# Patient Record
Sex: MALE | Race: WHITE | NOT HISPANIC OR LATINO | Employment: FULL TIME | URBAN - METROPOLITAN AREA
[De-identification: names, ages, dates, MRNs, and addresses within clinical notes are randomized per-mention and may not be internally consistent; named-entity substitution may affect disease eponyms.]

---

## 2022-07-27 ENCOUNTER — OCCUPATIONAL MEDICINE (OUTPATIENT)
Dept: URGENT CARE | Facility: CLINIC | Age: 52
End: 2022-07-27
Payer: COMMERCIAL

## 2022-07-27 VITALS
BODY MASS INDEX: 26.44 KG/M2 | HEART RATE: 95 BPM | RESPIRATION RATE: 16 BRPM | SYSTOLIC BLOOD PRESSURE: 102 MMHG | DIASTOLIC BLOOD PRESSURE: 70 MMHG | HEIGHT: 74 IN | WEIGHT: 206 LBS | OXYGEN SATURATION: 96 % | TEMPERATURE: 98.4 F

## 2022-07-27 DIAGNOSIS — M25.611 DECREASED ROM OF RIGHT SHOULDER: ICD-10-CM

## 2022-07-27 DIAGNOSIS — M25.511 CHRONIC RIGHT SHOULDER PAIN: ICD-10-CM

## 2022-07-27 DIAGNOSIS — M54.2 NECK PAIN: ICD-10-CM

## 2022-07-27 DIAGNOSIS — G47.01 INSOMNIA DUE TO MEDICAL CONDITION: ICD-10-CM
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
## 2022-07-27 DIAGNOSIS — G89.29 CHRONIC RIGHT SHOULDER PAIN: ICD-10-CM

## 2022-07-27 PROCEDURE — 99203 OFFICE O/P NEW LOW 30 MIN: CPT | Performed by: NURSE PRACTITIONER

## 2022-07-27 RX ORDER — IBUPROFEN 800 MG/1
800 TABLET ORAL EVERY 8 HOURS PRN
Qty: 30 TABLET | Refills: 0 | Status: SHIPPED | OUTPATIENT
Start: 2022-07-27 | End: 2022-09-06

## 2022-07-27 RX ORDER — KETOROLAC TROMETHAMINE 30 MG/ML
30 INJECTION, SOLUTION INTRAMUSCULAR; INTRAVENOUS ONCE
Status: COMPLETED | OUTPATIENT
Start: 2022-07-27 | End: 2022-07-27

## 2022-07-27 RX ADMIN — KETOROLAC TROMETHAMINE 30 MG: 30 INJECTION, SOLUTION INTRAMUSCULAR; INTRAVENOUS at 18:03
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
## 2022-08-08 ENCOUNTER — OCCUPATIONAL MEDICINE (OUTPATIENT)
Dept: OCCUPATIONAL MEDICINE | Facility: CLINIC | Age: 52
End: 2022-08-08
Payer: COMMERCIAL

## 2022-08-08 VITALS
HEIGHT: 74 IN | RESPIRATION RATE: 14 BRPM | WEIGHT: 206 LBS | DIASTOLIC BLOOD PRESSURE: 82 MMHG | BODY MASS INDEX: 26.44 KG/M2 | HEART RATE: 85 BPM | SYSTOLIC BLOOD PRESSURE: 126 MMHG | TEMPERATURE: 98.4 F | OXYGEN SATURATION: 95 %

## 2022-08-08 DIAGNOSIS — S16.1XXD STRAIN OF NECK MUSCLE, SUBSEQUENT ENCOUNTER: ICD-10-CM

## 2022-08-08 DIAGNOSIS — S46.911D STRAIN OF RIGHT SHOULDER, SUBSEQUENT ENCOUNTER: ICD-10-CM

## 2022-08-08 PROCEDURE — 99204 OFFICE O/P NEW MOD 45 MIN: CPT | Performed by: PREVENTIVE MEDICINE

## 2022-08-08 RX ORDER — TIZANIDINE 4 MG/1
4 TABLET ORAL
Qty: 30 TABLET | Refills: 1 | Status: SHIPPED | OUTPATIENT
Start: 2022-08-08 | End: 2022-08-26

## 2022-08-08 RX ORDER — DICLOFENAC SODIUM 75 MG/1
75 TABLET, DELAYED RELEASE ORAL 2 TIMES DAILY
Qty: 60 TABLET | Refills: 0 | Status: SHIPPED | OUTPATIENT
Start: 2022-08-08 | End: 2022-08-26
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
## 2022-08-25 SDOH — ECONOMIC STABILITY: HOUSING INSECURITY
IN THE LAST 12 MONTHS, WAS THERE A TIME WHEN YOU DID NOT HAVE A STEADY PLACE TO SLEEP OR SLEPT IN A SHELTER (INCLUDING NOW)?: NO

## 2022-08-25 SDOH — ECONOMIC STABILITY: FOOD INSECURITY: WITHIN THE PAST 12 MONTHS, THE FOOD YOU BOUGHT JUST DIDN'T LAST AND YOU DIDN'T HAVE MONEY TO GET MORE.: NEVER TRUE

## 2022-08-25 SDOH — ECONOMIC STABILITY: FOOD INSECURITY: WITHIN THE PAST 12 MONTHS, YOU WORRIED THAT YOUR FOOD WOULD RUN OUT BEFORE YOU GOT MONEY TO BUY MORE.: NEVER TRUE

## 2022-08-25 SDOH — ECONOMIC STABILITY: TRANSPORTATION INSECURITY
IN THE PAST 12 MONTHS, HAS LACK OF RELIABLE TRANSPORTATION KEPT YOU FROM MEDICAL APPOINTMENTS, MEETINGS, WORK OR FROM GETTING THINGS NEEDED FOR DAILY LIVING?: NO

## 2022-08-25 SDOH — HEALTH STABILITY: PHYSICAL HEALTH: ON AVERAGE, HOW MANY DAYS PER WEEK DO YOU ENGAGE IN MODERATE TO STRENUOUS EXERCISE (LIKE A BRISK WALK)?: 0 DAYS

## 2022-08-25 SDOH — ECONOMIC STABILITY: TRANSPORTATION INSECURITY
IN THE PAST 12 MONTHS, HAS THE LACK OF TRANSPORTATION KEPT YOU FROM MEDICAL APPOINTMENTS OR FROM GETTING MEDICATIONS?: NO

## 2022-08-25 SDOH — ECONOMIC STABILITY: HOUSING INSECURITY: IN THE LAST 12 MONTHS, HOW MANY PLACES HAVE YOU LIVED?: 2

## 2022-08-25 SDOH — HEALTH STABILITY: PHYSICAL HEALTH: ON AVERAGE, HOW MANY MINUTES DO YOU ENGAGE IN EXERCISE AT THIS LEVEL?: 30 MIN

## 2022-08-25 SDOH — HEALTH STABILITY: MENTAL HEALTH
STRESS IS WHEN SOMEONE FEELS TENSE, NERVOUS, ANXIOUS, OR CAN'T SLEEP AT NIGHT BECAUSE THEIR MIND IS TROUBLED. HOW STRESSED ARE YOU?: NOT AT ALL

## 2022-08-25 SDOH — ECONOMIC STABILITY: INCOME INSECURITY: IN THE LAST 12 MONTHS, WAS THERE A TIME WHEN YOU WERE NOT ABLE TO PAY THE MORTGAGE OR RENT ON TIME?: NO

## 2022-08-25 SDOH — ECONOMIC STABILITY: INCOME INSECURITY: HOW HARD IS IT FOR YOU TO PAY FOR THE VERY BASICS LIKE FOOD, HOUSING, MEDICAL CARE, AND HEATING?: NOT HARD AT ALL

## 2022-08-25 SDOH — ECONOMIC STABILITY: TRANSPORTATION INSECURITY
IN THE PAST 12 MONTHS, HAS LACK OF TRANSPORTATION KEPT YOU FROM MEETINGS, WORK, OR FROM GETTING THINGS NEEDED FOR DAILY LIVING?: NO

## 2022-08-25 ASSESSMENT — SOCIAL DETERMINANTS OF HEALTH (SDOH)
HOW OFTEN DO YOU GET TOGETHER WITH FRIENDS OR RELATIVES?: NEVER
HOW MANY DRINKS CONTAINING ALCOHOL DO YOU HAVE ON A TYPICAL DAY WHEN YOU ARE DRINKING: PATIENT DOES NOT DRINK
HOW OFTEN DO YOU ATTENT MEETINGS OF THE CLUB OR ORGANIZATION YOU BELONG TO?: NEVER
HOW OFTEN DO YOU ATTENT MEETINGS OF THE CLUB OR ORGANIZATION YOU BELONG TO?: NEVER
HOW OFTEN DO YOU GET TOGETHER WITH FRIENDS OR RELATIVES?: NEVER
DO YOU BELONG TO ANY CLUBS OR ORGANIZATIONS SUCH AS CHURCH GROUPS UNIONS, FRATERNAL OR ATHLETIC GROUPS, OR SCHOOL GROUPS?: NO
WITHIN THE PAST 12 MONTHS, YOU WORRIED THAT YOUR FOOD WOULD RUN OUT BEFORE YOU GOT THE MONEY TO BUY MORE: NEVER TRUE
ARE YOU MARRIED, WIDOWED, DIVORCED, SEPARATED, NEVER MARRIED, OR LIVING WITH A PARTNER?: LIVING WITH PARTNER
HOW OFTEN DO YOU HAVE SIX OR MORE DRINKS ON ONE OCCASION: NEVER
HOW OFTEN DO YOU ATTEND CHURCH OR RELIGIOUS SERVICES?: NEVER
HOW OFTEN DO YOU HAVE A DRINK CONTAINING ALCOHOL: MONTHLY OR LESS
ARE YOU MARRIED, WIDOWED, DIVORCED, SEPARATED, NEVER MARRIED, OR LIVING WITH A PARTNER?: LIVING WITH PARTNER
IN A TYPICAL WEEK, HOW MANY TIMES DO YOU TALK ON THE PHONE WITH FAMILY, FRIENDS, OR NEIGHBORS?: ONCE A WEEK
IN A TYPICAL WEEK, HOW MANY TIMES DO YOU TALK ON THE PHONE WITH FAMILY, FRIENDS, OR NEIGHBORS?: ONCE A WEEK
HOW OFTEN DO YOU ATTEND CHURCH OR RELIGIOUS SERVICES?: NEVER
DO YOU BELONG TO ANY CLUBS OR ORGANIZATIONS SUCH AS CHURCH GROUPS UNIONS, FRATERNAL OR ATHLETIC GROUPS, OR SCHOOL GROUPS?: NO
HOW HARD IS IT FOR YOU TO PAY FOR THE VERY BASICS LIKE FOOD, HOUSING, MEDICAL CARE, AND HEATING?: NOT HARD AT ALL

## 2022-08-25 ASSESSMENT — LIFESTYLE VARIABLES
SKIP TO QUESTIONS 9-10: 1
HOW OFTEN DO YOU HAVE SIX OR MORE DRINKS ON ONE OCCASION: NEVER
AUDIT-C TOTAL SCORE: 1
HOW OFTEN DO YOU HAVE A DRINK CONTAINING ALCOHOL: MONTHLY OR LESS
HOW MANY STANDARD DRINKS CONTAINING ALCOHOL DO YOU HAVE ON A TYPICAL DAY: PATIENT DOES NOT DRINK

## 2022-08-26 ENCOUNTER — OFFICE VISIT (OUTPATIENT)
Dept: MEDICAL GROUP | Facility: PHYSICIAN GROUP | Age: 52
End: 2022-08-26
Payer: COMMERCIAL

## 2022-08-26 VITALS
OXYGEN SATURATION: 98 % | HEIGHT: 72 IN | DIASTOLIC BLOOD PRESSURE: 86 MMHG | WEIGHT: 199.6 LBS | SYSTOLIC BLOOD PRESSURE: 128 MMHG | HEART RATE: 98 BPM | TEMPERATURE: 97.6 F | BODY MASS INDEX: 27.04 KG/M2 | RESPIRATION RATE: 16 BRPM

## 2022-08-26 DIAGNOSIS — Z12.12 SCREENING FOR COLORECTAL CANCER: ICD-10-CM

## 2022-08-26 DIAGNOSIS — Z12.11 SCREENING FOR COLORECTAL CANCER: ICD-10-CM

## 2022-08-26 DIAGNOSIS — Z12.5 SCREENING FOR MALIGNANT NEOPLASM OF PROSTATE: ICD-10-CM

## 2022-08-26 DIAGNOSIS — Z80.3 FAMILY HISTORY OF BREAST CANCER IN SISTER: ICD-10-CM

## 2022-08-26 DIAGNOSIS — Z13.220 SCREENING, LIPID: ICD-10-CM

## 2022-08-26 DIAGNOSIS — Z23 NEED FOR VACCINATION: ICD-10-CM

## 2022-08-26 DIAGNOSIS — R53.83 FATIGUE, UNSPECIFIED TYPE: ICD-10-CM

## 2022-08-26 DIAGNOSIS — N52.03 COMBINED ARTERIAL INSUFFICIENCY AND CORPORO-VENOUS OCCLUSIVE ERECTILE DYSFUNCTION: ICD-10-CM

## 2022-08-26 PROBLEM — N52.9 ERECTILE DYSFUNCTION: Status: ACTIVE | Noted: 2022-08-26

## 2022-08-26 PROCEDURE — 99204 OFFICE O/P NEW MOD 45 MIN: CPT | Performed by: FAMILY MEDICINE

## 2022-08-26 RX ORDER — ZOSTER VACCINE RECOMBINANT, ADJUVANTED 50 MCG/0.5
0.5 KIT INTRAMUSCULAR ONCE
Qty: 0.5 ML | Refills: 1 | Status: SHIPPED | OUTPATIENT
Start: 2022-08-26 | End: 2022-08-26

## 2022-08-26 RX ORDER — CLOSTRIDIUM TETANI TOXOID ANTIGEN (FORMALDEHYDE INACTIVATED), CORYNEBACTERIUM DIPHTHERIAE TOXOID ANTIGEN (FORMALDEHYDE INACTIVATED), BORDETELLA PERTUSSIS TOXOID ANTIGEN (GLUTARALDEHYDE INACTIVATED), BORDETELLA PERTUSSIS FILAMENTOUS HEMAGGLUTININ ANTIGEN (FORMALDEHYDE INACTIVATED), BORDETELLA PERTUSSIS PERTACTIN ANTIGEN, AND BORDETELLA PERTUSSIS FIMBRIAE 2/3 ANTIGEN 5; 2; 2.5; 5; 3; 5 [LF]/.5ML; [LF]/.5ML; UG/.5ML; UG/.5ML; UG/.5ML; UG/.5ML
0.5 INJECTION, SUSPENSION INTRAMUSCULAR ONCE
Qty: 0.5 ML | Refills: 0 | Status: SHIPPED
Start: 2022-08-26 | End: 2022-08-26

## 2022-08-26 RX ORDER — SILDENAFIL 100 MG/1
100 TABLET, FILM COATED ORAL
Qty: 10 TABLET | Refills: 3 | Status: SHIPPED | OUTPATIENT
Start: 2022-08-26 | End: 2022-09-15

## 2022-08-26 ASSESSMENT — PATIENT HEALTH QUESTIONNAIRE - PHQ9: CLINICAL INTERPRETATION OF PHQ2 SCORE: 0

## 2022-08-26 NOTE — PROGRESS NOTES
Subjective:   Donadl Fuller is a 51 y.o. male here today for evaluation and management of:     Erectile dysfunction  rx for viagra 100mg as needed  Risks discussed.   No hx of trauma/surgery  No new partners.   No hx of HTN, heart disease.      Advised him to get colonoscopy, no fly hx of colon cancer.     Current medicines (including changes today)  Current Outpatient Medications   Medication Sig Dispense Refill    tetanus-dipth-acell pertussis (ADACEL) 5-2-15.5 LF-MCG/0.5 Suspension Inject 0.5 mL into the shoulder, thigh, or buttocks one time for 1 dose. 0.5 mL 0    Zoster Vac Recomb Adjuvanted (SHINGRIX) 50 MCG/0.5ML Recon Susp Inject 0.5 mL into the shoulder, thigh, or buttocks one time for 1 dose. 0.5 mL 1    sildenafil citrate (VIAGRA) 100 MG tablet Take 1 Tablet by mouth 1 time a day as needed for Erectile Dysfunction. 10 Tablet 3    ibuprofen (MOTRIN) 800 MG Tab Take 1 Tablet by mouth every 8 hours as needed for Moderate Pain or Inflammation. 30 Tablet 0     No current facility-administered medications for this visit.     He  has no past medical history on file.    ROS  No chest pain, no shortness of breath, no abdominal pain       Objective:     /86 (BP Location: Left arm, Patient Position: Sitting, BP Cuff Size: Adult)   Pulse 98   Temp 36.4 °C (97.6 °F) (Temporal)   Resp 16   Ht 1.829 m (6')   Wt 90.5 kg (199 lb 9.6 oz)   SpO2 98%  Body mass index is 27.07 kg/m².   Physical Exam:  Constitutional: Alert, no distress.  Skin: Warm, dry, good turgor, no rashes in visible areas.  Eye: Equal, round and reactive, conjunctiva clear, lids normal.  ENMT: Lips without lesions, good dentition, oropharynx clear.  Neck: Trachea midline, no masses, no thyromegaly. No cervical or supraclavicular lymphadenopathy  Respiratory: Unlabored respiratory effort, lungs clear to auscultation, no wheezes, no ronchi.  Cardiovascular: Normal S1, S2, no murmur, no edema.  Abdomen: Soft, non-tender, no masses, no  hepatosplenomegaly.  Psych: Alert and oriented x3, normal affect and mood.        Assessment and Plan:   The following treatment plan was discussed    1. Need for vaccination  - tetanus-dipth-acell pertussis (ADACEL) 5-2-15.5 LF-MCG/0.5 Suspension; Inject 0.5 mL into the shoulder, thigh, or buttocks one time for 1 dose.  Dispense: 0.5 mL; Refill: 0  - Zoster Vac Recomb Adjuvanted (SHINGRIX) 50 MCG/0.5ML Recon Susp; Inject 0.5 mL into the shoulder, thigh, or buttocks one time for 1 dose.  Dispense: 0.5 mL; Refill: 1    2. Screening for colorectal cancer  - Referral to GI for Colonoscopy    3. Combined arterial insufficiency and corporo-venous occlusive erectile dysfunction  - Comp Metabolic Panel; Future    4. Screening, lipid  - Lipid Profile; Future    5. Screening for malignant neoplasm of prostate  - PROSTATE SPECIFIC AG SCREENING; Future    6. Fatigue, unspecified type  - CBC WITH DIFFERENTIAL; Future    Other orders  - sildenafil citrate (VIAGRA) 100 MG tablet; Take 1 Tablet by mouth 1 time a day as needed for Erectile Dysfunction.  Dispense: 10 Tablet; Refill: 3      Followup: Return in about 6 months (around 2/26/2023) for Lab Review.

## 2022-08-26 NOTE — ASSESSMENT & PLAN NOTE
rx for viagra 100mg as needed  Risks discussed.   No hx of trauma/surgery  No new partners.   No hx of HTN, heart disease.

## 2022-08-26 NOTE — PATIENT INSTRUCTIONS
Please get fasting labs, fasting for 8-10 hours before next visit. You can make an appointment for the lab or walk in.   Lab hours [facility] location: Monday to Friday 6 am - 4 pm, Saturday 7 am -noon  Even if you lose your lab paperwork, you can still come in to get your lab done.     Please check [facility] for your referral information for colonoscopy or call the referral department at .   You can also send me a [employer] message regarding your referral information.   Please note you will probably not get a call regarding the referral.

## 2022-09-06 ENCOUNTER — OFFICE VISIT (OUTPATIENT)
Dept: MEDICAL GROUP | Facility: PHYSICIAN GROUP | Age: 52
End: 2022-09-06
Payer: COMMERCIAL

## 2022-09-06 VITALS
HEIGHT: 72 IN | TEMPERATURE: 99.3 F | WEIGHT: 204 LBS | RESPIRATION RATE: 14 BRPM | DIASTOLIC BLOOD PRESSURE: 80 MMHG | BODY MASS INDEX: 27.63 KG/M2 | OXYGEN SATURATION: 98 % | SYSTOLIC BLOOD PRESSURE: 126 MMHG | HEART RATE: 100 BPM

## 2022-09-06 DIAGNOSIS — E11.65 TYPE 2 DIABETES MELLITUS WITH HYPERGLYCEMIA, WITH LONG-TERM CURRENT USE OF INSULIN (HCC): ICD-10-CM

## 2022-09-06 DIAGNOSIS — Z79.4 TYPE 2 DIABETES MELLITUS WITH HYPERGLYCEMIA, WITH LONG-TERM CURRENT USE OF INSULIN (HCC): ICD-10-CM

## 2022-09-06 PROCEDURE — 99214 OFFICE O/P EST MOD 30 MIN: CPT | Performed by: FAMILY MEDICINE

## 2022-09-06 RX ORDER — SIMVASTATIN 20 MG
20 TABLET ORAL NIGHTLY
Qty: 90 TABLET | Refills: 3 | Status: SHIPPED | OUTPATIENT
Start: 2022-09-06 | End: 2023-01-24

## 2022-09-06 RX ORDER — DULAGLUTIDE 0.75 MG/.5ML
0.5 INJECTION, SOLUTION SUBCUTANEOUS
Qty: 2 ML | Refills: 3 | Status: SHIPPED | OUTPATIENT
Start: 2022-09-06 | End: 2023-01-24

## 2022-09-06 RX ORDER — LISINOPRIL 2.5 MG/1
2.5 TABLET ORAL DAILY
Qty: 90 TABLET | Refills: 3 | Status: SHIPPED | OUTPATIENT
Start: 2022-09-06 | End: 2023-01-24

## 2022-09-06 NOTE — PATIENT INSTRUCTIONS
Please get fasting labs, fasting for 8-10 hours before next visit. You can make an appointment for the lab or walk in.   Lab hours [facility] location: Monday to Friday 6 am - 4 pm, Saturday 7 am -noon  Even if you lose your lab paperwork, you can still come in to get your lab done.

## 2022-09-06 NOTE — ASSESSMENT & PLAN NOTE
Pt is on insulin 10 units qhs after A1c >14 when screening labs for neck surgery revealed BS in 600s.  Blood sugars now in 300s. I advsied he inc nph insulin to 15 units qhs  rx for met 1000 bid provided  rx for trulicity provided if inc in insulin and met are not improving blood sugars.   He feels well, no increased thirst, urination, or any symptoms of DM at all.   It is fortunate he was going to have surgery and had screening labs done.     ACEI added low dose lisinopril 2.5 mg to protect renal function, heart  Simvastatin 20 mg added for cardiovascular protection  Follow up labs ordered

## 2022-09-07 RX ORDER — BLOOD-GLUCOSE METER
KIT MISCELLANEOUS
COMMUNITY
Start: 2022-09-04

## 2022-09-07 RX ORDER — IBUPROFEN 200 MG
200 TABLET ORAL
COMMUNITY
End: 2023-01-24

## 2022-09-07 RX ORDER — BLOOD SUGAR DIAGNOSTIC
1 STRIP MISCELLANEOUS
COMMUNITY
Start: 2022-09-02

## 2022-09-07 RX ORDER — BLOOD SUGAR DIAGNOSTIC
STRIP MISCELLANEOUS
COMMUNITY
Start: 2022-09-04

## 2022-09-07 RX ORDER — LANCETS 30 GAUGE
EACH MISCELLANEOUS
COMMUNITY
Start: 2022-09-03

## 2022-09-07 NOTE — PROGRESS NOTES
Subjective:   Donald Fuller is a 51 y.o. male here today for evaluation and management of:     Type 2 diabetes mellitus with hyperglycemia, with long-term current use of insulin ([facility])  Pt is on insulin 10 units qhs after A1c >14 when screening labs for neck surgery revealed BS in 600s.  Blood sugars now in 300s. I advsied he inc nph insulin to 15 units qhs  rx for met 1000 bid provided  rx for trulicity provided if inc in insulin and met are not improving blood sugars.   He feels well, no increased thirst, urination, or any symptoms of DM at all.   It is fortunate he was going to have surgery and had screening labs done.     ACEI added low dose lisinopril 2.5 mg to protect renal function, heart  Simvastatin 20 mg added for cardiovascular protection  Follow up labs ordered       Current medicines (including changes today)  Current Outpatient Medications   Medication Sig Dispense Refill    insulin NPH (HUMULIN N) 100 UNIT/ML Suspension Inject 10 Units under the skin.      metformin (GLUCOPHAGE) 1000 MG tablet Take 1 Tablet by mouth 2 times a day with meals. 180 Tablet 3    Dulaglutide (TRULICITY) 0.75 MG/0.5ML Solution Pen-injector Inject 0.5 mL under the skin every 7 days. 2 mL 3    lisinopril (PRINIVIL) 2.5 MG Tab Take 1 Tablet by mouth every day. For kidney protection 90 Tablet 3    simvastatin (ZOCOR) 20 MG Tab Take 1 Tablet by mouth every evening. To protect your heart 90 Tablet 3     No current facility-administered medications for this visit.     He  has no past medical history on file.    ROS  No chest pain, no shortness of breath, no abdominal pain       Objective:     /80 (BP Location: Left arm)   Pulse 100   Temp 37.4 °C (99.3 °F) (Temporal)   Resp 14   Ht 1.829 m (6')   Wt 92.5 kg (204 lb)   SpO2 98%  Body mass index is 27.67 kg/m².   Physical Exam:  Constitutional: Alert, no distress.  Skin: Warm, dry, good turgor, no rashes in visible areas.  Eye: Equal, round and reactive,  conjunctiva clear, lids normal.  ENMT: Lips without lesions, good dentition, oropharynx clear.  Neck: Trachea midline, no masses, no thyromegaly. No cervical or supraclavicular lymphadenopathy  Respiratory: Unlabored respiratory effort, lungs clear to auscultation, no wheezes, no ronchi.  Cardiovascular: Normal S1, S2, no murmur, no edema.  Abdomen: Soft, non-tender, no masses, no hepatosplenomegaly.  Psych: Alert and oriented x3, normal affect and mood.        Assessment and Plan:   The following treatment plan was discussed    1. Type 2 diabetes mellitus with hyperglycemia, with long-term current use of insulin (HCC)  - Lipid Profile; Future  - Comp Metabolic Panel; Future  - HEMOGLOBIN A1C; Future  - MICROALBUMIN CREAT RATIO URINE; Future    Other orders  - insulin NPH (HUMULIN N) 100 UNIT/ML Suspension; Inject 10 Units under the skin.  - metformin (GLUCOPHAGE) 1000 MG tablet; Take 1 Tablet by mouth 2 times a day with meals.  Dispense: 180 Tablet; Refill: 3  - Dulaglutide (TRULICITY) 0.75 MG/0.5ML Solution Pen-injector; Inject 0.5 mL under the skin every 7 days.  Dispense: 2 mL; Refill: 3  - lisinopril (PRINIVIL) 2.5 MG Tab; Take 1 Tablet by mouth every day. For kidney protection  Dispense: 90 Tablet; Refill: 3  - simvastatin (ZOCOR) 20 MG Tab; Take 1 Tablet by mouth every evening. To protect your heart  Dispense: 90 Tablet; Refill: 3      Followup: Return in about 3 months (around 12/6/2022) for DM2, Lab Review.

## 2022-09-15 RX ORDER — CALCIUM CARB/VITAMIN D3/VIT K1 500-100-40
TABLET,CHEWABLE ORAL
COMMUNITY
End: 2022-09-15 | Stop reason: SDUPTHER

## 2022-09-15 RX ORDER — SILDENAFIL 100 MG/1
100 TABLET, FILM COATED ORAL
Qty: 10 TABLET | Refills: 3 | COMMUNITY
Start: 2022-09-15 | End: 2022-09-15 | Stop reason: SDUPTHER

## 2022-09-15 RX ORDER — SILDENAFIL 100 MG/1
100 TABLET, FILM COATED ORAL
Qty: 10 TABLET | Refills: 3 | Status: SHIPPED | OUTPATIENT
Start: 2022-09-15 | End: 2022-09-21

## 2022-09-15 RX ORDER — CALCIUM CARB/VITAMIN D3/VIT K1 500-100-40
TABLET,CHEWABLE ORAL
Qty: 400 EACH | Refills: 3 | Status: SHIPPED | OUTPATIENT
Start: 2022-09-15

## 2022-09-15 NOTE — TELEPHONE ENCOUNTER
Received request via: Patient    Was the patient seen in the last year in this department? Yes    Does the patient have an active prescription (recently filled or refills available) for medication(s) requested? No      Pt has tried the sildenafil citrate can we do this as Viagra

## 2022-11-10 ENCOUNTER — NON-PROVIDER VISIT (OUTPATIENT)
Dept: URGENT CARE | Facility: PHYSICIAN GROUP | Age: 52
End: 2022-11-10

## 2022-11-10 DIAGNOSIS — Z02.1 PRE-EMPLOYMENT DRUG SCREENING: ICD-10-CM

## 2022-11-10 PROCEDURE — 80305 DRUG TEST PRSMV DIR OPT OBS: CPT | Performed by: NURSE PRACTITIONER

## 2022-12-14 ENCOUNTER — OFFICE VISIT (OUTPATIENT)
Dept: MEDICAL GROUP | Facility: PHYSICIAN GROUP | Age: 52
End: 2022-12-14

## 2022-12-14 VITALS
SYSTOLIC BLOOD PRESSURE: 118 MMHG | OXYGEN SATURATION: 97 % | WEIGHT: 216 LBS | HEIGHT: 73 IN | DIASTOLIC BLOOD PRESSURE: 68 MMHG | RESPIRATION RATE: 18 BRPM | HEART RATE: 86 BPM | BODY MASS INDEX: 28.63 KG/M2 | TEMPERATURE: 97.2 F

## 2022-12-14 DIAGNOSIS — N52.03 COMBINED ARTERIAL INSUFFICIENCY AND CORPORO-VENOUS OCCLUSIVE ERECTILE DYSFUNCTION: ICD-10-CM

## 2022-12-14 DIAGNOSIS — D18.01 CHERRY ANGIOMA: ICD-10-CM

## 2022-12-14 DIAGNOSIS — R21 SKIN RASH: ICD-10-CM

## 2022-12-14 DIAGNOSIS — Z98.890 HISTORY OF NECK SURGERY: ICD-10-CM

## 2022-12-14 DIAGNOSIS — D22.9 BENIGN MOLE: ICD-10-CM

## 2022-12-14 PROBLEM — J96.00 ACUTE RESPIRATORY FAILURE (HCC): Status: ACTIVE | Noted: 2022-10-18

## 2022-12-14 PROBLEM — E87.1 HYPONATREMIA: Status: ACTIVE | Noted: 2022-10-18

## 2022-12-14 PROCEDURE — 99213 OFFICE O/P EST LOW 20 MIN: CPT | Performed by: NURSE PRACTITIONER

## 2022-12-14 RX ORDER — CELECOXIB 200 MG/1
200 CAPSULE ORAL
COMMUNITY
Start: 2022-10-11

## 2022-12-14 RX ORDER — TADALAFIL 20 MG/1
TABLET ORAL
Qty: 30 TABLET | Refills: 6 | Status: SHIPPED | OUTPATIENT
Start: 2022-12-14

## 2022-12-14 RX ORDER — GABAPENTIN 300 MG/1
300 CAPSULE ORAL
COMMUNITY
End: 2023-01-24

## 2022-12-14 ASSESSMENT — FIBROSIS 4 INDEX: FIB4 SCORE: .6163335513613656911

## 2022-12-14 NOTE — PROGRESS NOTES
"Subjective:     CC:   Chief Complaint   Patient presents with    Rash     Left upper chest x 2 yr        HPI:   Donald presents today with    History of neck surgery   Dr Sanderson was surgeon 10/18; neck pain has resolved    Skin rash  Has a spot on upper left side of chest proximal to collarbone x 2 yrs; will heal w/a/b ointment but after picking at it, returns    Erectile dysfunction  Generic Viagra was not effective for him  Tried name brand in the past was very effective but expensive   would like to try Cialis   advised Good Rx card given; will look into [facility] site    Benign mole  Mole on the left side of his cheek growing in size    Cherry angioma  Multiple on head bilat              ROS per HPI    Objective:     Exam:  /68   Pulse 86   Temp 36.2 °C (97.2 °F) (Temporal)   Resp 18   Ht 1.854 m (6' 1\")   Wt 98 kg (216 lb)   SpO2 97%   BMI 28.50 kg/m²  Body mass index is 28.5 kg/m².    Physical Exam:  Constitutional: Well-developed and well-nourished male  Not diaphoretic. No distress.   Skin: warm, dry, intact, scattered cherry angiomas on head, small mass approx size of a dime on left side of chest, no d/c or erythema noted; mole on left side of cheek approx size of a pea, small mass on left upper eyelid medial side; no open wounds  Head: Atraumatic without lesions.  Eyes: Conjunctivae are normal. Pupils are equal, round. No scleral icterus.   Ears:  External ears unremarkable.   Neck: Supple, trachea midline. No thyromegaly present. No cervical or supraclavicular lymphadenopathy.  Cardiovascular: Regular rate and rhythm without murmur.   Pulmonary: Clear to ausculation. Normal effort. No rales, ronchi, or wheezing.  Extremities: No cyanosis, clubbing, erythema, nor edema.   Neurological: Alert and oriented x 3.   Psychiatric:  Behavior, mood, and affect are appropriate.        Assessment & Plan:     51 y.o. male with the following -     1. Skin rash  - Referral to Dermatology    2. [place] " angioma  - Referral to Dermatology    3. 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    Please note that this dictation was created using voice recognition software. I have made every reasonable attempt to correct obvious errors, but I expect that there are errors of grammar and possibly content that I did not discover before finalizing the note.

## 2022-12-14 NOTE — ASSESSMENT & PLAN NOTE
Generic Viagra was not effective for him  Tried name brand in the past was very effective but expensive   would like to try Cialis   advised Good Rx card given; will look into [facility] site

## 2022-12-14 NOTE — ASSESSMENT & PLAN NOTE
Has a spot on upper left side of chest proximal to collarbone x 2 yrs; will heal w/a/b ointment but after picking at it, returns

## 2023-01-24 ENCOUNTER — OFFICE VISIT (OUTPATIENT)
Dept: MEDICAL GROUP | Facility: PHYSICIAN GROUP | Age: 53
End: 2023-01-24

## 2023-01-24 VITALS
TEMPERATURE: 97 F | WEIGHT: 206 LBS | OXYGEN SATURATION: 98 % | RESPIRATION RATE: 16 BRPM | DIASTOLIC BLOOD PRESSURE: 68 MMHG | HEIGHT: 73 IN | SYSTOLIC BLOOD PRESSURE: 118 MMHG | BODY MASS INDEX: 27.3 KG/M2 | HEART RATE: 107 BPM

## 2023-01-24 DIAGNOSIS — R94.31 ABNORMAL EKG: ICD-10-CM

## 2023-01-24 DIAGNOSIS — R55 SYNCOPE, UNSPECIFIED SYNCOPE TYPE: ICD-10-CM

## 2023-01-24 DIAGNOSIS — E11.65 TYPE 2 DIABETES MELLITUS WITH HYPERGLYCEMIA, WITH LONG-TERM CURRENT USE OF INSULIN (HCC): ICD-10-CM

## 2023-01-24 DIAGNOSIS — E16.2 HYPOGLYCEMIA: ICD-10-CM

## 2023-01-24 DIAGNOSIS — Z79.4 TYPE 2 DIABETES MELLITUS WITH HYPERGLYCEMIA, WITH LONG-TERM CURRENT USE OF INSULIN (HCC): ICD-10-CM

## 2023-01-24 DIAGNOSIS — F41.9 ANXIETY: ICD-10-CM

## 2023-01-24 LAB
GLUCOSE BLD-MCNC: 256 MG/DL (ref 70–100)
HBA1C MFR BLD: 9.7 % (ref 0–5.6)
INT CON NEG: ABNORMAL
INT CON POS: ABNORMAL

## 2023-01-24 PROCEDURE — 99214 OFFICE O/P EST MOD 30 MIN: CPT | Performed by: FAMILY MEDICINE

## 2023-01-24 PROCEDURE — 83036 HEMOGLOBIN GLYCOSYLATED A1C: CPT | Performed by: FAMILY MEDICINE

## 2023-01-24 PROCEDURE — 93000 ELECTROCARDIOGRAM COMPLETE: CPT | Performed by: FAMILY MEDICINE

## 2023-01-24 RX ORDER — VENLAFAXINE HYDROCHLORIDE 37.5 MG/1
37.5 CAPSULE, EXTENDED RELEASE ORAL DAILY
Qty: 90 CAPSULE | Refills: 3 | Status: SHIPPED | OUTPATIENT
Start: 2023-01-24 | End: 2023-03-08 | Stop reason: SDUPTHER

## 2023-01-24 RX ORDER — PROCHLORPERAZINE 25 MG/1
SUPPOSITORY RECTAL
Qty: 1 EACH | Refills: 3 | Status: SHIPPED | OUTPATIENT
Start: 2023-01-24

## 2023-01-24 RX ORDER — PROCHLORPERAZINE 25 MG/1
SUPPOSITORY RECTAL
Qty: 1 EACH | Refills: 0 | Status: SHIPPED | OUTPATIENT
Start: 2023-01-24

## 2023-01-24 RX ORDER — HYDROXYZINE HYDROCHLORIDE 25 MG/1
25 TABLET, FILM COATED ORAL 3 TIMES DAILY PRN
Qty: 90 TABLET | Refills: 0 | Status: SHIPPED | OUTPATIENT
Start: 2023-01-24 | End: 2023-03-08 | Stop reason: SDUPTHER

## 2023-01-24 RX ORDER — PROCHLORPERAZINE 25 MG/1
SUPPOSITORY RECTAL
Qty: 3 EACH | Refills: 11 | Status: SHIPPED | OUTPATIENT
Start: 2023-01-24

## 2023-01-24 RX ORDER — VENLAFAXINE HYDROCHLORIDE 37.5 MG/1
37.5 CAPSULE, EXTENDED RELEASE ORAL DAILY
Qty: 90 CAPSULE | Refills: 3 | Status: SHIPPED | OUTPATIENT
Start: 2023-01-24 | End: 2023-01-24 | Stop reason: SDUPTHER

## 2023-01-24 RX ORDER — GABAPENTIN 300 MG/1
600 CAPSULE ORAL 3 TIMES DAILY
Qty: 180 CAPSULE | Refills: 3 | Status: SHIPPED | OUTPATIENT
Start: 2023-01-24 | End: 2023-06-16 | Stop reason: SDUPTHER

## 2023-01-24 RX ORDER — SIMVASTATIN 20 MG
20 TABLET ORAL
COMMUNITY

## 2023-01-24 ASSESSMENT — FIBROSIS 4 INDEX: FIB4 SCORE: 0.63

## 2023-01-25 ENCOUNTER — TELEPHONE (OUTPATIENT)
Dept: HEALTH INFORMATION MANAGEMENT | Facility: OTHER | Age: 53
End: 2023-01-25

## 2023-01-25 ENCOUNTER — E-CONSULT (OUTPATIENT)
Dept: CARDIOLOGY | Facility: MEDICAL CENTER | Age: 53
End: 2023-01-25

## 2023-01-25 DIAGNOSIS — Z71.9 ENCOUNTER FOR CONSULTATION: ICD-10-CM

## 2023-01-25 PROCEDURE — 99447 NTRPROF PH1/NTRNET/EHR 11-20: CPT | Performed by: INTERNAL MEDICINE

## 2023-01-25 NOTE — ASSESSMENT & PLAN NOTE
In dec and then a week ago he had episodes of syncope  He has blood sugars in 300s as he ran out of his insulin  He was in the ER in [place] 2 days after he passed out, fell and broke ribs  2 days ago he felt faint, fell against the bed and passed out briefly.    ekg at [facility] showing tachycardia and diffuse st elevations  Similar ekg in clinic today.   I advised him to go to ER immediately with any recurrent syncope or any chest pain or call 911  I will request cardiology econsult to look at the ekg in case I am missing a serious arrhythmia.
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
## 2023-01-25 NOTE — PROGRESS NOTES
Called patient to see if patient was around building to come in and do EKG. Patient stated he was at home. I asked the patient to come back and do his EKG. Patient asked about his medications, I checked and assured the patient that they were sent to his pharmacy. I then asked the patient if he would be able to come back to do an EKG that his provider ordered and he stated he would be on his way. Called patient again at 5:31.Patient was upset stated he needs his gabapentin for his nerve damage. I told the patient I would lend his provider know to order the prescription. I then asked him again if he would be coming and he stated he will be here tomorrow after lunch.

## 2023-01-25 NOTE — PROGRESS NOTES
E-Consult Response     After careful review of the patient's information available in the medical record, the following are my findings and recommendations:    Reason for consult: Abnormal EKG    Summary of data reviewed: Clinic notes, EKG    Recommendations: EKG itself is not very concerning especially given the fact it has not changed since December.  However if he has recurrent syncope would recommend going to ER.  Agree with urgent cardiology consult looks like he needs full evaluation, echocardiogram.    E-Consult Time: 11 minutes were spent with >50% of the total time spent reviewing items outlined in the summary of data reviewed (Use code [id number])    Sravan Gonzalez M.D.

## 2023-01-25 NOTE — PROGRESS NOTES
Attempted to call patient for EKG, A1C, and Glucose from lobby. Pt held up his index finger for me to wait and left the building. Waited for 2 minutes then moved on to the next patient.

## 2023-01-25 NOTE — ASSESSMENT & PLAN NOTE
He has poorly controlled blood sugars  He is on humulin 22 units twice a day, met 1000 bid  He ran out of strips. Before that sugars were 126  He ran out of insulin as workman's comp did not pay him for a month and a half, he has to pay for his medication out of pocket.
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
## 2023-01-27 ENCOUNTER — TELEPHONE (OUTPATIENT)
Dept: CARDIOLOGY | Facility: MEDICAL CENTER | Age: 53
End: 2023-01-27

## 2023-01-27 NOTE — TELEPHONE ENCOUNTER
Spoke to patient in regards to obtaining records for NP appointment with Dr. Israel 2/1/23. Per patient has never been treated by a previous cardiologist. Confirmed all recent notes, labs, and cardiac imaging are in Epic. Confirmed with patient appointment time, location, and date.

## 2023-02-02 ENCOUNTER — TELEPHONE (OUTPATIENT)
Dept: CARDIOLOGY | Facility: MEDICAL CENTER | Age: 53
End: 2023-02-02

## 2023-02-06 ENCOUNTER — TELEPHONE (OUTPATIENT)
Dept: HEALTH INFORMATION MANAGEMENT | Facility: OTHER | Age: 53
End: 2023-02-06

## 2023-03-09 RX ORDER — HYDROXYZINE HYDROCHLORIDE 25 MG/1
25 TABLET, FILM COATED ORAL 3 TIMES DAILY PRN
Qty: 90 TABLET | Refills: 3 | Status: SHIPPED | OUTPATIENT
Start: 2023-03-09 | End: 2023-06-16 | Stop reason: SDUPTHER

## 2023-03-09 RX ORDER — VENLAFAXINE HYDROCHLORIDE 37.5 MG/1
37.5 CAPSULE, EXTENDED RELEASE ORAL DAILY
Qty: 90 CAPSULE | Refills: 3 | Status: SHIPPED | OUTPATIENT
Start: 2023-03-09 | End: 2023-06-16 | Stop reason: SDUPTHER

## 2023-03-09 NOTE — TELEPHONE ENCOUNTER
Received request via: Pharmacy    Was the patient seen in the last year in this department? Yes  LOV 01/24/2023    Does the patient have an active prescription (recently filled or refills available) for medication(s) requested? No    Does the patient have [facility] Plus and need 100 day supply (blood pressure, diabetes and cholesterol meds only)? Patient does not have SCP

## 2023-03-15 ENCOUNTER — OFFICE VISIT (OUTPATIENT)
Dept: MEDICAL GROUP | Facility: PHYSICIAN GROUP | Age: 53
End: 2023-03-15
Payer: COMMERCIAL

## 2023-03-15 VITALS
TEMPERATURE: 97.6 F | OXYGEN SATURATION: 97 % | WEIGHT: 217 LBS | HEART RATE: 106 BPM | HEIGHT: 73 IN | BODY MASS INDEX: 28.76 KG/M2 | RESPIRATION RATE: 16 BRPM | SYSTOLIC BLOOD PRESSURE: 110 MMHG | DIASTOLIC BLOOD PRESSURE: 78 MMHG

## 2023-03-15 DIAGNOSIS — E11.65 TYPE 2 DIABETES MELLITUS WITH HYPERGLYCEMIA, WITH LONG-TERM CURRENT USE OF INSULIN (HCC): ICD-10-CM

## 2023-03-15 DIAGNOSIS — Z12.12 SCREENING FOR COLORECTAL CANCER: ICD-10-CM

## 2023-03-15 DIAGNOSIS — F41.9 ANXIETY: ICD-10-CM

## 2023-03-15 DIAGNOSIS — Z79.4 TYPE 2 DIABETES MELLITUS WITH HYPERGLYCEMIA, WITH LONG-TERM CURRENT USE OF INSULIN (HCC): ICD-10-CM

## 2023-03-15 DIAGNOSIS — R55 SYNCOPE, UNSPECIFIED SYNCOPE TYPE: ICD-10-CM

## 2023-03-15 DIAGNOSIS — Z23 NEED FOR VACCINATION: ICD-10-CM

## 2023-03-15 DIAGNOSIS — Z12.11 SCREENING FOR COLORECTAL CANCER: ICD-10-CM

## 2023-03-15 PROBLEM — J96.00 ACUTE RESPIRATORY FAILURE (HCC): Status: RESOLVED | Noted: 2022-10-18 | Resolved: 2023-03-15

## 2023-03-15 PROCEDURE — 99214 OFFICE O/P EST MOD 30 MIN: CPT | Performed by: FAMILY MEDICINE

## 2023-03-15 ASSESSMENT — FIBROSIS 4 INDEX: FIB4 SCORE: 0.63

## 2023-03-15 ASSESSMENT — PATIENT HEALTH QUESTIONNAIRE - PHQ9: CLINICAL INTERPRETATION OF PHQ2 SCORE: 0

## 2023-03-15 NOTE — PATIENT INSTRUCTIONS
Referral information sent to the following:  Cardiology     [facility]  [facility]  [address], [address]  [place] [address]  Phone: [phone]

## 2023-03-15 NOTE — ASSESSMENT & PLAN NOTE
Advised him to follow up with cardiology referral and echo. Number to call cardiology provided.   He has had no further syncopal episodes.   ER precautions reviewed.

## 2023-03-15 NOTE — PROGRESS NOTES
Subjective:   Donald Fuller is a 52 y.o. male here today for evaluation and management of:     Anxiety  Good improvement with effexor and hydroxyzine  He is more relaxed.       Type 2 diabetes mellitus with hyperglycemia, with long-term current use of insulin (HCC)  Fasting sugars 269  He is on metformin 1000 bid  Had not paid from workers comp so not been on his insulin.  Insulin 70/30 25 units bid ordered: less expensive  DM foot exam done today  Retinal screen done in clinic today  PNA vaccination done today.        Syncope  Advised him to follow up with cardiology referral and echo. Number to call cardiology provided.   He has had no further syncopal episodes.   ER precautions reviewed.              Current medicines (including changes today)  Current Outpatient Medications   Medication Sig Dispense Refill    insulin 70/30 (HUMULIN 70/30/NOVOLIN 70/30) (70-30) 100 UNIT/ML Suspension Inject 25 Units under the skin 2 times a day. 10 mL 11    hydrOXYzine HCl (ATARAX) 25 MG Tab Take 1 Tablet by mouth 3 times a day as needed for Itching or Anxiety. 90 Tablet 3    venlafaxine XR (EFFEXOR XR) 37.5 MG CAPSULE SR 24 HR Take 1 Capsule by mouth every day. 90 Capsule 3    simvastatin (ZOCOR) 20 MG Tab Take 20 mg by mouth.      Continuous Blood Gluc  (DEXCOM G6 ) Device Use multiple times a day to check blood sugars: fasting, before and after meals and for symptoms of low and high blood sugars. 1 Each 0    Continuous Blood Gluc Sensor (DEXCOM G6 SENSOR) Misc Use multiple times a day to check blood sugars: fasting, before and after meals and for symptoms of low and high blood sugars. 3 Each 11    Continuous Blood Gluc Transmit (DEXCOM G6 TRANSMITTER) Misc Use multiple times a day to check blood sugars: fasting, before and after meals and for symptoms of low and high blood sugars. 1 Each 3    insulin NPH (HUMULIN/NOVOLIN) 100 UNIT/ML Suspension Inject 22 Units under the skin 2 times a day. 14 mL 5     "gabapentin (NEURONTIN) 300 MG Cap Take 2 Capsules by mouth 3 times a day. 180 Capsule 3    celecoxib (CELEBREX) 200 MG Cap Take 200 mg by mouth every day.      tadalafil (CIALIS) 20 MG tablet Take 1/2 tab PO 30-60 min before activity; may increase to full tab prn; max 20 mg/24 hr 30 Tablet 6    syringe 1 ML Misc       Insulin Syringe-Needle U-100 (INSULIN SYRINGE 1CC/31GX5/16\") 31G X 5/16\" 1 ML Misc Use as directed for SQ injection of insulin. 400 Each 3    Blood Glucose Calibration (ONETOUCH VERIO) Solution       Blood Glucose Monitoring Suppl (ONETOUCH VERIO REFLECT) w/Device Kit USE AS DIRECTED      glucose blood (ONETOUCH VERIO) strip 1 Strip by Other route.      ONETOUCH VERIO strip TEST 1 STRIP EVERY MORNING TO MEASURE BLOOD SUGAR AS DIRECTED      Lancets (ONETOUCH DELICA PLUS [id number]) Misc USE 1 LANCET EVERY MORNING TO MEASURE BLOOD SUGAR AS DIRECTED      metformin (GLUCOPHAGE) 1000 MG tablet Take 1 Tablet by mouth 2 times a day with meals. 180 Tablet 3     No current facility-administered medications for this visit.     He  has no past medical history on file.    ROS  No chest pain, no shortness of breath, no abdominal pain       Objective:     /78 (BP Location: Left arm, Patient Position: Sitting, BP Cuff Size: Adult)   Pulse (!) 106   Temp 36.4 °C (97.6 °F) (Temporal)   Resp 16   Ht 1.854 m (6' 1\")   Wt 98.4 kg (217 lb)   SpO2 97%  Body mass index is 28.63 kg/m².   Physical Exam:  Constitutional: Alert, no distress.  Skin: Warm, dry, good turgor, no rashes in visible areas.  Eye: Equal, round and reactive, conjunctiva clear, lids normal.  ENMT: Lips without lesions, good dentition, oropharynx clear.  Neck: Trachea midline, no masses, no thyromegaly. No cervical or supraclavicular lymphadenopathy  Respiratory: Unlabored respiratory effort, lungs clear to auscultation, no wheezes, no ronchi.  Cardiovascular: Normal S1, S2, no murmur, no edema.  Abdomen: Soft, non-tender, no masses, no " hepatosplenomegaly.  Psych: Alert and oriented x3, normal affect and mood.        Assessment and Plan:   The following treatment plan was discussed    1. Anxiety    2. Type 2 diabetes mellitus with hyperglycemia, with long-term current use of insulin (HCC)  - Diabetic Monofilament LE Exam  - insulin 70/30 (HUMULIN 70/30/NOVOLIN 70/30) (70-30) 100 UNIT/ML Suspension; Inject 25 Units under the skin 2 times a day.  Dispense: 10 mL; Refill: 11  - POCT Retinal Eye Exam    3. Syncope, unspecified syncope type    4. Need for vaccination      Followup: No follow-ups on file.

## 2023-03-15 NOTE — ASSESSMENT & PLAN NOTE
Fasting sugars 269  He is on metformin 1000 bid  Had not paid from workers comp so not been on his insulin.  Insulin 70/30 25 units bid ordered: less expensive  DM foot exam done today  Retinal screen done in clinic today  PNA vaccination done today.

## 2023-06-20 RX ORDER — VENLAFAXINE HYDROCHLORIDE 37.5 MG/1
37.5 CAPSULE, EXTENDED RELEASE ORAL DAILY
Qty: 90 CAPSULE | Refills: 3 | Status: SHIPPED | OUTPATIENT
Start: 2023-06-20

## 2023-06-20 RX ORDER — GABAPENTIN 300 MG/1
600 CAPSULE ORAL 3 TIMES DAILY
Qty: 180 CAPSULE | Refills: 3 | Status: SHIPPED | OUTPATIENT
Start: 2023-06-20 | End: 2023-11-28

## 2023-06-20 RX ORDER — HYDROXYZINE HYDROCHLORIDE 25 MG/1
25 TABLET, FILM COATED ORAL 3 TIMES DAILY PRN
Qty: 90 TABLET | Refills: 3 | Status: SHIPPED | OUTPATIENT
Start: 2023-06-20 | End: 2023-11-16

## 2023-07-13 ENCOUNTER — NON-PROVIDER VISIT (OUTPATIENT)
Dept: URGENT CARE | Facility: PHYSICIAN GROUP | Age: 53
End: 2023-07-13

## 2023-07-13 DIAGNOSIS — Z02.1 PRE-EMPLOYMENT DRUG SCREENING: ICD-10-CM

## 2023-07-13 LAB
AMP AMPHETAMINE: NORMAL
COC COCAINE: NORMAL
INT CON NEG: NORMAL
INT CON POS: NORMAL
MET METHAMPHETAMINES: NORMAL
OPI OPIATES: NORMAL
PCP PHENCYCLIDINE: NORMAL
POC DRUG COMMENT 753798-OCCUPATIONAL HEALTH: NORMAL
THC: NORMAL

## 2023-07-13 PROCEDURE — 80305 DRUG TEST PRSMV DIR OPT OBS: CPT

## 2023-11-16 RX ORDER — IBUPROFEN 800 MG/1
800 TABLET ORAL EVERY 8 HOURS PRN
COMMUNITY
Start: 2023-10-03

## 2023-11-16 RX ORDER — HYDROXYZINE HYDROCHLORIDE 25 MG/1
TABLET, FILM COATED ORAL
Qty: 90 TABLET | Refills: 3 | Status: SHIPPED | OUTPATIENT
Start: 2023-11-16

## 2023-11-16 RX ORDER — AMOXICILLIN 500 MG/1
500 CAPSULE ORAL 3 TIMES DAILY
COMMUNITY
Start: 2023-10-03

## 2023-11-16 NOTE — TELEPHONE ENCOUNTER
Received request via: Pharmacy    Was the patient seen in the last year in this department? Yes    Does the patient have an active prescription (recently filled or refills available) for medication(s) requested? No    Does the patient have [facility] Plus and need 100 day supply (blood pressure, diabetes and cholesterol meds only)? Patient does not have SCP    Last office visit 03/15/2023

## 2023-11-27 NOTE — TELEPHONE ENCOUNTER
Requested Prescriptions     Pending Prescriptions Disp Refills    gabapentin (NEURONTIN) 300 MG Cap [Pharmacy Med Name: Gabapentin 300 MG Oral Capsule] 180 Capsule 0     Sig: TAKE 2 CAPSULES BY MOUTH THREE TIMES DAILY      Last office visit: 3/15/23  Last lab: 9/2/22

## 2023-11-28 RX ORDER — GABAPENTIN 300 MG/1
600 CAPSULE ORAL 3 TIMES DAILY
Qty: 180 CAPSULE | Refills: 3 | Status: SHIPPED | OUTPATIENT
Start: 2023-11-28

## 2024-04-18 ENCOUNTER — OFFICE VISIT (OUTPATIENT)
Dept: URGENT CARE | Facility: PHYSICIAN GROUP | Age: 54
End: 2024-04-18

## 2024-04-18 VITALS
HEART RATE: 89 BPM | TEMPERATURE: 98.1 F | OXYGEN SATURATION: 96 % | BODY MASS INDEX: 28.79 KG/M2 | DIASTOLIC BLOOD PRESSURE: 70 MMHG | HEIGHT: 73 IN | SYSTOLIC BLOOD PRESSURE: 118 MMHG | WEIGHT: 217.2 LBS | RESPIRATION RATE: 20 BRPM

## 2024-04-18 DIAGNOSIS — E11.9 TYPE 2 DIABETES MELLITUS WITHOUT COMPLICATION, UNSPECIFIED WHETHER LONG TERM INSULIN USE (HCC): ICD-10-CM

## 2024-04-18 DIAGNOSIS — L08.9 WOUND INFECTION: ICD-10-CM

## 2024-04-18 DIAGNOSIS — T14.8XXA WOUND INFECTION: ICD-10-CM

## 2024-04-18 PROCEDURE — 99214 OFFICE O/P EST MOD 30 MIN: CPT | Performed by: PHYSICIAN ASSISTANT

## 2024-04-18 PROCEDURE — 3078F DIAST BP <80 MM HG: CPT | Performed by: PHYSICIAN ASSISTANT

## 2024-04-18 PROCEDURE — 3074F SYST BP LT 130 MM HG: CPT | Performed by: PHYSICIAN ASSISTANT

## 2024-04-18 RX ORDER — AMOXICILLIN AND CLAVULANATE POTASSIUM 875; 125 MG/1; MG/1
1 TABLET, FILM COATED ORAL 2 TIMES DAILY
Qty: 14 TABLET | Refills: 0 | Status: SHIPPED | OUTPATIENT
Start: 2024-04-18 | End: 2024-04-25

## 2024-04-18 RX ORDER — SULFAMETHOXAZOLE AND TRIMETHOPRIM 800; 160 MG/1; MG/1
1 TABLET ORAL 2 TIMES DAILY
Qty: 14 TABLET | Refills: 0 | Status: SHIPPED | OUTPATIENT
Start: 2024-04-18 | End: 2024-04-25

## 2024-04-18 ASSESSMENT — ENCOUNTER SYMPTOMS
FEVER: 0
CHILLS: 0

## 2024-04-18 ASSESSMENT — FIBROSIS 4 INDEX: FIB4 SCORE: 0.64

## 2024-04-18 NOTE — PROGRESS NOTES
"  Subjective:   Donald Fuller is a 53 y.o. male who presents today with   Chief Complaint   Patient presents with    Wound Check     Pt has had wound on left leg for four months its not healing and it has been getting worse and very painful, throbbing.     Other  This is a new problem. The current episode started more than 1 month ago. The problem occurs constantly. The problem has been gradually worsening. Pertinent negatives include no chills or fever.     Patient is unsure of specific injury but states he thought he may have scraped it about 4 months ago.  PMH:  has no past medical history on file.  MEDS:   Current Outpatient Medications:     amoxicillin-clavulanate (AUGMENTIN) 875-125 MG Tab, Take 1 Tablet by mouth 2 times a day for 7 days., Disp: 14 Tablet, Rfl: 0    sulfamethoxazole-trimethoprim (BACTRIM DS) 800-160 MG tablet, Take 1 Tablet by mouth 2 times a day for 7 days., Disp: 14 Tablet, Rfl: 0    ibuprofen (MOTRIN) 800 MG Tab, Take 800 mg by mouth every 8 hours as needed. for pain, Disp: , Rfl:     insulin 70/30 (HUMULIN 70/30/NOVOLIN 70/30) (70-30) 100 UNIT/ML Suspension, Inject 25 Units under the skin 2 times a day., Disp: 10 mL, Rfl: 11    syringe 1 ML Misc, , Disp: , Rfl:     Insulin Syringe-Needle U-100 (INSULIN SYRINGE 1CC/31GX5/16\") 31G X 5/16\" 1 ML Misc, Use as directed for SQ injection of insulin., Disp: 400 Each, Rfl: 3    Blood Glucose Calibration (ONETOUCH VERIO) Solution, , Disp: , Rfl:     Blood Glucose Monitoring Suppl (ONETOUCH VERIO REFLECT) w/Device Kit, USE AS DIRECTED, Disp: , Rfl:     glucose blood (ONETOUCH VERIO) strip, 1 Strip by Other route., Disp: , Rfl:     ONETOUCH VERIO strip, TEST 1 STRIP EVERY MORNING TO MEASURE BLOOD SUGAR AS DIRECTED, Disp: , Rfl:     Lancets (ONETOUCH DELICA PLUS [id number]) Misc, USE 1 LANCET EVERY MORNING TO MEASURE BLOOD SUGAR AS DIRECTED, Disp: , Rfl:     gabapentin (NEURONTIN) 300 MG Cap, TAKE 2 CAPSULES BY MOUTH THREE TIMES DAILY (Patient " not taking: Reported on 4/18/2024), Disp: 180 Capsule, Rfl: 3    hydrOXYzine HCl (ATARAX) 25 MG Tab, TAKE 1 TABLET BY MOUTH THREE TIMES DAILY AS NEEDED FOR ITCHING OR  ANXIETY (Patient not taking: Reported on 4/18/2024), Disp: 90 Tablet, Rfl: 3    venlafaxine XR (EFFEXOR XR) 37.5 MG CAPSULE SR 24 HR, Take 1 Capsule by mouth every day. (Patient not taking: Reported on 4/18/2024), Disp: 90 Capsule, Rfl: 3    metformin (GLUCOPHAGE) 1000 MG tablet, Take 1 Tablet by mouth 2 times a day with meals. (Patient not taking: Reported on 4/18/2024), Disp: 180 Tablet, Rfl: 3    simvastatin (ZOCOR) 20 MG Tab, Take 20 mg by mouth. (Patient not taking: Reported on 4/18/2024), Disp: , Rfl:     Continuous Blood Gluc  (DEXCOM G6 ) Device, Use multiple times a day to check blood sugars: fasting, before and after meals and for symptoms of low and high blood sugars. (Patient not taking: Reported on 4/18/2024), Disp: 1 Each, Rfl: 0    Continuous Blood Gluc Sensor (DEXCOM G6 SENSOR) Misc, Use multiple times a day to check blood sugars: fasting, before and after meals and for symptoms of low and high blood sugars. (Patient not taking: Reported on 4/18/2024), Disp: 3 Each, Rfl: 11    Continuous Blood Gluc Transmit (DEXCOM G6 TRANSMITTER) Misc, Use multiple times a day to check blood sugars: fasting, before and after meals and for symptoms of low and high blood sugars. (Patient not taking: Reported on 4/18/2024), Disp: 1 Each, Rfl: 3    insulin NPH (HUMULIN/NOVOLIN) 100 UNIT/ML Suspension, Inject 22 Units under the skin 2 times a day. (Patient not taking: Reported on 4/18/2024), Disp: 14 mL, Rfl: 5    celecoxib (CELEBREX) 200 MG Cap, Take 200 mg by mouth every day. (Patient not taking: Reported on 4/18/2024), Disp: , Rfl:     tadalafil (CIALIS) 20 MG tablet, Take 1/2 tab PO 30-60 min before activity; may increase to full tab prn; max 20 mg/24 hr (Patient not taking: Reported on 4/18/2024), Disp: 30 Tablet, Rfl: 6  ALLERGIES: No  "Known Allergies  SURGHX: No past surgical history on file.  SOCHX:  reports that he has never smoked. He has never used smokeless tobacco. He reports that he does not drink alcohol and does not use drugs.  FH: Reviewed with patient, not pertinent to this visit.     Review of Systems   Constitutional:  Negative for chills and fever.   Skin:         Left leg infection      Objective:   /70 (BP Location: Left arm, Patient Position: Sitting, BP Cuff Size: Adult)   Pulse 89   Temp 36.7 °C (98.1 °F) (Temporal)   Resp 20   Ht 1.854 m (6' 1\")   Wt 98.5 kg (217 lb 3.2 oz)   SpO2 96%   BMI 28.66 kg/m²   Physical Exam  Vitals and nursing note reviewed.   Constitutional:       General: He is not in acute distress.     Appearance: Normal appearance. He is well-developed. He is not ill-appearing or toxic-appearing.   HENT:      Head: Normocephalic and atraumatic.      Right Ear: Hearing normal.      Left Ear: Hearing normal.   Cardiovascular:      Rate and Rhythm: Normal rate.   Pulmonary:      Effort: Pulmonary effort is normal.   Musculoskeletal:      Comments: Patient has no bony tenderness to palpation of the left lower extremity.  Full range of motion of the left lower extremity and foot.  Normal gait.   Skin:     General: Skin is warm and dry.             Comments: Approximately 2 x 3 cm area of erythema with central scab to the left anterior shin.  No active purulent drainage.  Slight serous drainage.  Surrounding erythema but no proximal streaking or distal streaking.   Neurological:      Mental Status: He is alert.      Coordination: Coordination normal.   Psychiatric:         Mood and Affect: Mood normal.       Assessment/Plan:   Assessment    1. Wound infection  - amoxicillin-clavulanate (AUGMENTIN) 875-125 MG Tab; Take 1 Tablet by mouth 2 times a day for 7 days.  Dispense: 14 Tablet; Refill: 0  - sulfamethoxazole-trimethoprim (BACTRIM DS) 800-160 MG tablet; Take 1 Tablet by mouth 2 times a day for 7 days. "  Dispense: 14 Tablet; Refill: 0  - Referral to Dermatology  - Referral to Wound Clinic    2. Type 2 diabetes mellitus without complication, unspecified whether long term insulin use (HCC)  - Referral to Wound Clinic    Concern of lower extremity infection with history of diabetes.  I referred patient to dermatology as well as wound clinic at this time.  Dermatology referral was in case of possible precancerous lesion given changes and no noted recent injury or trauma.  Recommended updating tetanus shot today but patient declines.  Area was cleaned and antibiotic ointment and bandage placed today.  Recommend keeping area clean and covered with regular bandage changes at least once or twice a day.  Should allow the wound to air dry as well and try to elevate the area.  Will have dual coverage for broad antibiotic spectrum with Augmentin and Bactrim.    Differential diagnosis, natural history, supportive care, and indications for immediate follow-up discussed.   Patient given instructions and understanding of medications and treatment.    If not improving in 3-5 days, F/U with PCP or return to UC if symptoms worsen.  Strict ER precautions given with any new or worsening symptoms as we discussed.  Patient agreeable to plan.      Please note that this dictation was created using voice recognition software. I have made every reasonable attempt to correct obvious errors, but I expect that there are errors of grammar and possibly content that I did not discover before finalizing the note.    Jun Irving PA-C

## 2024-05-07 ENCOUNTER — TELEPHONE (OUTPATIENT)
Dept: HEALTH INFORMATION MANAGEMENT | Facility: OTHER | Age: 54
End: 2024-05-07
Payer: COMMERCIAL

## 2024-10-16 RX ORDER — GABAPENTIN 300 MG/1
600 CAPSULE ORAL 3 TIMES DAILY
Qty: 180 CAPSULE | Refills: 3 | Status: SHIPPED | OUTPATIENT
Start: 2024-10-16

## 2024-12-05 NOTE — TELEPHONE ENCOUNTER
Last OV  [id number]      Received request via: Patient    Was the patient seen in the last year in this department? No    Does the patient have an active prescription (recently filled or refills available) for medication(s) requested? No    Pharmacy Name: [facility]     Does the patient have nursing home Plus and need 100-day supply? (This applies to ALL medications) Patient does not have SCP

## 2024-12-06 RX ORDER — HYDROXYZINE HYDROCHLORIDE 25 MG/1
TABLET, FILM COATED ORAL
Qty: 90 TABLET | Refills: 3 | Status: SHIPPED | OUTPATIENT
Start: 2024-12-06

## 2025-01-08 ENCOUNTER — OFFICE VISIT (OUTPATIENT)
Dept: MEDICAL GROUP | Facility: PHYSICIAN GROUP | Age: 55
End: 2025-01-08
Payer: COMMERCIAL

## 2025-01-08 VITALS
BODY MASS INDEX: 28.23 KG/M2 | TEMPERATURE: 97.8 F | DIASTOLIC BLOOD PRESSURE: 80 MMHG | HEART RATE: 100 BPM | WEIGHT: 213 LBS | OXYGEN SATURATION: 98 % | RESPIRATION RATE: 18 BRPM | SYSTOLIC BLOOD PRESSURE: 120 MMHG | HEIGHT: 73 IN

## 2025-01-08 DIAGNOSIS — L97.425 DIABETIC ULCER OF LEFT MIDFOOT ASSOCIATED WITH TYPE 2 DIABETES MELLITUS, WITH MUSCLE INVOLVEMENT WITHOUT EVIDENCE OF NECROSIS (HCC): ICD-10-CM

## 2025-01-08 DIAGNOSIS — E11.65 TYPE 2 DIABETES MELLITUS WITH HYPERGLYCEMIA, WITH LONG-TERM CURRENT USE OF INSULIN (HCC): ICD-10-CM

## 2025-01-08 DIAGNOSIS — Z12.5 SCREENING FOR MALIGNANT NEOPLASM OF PROSTATE: ICD-10-CM

## 2025-01-08 DIAGNOSIS — Z79.4 TYPE 2 DIABETES MELLITUS WITH HYPERGLYCEMIA, WITH LONG-TERM CURRENT USE OF INSULIN (HCC): ICD-10-CM

## 2025-01-08 DIAGNOSIS — E11.42 DIABETIC POLYNEUROPATHY ASSOCIATED WITH TYPE 2 DIABETES MELLITUS (HCC): ICD-10-CM

## 2025-01-08 DIAGNOSIS — E11.621 DIABETIC ULCER OF LEFT MIDFOOT ASSOCIATED WITH TYPE 2 DIABETES MELLITUS, WITH MUSCLE INVOLVEMENT WITHOUT EVIDENCE OF NECROSIS (HCC): ICD-10-CM

## 2025-01-08 LAB
HBA1C MFR BLD: 15 % (ref ?–5.8)
POCT INT CON NEG: NEGATIVE
POCT INT CON POS: POSITIVE

## 2025-01-08 PROCEDURE — 3079F DIAST BP 80-89 MM HG: CPT | Performed by: FAMILY MEDICINE

## 2025-01-08 PROCEDURE — 83036 HEMOGLOBIN GLYCOSYLATED A1C: CPT | Performed by: FAMILY MEDICINE

## 2025-01-08 PROCEDURE — 3074F SYST BP LT 130 MM HG: CPT | Performed by: FAMILY MEDICINE

## 2025-01-08 PROCEDURE — 99214 OFFICE O/P EST MOD 30 MIN: CPT | Performed by: FAMILY MEDICINE

## 2025-01-08 RX ORDER — INSULIN LISPRO 100 [IU]/ML
2 INJECTION, SOLUTION INTRAVENOUS; SUBCUTANEOUS
Qty: 3 ML | Refills: 3 | Status: SHIPPED | OUTPATIENT
Start: 2025-01-08

## 2025-01-08 RX ORDER — SIMVASTATIN 20 MG
20 TABLET ORAL NIGHTLY
Qty: 90 TABLET | Refills: 3 | Status: SHIPPED | OUTPATIENT
Start: 2025-01-08

## 2025-01-08 RX ORDER — CEPHALEXIN 500 MG/1
500 CAPSULE ORAL
COMMUNITY
Start: 2025-01-06 | End: 2025-01-16

## 2025-01-08 RX ORDER — PREGABALIN 100 MG/1
100 CAPSULE ORAL 2 TIMES DAILY
Qty: 60 CAPSULE | Refills: 5 | Status: SHIPPED | OUTPATIENT
Start: 2025-01-08 | End: 2025-07-07

## 2025-01-08 RX ORDER — VENLAFAXINE HYDROCHLORIDE 37.5 MG/1
37.5 CAPSULE, EXTENDED RELEASE ORAL DAILY
Qty: 90 CAPSULE | Refills: 3 | Status: SHIPPED | OUTPATIENT
Start: 2025-01-08

## 2025-01-08 RX ORDER — SEMAGLUTIDE 0.68 MG/ML
0.25 INJECTION, SOLUTION SUBCUTANEOUS
Qty: 3 ML | Refills: 11 | Status: SHIPPED | OUTPATIENT
Start: 2025-01-08

## 2025-01-08 ASSESSMENT — FIBROSIS 4 INDEX: FIB4 SCORE: 0.76

## 2025-01-08 ASSESSMENT — PATIENT HEALTH QUESTIONNAIRE - PHQ9: CLINICAL INTERPRETATION OF PHQ2 SCORE: 0
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
## 2025-01-13 ENCOUNTER — TELEPHONE (OUTPATIENT)
Dept: HEALTH INFORMATION MANAGEMENT | Facility: OTHER | Age: 55
End: 2025-01-13
Payer: COMMERCIAL

## 2025-01-17 ENCOUNTER — TELEPHONE (OUTPATIENT)
Dept: HEALTH INFORMATION MANAGEMENT | Facility: OTHER | Age: 55
End: 2025-01-17
Payer: COMMERCIAL

## 2025-01-23 ENCOUNTER — TELEPHONE (OUTPATIENT)
Dept: MEDICAL GROUP | Facility: PHYSICIAN GROUP | Age: 55
End: 2025-01-23
Payer: COMMERCIAL

## 2025-01-23 NOTE — TELEPHONE ENCOUNTER
Patient, called stating they are picking up medication but is currently moving and not able to find his syringes so he would like to have some sent to the pharmacy. Please advise.    Thank you!

## 2025-01-24 RX ORDER — CALCIUM CARB/VITAMIN D3/VIT K1 500-100-40
TABLET,CHEWABLE ORAL
Qty: 400 EACH | Refills: 3 | Status: SHIPPED | OUTPATIENT
Start: 2025-01-24

## 2025-02-07 ENCOUNTER — DOCUMENTATION (OUTPATIENT)
Dept: MEDICAL GROUP | Facility: MEDICAL CENTER | Age: 55
End: 2025-02-07

## 2025-02-07 NOTE — Clinical Note
Called with questions about foot wound that sounds like was worse over the last couple days, advised to present to ER.

## 2025-02-08 ENCOUNTER — PHARMACY VISIT (OUTPATIENT)
Dept: PHARMACY | Facility: MEDICAL CENTER | Age: 55
End: 2025-02-08
Payer: COMMERCIAL

## 2025-02-08 ENCOUNTER — APPOINTMENT (OUTPATIENT)
Dept: RADIOLOGY | Facility: MEDICAL CENTER | Age: 55
End: 2025-02-08
Attending: EMERGENCY MEDICINE

## 2025-02-08 ENCOUNTER — HOSPITAL ENCOUNTER (EMERGENCY)
Facility: MEDICAL CENTER | Age: 55
End: 2025-02-08
Attending: EMERGENCY MEDICINE

## 2025-02-08 VITALS
SYSTOLIC BLOOD PRESSURE: 129 MMHG | OXYGEN SATURATION: 95 % | WEIGHT: 212.96 LBS | HEART RATE: 105 BPM | HEIGHT: 73 IN | RESPIRATION RATE: 18 BRPM | DIASTOLIC BLOOD PRESSURE: 78 MMHG | TEMPERATURE: 98.6 F | BODY MASS INDEX: 28.22 KG/M2

## 2025-02-08 DIAGNOSIS — L03.90 CELLULITIS, UNSPECIFIED CELLULITIS SITE: ICD-10-CM

## 2025-02-08 LAB
ALBUMIN SERPL BCP-MCNC: 3.8 G/DL (ref 3.2–4.9)
ALBUMIN/GLOB SERPL: 1.3 G/DL
ALP SERPL-CCNC: 88 U/L (ref 30–99)
ALT SERPL-CCNC: 14 U/L (ref 2–50)
ANION GAP SERPL CALC-SCNC: 12 MMOL/L (ref 7–16)
AST SERPL-CCNC: 17 U/L (ref 12–45)
BASOPHILS # BLD AUTO: 0.4 % (ref 0–1.8)
BASOPHILS # BLD: 0.04 K/UL (ref 0–0.12)
BILIRUB SERPL-MCNC: 0.3 MG/DL (ref 0.1–1.5)
BUN SERPL-MCNC: 24 MG/DL (ref 8–22)
CALCIUM ALBUM COR SERPL-MCNC: 9.6 MG/DL (ref 8.5–10.5)
CALCIUM SERPL-MCNC: 9.4 MG/DL (ref 8.5–10.5)
CHLORIDE SERPL-SCNC: 100 MMOL/L (ref 96–112)
CO2 SERPL-SCNC: 23 MMOL/L (ref 20–33)
CREAT SERPL-MCNC: 1.19 MG/DL (ref 0.5–1.4)
EOSINOPHIL # BLD AUTO: 0.1 K/UL (ref 0–0.51)
EOSINOPHIL NFR BLD: 1 % (ref 0–6.9)
ERYTHROCYTE [DISTWIDTH] IN BLOOD BY AUTOMATED COUNT: 40.4 FL (ref 35.9–50)
GFR SERPLBLD CREATININE-BSD FMLA CKD-EPI: 72 ML/MIN/1.73 M 2
GLOBULIN SER CALC-MCNC: 3 G/DL (ref 1.9–3.5)
GLUCOSE BLD STRIP.AUTO-MCNC: 249 MG/DL (ref 65–99)
GLUCOSE SERPL-MCNC: 301 MG/DL (ref 65–99)
HCT VFR BLD AUTO: 48.6 % (ref 42–52)
HGB BLD-MCNC: 15.9 G/DL (ref 14–18)
IMM GRANULOCYTES # BLD AUTO: 0.04 K/UL (ref 0–0.11)
IMM GRANULOCYTES NFR BLD AUTO: 0.4 % (ref 0–0.9)
LYMPHOCYTES # BLD AUTO: 1.42 K/UL (ref 1–4.8)
LYMPHOCYTES NFR BLD: 14.6 % (ref 22–41)
MCH RBC QN AUTO: 28.9 PG (ref 27–33)
MCHC RBC AUTO-ENTMCNC: 32.7 G/DL (ref 32.3–36.5)
MCV RBC AUTO: 88.4 FL (ref 81.4–97.8)
MONOCYTES # BLD AUTO: 0.73 K/UL (ref 0–0.85)
MONOCYTES NFR BLD AUTO: 7.5 % (ref 0–13.4)
NEUTROPHILS # BLD AUTO: 7.42 K/UL (ref 1.82–7.42)
NEUTROPHILS NFR BLD: 76.1 % (ref 44–72)
NRBC # BLD AUTO: 0 K/UL
NRBC BLD-RTO: 0 /100 WBC (ref 0–0.2)
PLATELET # BLD AUTO: 269 K/UL (ref 164–446)
PMV BLD AUTO: 10.1 FL (ref 9–12.9)
POTASSIUM SERPL-SCNC: 5.3 MMOL/L (ref 3.6–5.5)
PROT SERPL-MCNC: 6.8 G/DL (ref 6–8.2)
RBC # BLD AUTO: 5.5 M/UL (ref 4.7–6.1)
SODIUM SERPL-SCNC: 135 MMOL/L (ref 135–145)
WBC # BLD AUTO: 9.8 K/UL (ref 4.8–10.8)

## 2025-02-08 PROCEDURE — RXMED WILLOW AMBULATORY MEDICATION CHARGE: Performed by: EMERGENCY MEDICINE

## 2025-02-08 PROCEDURE — 36415 COLL VENOUS BLD VENIPUNCTURE: CPT

## 2025-02-08 PROCEDURE — 82962 GLUCOSE BLOOD TEST: CPT

## 2025-02-08 PROCEDURE — 85025 COMPLETE CBC W/AUTO DIFF WBC: CPT

## 2025-02-08 PROCEDURE — 700102 HCHG RX REV CODE 250 W/ 637 OVERRIDE(OP): Performed by: EMERGENCY MEDICINE

## 2025-02-08 PROCEDURE — 73630 X-RAY EXAM OF FOOT: CPT | Mod: LT

## 2025-02-08 PROCEDURE — 99283 EMERGENCY DEPT VISIT LOW MDM: CPT

## 2025-02-08 PROCEDURE — A9270 NON-COVERED ITEM OR SERVICE: HCPCS | Performed by: EMERGENCY MEDICINE

## 2025-02-08 PROCEDURE — 80053 COMPREHEN METABOLIC PANEL: CPT

## 2025-02-08 RX ADMIN — AMOXICILLIN AND CLAVULANATE POTASSIUM 1 TABLET: 875; 125 TABLET, FILM COATED ORAL at 13:47

## 2025-02-08 ASSESSMENT — FIBROSIS 4 INDEX: FIB4 SCORE: 0.76

## 2025-02-08 NOTE — PROGRESS NOTES
"After hours phone call  Call on 2/7/2025 at 11:22 PM from Donald Fuller [phone] (home) [phone] (work) who reports PCP: Kojo Covington M.D..  Pt reports: months old chronic left foot wound acutely became red and swollen \"like a balloon\" with severe pain in the mid foot that comes and goes despite neuropathy. Wound is purplish and redness extends from the wound on the plantar foot over the top of the foot. No drainage or foul odors. He does report chills the last 2 nights.   Hx of uncontrolled diabetes, last at 15.0 1 month ago. He notes he is now on metformin, 70/30 30 units, insulin glargine 10 units, lispro 2-4 units with meals. He only checks once per day before on eof his meals in the mid afternoon, which is around 128.   He has not followed up with wound care.    Plan: considering severity of uncontrolled diabetes, severe pain in the foot even in the setting of neuropathy, description of redness and swelling, wound looking purple, reports of chills, I have advised patient to present to the emergency room for further evaluation. He states he will present in the morning.     Maria D Hale M.D.      "

## 2025-02-08 NOTE — ED NOTES
Patient given discharge instructions and education. Verbalizes understanding. Given chance to ask questions. Patient educated on signs and symptoms to returned to ER, Educated patient they can return for any concerning symptoms. One prescription sent to pharmacy on file. Education given to patient about medication. Patient states they have their belongings. Denies additional needs at this time. Reports pain is well controlled. Patient monitored every hour and PRN for safety and comfort. Patient ambulatory out of department.

## 2025-02-08 NOTE — ED TRIAGE NOTES
Chief Complaint   Patient presents with    Foot Swelling    Wound Infection     Ambulatory to triage w/ c/o L foot pain/redness/swelling x 2 days.  Patient is a diabetic, compliant with medications.  FSBS 249.  Patient has a ulcer to plantar aspect of foot x 2 months. Completed antibiotics 1 week ago.

## 2025-02-08 NOTE — ED PROVIDER NOTES
ER Provider Note    Scribed for Dr. Johnathan Washburn M.D. by Kristie Ibarra. 2/8/2025  1:10 PM    Primary Care Provider: Kojo Covington M.D.    CHIEF COMPLAINT  Chief Complaint   Patient presents with    Foot Swelling    Wound Infection       EXTERNAL RECORDS REVIEWED  Outpatient Notes Follow ups for diabetes mellitus in January, no changes to medications    HPI/ROS      Donald Fuller is a 54 y.o. male who presents to the ED for evaluation of foot swelling onset two days ago. His left foot is more swollen than his right. Patient has peripheral neuropathy secondary to diabetes mellitus type I. He reports pain with walking and a wound on the bottom of his left foot. No alleviating or exacerbating factors noted.     PAST MEDICAL HISTORY  Past Medical History:   Diagnosis Date    Diabetes (HCC)        SURGICAL HISTORY  History reviewed. No pertinent surgical history.    FAMILY HISTORY  History reviewed. No pertinent family history.    SOCIAL HISTORY   reports that he has never smoked. He has never used smokeless tobacco. He reports that he does not drink alcohol and does not use drugs.    CURRENT MEDICATIONS  Previous Medications    BLOOD GLUCOSE CALIBRATION (ONETOUCH VERIO) SOLUTION        BLOOD GLUCOSE MONITORING SUPPL (ONETOUCH VERIO REFLECT) W/DEVICE KIT    USE AS DIRECTED    GLUCOSE BLOOD (ONETOUCH VERIO) STRIP    1 Strip by Other route.    HYDROXYZINE HCL (ATARAX) 25 MG TAB    TAKE 1 TABLET BY MOUTH THREE TIMES DAILY AS NEEDED FOR ITCHING OR ANXIETY.    IBUPROFEN (MOTRIN) 800 MG TAB    Take 800 mg by mouth every 8 hours as needed. for pain    INSULIN 70/30 (HUMULIN 70/30/NOVOLIN 70/30) (70-30) 100 UNIT/ML SUSPENSION    Inject 25 Units under the skin 2 times a day.    INSULIN GLARGINE 100 UNIT/ML SC SOPN INJECTION    Inject 10 Units under the skin every evening. Till fasting glucose is     INSULIN LISPRO 100 UNIT/ML SC SOPN INJECTION PEN    Inject 2 Units under the skin 3 times a day before meals.  "Use 2 units if blood sugar 150-200. 4 units if 200-250, 6 units if 250-300, 8 units if 300-350, 10 units if 350-400    INSULIN SYRINGE-NEEDLE U-100 (INSULIN SYRINGE 1CC/31GX5/16\") 31G X 5/16\" 1 ML MISC    Use as directed for SQ injection of insulin.    LANCETS (ONETOUCH DELICA PLUS [id number]) MISC    USE 1 LANCET EVERY MORNING TO MEASURE BLOOD SUGAR AS DIRECTED    METFORMIN (GLUCOPHAGE) 1000 MG TABLET    Take 1 Tablet by mouth 2 times a day with meals.    ONETOUCH VERIO STRIP    TEST 1 STRIP EVERY MORNING TO MEASURE BLOOD SUGAR AS DIRECTED    PREGABALIN (LYRICA) 100 MG CAP    Take 1 Capsule by mouth 2 times a day for 180 days.    SEMAGLUTIDE,0.25 OR 0.5MG/DOS, (OZEMPIC, 0.25 OR 0.5 MG/DOSE,) 2 MG/3ML SOLUTION PEN-INJECTOR    Inject 0.25 mg under the skin every 7 days.    SIMVASTATIN (ZOCOR) 20 MG TAB    Take 1 Tablet by mouth every evening.    SYRINGE 1 ML MISC        VENLAFAXINE XR (EFFEXOR XR) 37.5 MG CAPSULE SR 24 HR    Take 1 Capsule by mouth every day.       ALLERGIES  Patient has no known allergies.    PHYSICAL EXAM  BP (!) 148/92   Pulse (!) 110   Temp 37 °C (98.6 °F) (Temporal)   Resp 18   Ht 1.854 m (6' 1\")   Wt 96.6 kg (212 lb 15.4 oz)   SpO2 97%   BMI 28.10 kg/m²   Constitutional: Alert in no apparent distress.  HENT: No signs of trauma, Bilateral external ears normal, Nose normal.   Eyes: Pupils are equal and reactive, Conjunctiva normal, Non-icteric.   Neck: Normal range of motion, No tenderness, Supple,   Cardiovascular: Tachycardic rate and rhythm, no murmurs.   Thorax & Lungs: Normal breath sounds, No respiratory distress, No wheezing, No chest tenderness.   Abdomen: Bowel sounds normal, Soft, No tenderness,   Skin: Warm, Dry, No erythema, No rash. Wound to the base of the left forefoot with little soft purulent drainage, no obvious deformity.  Back: No bony tenderness, No CVA tenderness.   Extremities: No edema, No tenderness, No cyanosis, no tenderness  Neurologic: Alert, Grossly non-focal. "   Psychiatric: Affect normal     DIAGNOSTIC STUDIES & PROCEDURES    Labs:   Labs Reviewed   CBC WITH DIFFERENTIAL - Abnormal; Notable for the following components:       Result Value    Neutrophils-Polys 76.10 (*)     Lymphocytes 14.60 (*)     All other components within normal limits   COMP METABOLIC PANEL - Abnormal; Notable for the following components:    Glucose 301 (*)     Bun 24 (*)     All other components within normal limits   POCT GLUCOSE DEVICE RESULTS - Abnormal; Notable for the following components:    POC Glucose, Blood 249 (*)     All other components within normal limits   ESTIMATED GFR   POCT GLUCOSE     All labs reviewed by me.    Radiology:   The attending Emergency Physician has independently interpreted the diagnostic imaging associated with this visit and is awaiting the final reading from the radiologist, which will be displayed below.    Preliminary interpretation is a follows: No obvious bony erosions  Radiologist interpretation: See below.    DX-FOOT-COMPLETE 3+ LEFT   Final Result      Soft tissue swelling without acute osseous abnormality.           COURSE & MEDICAL DECISION MAKING    ED Observation Status? No; Patient does not meet criteria for ED Observation.     INITIAL ASSESSMENT AND PLAN  Care Narrative:       1:18 PM - Patient seen and evaluated at bedside. Discussed plan of care, including obtaining imaging and labs. Patient agrees to plan of care. Ordered Urinalysis, POCT glucose docked device, CMP, CBC w/ diff, and DX-Foot left to evaluate. Will give patient a dose of Augmentin 875-125 mg PO.    1:57 PM - I reevaluated the patient at bedside. The patient informs me they feel improved. I discussed the patient's diagnostic study results.  I discussed plan for discharge and follow up as outlined below. He is to take the antibiotics as prescribed. The patient is stable for discharge at this time and will return for any new or worsening symptoms. Patient verbalizes understanding and  support with my plan for discharge.       ADDITIONAL PROBLEM LIST AND DISPOSITION  Patient with wound to the bottom of his foot.  It does appear to be improved and somewhat is from old pictures.  However the patient does have cellulitis on the dorsum of the foot.  X-ray does not show any any erosions or osteomyelitis.  Will give the patient antibiotics and discharged home with strict return precautions and follow-up with               DISPOSITION AND DISCUSSIONS  I have discussed management of the patient with the following physicians and TALI's: None    Discussion of management with other [facility] or appropriate source(s): None     Escalation of care considered, and ultimately not performed: diagnostic imaging.  CT was not felt necessary  Barriers to care at this time, including but not limited to:  None .     Decision tools and prescription drugs considered including, but not limited to: Antibiotics Augmentin 875-125 mg .    The patient will return for new or worsening symptoms and is stable at the time of discharge.    The patient is referred to a primary physician for blood pressure management, diabetic screening, and for all other preventative health concerns.    DISPOSITION:  Patient will be discharged home in stable condition.    FOLLOW UP:  Kojo Covington M.D.  [address] [place] [id number] [phone]    In 2 days        OUTPATIENT MEDICATIONS:  New Prescriptions    AMOXICILLIN-CLAVULANATE (AUGMENTIN) 875-125 MG TAB    Take 1 Tablet by mouth 2 times a day for 7 days.       FINAL IMPRESSION   1. Cellulitis, unspecified cellulitis site         Kristie ROBERSON), am scribing for, and in the presence of, Johnathan Washburn M.D..    Electronically signed by: Kristie Pham), 2/8/2025    Johnathan ROBERSON M.D. personally performed the services described in this documentation, as scribed by Kristie Ibarra in my presence, and it is both accurate and complete.    The note accurately  reflects work and decisions made by me.  Johnathan Washburn M.D.  2/8/2025  3:45 PM

## 2025-02-26 ENCOUNTER — HOSPITAL ENCOUNTER (INPATIENT)
Facility: MEDICAL CENTER | Age: 55
End: 2025-02-26
Attending: EMERGENCY MEDICINE | Admitting: HOSPITALIST

## 2025-02-26 ENCOUNTER — APPOINTMENT (OUTPATIENT)
Dept: RADIOLOGY | Facility: MEDICAL CENTER | Age: 55
DRG: 617 | End: 2025-02-26
Attending: EMERGENCY MEDICINE

## 2025-02-26 DIAGNOSIS — I10 PRIMARY HYPERTENSION: ICD-10-CM

## 2025-02-26 DIAGNOSIS — E11.65 TYPE 2 DIABETES MELLITUS WITH HYPERGLYCEMIA, WITH LONG-TERM CURRENT USE OF INSULIN (HCC): ICD-10-CM

## 2025-02-26 DIAGNOSIS — L97.426 DIABETIC ULCER OF LEFT MIDFOOT ASSOCIATED WITH TYPE 2 DIABETES MELLITUS, WITH BONE INVOLVEMENT WITHOUT EVIDENCE OF NECROSIS (HCC): ICD-10-CM

## 2025-02-26 DIAGNOSIS — E11.621 DIABETIC ULCER OF TOE OF LEFT FOOT ASSOCIATED WITH TYPE 2 DIABETES MELLITUS, UNSPECIFIED ULCER STAGE (HCC): ICD-10-CM

## 2025-02-26 DIAGNOSIS — E11.621 DIABETIC ULCER OF LEFT MIDFOOT ASSOCIATED WITH TYPE 2 DIABETES MELLITUS, WITH BONE INVOLVEMENT WITHOUT EVIDENCE OF NECROSIS (HCC): ICD-10-CM

## 2025-02-26 DIAGNOSIS — N17.9 AKI (ACUTE KIDNEY INJURY) (HCC): ICD-10-CM

## 2025-02-26 DIAGNOSIS — L97.529 DIABETIC ULCER OF TOE OF LEFT FOOT ASSOCIATED WITH TYPE 2 DIABETES MELLITUS, UNSPECIFIED ULCER STAGE (HCC): ICD-10-CM

## 2025-02-26 DIAGNOSIS — Z79.4 TYPE 2 DIABETES MELLITUS WITH HYPERGLYCEMIA, WITH LONG-TERM CURRENT USE OF INSULIN (HCC): ICD-10-CM

## 2025-02-26 DIAGNOSIS — M86.072 ACUTE HEMATOGENOUS OSTEOMYELITIS OF LEFT FOOT (HCC): ICD-10-CM

## 2025-02-26 DIAGNOSIS — Z89.429 HISTORY OF COMPLETE RAY AMPUTATION OF FIFTH TOE (HCC): ICD-10-CM

## 2025-02-26 DIAGNOSIS — R73.9 HYPERGLYCEMIA: ICD-10-CM

## 2025-02-26 DIAGNOSIS — L03.116 CELLULITIS OF LEFT LOWER EXTREMITY: ICD-10-CM

## 2025-02-26 LAB
ANION GAP SERPL CALC-SCNC: 12 MMOL/L (ref 7–16)
BASOPHILS # BLD AUTO: 0.5 % (ref 0–1.8)
BASOPHILS # BLD: 0.04 K/UL (ref 0–0.12)
BUN SERPL-MCNC: 27 MG/DL (ref 8–22)
CALCIUM SERPL-MCNC: 9.7 MG/DL (ref 8.5–10.5)
CHLORIDE SERPL-SCNC: 97 MMOL/L (ref 96–112)
CO2 SERPL-SCNC: 26 MMOL/L (ref 20–33)
CREAT SERPL-MCNC: 1.62 MG/DL (ref 0.5–1.4)
CRP SERPL HS-MCNC: 1.34 MG/DL (ref 0–0.75)
EOSINOPHIL # BLD AUTO: 0.09 K/UL (ref 0–0.51)
EOSINOPHIL NFR BLD: 1.2 % (ref 0–6.9)
ERYTHROCYTE [DISTWIDTH] IN BLOOD BY AUTOMATED COUNT: 39 FL (ref 35.9–50)
ERYTHROCYTE [SEDIMENTATION RATE] IN BLOOD BY WESTERGREN METHOD: 17 MM/HOUR (ref 0–20)
GFR SERPLBLD CREATININE-BSD FMLA CKD-EPI: 50 ML/MIN/1.73 M 2
GLUCOSE BLD STRIP.AUTO-MCNC: 327 MG/DL (ref 65–99)
GLUCOSE SERPL-MCNC: 513 MG/DL (ref 65–99)
HCT VFR BLD AUTO: 44.4 % (ref 42–52)
HGB BLD-MCNC: 14.6 G/DL (ref 14–18)
IMM GRANULOCYTES # BLD AUTO: 0.03 K/UL (ref 0–0.11)
IMM GRANULOCYTES NFR BLD AUTO: 0.4 % (ref 0–0.9)
LACTATE SERPL-SCNC: 3.1 MMOL/L (ref 0.5–2)
LYMPHOCYTES # BLD AUTO: 1.46 K/UL (ref 1–4.8)
LYMPHOCYTES NFR BLD: 19.5 % (ref 22–41)
MCH RBC QN AUTO: 28.5 PG (ref 27–33)
MCHC RBC AUTO-ENTMCNC: 32.9 G/DL (ref 32.3–36.5)
MCV RBC AUTO: 86.5 FL (ref 81.4–97.8)
MONOCYTES # BLD AUTO: 0.66 K/UL (ref 0–0.85)
MONOCYTES NFR BLD AUTO: 8.8 % (ref 0–13.4)
NEUTROPHILS # BLD AUTO: 5.21 K/UL (ref 1.82–7.42)
NEUTROPHILS NFR BLD: 69.6 % (ref 44–72)
NRBC # BLD AUTO: 0 K/UL
NRBC BLD-RTO: 0 /100 WBC (ref 0–0.2)
PLATELET # BLD AUTO: 269 K/UL (ref 164–446)
PMV BLD AUTO: 10.4 FL (ref 9–12.9)
POTASSIUM SERPL-SCNC: 5.4 MMOL/L (ref 3.6–5.5)
RBC # BLD AUTO: 5.13 M/UL (ref 4.7–6.1)
SODIUM SERPL-SCNC: 135 MMOL/L (ref 135–145)
WBC # BLD AUTO: 7.5 K/UL (ref 4.8–10.8)

## 2025-02-26 PROCEDURE — 85652 RBC SED RATE AUTOMATED: CPT

## 2025-02-26 PROCEDURE — 700105 HCHG RX REV CODE 258: Performed by: EMERGENCY MEDICINE

## 2025-02-26 PROCEDURE — 86140 C-REACTIVE PROTEIN: CPT

## 2025-02-26 PROCEDURE — 83605 ASSAY OF LACTIC ACID: CPT | Mod: 91

## 2025-02-26 PROCEDURE — 82962 GLUCOSE BLOOD TEST: CPT

## 2025-02-26 PROCEDURE — 80048 BASIC METABOLIC PNL TOTAL CA: CPT

## 2025-02-26 PROCEDURE — 87040 BLOOD CULTURE FOR BACTERIA: CPT | Mod: 91

## 2025-02-26 PROCEDURE — 99285 EMERGENCY DEPT VISIT HI MDM: CPT

## 2025-02-26 PROCEDURE — 96365 THER/PROPH/DIAG IV INF INIT: CPT

## 2025-02-26 PROCEDURE — 87641 MR-STAPH DNA AMP PROBE: CPT

## 2025-02-26 PROCEDURE — 96375 TX/PRO/DX INJ NEW DRUG ADDON: CPT

## 2025-02-26 PROCEDURE — 73630 X-RAY EXAM OF FOOT: CPT | Mod: LT

## 2025-02-26 PROCEDURE — 96366 THER/PROPH/DIAG IV INF ADDON: CPT

## 2025-02-26 PROCEDURE — 85025 COMPLETE CBC W/AUTO DIFF WBC: CPT

## 2025-02-26 PROCEDURE — 96367 TX/PROPH/DG ADDL SEQ IV INF: CPT

## 2025-02-26 PROCEDURE — 700111 HCHG RX REV CODE 636 W/ 250 OVERRIDE (IP): Performed by: EMERGENCY MEDICINE

## 2025-02-26 PROCEDURE — 36415 COLL VENOUS BLD VENIPUNCTURE: CPT

## 2025-02-26 RX ORDER — SODIUM CHLORIDE 9 MG/ML
1000 INJECTION, SOLUTION INTRAVENOUS ONCE
Status: COMPLETED | OUTPATIENT
Start: 2025-02-26 | End: 2025-02-26

## 2025-02-26 RX ORDER — MORPHINE SULFATE 4 MG/ML
4 INJECTION INTRAVENOUS ONCE
Status: COMPLETED | OUTPATIENT
Start: 2025-02-26 | End: 2025-02-26

## 2025-02-26 RX ADMIN — VANCOMYCIN HYDROCHLORIDE 2500 MG: 5 INJECTION, POWDER, LYOPHILIZED, FOR SOLUTION INTRAVENOUS at 22:30

## 2025-02-26 RX ADMIN — MORPHINE SULFATE 4 MG: 4 INJECTION, SOLUTION INTRAMUSCULAR; INTRAVENOUS at 21:55

## 2025-02-26 RX ADMIN — AMPICILLIN AND SULBACTAM 3 G: 1; 2 INJECTION, POWDER, FOR SOLUTION INTRAMUSCULAR; INTRAVENOUS at 21:56

## 2025-02-26 RX ADMIN — SODIUM CHLORIDE 1000 ML: 9 INJECTION, SOLUTION INTRAVENOUS at 21:38

## 2025-02-26 ASSESSMENT — PAIN DESCRIPTION - PAIN TYPE: TYPE: ACUTE PAIN

## 2025-02-26 ASSESSMENT — FIBROSIS 4 INDEX: FIB4 SCORE: 0.93

## 2025-02-27 ENCOUNTER — APPOINTMENT (OUTPATIENT)
Dept: RADIOLOGY | Facility: MEDICAL CENTER | Age: 55
DRG: 617 | End: 2025-02-27
Attending: STUDENT IN AN ORGANIZED HEALTH CARE EDUCATION/TRAINING PROGRAM

## 2025-02-27 PROBLEM — E87.20 LACTIC ACIDOSIS: Status: ACTIVE | Noted: 2025-02-27

## 2025-02-27 PROBLEM — E11.8 DIABETIC FOOT (HCC): Status: ACTIVE | Noted: 2025-02-27

## 2025-02-27 PROBLEM — N17.9 ACUTE RENAL FAILURE (HCC): Status: ACTIVE | Noted: 2025-02-27

## 2025-02-27 LAB
BACTERIA BLD CULT: NORMAL
BACTERIA BLD CULT: NORMAL
EST. AVERAGE GLUCOSE BLD GHB EST-MCNC: 352 MG/DL
GLUCOSE BLD STRIP.AUTO-MCNC: 246 MG/DL (ref 65–99)
GLUCOSE BLD STRIP.AUTO-MCNC: 290 MG/DL (ref 65–99)
GLUCOSE BLD STRIP.AUTO-MCNC: 318 MG/DL (ref 65–99)
GRAM STN SPEC: NORMAL
HBA1C MFR BLD: 13.9 % (ref 4–5.6)
LACTATE SERPL-SCNC: 1.1 MMOL/L (ref 0.5–2)
LACTATE SERPL-SCNC: 1.3 MMOL/L (ref 0.5–2)
SCCMEC + MECA PNL NOSE NAA+PROBE: NEGATIVE
SIGNIFICANT IND 70042: NORMAL
SITE SITE: NORMAL
SOURCE SOURCE: NORMAL

## 2025-02-27 PROCEDURE — 96366 THER/PROPH/DIAG IV INF ADDON: CPT

## 2025-02-27 PROCEDURE — 700102 HCHG RX REV CODE 250 W/ 637 OVERRIDE(OP): Performed by: HOSPITALIST

## 2025-02-27 PROCEDURE — 87070 CULTURE OTHR SPECIMN AEROBIC: CPT

## 2025-02-27 PROCEDURE — 97597 DBRDMT OPN WND 1ST 20 CM/<: CPT

## 2025-02-27 PROCEDURE — 700105 HCHG RX REV CODE 258: Performed by: HOSPITALIST

## 2025-02-27 PROCEDURE — 700117 HCHG RX CONTRAST REV CODE 255: Mod: JZ | Performed by: STUDENT IN AN ORGANIZED HEALTH CARE EDUCATION/TRAINING PROGRAM

## 2025-02-27 PROCEDURE — A9270 NON-COVERED ITEM OR SERVICE: HCPCS | Performed by: STUDENT IN AN ORGANIZED HEALTH CARE EDUCATION/TRAINING PROGRAM

## 2025-02-27 PROCEDURE — 770001 HCHG ROOM/CARE - MED/SURG/GYN PRIV*

## 2025-02-27 PROCEDURE — A9270 NON-COVERED ITEM OR SERVICE: HCPCS | Performed by: HOSPITALIST

## 2025-02-27 PROCEDURE — 700101 HCHG RX REV CODE 250: Performed by: STUDENT IN AN ORGANIZED HEALTH CARE EDUCATION/TRAINING PROGRAM

## 2025-02-27 PROCEDURE — A9579 GAD-BASE MR CONTRAST NOS,1ML: HCPCS | Mod: JZ | Performed by: STUDENT IN AN ORGANIZED HEALTH CARE EDUCATION/TRAINING PROGRAM

## 2025-02-27 PROCEDURE — 82962 GLUCOSE BLOOD TEST: CPT | Mod: 91

## 2025-02-27 PROCEDURE — 83036 HEMOGLOBIN GLYCOSYLATED A1C: CPT

## 2025-02-27 PROCEDURE — 97162 PT EVAL MOD COMPLEX 30 MIN: CPT

## 2025-02-27 PROCEDURE — 700111 HCHG RX REV CODE 636 W/ 250 OVERRIDE (IP): Mod: JZ | Performed by: HOSPITALIST

## 2025-02-27 PROCEDURE — 87147 CULTURE TYPE IMMUNOLOGIC: CPT

## 2025-02-27 PROCEDURE — 87077 CULTURE AEROBIC IDENTIFY: CPT | Mod: 91

## 2025-02-27 PROCEDURE — 99223 1ST HOSP IP/OBS HIGH 75: CPT | Performed by: HOSPITALIST

## 2025-02-27 PROCEDURE — 87205 SMEAR GRAM STAIN: CPT

## 2025-02-27 PROCEDURE — 700102 HCHG RX REV CODE 250 W/ 637 OVERRIDE(OP): Performed by: STUDENT IN AN ORGANIZED HEALTH CARE EDUCATION/TRAINING PROGRAM

## 2025-02-27 PROCEDURE — 83605 ASSAY OF LACTIC ACID: CPT

## 2025-02-27 PROCEDURE — 36415 COLL VENOUS BLD VENIPUNCTURE: CPT

## 2025-02-27 PROCEDURE — 73719 MRI LOWER EXTREMITY W/DYE: CPT | Mod: LT

## 2025-02-27 PROCEDURE — 87186 SC STD MICRODIL/AGAR DIL: CPT

## 2025-02-27 RX ORDER — PROMETHAZINE HYDROCHLORIDE 25 MG/1
12.5-25 SUPPOSITORY RECTAL EVERY 4 HOURS PRN
Status: DISCONTINUED | OUTPATIENT
Start: 2025-02-27 | End: 2025-03-01 | Stop reason: HOSPADM

## 2025-02-27 RX ORDER — ATORVASTATIN CALCIUM 20 MG/1
20 TABLET, FILM COATED ORAL EVERY EVENING
Status: DISCONTINUED | OUTPATIENT
Start: 2025-02-27 | End: 2025-03-01 | Stop reason: HOSPADM

## 2025-02-27 RX ORDER — INSULIN LISPRO 100 [IU]/ML
2-9 INJECTION, SOLUTION INTRAVENOUS; SUBCUTANEOUS
Status: DISCONTINUED | OUTPATIENT
Start: 2025-02-27 | End: 2025-03-01 | Stop reason: HOSPADM

## 2025-02-27 RX ORDER — ONDANSETRON 4 MG/1
4 TABLET, ORALLY DISINTEGRATING ORAL EVERY 4 HOURS PRN
Status: DISCONTINUED | OUTPATIENT
Start: 2025-02-27 | End: 2025-03-01 | Stop reason: HOSPADM

## 2025-02-27 RX ORDER — OXYCODONE HYDROCHLORIDE 5 MG/1
5 TABLET ORAL
Refills: 0 | Status: DISCONTINUED | OUTPATIENT
Start: 2025-02-27 | End: 2025-03-01 | Stop reason: HOSPADM

## 2025-02-27 RX ORDER — LABETALOL HYDROCHLORIDE 5 MG/ML
10 INJECTION, SOLUTION INTRAVENOUS EVERY 4 HOURS PRN
Status: DISCONTINUED | OUTPATIENT
Start: 2025-02-27 | End: 2025-03-01 | Stop reason: HOSPADM

## 2025-02-27 RX ORDER — ACETAMINOPHEN 325 MG/1
650 TABLET ORAL EVERY 6 HOURS PRN
Status: DISCONTINUED | OUTPATIENT
Start: 2025-02-27 | End: 2025-03-01 | Stop reason: HOSPADM

## 2025-02-27 RX ORDER — OXYCODONE HYDROCHLORIDE 5 MG/1
2.5 TABLET ORAL
Refills: 0 | Status: DISCONTINUED | OUTPATIENT
Start: 2025-02-27 | End: 2025-03-01 | Stop reason: HOSPADM

## 2025-02-27 RX ORDER — HYDROXYZINE HYDROCHLORIDE 50 MG/1
25 TABLET, FILM COATED ORAL 3 TIMES DAILY PRN
Status: DISCONTINUED | OUTPATIENT
Start: 2025-02-27 | End: 2025-03-01 | Stop reason: HOSPADM

## 2025-02-27 RX ORDER — PROMETHAZINE HYDROCHLORIDE 25 MG/1
12.5-25 TABLET ORAL EVERY 4 HOURS PRN
Status: DISCONTINUED | OUTPATIENT
Start: 2025-02-27 | End: 2025-03-01 | Stop reason: HOSPADM

## 2025-02-27 RX ORDER — MORPHINE SULFATE 4 MG/ML
2 INJECTION INTRAVENOUS
Status: DISCONTINUED | OUTPATIENT
Start: 2025-02-27 | End: 2025-03-01 | Stop reason: HOSPADM

## 2025-02-27 RX ORDER — FLUOXETINE 10 MG/1
10 CAPSULE ORAL DAILY
Status: DISCONTINUED | OUTPATIENT
Start: 2025-02-27 | End: 2025-03-01 | Stop reason: HOSPADM

## 2025-02-27 RX ORDER — SODIUM CHLORIDE 9 MG/ML
INJECTION, SOLUTION INTRAVENOUS CONTINUOUS
Status: DISCONTINUED | OUTPATIENT
Start: 2025-02-27 | End: 2025-02-28

## 2025-02-27 RX ORDER — PROCHLORPERAZINE EDISYLATE 5 MG/ML
5-10 INJECTION INTRAMUSCULAR; INTRAVENOUS EVERY 4 HOURS PRN
Status: DISCONTINUED | OUTPATIENT
Start: 2025-02-27 | End: 2025-03-01 | Stop reason: HOSPADM

## 2025-02-27 RX ORDER — ONDANSETRON 2 MG/ML
4 INJECTION INTRAMUSCULAR; INTRAVENOUS EVERY 4 HOURS PRN
Status: DISCONTINUED | OUTPATIENT
Start: 2025-02-27 | End: 2025-03-01 | Stop reason: HOSPADM

## 2025-02-27 RX ORDER — DEXTROSE MONOHYDRATE 25 G/50ML
25 INJECTION, SOLUTION INTRAVENOUS
Status: DISCONTINUED | OUTPATIENT
Start: 2025-02-27 | End: 2025-03-01 | Stop reason: HOSPADM

## 2025-02-27 RX ORDER — SODIUM HYPOCHLORITE 1.25 MG/ML
SOLUTION TOPICAL DAILY
Status: DISCONTINUED | OUTPATIENT
Start: 2025-02-27 | End: 2025-03-01

## 2025-02-27 RX ADMIN — INSULIN LISPRO 6 UNITS: 100 INJECTION, SOLUTION INTRAVENOUS; SUBCUTANEOUS at 09:42

## 2025-02-27 RX ADMIN — INSULIN LISPRO 3 UNITS: 100 INJECTION, SOLUTION INTRAVENOUS; SUBCUTANEOUS at 13:24

## 2025-02-27 RX ADMIN — INSULIN LISPRO 5 UNITS: 100 INJECTION, SOLUTION INTRAVENOUS; SUBCUTANEOUS at 21:00

## 2025-02-27 RX ADMIN — ACETAMINOPHEN 650 MG: 325 TABLET ORAL at 19:46

## 2025-02-27 RX ADMIN — OXYCODONE HYDROCHLORIDE 5 MG: 5 TABLET ORAL at 13:21

## 2025-02-27 RX ADMIN — OXYCODONE HYDROCHLORIDE 5 MG: 5 TABLET ORAL at 02:11

## 2025-02-27 RX ADMIN — SODIUM CHLORIDE 1000 ML: 9 INJECTION, SOLUTION INTRAVENOUS at 19:52

## 2025-02-27 RX ADMIN — FLUOXETINE HYDROCHLORIDE 10 MG: 10 CAPSULE ORAL at 05:07

## 2025-02-27 RX ADMIN — AMPICILLIN AND SULBACTAM 3 G: 1; 2 INJECTION, POWDER, FOR SOLUTION INTRAMUSCULAR; INTRAVENOUS at 19:54

## 2025-02-27 RX ADMIN — GADOTERIDOL 20 ML: 279.3 INJECTION, SOLUTION INTRAVENOUS at 19:28

## 2025-02-27 RX ADMIN — SODIUM CHLORIDE: 9 INJECTION, SOLUTION INTRAVENOUS at 01:54

## 2025-02-27 RX ADMIN — AMPICILLIN AND SULBACTAM 3 G: 1; 2 INJECTION, POWDER, FOR SOLUTION INTRAMUSCULAR; INTRAVENOUS at 13:17

## 2025-02-27 RX ADMIN — SODIUM HYPOCHLORITE 10 ML: 1.25 SOLUTION TOPICAL at 13:30

## 2025-02-27 RX ADMIN — AMPICILLIN AND SULBACTAM 3 G: 1; 2 INJECTION, POWDER, FOR SOLUTION INTRAMUSCULAR; INTRAVENOUS at 05:07

## 2025-02-27 RX ADMIN — ATORVASTATIN CALCIUM 20 MG: 20 TABLET, FILM COATED ORAL at 19:47

## 2025-02-27 ASSESSMENT — PAIN DESCRIPTION - PAIN TYPE
TYPE: OTHER (COMMENT)
TYPE: ACUTE PAIN

## 2025-02-27 ASSESSMENT — LIFESTYLE VARIABLES
TOTAL SCORE: 0
CONSUMPTION TOTAL: NEGATIVE
EVER FELT BAD OR GUILTY ABOUT YOUR DRINKING: NO
SUBSTANCE_ABUSE: 0
EVER HAD A DRINK FIRST THING IN THE MORNING TO STEADY YOUR NERVES TO GET RID OF A HANGOVER: NO
HAVE PEOPLE ANNOYED YOU BY CRITICIZING YOUR DRINKING: NO
DOES PATIENT WANT TO STOP DRINKING: NO
HAVE YOU EVER FELT YOU SHOULD CUT DOWN ON YOUR DRINKING: NO
AVERAGE NUMBER OF DAYS PER WEEK YOU HAVE A DRINK CONTAINING ALCOHOL: 0
HOW MANY TIMES IN THE PAST YEAR HAVE YOU HAD 5 OR MORE DRINKS IN A DAY: 0
TOTAL SCORE: 0
ALCOHOL_USE: NO
TOTAL SCORE: 0
ON A TYPICAL DAY WHEN YOU DRINK ALCOHOL HOW MANY DRINKS DO YOU HAVE: 0

## 2025-02-27 ASSESSMENT — COGNITIVE AND FUNCTIONAL STATUS - GENERAL
WALKING IN HOSPITAL ROOM: A LITTLE
MOVING TO AND FROM BED TO CHAIR: A LITTLE
SUGGESTED CMS G CODE MODIFIER DAILY ACTIVITY: CH
STANDING UP FROM CHAIR USING ARMS: A LITTLE
CLIMB 3 TO 5 STEPS WITH RAILING: A LITTLE
DAILY ACTIVITIY SCORE: 24
MOBILITY SCORE: 20
SUGGESTED CMS G CODE MODIFIER MOBILITY: CJ

## 2025-02-27 ASSESSMENT — ENCOUNTER SYMPTOMS
PALPITATIONS: 0
BACK PAIN: 0
DIAPHORESIS: 0
HALLUCINATIONS: 0
HEADACHES: 0
SINUS PAIN: 0
FEVER: 0
BRUISES/BLEEDS EASILY: 0
WHEEZING: 0
TREMORS: 0
FLANK PAIN: 0
SORE THROAT: 0
DEPRESSION: 0
TINGLING: 0
CONSTIPATION: 0
SPUTUM PRODUCTION: 0
FALLS: 0
MYALGIAS: 0
WEAKNESS: 0
DIZZINESS: 0
DIARRHEA: 0
CHILLS: 1
DOUBLE VISION: 0
VOMITING: 0
ORTHOPNEA: 0
ABDOMINAL PAIN: 0
HEMOPTYSIS: 0
HEARTBURN: 0
CLAUDICATION: 0
STRIDOR: 0
NAUSEA: 0
COUGH: 0
POLYDIPSIA: 0
PND: 0
SHORTNESS OF BREATH: 0
BLURRED VISION: 0
BLOOD IN STOOL: 0
EYE PAIN: 0
PHOTOPHOBIA: 0
NECK PAIN: 0

## 2025-02-27 ASSESSMENT — SOCIAL DETERMINANTS OF HEALTH (SDOH)
IN THE PAST 12 MONTHS, HAS THE ELECTRIC, GAS, OIL, OR WATER COMPANY THREATENED TO SHUT OFF SERVICE IN YOUR HOME?: NO
WITHIN THE LAST YEAR, HAVE YOU BEEN AFRAID OF YOUR PARTNER OR EX-PARTNER?: NO
WITHIN THE LAST YEAR, HAVE TO BEEN RAPED OR FORCED TO HAVE ANY KIND OF SEXUAL ACTIVITY BY YOUR PARTNER OR EX-PARTNER?: NO
WITHIN THE PAST 12 MONTHS, THE FOOD YOU BOUGHT JUST DIDN'T LAST AND YOU DIDN'T HAVE MONEY TO GET MORE: NEVER TRUE
WITHIN THE PAST 12 MONTHS, YOU WORRIED THAT YOUR FOOD WOULD RUN OUT BEFORE YOU GOT THE MONEY TO BUY MORE: NEVER TRUE
WITHIN THE LAST YEAR, HAVE YOU BEEN KICKED, HIT, SLAPPED, OR OTHERWISE PHYSICALLY HURT BY YOUR PARTNER OR EX-PARTNER?: NO
WITHIN THE LAST YEAR, HAVE YOU BEEN HUMILIATED OR EMOTIONALLY ABUSED IN OTHER WAYS BY YOUR PARTNER OR EX-PARTNER?: NO

## 2025-02-27 ASSESSMENT — PATIENT HEALTH QUESTIONNAIRE - PHQ9
SUM OF ALL RESPONSES TO PHQ9 QUESTIONS 1 AND 2: 0
1. LITTLE INTEREST OR PLEASURE IN DOING THINGS: NOT AT ALL
2. FEELING DOWN, DEPRESSED, IRRITABLE, OR HOPELESS: NOT AT ALL

## 2025-02-27 NOTE — ED NOTES
POC BS finger stick 327, repeat lactic collected and sent. Pt provided urinal. Denies any pain at this time, Vanco still running, denies any needs  Call light in reach

## 2025-02-27 NOTE — PROGRESS NOTES
" Daily Progress Note    Date of Service  2/27/2025    Chief Complaint  Donald Fuller is a 54 y.o. male admitted 2/26/2025 with left foot pain    Hospital Course  Donald Fuller is a 54 y.o. male  with a significant history of poorly controlled diabetes that came in on 2/26/25 due to a worsening left foot ulcer. Patient was last seen 10 days ago for left foot cellulitis. He was given antibiotics and sent home. Since then his redness and swelling around the foot has gotten worse with a streak coming up midfoot.   In the ED, patient was found to be hyperglycemic with BS as high as 500 as well as an DOMINIC. He states that his sugars are generally 130-160's and he does have diabetic neuropathy. Most recent A1C this year is 15. Radiographic images of his left foot showed no signs of osteomyelitis.     Interval Problem Update  2.26 Admitted for further management of diabetic foot ulcer.  2.27. Wound care consulted. Left foot wound having atypical presentation. Will order MRI of left foot due to atypical presentation and recurrent infection. Wound cultures pending. Continue IVF for DOMINIC and trend daily BMPs. Will order off loading boot and diabetes education. Continue supportive care.       I have discussed this patient's plan of care and discharge plan at IDT rounds today with Case Management, Nursing, Nursing leadership, and other members of the IDT team.    Consultants/Specialty      Code Status  Full Code    Disposition:   I have discussed this patient's plan of care and discharge plan at IDT rounds today with Case Management, Nursing, Nursing leadership, and other members of the IDT team.        Subjectively: Patient reports, \" I just want to get this wound better\"     Review of Systems  Review of Systems   Constitutional:  Positive for chills. Negative for fever.   HENT:  Negative for hearing loss.    Eyes:  Negative for blurred vision.   Respiratory:  Negative for cough and hemoptysis.  "   Cardiovascular:  Negative for chest pain and palpitations.   Gastrointestinal:  Negative for heartburn.   Genitourinary:  Negative for dysuria.   Musculoskeletal:  Negative for myalgias.   Skin:  Negative for rash.   Neurological:  Negative for dizziness and weakness.        Physical Exam  Temp:  [36.1 °C (97 °F)-36.4 °C (97.6 °F)] 36.1 °C (97 °F)  Pulse:  [] 96  Resp:  [17-19] 17  BP: (128-156)/() 144/95  SpO2:  [90 %-96 %] 93 %    Physical Exam  Vitals and nursing note reviewed.   Constitutional:       Appearance: Normal appearance.   Cardiovascular:      Rate and Rhythm: Normal rate and regular rhythm.      Pulses: Normal pulses.      Heart sounds: Normal heart sounds, S1 normal and S2 normal. No murmur heard.     No friction rub. No gallop.   Pulmonary:      Effort: Pulmonary effort is normal.      Breath sounds: Normal breath sounds.   Abdominal:      General: Abdomen is flat. Bowel sounds are normal.      Palpations: Abdomen is soft.   Feet:      Left foot:      Skin integrity: Ulcer, skin breakdown and erythema present.      Comments: Left foot ulcer calloused, appearing with some necrotic tissue.   Skin:     Capillary Refill: Capillary refill takes 2 to 3 seconds.      Findings: Erythema present.   Neurological:      Mental Status: He is alert.      Comments: BLE numbness and tingling.          Fluids    Intake/Output Summary (Last 24 hours) at 2/27/2025 1236  Last data filed at 2/27/2025 0509  Gross per 24 hour   Intake --   Output 800 ml   Net -800 ml        Laboratory  Recent Labs     02/26/25  2136   WBC 7.5   RBC 5.13   HEMOGLOBIN 14.6   HEMATOCRIT 44.4   MCV 86.5   MCH 28.5   MCHC 32.9   RDW 39.0   PLATELETCT 269   MPV 10.4     Recent Labs     02/26/25  2136   SODIUM 135   POTASSIUM 5.4   CHLORIDE 97   CO2 26   GLUCOSE 513*   BUN 27*   CREATININE 1.62*   CALCIUM 9.7                   Imaging  DX-FOOT-COMPLETE 3+ LEFT   Final Result      Wound or ulcer involving the lateral forefoot  without definite underlying osseous abnormality.           Assessment/Plan  * Diabetic foot ulcer (HCC)- (present on admission)  Assessment & Plan  Patient had a wound since January 5 with drainage  Started IV vancomycin, follow trough levels and adjust levels accordingly  IV Unasyn  Blood cultures and wound cultures pending .   Inflammatory markers are negative and foot x-rays negative for osteomyelitis, however, wound showing atypical presentations. Ordered MRI for left foot.  Wound care following. Will treat with antiseptic solution.   Supportive care: Heel off loading. Pain control  Ordered off loading boot.     Lactic acidosis  Assessment & Plan   3.1 on admit  Trended and resolved.   Continue IV hydration     Acute renal failure (HCC)  Assessment & Plan  Likely prerenal  IV fluid hydration  Monitor BMP daily and trend  Avoid IV contrast/nephrotoxins/NSAIDs  Dose adjust meds for decreased GFR     Type 2 diabetes mellitus with hyperglycemia, with long-term current use of insulin (HCC)- (present on admission)  Assessment & Plan    BG 500s on admit. Last HgbA1C obtained this last January was 15.0.   Started on insulin sliding scale with serial Accu-Check and Lantus 10 units  Holding home meds   Continue statin   BG Goal 140-180 while in hospital  Check hemoglobin A1c  Hypoglycemic protocol in place  Ordered Diabetes Education  Off loading boot ordered      Diabetic Neuropathy   BLE decreased sensation however no tingling or discomfort aside from foot ulcer.  Pain control.       VTE prophylaxis:   SCDs/TEDs   enoxaparin ppx

## 2025-02-27 NOTE — ASSESSMENT & PLAN NOTE
Likely prerenal  IV fluid hydration completed, renal function back to normal   Avoid IV contrast/nephrotoxins/NSAIDs  Dose adjust meds for decreased GFR

## 2025-02-27 NOTE — PROGRESS NOTES
4 Eyes Skin Assessment Completed by Vicki VAIL RN and Jed NATH RN.    Head Scab  Ears WDL  Nose WDL  Mouth WDL  Neck WDL  Breast/Chest WDL  Shoulder Blades WDL  Spine WDL  (R) Arm/Elbow/Hand Redness and Blanching  (L) Arm/Elbow/Hand Redness, Blanching, and Scab  Abdomen WDL  Groin WDL  Scrotum/Coccyx/Buttocks WDL  (R) Leg Redness, Blanching, and Scab  (L) Leg Redness, Blanching, and Scab  (R) Heel/Foot/Toe Redness and Blanching  (L) Heel/Foot/Toe Redness, Blanching, and Swelling, Wound (see pictures)    Devices In Places Blood Pressure Cuff and Pulse Ox    Interventions In Place Heel Mepilex, TAP System, and Pillows    Possible Skin Injury Yes    Pictures Uploaded Into Epic Yes  Wound Consult Placed Yes  RN Wound Prevention Protocol Ordered Yes

## 2025-02-27 NOTE — ASSESSMENT & PLAN NOTE
Patient had a wound for several months, seen in early January, completed 7 day course of keflex, and see again 2/8 and treated with 10 day course of augment. Presents with cellulitis   Cx thus far with GPC, continue Unasyn, stop Vanc   Xray neg for osteomyelitis  MRI completed showing Marrow edema and abnormal enhancement of the fifth proximal phalanx in the fifth metatarsal head consistent with osteomyelitis.  Wound consulted   I have consulted with ortho to see if he would benefit from debridement

## 2025-02-27 NOTE — ED NOTES
Pt medicated per MAR for foot pain 8/10 and Unasyn abx started after both sets of blood cultures collected and taken to lab. Pt resting at this time in NAD, VSS on RA. NS bolus still running. Updated on POC, call light in reach

## 2025-02-27 NOTE — ED PROVIDER NOTES
ED Provider Note    CHIEF COMPLAINT  Chief Complaint   Patient presents with    Foot Pain     Pt reports he was seen for left foot cellulitis x10 days ago and was given antibiotics. Now, pt reports a blister/wound on left foot. Hx of diabetes        EXTERNAL RECORDS REVIEWED  Other patient was seen in our department on 2/8 with cellulitis of left lower extremity and sent home on Augmentin    HPI/ROS  LIMITATION TO HISTORY   Select: : None  OUTSIDE HISTORIAN(S):  katerina Fuller is a 54 y.o. male who presents to the emergency department chief complaint of worsening ulcer worsening redness and pain to the left foot.  He does have a diabetic neuropathy and he states that sugars been fine in the 130s to 160s.  He completed the Augmentin and the last 5 days the redness swelling and pain is progressed.  He now has a streak coming up the midfoot.  He also states the wound has gotten significantly enlarged.  No fevers no chills no nausea vomiting but just feels uncomfortable.    PAST MEDICAL HISTORY   has a past medical history of Diabetes (HCC).    SURGICAL HISTORY  patient denies any surgical history    FAMILY HISTORY  History reviewed. No pertinent family history.    SOCIAL HISTORY  Social History     Tobacco Use    Smoking status: Never    Smokeless tobacco: Never   Vaping Use    Vaping status: Some Days    Substances: Nicotine   Substance and Sexual Activity    Alcohol use: Never    Drug use: Never    Sexual activity: Never       CURRENT MEDICATIONS  Home Medications       Reviewed by Sandi Clifford R.N. (Registered Nurse) on 02/26/25 at 2031  Med List Status: Not Addressed     Medication Last Dose Status   Blood Glucose Calibration (ONETOUCH VERIO) Solution  Active   Blood Glucose Monitoring Suppl (ONETOUCH VERIO REFLECT) w/Device Kit  Active   glucose blood (ONETOUCH VERIO) strip  Active   hydrOXYzine HCl (ATARAX) 25 MG Tab  Active   ibuprofen (MOTRIN) 800 MG Tab  Active   insulin 70/30 (HUMULIN  "70/30/NOVOLIN 70/30) (70-30) 100 UNIT/ML Suspension  Active   insulin glargine 100 UNIT/ML SC SOPN injection  Active   insulin lispro 100 UNIT/ML SC SOPN injection PEN  Active   Insulin Syringe-Needle U-100 (INSULIN SYRINGE 1CC/31GX5/16\") 31G X 5/16\" 1 ML Misc  Active   Lancets (ONETOUCH DELICA PLUS [id number]) Misc  Active   metformin (GLUCOPHAGE) 1000 MG tablet  Active   ONETOUCH VERIO strip  Active   pregabalin (LYRICA) 100 MG Cap  Active   Semaglutide,0.25 or 0.5MG/DOS, (OZEMPIC, 0.25 OR 0.5 MG/DOSE,) 2 MG/3ML Solution Pen-injector  Active   simvastatin (ZOCOR) 20 MG Tab  Active   syringe 1 ML Misc  Active   venlafaxine XR (EFFEXOR XR) 37.5 MG CAPSULE SR 24 HR  Active                    ALLERGIES  No Known Allergies    PHYSICAL EXAM  VITAL SIGNS: BP (!) 156/101   Pulse (!) 114   Temp 36.4 °C (97.6 °F) (Temporal)   Resp 19   Ht 1.88 m (6' 2\")   Wt 95.9 kg (211 lb 6.7 oz)   SpO2 93%   BMI 27.14 kg/m²    Pulse OX: Pulse Oxygen level is within normal limits on room air  Constitutional: Alert in no apparent distress.  HENT: Normocephalic, Atraumatic, MMM  Eyes: PERound. Conjunctiva normal, non-icteric.   Heart: Regular rhythm tachycardic intact distal pulses   Lungs: Symmetrical movement, no resp distress   Abdomen: Non-tender, non-distended, normal bowel sounds  EXT/Back  left lower extremity there is a wound proximal to the fifth digit on the plantar aspect approximately 3 cm in diameter there is erythema on the dorsal aspect of the foot with a mild streak going up the foot but not to the tibial area DP pulses 2+  Skin: Warm, Dry  Neurologic: Alert and oriented, Grossly non-focal.       EKG/LABS  Labs Reviewed   CBC WITH DIFFERENTIAL - Abnormal; Notable for the following components:       Result Value    Lymphocytes 19.50 (*)     All other components within normal limits   BASIC METABOLIC PANEL - Abnormal; Notable for the following components:    Glucose 513 (*)     Bun 27 (*)     Creatinine 1.62 (*)     All " other components within normal limits   CRP QUANTITIVE (NON-CARDIAC) - Abnormal; Notable for the following components:    Stat C-Reactive Protein 1.34 (*)     All other components within normal limits   LACTIC ACID - Abnormal; Notable for the following components:    Lactic Acid 3.1 (*)     All other components within normal limits   ESTIMATED GFR - Abnormal; Notable for the following components:    GFR (CKD-EPI) 50 (*)     All other components within normal limits   SED RATE   LACTIC ACID   LACTIC ACID   BLOOD CULTURE   BLOOD CULTURE   MRSA BY PCR (AMP)       I have independently interpreted this EKG    RADIOLOGY/PROCEDURES   I have independently interpreted the diagnostic imaging associated with this visit and am waiting the final reading from the radiologist.   My preliminary interpretation is as follows:     X-ray of the left foot with ulcer evident without bony abnormality    Radiologist interpretation:  DX-FOOT-COMPLETE 3+ LEFT   Final Result      Wound or ulcer involving the lateral forefoot without definite underlying osseous abnormality.          COURSE & MEDICAL DECISION MAKING    ASSESSMENT, COURSE AND PLAN  Care Narrative:     Patient is a 54-year-old male who presents to the emergency department with physical examination consistent with a diabetic ulcer and cellulitis of the left foot with mild streaking.  Good pulses and cap refill intact will evaluate for underlying infectious process with CRP ESR and will be treated with IV fluids pain management IV antibiotics.    DISPOSITION AND DISCUSSIONS  11:28 PM  Patient's blood glucose came back at over 500 as well as an DOMINIC based on his renal function.  The patient was already treated with IV antibiotics for diabetic cellulitis and wound.  Accu-Chek will be done now after his receive the IV fluids as well as repeat lactic acid which was elevated consistent with SIRS without endorgan injury.  He will be admitted for IV antibiotics in the setting of a diabetic  foot ulcer and cellulitis.    Hydration: Based on the patient's presentation of Tachycardia the patient was given IV fluids. IV Hydration was used because oral hydration was not adequate alone. Upon recheck following hydration, the patient was improved.    Patient blood glucose in the 300s after the IV fluids I spoke with Dr. Shore with the medicine service for hospitalization the setting of a diabetic foot ulcer uncontrolled diabetes and cellulitis.      I have discussed management of the patient with the following physicians and TALI's:  Silviano    Discussion of management with other Q or appropriate source(s): Pharmacy for vanc dosing        FINAL DIAGNOSIS  1. Diabetic ulcer of toe of left foot associated with type 2 diabetes mellitus, unspecified ulcer stage (HCC)    2. Cellulitis of left lower extremity    3. DOMINIC (acute kidney injury) (HCC)    4. Hyperglycemia         Electronically signed by: Edith Henriquez M.D., 2/26/2025 9:24 PM

## 2025-02-27 NOTE — ED NOTES
Unasyn complete, MRSA swab collected and sent. Vanco infusion started. Pt resting at this time in NAD, VSS on RA. Call light in reach, pain has improved since morphine admin

## 2025-02-27 NOTE — H&P
Hospital Medicine History & Physical Note    Date of Service  2/27/2025    Primary Care Physician  Kojo Covington M.D.    Consultants      Specialist Names:     Code Status  Full Code    Chief Complaint  Chief Complaint   Patient presents with    Foot Pain     Pt reports he was seen for left foot cellulitis x10 days ago and was given antibiotics. Now, pt reports a blister/wound on left foot. Hx of diabetes        History of Presenting Illness  Donald Fuller is a 54 y.o. male who presented 2/26/2025 with past medical history of insulin-dependent diabetes, who presents to the hospital for left foot wound that started on January 5.  He states that it started out as a blister which then popped.  For the past 2 to 3 days it has been bleeding every day.  Yesterday he started noticing streaking going up his foot.  The patient is having chills but denies any fevers.  The patient was scheduled to see wound care this Friday.  He denies any nausea, vomiting, diarrhea.  Patient is compliant with his home diabetic medication.  Patient is a non-smoker.    Foot x-ray did not show any evidence of osteomyelitis      I discussed the plan of care with patient.    Review of Systems  Review of Systems   Constitutional:  Positive for chills. Negative for diaphoresis, fever and malaise/fatigue.   HENT:  Negative for congestion, ear discharge, ear pain, hearing loss, nosebleeds, sinus pain, sore throat and tinnitus.    Eyes:  Negative for blurred vision, double vision, photophobia and pain.   Respiratory:  Negative for cough, hemoptysis, sputum production, shortness of breath, wheezing and stridor.    Cardiovascular:  Negative for chest pain, palpitations, orthopnea, claudication, leg swelling and PND.   Gastrointestinal:  Negative for abdominal pain, blood in stool, constipation, diarrhea, heartburn, melena, nausea and vomiting.   Genitourinary:  Negative for dysuria, flank pain, frequency, hematuria and urgency.  "  Musculoskeletal:  Negative for back pain, falls, joint pain, myalgias and neck pain.   Skin:  Negative for itching and rash.   Neurological:  Negative for dizziness, tingling, tremors, weakness and headaches.   Endo/Heme/Allergies:  Negative for environmental allergies and polydipsia. Does not bruise/bleed easily.   Psychiatric/Behavioral:  Negative for depression, hallucinations, substance abuse and suicidal ideas.        Past Medical History   has a past medical history of Diabetes ([facility]).    Surgical History    Surgical history reviewed is not pertinent    Family History  Family history reviewed with patient. There is no family history that is pertinent to the chief complaint.     Social History   reports that he has never smoked. He has never used smokeless tobacco. He reports that he does not drink alcohol and does not use drugs.    Allergies  No Known Allergies    Medications  Prior to Admission Medications   Prescriptions Last Dose Informant Patient Reported? Taking?   FLUOXETINE HCL PO 2/26/2025 Morning Patient Yes Yes   Sig: Take 1 Caplet by mouth every day. Unknown strength   Insulin Syringe-Needle U-100 (INSULIN SYRINGE 1CC/31GX5/16\") 31G X 5/16\" 1 ML Misc  Patient No No   Sig: Use as directed for SQ injection of insulin.   Semaglutide,0.25 or 0.5MG/DOS, (OZEMPIC, 0.25 OR 0.5 MG/DOSE,) 2 MG/3ML Solution Pen-injector Not Taking Patient No No   Sig: Inject 0.25 mg under the skin every 7 days.   Patient not taking: Reported on 2/26/2025   amoxicillin-clavulanate (AUGMENTIN) 875-125 MG Tab 2/15/2025 Evening  Yes Yes   Sig: Take 1 Tablet by mouth 2 times a day. 7 day course completed   hydrOXYzine HCl (ATARAX) 25 MG Tab 2/26/2025 Morning Patient No Yes   Sig: TAKE 1 TABLET BY MOUTH THREE TIMES DAILY AS NEEDED FOR ITCHING OR ANXIETY.   Patient taking differently: Take 25 mg by mouth 3 times a day as needed.   ibuprofen (MOTRIN) 200 MG Tab 2/25/2025 Evening Patient Yes Yes   Sig: Take 600 mg by mouth every 8 " hours as needed for Mild Pain. 600 mg = 3 tablets   insulin 70/30 (HUMULIN 70/30/NOVOLIN 70/30) (70-30) 100 UNIT/ML Suspension 2/25/2025 Evening Patient No Yes   Sig: Inject 25 Units under the skin 2 times a day.   Patient taking differently: Inject 30 Units under the skin 2 times a day.   insulin glargine 100 UNIT/ML SC SOPN injection 2/25/2025 Evening Patient No Yes   Sig: Inject 10 Units under the skin every evening. Till fasting glucose is    insulin lispro 100 UNIT/ML SC SOPN injection PEN 2/23/2025 Evening Patient No Yes   Sig: Inject 2 Units under the skin 3 times a day before meals. Use 2 units if blood sugar 150-200. 4 units if 200-250, 6 units if 250-300, 8 units if 300-350, 10 units if 350-400   metformin (GLUCOPHAGE) 1000 MG tablet 2/26/2025 Morning Patient No Yes   Sig: Take 1 Tablet by mouth 2 times a day with meals.   pregabalin (LYRICA) 100 MG Cap Not Taking Patient No No   Sig: Take 1 Capsule by mouth 2 times a day for 180 days.   Patient not taking: Reported on 2/26/2025   simvastatin (ZOCOR) 20 MG Tab Not Taking Patient No No   Sig: Take 1 Tablet by mouth every evening.   Patient not taking: Reported on 2/26/2025   venlafaxine XR (EFFEXOR XR) 37.5 MG CAPSULE SR 24 HR Not Taking Patient No No   Sig: Take 1 Capsule by mouth every day.   Patient not taking: Reported on 2/26/2025      Facility-Administered Medications: None       Physical Exam  Temp:  [36.4 °C (97.6 °F)] 36.4 °C (97.6 °F)  Pulse:  [] 98  Resp:  [18-19] 19  BP: (128-156)/() 128/70  SpO2:  [90 %-96 %] 92 %  Blood Pressure: 128/70   Temperature: 36.4 °C (97.6 °F)   Pulse: 100   Respiration: 19   Pulse Oximetry: 92 %       Physical Exam  Vitals and nursing note reviewed.   Constitutional:       General: He is not in acute distress.     Appearance: Normal appearance. He is not ill-appearing, toxic-appearing or diaphoretic.   HENT:      Head: Normocephalic and atraumatic.      Nose: No congestion or rhinorrhea.       "Mouth/Throat:      Pharynx: No posterior oropharyngeal erythema.   Eyes:      General: No scleral icterus.        Right eye: No discharge.   Cardiovascular:      Rate and Rhythm: Normal rate and regular rhythm.      Pulses: Normal pulses.      Heart sounds: Normal heart sounds. No murmur heard.     No friction rub. No gallop.   Pulmonary:      Effort: Pulmonary effort is normal. No respiratory distress.      Breath sounds: Normal breath sounds. No stridor. No wheezing, rhonchi or rales.   Abdominal:      General: There is no distension.      Tenderness: There is no abdominal tenderness.   Musculoskeletal:         General: No swelling, tenderness, deformity or signs of injury.      Cervical back: Normal range of motion.      Right lower leg: No edema.      Left lower leg: No edema.   Skin:     Coloration: Skin is not jaundiced or pale.      Findings: No bruising, erythema, lesion or rash.      Comments: Left foot wound with drainage   Neurological:      General: No focal deficit present.      Mental Status: He is alert and oriented to person, place, and time.         Laboratory:  Recent Labs     02/26/25  2136   WBC 7.5   RBC 5.13   HEMOGLOBIN 14.6   HEMATOCRIT 44.4   MCV 86.5   MCH 28.5   MCHC 32.9   RDW 39.0   PLATELETCT 269   MPV 10.4     Recent Labs     02/26/25  2136   SODIUM 135   POTASSIUM 5.4   CHLORIDE 97   CO2 26   GLUCOSE 513*   BUN 27*   CREATININE 1.62*   CALCIUM 9.7     Recent Labs     02/26/25  2136   GLUCOSE 513*         No results for input(s): \"NTPROBNP\" in the last 72 hours.      No results for input(s): \"TROPONINT\" in the last 72 hours.    Imaging:  DX-FOOT-COMPLETE 3+ LEFT   Final Result      Wound or ulcer involving the lateral forefoot without definite underlying osseous abnormality.          X-Ray:  I have personally reviewed the images and compared with prior images.    Assessment/Plan:  Justification for Admission Status  I anticipate this patient will require at least two midnights for " appropriate medical management, necessitating inpatient admission because diabetic foot    Patient will need a Med/Surg bed on MEDICAL service .  The need is secondary to diabetic foot.    * Diabetic foot (HCC)- (present on admission)  Assessment & Plan  Patient had a wound since January 5 with drainage  Start IV vancomycin, follow trough levels and adjust levels accordingly  IV Unasyn  Wound cultures and blood cultures ordered  Inflammatory markers are negative and foot x-rays negative for osteomyelitis  Wound consult      Lactic acidosis  Assessment & Plan  Continue IV hydration    Acute renal failure (HCC)  Assessment & Plan  Likely prerenal  IV fluid hydration  Monitor BMP and assess response  Avoid IV contrast/nephrotoxins/NSAIDs  Dose adjust meds for decreased GFR      Type 2 diabetes mellitus with hyperglycemia, with long-term current use of insulin (HCC)- (present on admission)  Assessment & Plan  Start on insulin sliding scale with serial Accu-Checks  Check hemoglobin A1c  Hypoglycemic protocol in place  Lantus 10 units        VTE prophylaxis: SCDs/TEDs

## 2025-02-27 NOTE — ED NOTES
ED tech at [facility] transporting pt upstairs. Pt transported upstairs in stable condition w/ chart and all belongings, VSS

## 2025-02-27 NOTE — WOUND TEAM
[facility]  Inpatient Services  Initial Wound and Skin Care Evaluation    Admission Date: 2/26/2025     Last order of IP CONSULT TO WOUND CARE was found on 2/27/2025 from Hospital Encounter on 2/26/2025     HPI, PMH, SH: Reviewed    History reviewed. No pertinent surgical history.  Social History     Tobacco Use    Smoking status: Never    Smokeless tobacco: Never   Substance Use Topics    Alcohol use: Never     Chief Complaint   Patient presents with    Foot Pain     Pt reports he was seen for left foot cellulitis x10 days ago and was given antibiotics. Now, pt reports a blister/wound on left foot. Hx of diabetes      Diagnosis: Diabetic foot (HCC) [E11.8]    Unit where seen by Wound Team: T312/02     WOUND CONSULT RELATED TO:  L DFU    WOUND TEAM PLAN OF CARE - Frequency of Follow-up:   Nursing to follow dressing orders written for wound care. Contact wound team if area fails to progress, deteriorates or with any questions/concerns if something comes up before next scheduled follow up (See below as to whether wound is following and frequency of wound follow up)   Weekly - nursing to do routine dressing changes and wound team will follow up about every 7 days    WOUND HISTORY:   Pt is a 53 YO male who presented to the ED for worsening wound on the L foot.  Pt was treated with a cours of Augmentin.  Hx includes DM.  Pt admitted for DFU, cellulitis, DOMINIC and hyperglycemia.  Pt states he has had the L foot wound for months. He takes care of wound himself cleaning and using bag balm.  Pt wears street shoes.  Pt states he takes his BG daily and gets a reading around 128.  He takes metformin and insulin.  Pt states he does not have full sensation to his feet    Shoe size 11       WOUND ASSESSMENT/LDA          Wound 02/26/25 Diabetic Ulcer Left plantar 5th MTH (Active)   Wound Image      02/27/25 1200   Periwound Assessment Callused;Maceration;Blanchable erythema 02/27/25 1200   Drainage Amount Small  02/27/25 1200   Drainage Description Serosanguineous;Purulent 02/27/25 1200   Periwound Protectant Barrier Paste 02/27/25 1200   Dressing Status Other (Comment) 02/27/25 1200   Dressing Changed Changed 02/27/25 1200   Dressing Cleansing/Solutions 1/4 Strength Dakin's Solution 02/27/25 1200   Dressing Options Moist Roll Gauze;Silicone Adhesive Foam 02/27/25 1200   Dressing Change/Treatment Frequency Every Shift, and As Needed 02/27/25 1200   NEXT Dressing Change/Treatment Date 02/28/25 02/27/25 1200   NEXT Weekly Photo (Inpatient Only) 03/06/25 02/27/25 1200   Wound Team Following Other (comment) 02/27/25 1200   Wound Length (cm) 2 cm 02/27/25 1200   Wound Width (cm) 4 cm 02/27/25 1200   Wound Surface Area (cm^2) 8 cm^2 02/27/25 1200   Wound Bed Slough (%) 100 % 02/27/25 1200   Wound Odor Mild 02/27/25 1200   Pulses Left;DP 02/27/25 1200   WOUND NURSE ONLY - Time Spent with Patient (mins) 60 02/27/25 1200         MRI ordered 2/27/25     Vascular:    TERRENCE:   No results found.    Lab Values:    Lab Results   Component Value Date/Time    WBC 7.5 02/26/2025 09:36 PM    RBC 5.13 02/26/2025 09:36 PM    HEMOGLOBIN 14.6 02/26/2025 09:36 PM    HEMATOCRIT 44.4 02/26/2025 09:36 PM    CREACTPROT 1.34 (H) 02/26/2025 09:36 PM    SEDRATEWES 17 02/26/2025 09:36 PM    HBA1C 15.0 (A) 01/08/2025 11:43 AM         Culture Results show:  Swab culture taken 2/27/25    Pain Level/Medicated:  None, Tolerated without pain medication       INTERVENTIONS BY WOUND TEAM:    Wound:  L foot plantar 5th MTH  Preparation for Dressing removal: Removed without difficulty  Cleansed/Non-selectively Debrided with:  Wound cleanser and Gauze  Non-Excisional Conservative Sharp debridement: Slough and Callus debrided away using curette, scissors, and forceps < 20cm2 debrided down to non-viable/devitalized tissue.  Scant bleeding noted, controlled with manual pressure.  Ashwini wound: Cleansed with Wound cleanser and Gauze, Prepped with No Sting  Primary Dressing:   aquacel ag, silicone adherent foam - orders for dakins q shift  educated pt re the importance of off loading for foot wound resolution    Advanced Wound Care Discharge Planning  Number of Clinicians necessary to complete wound care: 1  Is patient requiring IV pain medications for dressing changes:  No   Length of time for dressing change 60 min. (This does not include chart review, pre-medication time, set up, clean up or time spent charting.)    Interdisciplinary consultation: Patient, Bedside RN (), .  Dr Ram bedside    EVALUATION / RATIONALE FOR TREATMENT:     Date:  02/27/25  Wound Status:      Pt has a chronic DFU L plantar forefoot.  Pt has not had medical wound care for this wound.  Pt does not off load area.  BG not well controlled.  DP pulse palpable so blood flow should be adequate to resolve wound.  X-ray did not show signs of ostea - given history and presentation an MRI was ordered by physician.  Wound bed appears to be necrosing adipose.  Sharp debridement resulted in removal of most of arturo callus - curreting wound bed resulted in bleeding even though viable tissue was not observed.  Will treat with an antiseptic solution and reassess wound bed.           Goals: 100% granulation tissue in wound bed    NURSING PLAN OF CARE ORDERS:  Dressing changes: See Dressing Care orders    NUTRITION RECOMMENDATIONS   Wound Team Recommendations:  Dietary consult  Diabetes Education consult    DIET ORDERS (From admission to next 24h)       Start     Ordered    02/27/25 0103  Diet Order Diet: Consistent CHO (Diabetic)  ALL MEALS        Question:  Diet:  Answer:  Consistent CHO (Diabetic)    02/27/25 0102                    PREVENTATIVE INTERVENTIONS:    Q shift Jp - performed per nursing policy  Q shift pressure point assessments - performed per nursing policy    Surface/Positioning  Reposition q 2 hours - Currently in Place    Offloading/Redistribution  Sacral offloading dressing (Silicone dressing) -  Ordered  Heel offloading dressing (Silicone dressing) - Applied this Visit    Anticipated discharge plans:  Outpatient Wound Center - pt will need ongoing wound care for L foot wound       Vac Discharge Needs:  Vac Discharge plan is purely a recommendation from wound team and not a requirement for discharge unless otherwise stated by physician.  Not Applicable Pt not on a wound vac

## 2025-02-27 NOTE — CARE PLAN
The patient is Stable - Low risk of patient condition declining or worsening    Shift Goals  Clinical Goals: Pain, comfort  Patient Goals: Pain  Family Goals: not present    Progress made toward(s) clinical / shift goals:    Problem: Fall Risk  Goal: Patient will remain free from falls  Outcome: Progressing  Note: Patient is low fall risk     Problem: Mobility  Goal: Patient's capacity to carry out activities will improve  Outcome: Progressing  Flowsheets (Taken 2/27/2025 1346)  Mobility:   Encouraged mobilization per interdisciplinary team recommendations   Provided assistive devices   Administered pain management to allow progressive mobilization   Monitored for signs of activity intolerance   Provided rest periods between activities     Problem: Wound/ / Incision Healing  Goal: Patient's wound/surgical incision will decrease in size and heals properly  Outcome: Progressing  Note: Wound consulted and dressing changed, dressing orders placed for qshift changes

## 2025-02-27 NOTE — ED NOTES
PIV est by assist EMT, labs collected and sent including first set of blood cultures. Second set being collected now by phleb. NS bolus started.

## 2025-02-27 NOTE — PROGRESS NOTES
Pharmacy Vancomycin Kinetics Note for 2/27/2025     54 y.o. male on Vancomycin day # 1     Vancomycin Indication (AUC Dosing): Skin/skin structure infection    Provider specified end date: 03/06/25    Active Antibiotics (From admission, onward)      Ordered     Ordering Provider       Thu Feb 27, 2025  2:44 AM    02/27/25 0244  vancomycin (Vancocin) 1,750 mg in  mL IVPB  (vancomycin (VANCOCIN) IV (LD + Maintenance))  EVERY 24 HOURS        Note to Pharmacy: Per P&T vanco per pharmacy Protocol    Dena Shore M.D.       Thu Feb 27, 2025  1:03 AM    02/27/25 0103  ampicillin/sulbactam (Unasyn) 3 g in  mL IVPB  (Abscess/Cellulitis )  EVERY 6 HOURS         Dena Shore M.D.    02/27/25 0103  MD Alert...Vancomycin per Pharmacy  (Abscess/Cellulitis )  PHARMACY TO DOSE        Question:  Indication(s) for vancomycin?  Answer:  Skin and soft tissue infection    Dena Shore M.D.            Dosing Weight: 95.9 kg (211 lb 6.7 oz)      Admission History: Admitted on 2/26/2025 for Diabetic foot (HCC) [E11.8]  Pertinent history: Patient presenting with worsening wound on his foot including drainage. X-Rays negative for osteo. Empiric unasyn and vanco therapy initiated.    Allergies:     Patient has no known allergies.     Pertinent cultures to date:     Results       Procedure Component Value Units Date/Time    Blood Culture - Draw one from central line and one from peripheral site [[id number]] Collected: 02/26/25 2141    Order Status: Completed Specimen: Blood from Peripheral Updated: 02/27/25 0209     Significant Indicator NEG     Source BLD     Site PERIPHERAL     Culture Result No Growth  Note: Blood cultures are incubated for 5 days and  are monitored continuously.Positive blood cultures  are called to the RN and reported as soon as  they are identified.      Blood Culture - Draw one from central line and one from peripheral site [[id number]] Collected: 02/26/25 2136    Order Status: Completed Specimen: Blood  "from Line Updated: 25 0209     Significant Indicator NEG     Source BLD     Site Peripheral     Culture Result No Growth  Note: Blood cultures are incubated for 5 days and  are monitored continuously.Positive blood cultures  are called to the RN and reported as soon as  they are identified.      Culture Wound W/ Gram Stain [[id number]]     Order Status: Sent Specimen: Wound from Left Foot     Culture Wound W/ Gram Stain [[id number]]     Order Status: Canceled Specimen: Wound from Left Foot     MRSA By PCR (Amp) [[id number]] Collected: 25    Order Status: Sent Specimen: Respirate from Nares Updated: 25            Labs:     Estimated Creatinine Clearance: 59.9 mL/min (A) (by C-G formula based on SCr of 1.62 mg/dL (H)).  Recent Labs     25   WBC 7.5   NEUTSPOLYS 69.60     Recent Labs     25   BUN 27*   CREATININE 1.62*     No intake or output data in the 24 hours ending 25 0244   BP (!) 137/91   Pulse (!) 101   Temp 36.3 °C (97.3 °F) (Temporal)   Resp 18   Ht 1.88 m (6' 2\")   Wt 95.9 kg (211 lb 6.7 oz)   SpO2 92%  Temp (24hrs), Av.4 °C (97.5 °F), Min:36.3 °C (97.3 °F), Max:36.4 °C (97.6 °F)      List concerns for Vancomycin clearance:     None    Pharmacokinetics:     AUC kinetics:   Ke (hr ^-1): 0.0534 hr^-1  Half life: 12.98 hr  Clearance: 3.329  Estimated TDD: 1664.5  Estimated Dose: 1040  Estimated interval: 15    A/P:     -  Vancomycin dose: 2500mg loading dose followed by 1750mg Q24h    -  Next vancomycin level(s):    - None ordered at this time. Likely obtain levels after the 4th maintenance dose unless clinical status or renal function changes.     -  Predicted vancomycin AUC from initial AUC test calculator: 526 mg·hr/L    -  Comments: Concerns for accumulation listed above. MRSA nares swab ordered. Pharmacy will continue to follow and adjust therapy as clinically appropriate.    Vinayak Steel, PharmD    "

## 2025-02-27 NOTE — ED TRIAGE NOTES
Chief Complaint   Patient presents with    Foot Pain     Pt reports he was seen for left foot cellulitis x10 days ago and was given antibiotics. Now, pt reports a blister/wound on left foot. Hx of diabetes        Pt is alert and oriented, speaking in full sentences, follows commands and responds appropriately to questions. Resperations are even and unlabored.      Pt placed in lobby. Pt educated on triage process. Pt encouraged to alert staff for any changes.

## 2025-02-27 NOTE — PROGRESS NOTES
Left XL offloading shoe fit to pt. Pegs removed near area of concern and left at pt's bedside.     Contact traction for any questions or concerns.

## 2025-02-27 NOTE — DISCHARGE INSTR - DIET
Carbohydrate Counting for People With Diabetes  Foods with carbohydrates make your blood glucose level go up. Learning how to count carbohydrates can help you control your blood glucose levels. First, identify the foods you eat that contain carbohydrates. Then, using the Foods with Carbohydrates chart, determine about how much carbohydrates are in your meals and snacks. Make sure you are eating foods with fiber, protein, and healthy fat along with your carbohydrate foods.    Foods with Carbohydrates  The following table shows carbohydrate foods that have about 15 grams of carbohydrate each. Using measuring cups, spoons, or a food scale when you first begin learning about carbohydrate counting can help you learn about the portion sizes you typically eat.      The following foods have 15 grams carbohydrate each:    Grains  1 slice bread (1 ounce)  1 small tortilla (6-inch size)  ¼ large bagel (1 ounce)  1/3 cup pasta or rice (cooked)  ½ hamburger or hot dog bun (¾ ounce)  ½ cup cooked cereal  ½ to ¾ cup ready-to-eat cereal  2 taco shells (5-inch size)    Fruit  1 small fresh fruit (¾ to 1 cup)  ½ medium banana  17 small grapes (3 ounces)  1 cup melon or berries  ½ cup canned or frozen fruit  2 tablespoons dried fruit (blueberries, cherries, cranberries, raisins)  ½ cup unsweetened fruit juice    Starchy Vegetables  ½ cup cooked beans, peas, corn, potatoes/sweet potatoes  ¼ large baked potato (3 ounces)  1 cup acorn or butternut squash    Snack Foods  3 to 6 crackers  8 potato chips or 13 tortilla chips (¾ ounce to 1 ounce)  3 cups popped popcorn    Dairy  3/4 cup (6 ounces) nonfat plain yogurt, or yogurt with sugar-free sweetener  1 cup milk  1 cup plain rice, soy, coconut or flavored almond milk    Sweets and Desserts  ½ cup ice cream or frozen yogurt  1 tablespoon jam, jelly, pancake syrup, table sugar, or honey  2 tablespoons light pancake syrup  1 inch square of frosted cake or 2 inch square of unfrosted cake  2  small cookies (2/3 ounce each) or ¼ large cookie    Sometimes you’ll have to estimate carbohydrate amounts if you don’t know the exact recipe. One cup of mixed foods like soups can have 1 to 2 carbohydrate servings, while some casseroles might have 2 or more servings of carbohydrate.    Foods that have less than 20 calories in each serving can be counted as “free” foods. Count 1 cup raw vegetables, or ½ cup cooked non-starchy vegetables as “free” foods. If you eat 3 or more servings at one meal, then count them as 1 carbohydrate serving.    Foods without Carbohydrates  Not all foods contain carbohydrates. Meat, some dairy, fats, non-starchy vegetables, and many beverages don’t contain carbohydrate. So when you count carbohydrates, you can generally exclude chicken, pork, beef, fish, seafood, eggs, tofu, cheese, butter, sour cream, avocado, nuts, seeds, olives, mayonnaise, water, black coffee, unsweetened tea, and zero-calorie drinks. Vegetables with no or low carbohydrate include green beans, cauliflower, tomatoes, and onions.    How much carbohydrate should I eat at each meal?  Carbohydrate counting can help you plan your meals and manage your weight. Following are some starting points for carbohydrate intake at each meal. Work with your registered dietitian nutritionist to find the best range that works for your blood glucose and weight.       Women  2 - 3 carb servings (To Lose Weight)  3 - 4 carb servings (To Maintain Weight)    Men  3 - 4 carb servings (To Lose Weight)  4 - 5 carb servings (To Maintain Weight)      Checking your blood glucose after meals will help you know if you need to adjust the timing, type, or number of carbohydrate servings in your meal plan. Achieve and keep a healthy body weight by balancing your food intake and physical activity.         How should I plan my meals?  Plan for half the food on your plate to include non-starchy vegetables, like salad greens, broccoli, or carrots. Try to  eat 3 to 5 servings of non-starchy vegetables every day. Have a protein food at each meal. Protein foods include chicken, fish, meat, eggs, or beans (note that beans contain carbohydrate). These two food groups (non-starchy vegetables and proteins) are low in carbohydrate. If you fill up your plate with these foods, you will eat less carbohydrate but still fill up your stomach. Try to limit your carbohydrate portion to ¼ of the plate.    What fats are healthiest to eat?  Diabetes increases risk for heart disease. To help protect your heart, eat more healthy fats, such as olive oil, nuts, and avocado. Eat less saturated fats like butter, cream, and high-fat meats, like burden and sausage. Avoid trans fats, which are in all foods that list “partially hydrogenated oil” as an ingredient.      What should I drink?  Choose drinks that are not sweetened with sugar. The healthiest choices are water, carbonated or seltzer cabrera, and tea and coffee without added sugars.    Sweet drinks will make your blood glucose go up very quickly. One serving of soda or energy drink is ½ cup. It is best to drink these beverages only if your blood glucose is low.    Artificially sweetened, or diet drinks, typically do not increase your blood glucose if they have zero calories in them. Read labels of beverages, as some diet drinks do have carbohydrate and will raise your blood glucose.    Label Reading Tips  Read Nutrition Facts labels to find out how many grams of carbohydrate are in a food you want to eat. Don’t forget: sometimes serving sizes on the label aren’t the same as how much food you are going to eat, so you may need to calculate how much carbohydrate is in the food you are serving yourself.        The Nutrition Facts information is based on a standard serving size. However, that serving size may not be the same as the serving size used in carbohydrate counting.  Always start by checking the serving size on the label. Is this the  serving size you will be eating? How many servings are in the package?  Next, look at the total carbohydrate. It is measured in grams (g). To find the number of carbohydrate servings in 1 standard serving of a food, divide the total grams of carbohydrate by 15. One (1) carbohydrate serving is the amount of food with 15 g carbohydrate.  You don’t need to count grams of sugars. They are included in the total carbohydrate.  The label shows how many calories are in the standard serving. It also lists the amount of fat, cholesterol, sodium, protein, and some vitamins and minerals. Talk to your registered dietitian nutritionist or diabetes educator to learn about your goals for these nutrients.  Look below the line listing total fat to find out how much of that fat is saturated fat or trans fat. Choose foods that are low in these kinds of fats because they are not healthy for your heart. In the foods that are healthiest for your heart, grams of saturated fat and trans fat are less than one-third of the total fat grams.  If foods are very low in calories (less than 20 calories per serving) or carbohydrates (5 g carbohydrate or less per serving), you may not need to count them when you count carbohydrates. Ask your registered dietitian nutritionist or diabetes educator about these “free” foods.        Nutrition Counseling  Our expert team offers:   Medical Nutrition Therapy for Chronic Conditions   Weight Management   Diabetes Education and Management   Wellness Services   Body Composition Measurements   Gastrointestinal Health    [facility] Services are located at:  [address] [place] [id number]  For more information and to schedule a consultation, please call [phone].  A physician referral may be required by your insurance for coverage.

## 2025-02-27 NOTE — ASSESSMENT & PLAN NOTE
Poorly controlled diabetes, Ha1c in Jan 15.1%, repeat is 13.9%   Increaed insulin to 15 units, continue ISS, I have added meal time 5 unts and BG >250  Hypoglycemic protocol in place  Resume metformin 1000 mg BID   I have started actos 15 mg   Diabetes education ordered

## 2025-02-27 NOTE — CARE PLAN
The patient is Stable - Low risk of patient condition declining or worsening    Shift Goals  Clinical Goals: pain control, safety, ABX therapy  Patient Goals: pain control, education  Family Goals: not present    Problem: Pain - Standard  Goal: Alleviation of pain or a reduction in pain to the patient’s comfort goal  Description: Target End Date:  Prior to discharge or change in level of care  1.  Document pain using the appropriate pain scale per order or unit policy  2.  Educate and implement non-pharmacologic comfort measures (i.e. relaxation, distraction, massage, cold/heat therapy, etc.)  3.  Pain management medications as ordered  4.  Reassess pain after pain med administration per policy  5.  If opiods administered assess patient's response to pain medication is appropriate per POSS sedation scale  6.  Follow pain management plan developed in collaboration with patient and interdisciplinary team (including palliative care or pain specialists if applicable)  Outcome: Progressing     Problem: Knowledge Deficit - Standard  Goal: Patient and family/care givers will demonstrate understanding of plan of care, disease process/condition, diagnostic tests and medications  Description: Target End Date:  1-3 days or as soon as patient condition allows  1.  Patient and family/caregiver oriented to unit, equipment, visitation policy and means for communicating concern  2.  Complete/review Learning Assessment  3.  Assess knowledge level of disease process/condition, treatment plan, diagnostic tests and medications  4.  Explain disease process/condition, treatment plan, diagnostic tests and medications  Outcome: Progressing

## 2025-02-27 NOTE — ED NOTES
Med Rec completed per patient     Allergies reviewed: yes     Antibiotics in the past 30 days: yes  Augmentin 875-125 mg 7 day course completed 02/15/2025    Anticoagulant in past 14 days: no    Pharmacy patient utilizes: [facility]  [phone]    Per patient he does not remember if he is taking Simvastatin 20 mg (90 days supply ordered with 3 refills 01/08/2025)  Venflaxine XR 37.5 mg ( 90 day supply ordered with 3 refills 01/08/2025)  Pregabalin 100 mg (30 day supply ordered with 5 refills 01/08/2025)    He is not taking Ozempic because it is too expensive

## 2025-02-27 NOTE — DIETARY
Nutrition Services: Diet Education Consult   Day 0 of admit.  Donald Fuller is a 54 y.o. male with admitting DX of Diabetic foot (HCC) [E11.8]    RD received consult for diabetic diet education. RD spoke with pt to provide verbal education on eating for diabetes management. Pt reports he is all about convenience and it is easy to grab a bag of chips or candy. States he is thinking about using a meal preparation service for increased convenience because he can just microwave it and it will be ready. RD included nutrition recommendations and tips in dietary discharge instructions that align with pt's current dx. Outpatient resources included to encourage follow-up with [facility] for continued support after discharge.     No other education needs identified at this time. Please consult RD as needed for supplemental education or at request of pt.

## 2025-02-28 ENCOUNTER — TELEPHONE (OUTPATIENT)
Dept: ENDOCRINOLOGY | Facility: MEDICAL CENTER | Age: 55
End: 2025-02-28
Payer: COMMERCIAL

## 2025-02-28 ENCOUNTER — SURGERY (OUTPATIENT)
Age: 55
End: 2025-02-28
Payer: COMMERCIAL

## 2025-02-28 ENCOUNTER — ANESTHESIA (OUTPATIENT)
Dept: SURGERY | Facility: MEDICAL CENTER | Age: 55
End: 2025-02-28

## 2025-02-28 ENCOUNTER — ANESTHESIA EVENT (OUTPATIENT)
Dept: SURGERY | Facility: MEDICAL CENTER | Age: 55
End: 2025-02-28

## 2025-02-28 ENCOUNTER — APPOINTMENT (OUTPATIENT)
Dept: ENDOCRINOLOGY | Facility: MEDICAL CENTER | Age: 55
End: 2025-02-28

## 2025-02-28 VITALS
SYSTOLIC BLOOD PRESSURE: 110 MMHG | DIASTOLIC BLOOD PRESSURE: 82 MMHG | RESPIRATION RATE: 15 BRPM | WEIGHT: 211.42 LBS | HEART RATE: 101 BPM | TEMPERATURE: 97.2 F | BODY MASS INDEX: 27.13 KG/M2 | HEIGHT: 74 IN | OXYGEN SATURATION: 98 %

## 2025-02-28 PROBLEM — I10 HYPERTENSION: Status: ACTIVE | Noted: 2025-02-28

## 2025-02-28 PROBLEM — L97.426 DIABETIC ULCER OF LEFT MIDFOOT ASSOCIATED WITH TYPE 2 DIABETES MELLITUS, WITH BONE INVOLVEMENT WITHOUT EVIDENCE OF NECROSIS (HCC): Status: ACTIVE | Noted: 2025-02-27

## 2025-02-28 PROBLEM — E11.621 DIABETIC ULCER OF LEFT MIDFOOT ASSOCIATED WITH TYPE 2 DIABETES MELLITUS, WITH BONE INVOLVEMENT WITHOUT EVIDENCE OF NECROSIS (HCC): Status: ACTIVE | Noted: 2025-02-27

## 2025-02-28 LAB
ALBUMIN SERPL BCP-MCNC: 3.4 G/DL (ref 3.2–4.9)
ALBUMIN/GLOB SERPL: 1.4 G/DL
ALP SERPL-CCNC: 86 U/L (ref 30–99)
ALT SERPL-CCNC: 16 U/L (ref 2–50)
ANION GAP SERPL CALC-SCNC: 12 MMOL/L (ref 7–16)
ANION GAP SERPL CALC-SCNC: 12 MMOL/L (ref 7–16)
AST SERPL-CCNC: 15 U/L (ref 12–45)
BACTERIA SPEC ANAEROBE CULT: NORMAL
BACTERIA TISS AEROBE CULT: NORMAL
BACTERIA WND AEROBE CULT: ABNORMAL
BACTERIA WND AEROBE CULT: ABNORMAL
BILIRUB SERPL-MCNC: 0.3 MG/DL (ref 0.1–1.5)
BUN SERPL-MCNC: 16 MG/DL (ref 8–22)
BUN SERPL-MCNC: 18 MG/DL (ref 8–22)
CALCIUM ALBUM COR SERPL-MCNC: 9.4 MG/DL (ref 8.5–10.5)
CALCIUM SERPL-MCNC: 8.9 MG/DL (ref 8.5–10.5)
CALCIUM SERPL-MCNC: 9.3 MG/DL (ref 8.5–10.5)
CHLORIDE SERPL-SCNC: 101 MMOL/L (ref 96–112)
CHLORIDE SERPL-SCNC: 103 MMOL/L (ref 96–112)
CO2 SERPL-SCNC: 22 MMOL/L (ref 20–33)
CO2 SERPL-SCNC: 23 MMOL/L (ref 20–33)
CREAT SERPL-MCNC: 0.99 MG/DL (ref 0.5–1.4)
CREAT SERPL-MCNC: 1.31 MG/DL (ref 0.5–1.4)
ERYTHROCYTE [DISTWIDTH] IN BLOOD BY AUTOMATED COUNT: 37.7 FL (ref 35.9–50)
GFR SERPLBLD CREATININE-BSD FMLA CKD-EPI: 64 ML/MIN/1.73 M 2
GFR SERPLBLD CREATININE-BSD FMLA CKD-EPI: 90 ML/MIN/1.73 M 2
GLOBULIN SER CALC-MCNC: 2.5 G/DL (ref 1.9–3.5)
GLUCOSE BLD STRIP.AUTO-MCNC: 168 MG/DL (ref 65–99)
GLUCOSE BLD STRIP.AUTO-MCNC: 186 MG/DL (ref 65–99)
GLUCOSE BLD STRIP.AUTO-MCNC: 251 MG/DL (ref 65–99)
GLUCOSE BLD STRIP.AUTO-MCNC: 254 MG/DL (ref 65–99)
GLUCOSE BLD STRIP.AUTO-MCNC: 92 MG/DL (ref 65–99)
GLUCOSE SERPL-MCNC: 167 MG/DL (ref 65–99)
GLUCOSE SERPL-MCNC: 269 MG/DL (ref 65–99)
GRAM STN SPEC: ABNORMAL
GRAM STN SPEC: NORMAL
GRAM STN SPEC: NORMAL
HCT VFR BLD AUTO: 41.1 % (ref 42–52)
HGB BLD-MCNC: 14.1 G/DL (ref 14–18)
MCH RBC QN AUTO: 29 PG (ref 27–33)
MCHC RBC AUTO-ENTMCNC: 34.3 G/DL (ref 32.3–36.5)
MCV RBC AUTO: 84.4 FL (ref 81.4–97.8)
PLATELET # BLD AUTO: 230 K/UL (ref 164–446)
PMV BLD AUTO: 10.2 FL (ref 9–12.9)
POTASSIUM SERPL-SCNC: 4.4 MMOL/L (ref 3.6–5.5)
POTASSIUM SERPL-SCNC: 4.4 MMOL/L (ref 3.6–5.5)
PROT SERPL-MCNC: 5.9 G/DL (ref 6–8.2)
RBC # BLD AUTO: 4.87 M/UL (ref 4.7–6.1)
SIGNIFICANT IND 70042: ABNORMAL
SIGNIFICANT IND 70042: NORMAL
SITE SITE: ABNORMAL
SITE SITE: NORMAL
SODIUM SERPL-SCNC: 136 MMOL/L (ref 135–145)
SODIUM SERPL-SCNC: 137 MMOL/L (ref 135–145)
SOURCE SOURCE: ABNORMAL
SOURCE SOURCE: NORMAL
WBC # BLD AUTO: 6.8 K/UL (ref 4.8–10.8)

## 2025-02-28 PROCEDURE — 160009 HCHG ANES TIME/MIN: Performed by: STUDENT IN AN ORGANIZED HEALTH CARE EDUCATION/TRAINING PROGRAM

## 2025-02-28 PROCEDURE — 700102 HCHG RX REV CODE 250 W/ 637 OVERRIDE(OP): Performed by: HOSPITALIST

## 2025-02-28 PROCEDURE — 99222 1ST HOSP IP/OBS MODERATE 55: CPT | Mod: 57 | Performed by: STUDENT IN AN ORGANIZED HEALTH CARE EDUCATION/TRAINING PROGRAM

## 2025-02-28 PROCEDURE — 80053 COMPREHEN METABOLIC PANEL: CPT

## 2025-02-28 PROCEDURE — 87075 CULTR BACTERIA EXCEPT BLOOD: CPT

## 2025-02-28 PROCEDURE — 87015 SPECIMEN INFECT AGNT CONCNTJ: CPT

## 2025-02-28 PROCEDURE — 160002 HCHG RECOVERY MINUTES (STAT): Performed by: STUDENT IN AN ORGANIZED HEALTH CARE EDUCATION/TRAINING PROGRAM

## 2025-02-28 PROCEDURE — 88311 DECALCIFY TISSUE: CPT | Mod: 26 | Performed by: PATHOLOGY

## 2025-02-28 PROCEDURE — 160035 HCHG PACU - 1ST 60 MINS PHASE I: Performed by: STUDENT IN AN ORGANIZED HEALTH CARE EDUCATION/TRAINING PROGRAM

## 2025-02-28 PROCEDURE — 160039 HCHG SURGERY MINUTES - EA ADDL 1 MIN LEVEL 3: Performed by: STUDENT IN AN ORGANIZED HEALTH CARE EDUCATION/TRAINING PROGRAM

## 2025-02-28 PROCEDURE — 700102 HCHG RX REV CODE 250 W/ 637 OVERRIDE(OP): Performed by: STUDENT IN AN ORGANIZED HEALTH CARE EDUCATION/TRAINING PROGRAM

## 2025-02-28 PROCEDURE — 36415 COLL VENOUS BLD VENIPUNCTURE: CPT

## 2025-02-28 PROCEDURE — 85027 COMPLETE CBC AUTOMATED: CPT

## 2025-02-28 PROCEDURE — 0Y6N0ZF DETACHMENT AT LEFT FOOT, PARTIAL 5TH RAY, OPEN APPROACH: ICD-10-PCS | Performed by: STUDENT IN AN ORGANIZED HEALTH CARE EDUCATION/TRAINING PROGRAM

## 2025-02-28 PROCEDURE — A9270 NON-COVERED ITEM OR SERVICE: HCPCS | Performed by: HOSPITALIST

## 2025-02-28 PROCEDURE — 700111 HCHG RX REV CODE 636 W/ 250 OVERRIDE (IP): Mod: JZ | Performed by: STUDENT IN AN ORGANIZED HEALTH CARE EDUCATION/TRAINING PROGRAM

## 2025-02-28 PROCEDURE — 700105 HCHG RX REV CODE 258: Performed by: STUDENT IN AN ORGANIZED HEALTH CARE EDUCATION/TRAINING PROGRAM

## 2025-02-28 PROCEDURE — 82962 GLUCOSE BLOOD TEST: CPT

## 2025-02-28 PROCEDURE — 700101 HCHG RX REV CODE 250: Performed by: STUDENT IN AN ORGANIZED HEALTH CARE EDUCATION/TRAINING PROGRAM

## 2025-02-28 PROCEDURE — 28810 AMPUTATION TOE & METATARSAL: CPT | Mod: T4 | Performed by: STUDENT IN AN ORGANIZED HEALTH CARE EDUCATION/TRAINING PROGRAM

## 2025-02-28 PROCEDURE — 80048 BASIC METABOLIC PNL TOTAL CA: CPT

## 2025-02-28 PROCEDURE — 87205 SMEAR GRAM STAIN: CPT

## 2025-02-28 PROCEDURE — 160015 HCHG STAT PREOP MINUTES: Performed by: STUDENT IN AN ORGANIZED HEALTH CARE EDUCATION/TRAINING PROGRAM

## 2025-02-28 PROCEDURE — 770001 HCHG ROOM/CARE - MED/SURG/GYN PRIV*

## 2025-02-28 PROCEDURE — 87070 CULTURE OTHR SPECIMN AEROBIC: CPT

## 2025-02-28 PROCEDURE — 28810 AMPUTATION TOE & METATARSAL: CPT | Mod: 80ROC,T4 | Performed by: STUDENT IN AN ORGANIZED HEALTH CARE EDUCATION/TRAINING PROGRAM

## 2025-02-28 PROCEDURE — 160048 HCHG OR STATISTICAL LEVEL 1-5: Performed by: STUDENT IN AN ORGANIZED HEALTH CARE EDUCATION/TRAINING PROGRAM

## 2025-02-28 PROCEDURE — 99233 SBSQ HOSP IP/OBS HIGH 50: CPT | Performed by: STUDENT IN AN ORGANIZED HEALTH CARE EDUCATION/TRAINING PROGRAM

## 2025-02-28 PROCEDURE — 88305 TISSUE EXAM BY PATHOLOGIST: CPT | Mod: 26 | Performed by: PATHOLOGY

## 2025-02-28 PROCEDURE — 87077 CULTURE AEROBIC IDENTIFY: CPT | Mod: 91

## 2025-02-28 PROCEDURE — 700111 HCHG RX REV CODE 636 W/ 250 OVERRIDE (IP): Performed by: STUDENT IN AN ORGANIZED HEALTH CARE EDUCATION/TRAINING PROGRAM

## 2025-02-28 PROCEDURE — 88305 TISSUE EXAM BY PATHOLOGIST: CPT | Performed by: PATHOLOGY

## 2025-02-28 PROCEDURE — 700105 HCHG RX REV CODE 258: Performed by: HOSPITALIST

## 2025-02-28 PROCEDURE — A9270 NON-COVERED ITEM OR SERVICE: HCPCS | Performed by: STUDENT IN AN ORGANIZED HEALTH CARE EDUCATION/TRAINING PROGRAM

## 2025-02-28 PROCEDURE — 160028 HCHG SURGERY MINUTES - 1ST 30 MINS LEVEL 3: Performed by: STUDENT IN AN ORGANIZED HEALTH CARE EDUCATION/TRAINING PROGRAM

## 2025-02-28 PROCEDURE — 700111 HCHG RX REV CODE 636 W/ 250 OVERRIDE (IP): Performed by: HOSPITALIST

## 2025-02-28 PROCEDURE — 88311 DECALCIFY TISSUE: CPT | Performed by: PATHOLOGY

## 2025-02-28 RX ORDER — LABETALOL HYDROCHLORIDE 5 MG/ML
5 INJECTION, SOLUTION INTRAVENOUS
Status: DISCONTINUED | OUTPATIENT
Start: 2025-02-28 | End: 2025-02-28 | Stop reason: HOSPADM

## 2025-02-28 RX ORDER — VANCOMYCIN HYDROCHLORIDE 500 MG/10ML
INJECTION, POWDER, LYOPHILIZED, FOR SOLUTION INTRAVENOUS
Status: COMPLETED | OUTPATIENT
Start: 2025-02-28 | End: 2025-02-28

## 2025-02-28 RX ORDER — DEXTROSE MONOHYDRATE 25 G/50ML
25 INJECTION, SOLUTION INTRAVENOUS
Status: DISCONTINUED | OUTPATIENT
Start: 2025-02-28 | End: 2025-02-28 | Stop reason: HOSPADM

## 2025-02-28 RX ORDER — ENOXAPARIN SODIUM 100 MG/ML
40 INJECTION SUBCUTANEOUS DAILY
Status: DISCONTINUED | OUTPATIENT
Start: 2025-02-28 | End: 2025-03-01 | Stop reason: HOSPADM

## 2025-02-28 RX ORDER — OXYCODONE HCL 5 MG/5 ML
10 SOLUTION, ORAL ORAL
Status: DISCONTINUED | OUTPATIENT
Start: 2025-02-28 | End: 2025-02-28 | Stop reason: HOSPADM

## 2025-02-28 RX ORDER — DEXMEDETOMIDINE HYDROCHLORIDE 100 UG/ML
INJECTION, SOLUTION INTRAVENOUS PRN
Status: DISCONTINUED | OUTPATIENT
Start: 2025-02-28 | End: 2025-02-28 | Stop reason: SURG

## 2025-02-28 RX ORDER — ONDANSETRON 2 MG/ML
INJECTION INTRAMUSCULAR; INTRAVENOUS PRN
Status: DISCONTINUED | OUTPATIENT
Start: 2025-02-28 | End: 2025-02-28 | Stop reason: SURG

## 2025-02-28 RX ORDER — SODIUM CHLORIDE, SODIUM LACTATE, POTASSIUM CHLORIDE, CALCIUM CHLORIDE 600; 310; 30; 20 MG/100ML; MG/100ML; MG/100ML; MG/100ML
INJECTION, SOLUTION INTRAVENOUS
Status: DISCONTINUED | OUTPATIENT
Start: 2025-02-28 | End: 2025-02-28 | Stop reason: SURG

## 2025-02-28 RX ORDER — ALBUTEROL SULFATE 5 MG/ML
2.5 SOLUTION RESPIRATORY (INHALATION)
Status: DISCONTINUED | OUTPATIENT
Start: 2025-02-28 | End: 2025-02-28 | Stop reason: HOSPADM

## 2025-02-28 RX ORDER — OXYCODONE HCL 5 MG/5 ML
5 SOLUTION, ORAL ORAL
Status: DISCONTINUED | OUTPATIENT
Start: 2025-02-28 | End: 2025-02-28 | Stop reason: HOSPADM

## 2025-02-28 RX ORDER — PIOGLITAZONE 15 MG/1
15 TABLET ORAL DAILY
Status: DISCONTINUED | OUTPATIENT
Start: 2025-02-28 | End: 2025-03-01 | Stop reason: HOSPADM

## 2025-02-28 RX ORDER — DIPHENHYDRAMINE HYDROCHLORIDE 50 MG/ML
12.5 INJECTION, SOLUTION INTRAMUSCULAR; INTRAVENOUS
Status: DISCONTINUED | OUTPATIENT
Start: 2025-02-28 | End: 2025-02-28 | Stop reason: HOSPADM

## 2025-02-28 RX ORDER — HALOPERIDOL 5 MG/ML
1 INJECTION INTRAMUSCULAR
Status: DISCONTINUED | OUTPATIENT
Start: 2025-02-28 | End: 2025-02-28 | Stop reason: HOSPADM

## 2025-02-28 RX ORDER — MIDAZOLAM HYDROCHLORIDE 1 MG/ML
INJECTION INTRAMUSCULAR; INTRAVENOUS PRN
Status: DISCONTINUED | OUTPATIENT
Start: 2025-02-28 | End: 2025-02-28 | Stop reason: SURG

## 2025-02-28 RX ORDER — ONDANSETRON 2 MG/ML
4 INJECTION INTRAMUSCULAR; INTRAVENOUS
Status: DISCONTINUED | OUTPATIENT
Start: 2025-02-28 | End: 2025-02-28 | Stop reason: HOSPADM

## 2025-02-28 RX ORDER — DEXAMETHASONE SODIUM PHOSPHATE 4 MG/ML
INJECTION, SOLUTION INTRA-ARTICULAR; INTRALESIONAL; INTRAMUSCULAR; INTRAVENOUS; SOFT TISSUE PRN
Status: DISCONTINUED | OUTPATIENT
Start: 2025-02-28 | End: 2025-02-28 | Stop reason: SURG

## 2025-02-28 RX ORDER — INSULIN LISPRO 100 [IU]/ML
5 INJECTION, SOLUTION INTRAVENOUS; SUBCUTANEOUS
Status: DISCONTINUED | OUTPATIENT
Start: 2025-02-28 | End: 2025-03-01 | Stop reason: HOSPADM

## 2025-02-28 RX ORDER — LOSARTAN POTASSIUM 25 MG/1
25 TABLET ORAL
Status: DISCONTINUED | OUTPATIENT
Start: 2025-02-28 | End: 2025-03-01

## 2025-02-28 RX ORDER — HYDRALAZINE HYDROCHLORIDE 20 MG/ML
5 INJECTION INTRAMUSCULAR; INTRAVENOUS
Status: DISCONTINUED | OUTPATIENT
Start: 2025-02-28 | End: 2025-02-28 | Stop reason: HOSPADM

## 2025-02-28 RX ORDER — INSULIN LISPRO 100 [IU]/ML
2-9 INJECTION, SOLUTION INTRAVENOUS; SUBCUTANEOUS EVERY 6 HOURS
Status: DISCONTINUED | OUTPATIENT
Start: 2025-02-28 | End: 2025-02-28 | Stop reason: HOSPADM

## 2025-02-28 RX ORDER — LIDOCAINE HYDROCHLORIDE 20 MG/ML
INJECTION, SOLUTION EPIDURAL; INFILTRATION; INTRACAUDAL; PERINEURAL PRN
Status: DISCONTINUED | OUTPATIENT
Start: 2025-02-28 | End: 2025-02-28 | Stop reason: SURG

## 2025-02-28 RX ORDER — EPHEDRINE SULFATE 50 MG/ML
5 INJECTION, SOLUTION INTRAVENOUS
Status: DISCONTINUED | OUTPATIENT
Start: 2025-02-28 | End: 2025-02-28 | Stop reason: HOSPADM

## 2025-02-28 RX ORDER — TOBRAMYCIN 1.2 G/30ML
INJECTION, POWDER, LYOPHILIZED, FOR SOLUTION INTRAVENOUS
Status: DISCONTINUED | OUTPATIENT
Start: 2025-02-28 | End: 2025-02-28 | Stop reason: HOSPADM

## 2025-02-28 RX ADMIN — INSULIN LISPRO 5 UNITS: 100 INJECTION, SOLUTION INTRAVENOUS; SUBCUTANEOUS at 12:08

## 2025-02-28 RX ADMIN — AMPICILLIN AND SULBACTAM 3 G: 1; 2 INJECTION, POWDER, FOR SOLUTION INTRAMUSCULAR; INTRAVENOUS at 04:52

## 2025-02-28 RX ADMIN — METFORMIN HYDROCHLORIDE 1000 MG: 500 TABLET ORAL at 17:10

## 2025-02-28 RX ADMIN — INSULIN LISPRO 5 UNITS: 100 INJECTION, SOLUTION INTRAVENOUS; SUBCUTANEOUS at 17:20

## 2025-02-28 RX ADMIN — LIDOCAINE HYDROCHLORIDE 100 MG: 20 INJECTION, SOLUTION EPIDURAL; INFILTRATION; INTRACAUDAL; PERINEURAL at 14:32

## 2025-02-28 RX ADMIN — AMPICILLIN AND SULBACTAM 3 G: 1; 2 INJECTION, POWDER, FOR SOLUTION INTRAMUSCULAR; INTRAVENOUS at 00:01

## 2025-02-28 RX ADMIN — FENTANYL CITRATE 100 MCG: 50 INJECTION, SOLUTION INTRAMUSCULAR; INTRAVENOUS at 14:31

## 2025-02-28 RX ADMIN — TOBRAMYCIN 1.2 G: 1200 INJECTION, POWDER, FOR SOLUTION INTRAVENOUS at 14:56

## 2025-02-28 RX ADMIN — INSULIN LISPRO 5 UNITS: 100 INJECTION, SOLUTION INTRAVENOUS; SUBCUTANEOUS at 08:34

## 2025-02-28 RX ADMIN — INSULIN LISPRO 2 UNITS: 100 INJECTION, SOLUTION INTRAVENOUS; SUBCUTANEOUS at 12:06

## 2025-02-28 RX ADMIN — SODIUM HYPOCHLORITE 473 ML: 1.25 SOLUTION TOPICAL at 04:49

## 2025-02-28 RX ADMIN — ACETAMINOPHEN 650 MG: 325 TABLET ORAL at 05:42

## 2025-02-28 RX ADMIN — PROPOFOL 200 MG: 10 INJECTION, EMULSION INTRAVENOUS at 14:32

## 2025-02-28 RX ADMIN — ATORVASTATIN CALCIUM 20 MG: 20 TABLET, FILM COATED ORAL at 17:10

## 2025-02-28 RX ADMIN — AMPICILLIN AND SULBACTAM 3 G: 1; 2 INJECTION, POWDER, FOR SOLUTION INTRAMUSCULAR; INTRAVENOUS at 23:48

## 2025-02-28 RX ADMIN — METFORMIN HYDROCHLORIDE 1000 MG: 500 TABLET ORAL at 08:42

## 2025-02-28 RX ADMIN — AMPICILLIN AND SULBACTAM 3 G: 1; 2 INJECTION, POWDER, FOR SOLUTION INTRAMUSCULAR; INTRAVENOUS at 12:12

## 2025-02-28 RX ADMIN — VANCOMYCIN HYDROCHLORIDE 500 MG: 500 INJECTION, POWDER, LYOPHILIZED, FOR SOLUTION INTRAVENOUS at 14:56

## 2025-02-28 RX ADMIN — FLUOXETINE HYDROCHLORIDE 10 MG: 10 CAPSULE ORAL at 04:49

## 2025-02-28 RX ADMIN — PIOGLITAZONE 15 MG: 15 TABLET ORAL at 09:25

## 2025-02-28 RX ADMIN — VANCOMYCIN HYDROCHLORIDE 1750 MG: 5 INJECTION, POWDER, LYOPHILIZED, FOR SOLUTION INTRAVENOUS at 01:19

## 2025-02-28 RX ADMIN — ENOXAPARIN SODIUM 40 MG: 100 INJECTION SUBCUTANEOUS at 17:10

## 2025-02-28 RX ADMIN — MIDAZOLAM HYDROCHLORIDE 2 MG: 1 INJECTION, SOLUTION INTRAMUSCULAR; INTRAVENOUS at 14:31

## 2025-02-28 RX ADMIN — AMPICILLIN AND SULBACTAM 3 G: 1; 2 INJECTION, POWDER, FOR SOLUTION INTRAMUSCULAR; INTRAVENOUS at 17:16

## 2025-02-28 RX ADMIN — DEXMEDETOMIDINE HYDROCHLORIDE 20 MCG: 100 INJECTION, SOLUTION INTRAVENOUS at 14:40

## 2025-02-28 RX ADMIN — SODIUM CHLORIDE, POTASSIUM CHLORIDE, SODIUM LACTATE AND CALCIUM CHLORIDE: 600; 310; 30; 20 INJECTION, SOLUTION INTRAVENOUS at 14:31

## 2025-02-28 RX ADMIN — OXYCODONE HYDROCHLORIDE 5 MG: 5 TABLET ORAL at 13:45

## 2025-02-28 RX ADMIN — INSULIN LISPRO 5 UNITS: 100 INJECTION, SOLUTION INTRAVENOUS; SUBCUTANEOUS at 22:57

## 2025-02-28 RX ADMIN — INSULIN LISPRO 2 UNITS: 100 INJECTION, SOLUTION INTRAVENOUS; SUBCUTANEOUS at 17:19

## 2025-02-28 RX ADMIN — LOSARTAN POTASSIUM 25 MG: 25 TABLET, FILM COATED ORAL at 13:45

## 2025-02-28 RX ADMIN — ONDANSETRON 4 MG: 2 INJECTION INTRAMUSCULAR; INTRAVENOUS at 14:35

## 2025-02-28 RX ADMIN — DEXAMETHASONE SODIUM PHOSPHATE 4 MG: 4 INJECTION INTRA-ARTICULAR; INTRALESIONAL; INTRAMUSCULAR; INTRAVENOUS; SOFT TISSUE at 14:35

## 2025-02-28 ASSESSMENT — COGNITIVE AND FUNCTIONAL STATUS - GENERAL
MOVING TO AND FROM BED TO CHAIR: A LITTLE
SUGGESTED CMS G CODE MODIFIER MOBILITY: CJ
TOILETING: A LITTLE
DRESSING REGULAR LOWER BODY CLOTHING: A LITTLE
HELP NEEDED FOR BATHING: A LITTLE
MOBILITY SCORE: 20
WALKING IN HOSPITAL ROOM: A LITTLE
SUGGESTED CMS G CODE MODIFIER DAILY ACTIVITY: CJ
STANDING UP FROM CHAIR USING ARMS: A LITTLE
CLIMB 3 TO 5 STEPS WITH RAILING: A LITTLE
DAILY ACTIVITIY SCORE: 21

## 2025-02-28 ASSESSMENT — ENCOUNTER SYMPTOMS
FEVER: 0
SORE THROAT: 0
NAUSEA: 0
SHORTNESS OF BREATH: 0
VOMITING: 0
DIZZINESS: 0
CHILLS: 0

## 2025-02-28 ASSESSMENT — LIFESTYLE VARIABLES: SUBSTANCE_ABUSE: 0

## 2025-02-28 ASSESSMENT — PAIN DESCRIPTION - PAIN TYPE
TYPE: ACUTE PAIN
TYPE: ACUTE PAIN

## 2025-02-28 NOTE — ANESTHESIA TIME REPORT
Anesthesia Start and Stop Event Times       Date Time Event    2/28/2025 1426 Ready for Procedure     1431 Anesthesia Start     1507 Anesthesia Stop          Responsible Staff  02/28/25      Name Role Begin End    David Santiago M.D. Anesth 1431 1507          Overtime Reason:  no overtime (within assigned shift)    Comments:                                                           Patient with Hypoxic Respiratory failure which is Acute.  he is not on home oxygen. Supplemental oxygen was provided and noted- Oxygen Concentration (%):  [0.5-100] 100.   Signs/symptoms of respiratory failure include- tachypnea, increased work of breathing and respiratory distress. Contributing diagnoses includes - CHF, Pleural effusion and Pneumonia Labs and images were reviewed. Patient Has recent ABG, which has been reviewed. Will treat underlying causes and adjust management of respiratory failure as follows- broad spectrum abx, NIV

## 2025-02-28 NOTE — OR NURSING
1506- Pt arrives to PACU from OR on 6L of oxygen and OPA in place. Report received. Dressing to L foot CDI. FSBS 92.    1521- Pt contact Martha called and updated on pt status and POC.    1545- Pt awake, OPA removed, denies pain at this time.     1605- Pt taken to room. Pt comfortable and dressing CDI. Report called to Kelli MAGAÑA.

## 2025-02-28 NOTE — ANESTHESIA PROCEDURE NOTES
Airway    Date/Time: 2/28/2025 2:35 PM    Performed by: David Santiago M.D.  Authorized by: David Santiago M.D.    Location:  OR  Urgency:  Elective  Indications for Airway Management:  Anesthesia      Spontaneous Ventilation: absent    Sedation Level:  Deep  Preoxygenated: Yes    Mask Difficulty Assessment:  0 - not attempted  Final Airway Type:  Supraglottic airway  Final Supraglottic Airway:  Standard LMA    SGA Size:  5  Number of Attempts at Approach:  1

## 2025-02-28 NOTE — OP REPORT
Operative Report    Date of Surgery: 2/28/2025    Operating Surgeon:   Primary: Jason Davis M.D.  Fellow: Randall Thompson M.D.      Assistant:  Randall Thompson MD - ortho trauma fellow    Preoperative diagnosis:  Left small toe metatarsal head osteomyelitis with associated plantar diabetic ulcer    Postoperative diagnosis:   Left small toe metatarsal head osteomyelitis with associated plantar diabetic ulcer    Procedure:  Left small toe ray resection    Implants:     Indications:  Donald Fuller 54 y.o. male uncontrolled DMII (HbA1c 13.9) with plantar lateral foot ulcer overlying 5th metatarsal head.  MRI suggestive of osteomyelitis to the small toe and associated metatarsal head.  I held an extensive discussion with the patient regarding treatment options.  Nonoperative management consisting of antibiotics and wound care is unlikely to be curative for his issue.  Surgical options include ray resection versus transmetatarsal amputation.  I did inform him that I am concerned that there may be difficulty closing his wound with ray resection alone, which may mandate resection of the fourth digit as well to mobilize skin or amputation at the transmetatarsal level.     Discussed clinical, physical, and imaging findings with patient.  He elected to proceed with a resection of the fifth digit.  He understands that the fourth digit may also require resection to allow for appropriate soft tissue tissue mobilization or closure.  He also understands that he is at risk for wound complications at the surgical site, which may mandate transmetatarsal amputation in the future.  Other risks, benefits, and alternatives discussed with patient. Risks for surgery may include bleeding, infection, pain, persistent dysfunction, damage to surrounding neurovascular structures, anesthesia complications, operative failure, stroke, DVT, or death. Patient agrees to proceed with surgery. Informed consent obtained.      Description:    Patient was taken to the operating room and placed supine. Prepped and drapped in usual sterile fashion. A surgical timeout was performed to verify the patient, laterality of surgery, planned procedure.    A fishmouth incision was made overlying the small toe.  Incision was extended proximally and used to excise the plantar ulcer overlying the metatarsal head.  Incision then proceeded longitudinally along the lateral aspect of the fifth metatarsal.  Dissection was carried to bone throughout the wound.  The metatarsal shaft was then cut with an oscillating saw at the midshaft region.  Soft tissues were dissected from bone and the amputated digit was excised.  There appeared to be sufficient soft tissue to allow for closure of the wound.    The wound was irrigated copiously. One gram of vancomycin powder and 1.2 grams tobramycin powder were dispersed throughout the wound. A layered closure was performed. A soft compressive dressing was applied, followed by a postoperative shoe.    The patient was awoken from anesthesia and transferred off the operating table without complication.     Specimens: None    Anesthesia type: laryngeal mask anesthesia    Blood loss: 10 cc    Drains: none    Wound Classification: IV, Dirty or Infected.    Blood Products Administered: none    Postoperative condition: Stable    Postoperative Plan:  - Weightbearing Status: Heel weightbearing in postoperative shoe until wound heals  - ROM: As Tolerated  - Antibiotics: Per infectious disease  - Pain Control: Per protocol  - DVT Prophylaxis: Per protocol until discharge, none indicated at discharge  - Disposition: To floor, return to clinic Ortho trauma PA in 2 to 3 weeks for wound check no x-ray

## 2025-02-28 NOTE — ASSESSMENT & PLAN NOTE
BP persistently elevated   I have started losartan monitor and adjust as needed   Monitor renal function and K levels

## 2025-02-28 NOTE — CONSULTS
Orthopaedic Surgery Consult    Date: 2/28/2025       History Present Illness    Donald Fuller 54 y.o. male uncontrolled DMII (HbA1c 13.9) with plantar lateral foot ulcer overlying 5th metatarsal head. This has been present for several months. Has previously failed PO antibiotic treatment.  The wound is recently worsened in appearance and has begun draining more.  He also has new streaking erythema the foot.    Past Medical History:  Active Ambulatory Problems     Diagnosis Date Noted    Erectile dysfunction 08/26/2022    Family history of breast cancer in sister 08/26/2022    Type 2 diabetes mellitus with hyperglycemia, with long-term current use of insulin ([facility]) 09/06/2022    Hyponatremia 10/18/2022    History of neck surgery 12/14/2022    Skin rash 12/14/2022    Cherry angioma 12/14/2022    Benign mole 12/14/2022    Anxiety 01/24/2023    Syncope 01/24/2023    Diabetic ulcer of left midfoot associated with type 2 diabetes mellitus, with muscle involvement without evidence of necrosis ([facility]) 01/08/2025     Resolved Ambulatory Problems     Diagnosis Date Noted    Acute respiratory failure ([facility]) 10/18/2022     Past Medical History:   Diagnosis Date    Diabetes ([facility])        Past Surgical History:  History reviewed. No pertinent surgical history.    Medications:  Medications Prior to Admission   Medication Sig Dispense Refill Last Dose/Taking    FLUOXETINE HCL PO Take 1 Caplet by mouth every day. Unknown strength   2/26/2025 Morning    amoxicillin-clavulanate (AUGMENTIN) 875-125 MG Tab Take 1 Tablet by mouth 2 times a day. 7 day course completed   2/15/2025 Evening    insulin glargine 100 UNIT/ML SC SOPN injection Inject 10 Units under the skin every evening. Till fasting glucose is  3 mL 3 2/25/2025 Evening    insulin lispro 100 UNIT/ML SC SOPN injection PEN Inject 2 Units under the skin 3 times a day before meals. Use 2 units if blood sugar 150-200. 4 units if 200-250, 6 units if 250-300, 8 units if  "300-350, 10 units if 350-400 3 mL 3 2/23/2025 Evening    metformin (GLUCOPHAGE) 1000 MG tablet Take 1 Tablet by mouth 2 times a day with meals. 180 Tablet 3 2/26/2025 Morning    hydrOXYzine HCl (ATARAX) 25 MG Tab TAKE 1 TABLET BY MOUTH THREE TIMES DAILY AS NEEDED FOR ITCHING OR ANXIETY. (Patient taking differently: Take 25 mg by mouth 3 times a day as needed.) 90 Tablet 3 2/26/2025 Morning    ibuprofen (MOTRIN) 200 MG Tab Take 600 mg by mouth every 8 hours as needed for Mild Pain. 600 mg = 3 tablets   2/25/2025 Evening    insulin 70/30 (HUMULIN 70/30/NOVOLIN 70/30) (70-30) 100 UNIT/ML Suspension Inject 25 Units under the skin 2 times a day. (Patient taking differently: Inject 30 Units under the skin 2 times a day.) 10 mL 11 2/25/2025 Evening    Insulin Syringe-Needle U-100 (INSULIN SYRINGE 1CC/31GX5/16\") 31G X 5/16\" 1 ML Misc Use as directed for SQ injection of insulin. 400 Each 3     Semaglutide,0.25 or 0.5MG/DOS, (OZEMPIC, 0.25 OR 0.5 MG/DOSE,) 2 MG/3ML Solution Pen-injector Inject 0.25 mg under the skin every 7 days. (Patient not taking: Reported on 2/26/2025) 3 mL 11 Not Taking    simvastatin (ZOCOR) 20 MG Tab Take 1 Tablet by mouth every evening. (Patient not taking: Reported on 2/26/2025) 90 Tablet 3 Not Taking    venlafaxine XR (EFFEXOR XR) 37.5 MG CAPSULE SR 24 HR Take 1 Capsule by mouth every day. (Patient not taking: Reported on 2/26/2025) 90 Capsule 3 Not Taking    pregabalin (LYRICA) 100 MG Cap Take 1 Capsule by mouth 2 times a day for 180 days. (Patient not taking: Reported on 2/26/2025) 60 Capsule 5 Not Taking     Current Facility-Administered Medications   Medication Dose Route Frequency Provider Last Rate Last Admin    insulin lispro (HumaLOG,AdmeLOG) subcutaneous injection  5 Units Subcutaneous TID LASHON Ram M.D.   5 Units at 02/28/25 1208    metFORMIN (Glucophage) tablet 1,000 mg  1,000 mg Oral BID WITH MEALS Mary Ram M.D.   1,000 mg at 02/28/25 0842    pioglitazone (Actos) " tablet 15 mg  15 mg Oral DAILY Mary Ram M.D.   15 mg at 02/28/25 0925    losartan (Cozaar) tablet 25 mg  25 mg Oral Q DAY Mary Ram M.D.   25 mg at 02/28/25 1345    enoxaparin (Lovenox) inj 40 mg  40 mg Subcutaneous DAILY AT 1800 Mary Ram M.D.        acetaminophen (Tylenol) tablet 650 mg  650 mg Oral Q6HRS PRSHELLIE Shore M.D.   650 mg at 02/28/25 0542    oxyCODONE immediate-release (Roxicodone) tablet 2.5 mg  2.5 mg Oral Q3HRS PRSHELLIE Shore M.D.        Or    oxyCODONE immediate-release (Roxicodone) tablet 5 mg  5 mg Oral Q3HRS FRANCO Shore M.D.   5 mg at 02/28/25 1345    Or    morphine 4 MG/ML injection 2 mg  2 mg Intravenous Q3HRS PRSHELLIE Shore M.D.        ondansetron (Zofran) syringe/vial injection 4 mg  4 mg Intravenous Q4HRS PRSHELLIE Shore M.D.        ondansetron (Zofran ODT) dispertab 4 mg  4 mg Oral Q4HRS PRSHELLIE Shore M.D.        promethazine (Phenergan) tablet 12.5-25 mg  12.5-25 mg Oral Q4HRS FRANCO Shore M.D.        promethazine (Phenergan) suppository 12.5-25 mg  12.5-25 mg Rectal Q4HRS FRANCO Shore M.D.        prochlorperazine (Compazine) injection 5-10 mg  5-10 mg Intravenous Q4HRS FRANCO Shore M.D.        labetalol (Normodyne/Trandate) injection 10 mg  10 mg Intravenous Q4HRS FRANCO Shore M.D.        insulin lispro (HumaLOG,AdmeLOG) subcutaneous injection  2-9 Units Subcutaneous 4X/DAY TOD Shore M.D.   2 Units at 02/28/25 1206    And    dextrose 50% (D50W) injection 25 g  25 g Intravenous Q15 MIN PRN Dena Shore M.D.        ampicillin/sulbactam (Unasyn) 3 g in  mL IVPB  3 g Intravenous Q6HRS Dena Shore M.D.   Stopped at 02/28/25 1242    FLUoxetine (PROzac) capsule 10 mg  10 mg Oral DAILY Dena Shore M.D.   10 mg at 02/28/25 0449    dakins 0.125% (1/4 strength) topical soln   Topical DAILY Mary Ram M.D.   473 mL at 02/28/25 0449    hydrOXYzine HCl (Atarax) tablet 25 mg  25 mg Oral TID PRN Mary CHOUDHARY  SHON Ram        atorvastatin (Lipitor) tablet 20 mg  20 mg Oral Q EVENING Mary Ram M.D.   20 mg at 02/27/25 1947    insulin GLARGINE (Lantus,Semglee) injection  15 Units Subcutaneous Q EVENING Mary Ram M.D.   15 Units at 02/27/25 2000       Allergies:  Patient has no known allergies.    Family History:  History reviewed. No pertinent family history.    Social History:  Social History     Socioeconomic History    Marital status: Single    Highest education level: Bachelor's degree (e.g., BA, AB, BS)   Tobacco Use    Smoking status: Never    Smokeless tobacco: Never   Vaping Use    Vaping status: Some Days    Substances: Nicotine   Substance and Sexual Activity    Alcohol use: Never    Drug use: Never    Sexual activity: Never     Social Drivers of Health     Financial Resource Strain: Low Risk  (8/25/2022)    Overall Financial Resource Strain (CARDIA)     Difficulty of Paying Living Expenses: Not hard at all   Food Insecurity: No Food Insecurity (2/27/2025)    Hunger Vital Sign     Worried About Running Out of Food in the Last Year: Never true     Ran Out of Food in the Last Year: Never true   Transportation Needs: No Transportation Needs (2/27/2025)    PRAPARE - Transportation     Lack of Transportation (Medical): No     Lack of Transportation (Non-Medical): No   Physical Activity: Inactive (8/25/2022)    Exercise Vital Sign     Days of Exercise per Week: 0 days     Minutes of Exercise per Session: 30 min   Stress: No Stress Concern Present (8/25/2022)    [employer] [place] [employer] - Occupational Stress Questionnaire     Feeling of Stress : Not at all   Social Connections: Socially Isolated (8/25/2022)    Social Connection and Isolation Panel [NHANES]     Frequency of Communication with Friends and Family: Once a week     Frequency of Social Gatherings with Friends and Family: Never     Attends Hoahaoism Services: Never     Active Member of Clubs or Organizations: No      Attends Club or Organization Meetings: Never     Marital Status: Living with partner   Intimate Partner Violence: Not At Risk (2/27/2025)    Humiliation, Afraid, Rape, and Kick questionnaire     Fear of Current or Ex-Partner: No     Emotionally Abused: No     Physically Abused: No     Sexually Abused: No   Housing Stability: High Risk (2/27/2025)    Housing Stability Vital Sign     Unable to Pay for Housing in the Last Year: No     Number of Times Moved in the Last Year: 2     Homeless in the Last Year: No       ROS:  Complete ROS performed. ROS otherwise negative except for pertinent positives, negatives noted above in HPI.    Physical Exam     Vitals:    02/28/25 0820   BP: (!) 132/97   Pulse: 98   Resp: 16   Temp: 36.2 °C (97.2 °F)   SpO2: 95%       Physical Exam  General: NAD    Lungs: No increased work of breathing  Heart: Regular rate by palpation of peripheral pulse    LLE  2 x 3 cm wound along lateral plantar foot overlying fifth metatarsal head, probes to bone  Regional erythema and drainage from the wound  Grossly neurovascularly intact throughout foot  foot WWP    Labs, Imaging   Imaging:   MR-FOOT-WITH LEFT   Final Result      1.  Marrow edema and abnormal enhancement of the fifth proximal phalanx in the fifth metatarsal head consistent with osteomyelitis.      2.  Cellulitis involving the forefoot and most prominently seen in the area of the fifth MTP joint and there is also seen soft tissue ulceration.      DX-FOOT-COMPLETE 3+ LEFT   Final Result      Wound or ulcer involving the lateral forefoot without definite underlying osseous abnormality.          Laboratory Values  Recent Labs     02/26/25 2136 02/28/25  0103   WBC 7.5 6.8   RBC 5.13 4.87   HEMOGLOBIN 14.6 14.1   HEMATOCRIT 44.4 41.1*   MCV 86.5 84.4   MCH 28.5 29.0   MCHC 32.9 34.3   RDW 39.0 37.7   PLATELETCT 269 230   MPV 10.4 10.2     Recent Labs     02/26/25 2136 02/28/25  0103   SODIUM 135 136   POTASSIUM 5.4 4.4   CHLORIDE 97 101   CO2  26 23   GLUCOSE 513* 269*   BUN 27* 18           Assessment   Donald Fuller 54 y.o. male uncontrolled DMII (HbA1c 13.9) with plantar lateral foot ulcer overlying 5th metatarsal head.  MRI suggestive of osteomyelitis to the small toe and associated metatarsal head.  I held an extensive discussion with the patient regarding treatment options.  Nonoperative management consisting of antibiotics and wound care is unlikely to be curative for his issue.  Surgical options include ray resection versus transmetatarsal amputation.  I did inform him that I am concerned that there may be difficulty closing his wound with ray resection alone, which may mandate resection of the fourth digit as well to mobilize skin or amputation at the transmetatarsal level.     Plan     Discussed clinical, physical, and imaging findings with patient.  He elected to proceed with a resection of the fifth digit.  He understands that the fourth digit may also require resection to allow for appropriate soft tissue tissue mobilization or closure.  He also understands that he is at risk for wound complications at the surgical site, which may mandate transmetatarsal amputation in the future.  Other risks, benefits, and alternatives discussed with patient. Risks for surgery may include bleeding, infection, pain, persistent dysfunction, damage to surrounding neurovascular structures, anesthesia complications, operative failure, stroke, DVT, or death. Patient agrees to proceed with surgery. Informed consent obtained.      Signed:  Jason Davis M.D., MD  2/28/2025 2:00 PM

## 2025-02-28 NOTE — TELEPHONE ENCOUNTER
Called patient to confirm that they were able to make it into their apt for today due to them being inpatient at ED

## 2025-02-28 NOTE — PROGRESS NOTES
Hospital Medicine Daily Progress Note    Date of Service  2/28/2025    Chief Complaint  Donald Fuller is a 54 y.o. male admitted 2/26/2025 with diabetic foot ulcer with associated cellulitis     Hospital Course  Donald Fuller is a 54 y.o. male with a history of poorly controlled diabetes, diabetic neuropathy (Ha1c 15.1%) that presented with increasing redness and swelling of the right foot associated with a non-healing diabetic foot wound. Patient was treated with 10 day course of Keflex in Jan 2025 and a 7 day course of augmentin on 2/8/2025 which he reports completing. Wound has been present for several months. He has been managing it himself up to this point.     In the ED, patient was found to be hyperglycemic with BS as high as 500 as well as an DOMINIC. Xray imaging showed no  signs of osteomyelitis. He was started on IV antibiotics and admitted for further evaluation and care     Interval Problem Update  Seen and examined, no acute issues. No significant pain, has significant diabetic neuropathy in both feet  - MRI completed and show bone marrow edema involving the 5th phalange and distal tarsal head consistent with OM. These findings were discussed with the patient.   - I did consult with ortho to see if he would benefit from debridement/amputation   - continue Unasyn, stop vanc. Cx with GPC MRSA nares neg, no sign. Purulence  - DOMINIC resolved. BP is elevated, start losartan, monitor renal function and K   - BG remain poorly controlled. Resume home metformin, continue lantus 15 U, added meal time 5 units TID, start actos 15 mg daily   - diabetes education ordered and pending     I have discussed this patient's plan of care and discharge plan at IDT rounds today with Case Management, Nursing, Nursing leadership, and other members of the IDT team.    Consultants/Specialty  orthopedics    Code Status  Full Code    Disposition  The patient is not medically cleared for discharge to home or a  post-acute facility.      I have placed the appropriate orders for post-discharge needs.    Review of Systems  Review of Systems   Constitutional:  Negative for chills, fever and malaise/fatigue.   HENT:  Negative for sore throat.    Respiratory:  Negative for shortness of breath.    Cardiovascular:  Negative for chest pain.   Gastrointestinal:  Negative for nausea and vomiting.   Musculoskeletal:  Negative for joint pain.   Neurological:  Negative for dizziness.   Psychiatric/Behavioral:  Negative for substance abuse.         Physical Exam  Temp:  [36 °C (96.8 °F)-36.2 °C (97.2 °F)] 36.2 °C (97.2 °F)  Pulse:  [] 98  Resp:  [15-16] 16  BP: (132-144)/() 132/97  SpO2:  [94 %-95 %] 95 %    Physical Exam  Vitals and nursing note reviewed.   Constitutional:       Appearance: He is normal weight.   HENT:      Head: Normocephalic and atraumatic.      Mouth/Throat:      Mouth: Mucous membranes are moist.      Pharynx: Oropharynx is clear.   Eyes:      Conjunctiva/sclera: Conjunctivae normal.   Cardiovascular:      Rate and Rhythm: Normal rate and regular rhythm.      Heart sounds: Normal heart sounds.   Pulmonary:      Breath sounds: Normal breath sounds.   Abdominal:      General: Bowel sounds are normal.      Palpations: Abdomen is soft.   Musculoskeletal:         General: Swelling and signs of injury present. Normal range of motion.      Cervical back: Neck supple.      Comments: Diabetic ulcer to the lateral/ventral aspect of the foot, fatty tissue exposed, no tracking to bone   Skin:     Findings: Lesion present.   Neurological:      General: No focal deficit present.      Mental Status: He is alert and oriented to person, place, and time. Mental status is at baseline.   Psychiatric:         Mood and Affect: Mood normal.         Behavior: Behavior normal.         Fluids    Intake/Output Summary (Last 24 hours) at 2/28/2025 1316  Last data filed at 2/28/2025 0820  Gross per 24 hour   Intake --   Output 900  ml   Net -900 ml        Laboratory  Recent Labs     02/26/25 2136 02/28/25  0103   WBC 7.5 6.8   RBC 5.13 4.87   HEMOGLOBIN 14.6 14.1   HEMATOCRIT 44.4 41.1*   MCV 86.5 84.4   MCH 28.5 29.0   MCHC 32.9 34.3   RDW 39.0 37.7   PLATELETCT 269 230   MPV 10.4 10.2     Recent Labs     02/26/25 2136 02/28/25  0103   SODIUM 135 136   POTASSIUM 5.4 4.4   CHLORIDE 97 101   CO2 26 23   GLUCOSE 513* 269*   BUN 27* 18   CREATININE 1.62* 1.31   CALCIUM 9.7 8.9                   Imaging  MR-FOOT-WITH LEFT   Final Result      1.  Marrow edema and abnormal enhancement of the fifth proximal phalanx in the fifth metatarsal head consistent with osteomyelitis.      2.  Cellulitis involving the forefoot and most prominently seen in the area of the fifth MTP joint and there is also seen soft tissue ulceration.      DX-FOOT-COMPLETE 3+ LEFT   Final Result      Wound or ulcer involving the lateral forefoot without definite underlying osseous abnormality.           Assessment/Plan  * Diabetic ulcer of left midfoot associated with type 2 diabetes mellitus, with bone involvement without evidence of necrosis (HCC)- (present on admission)  Assessment & Plan  Patient had a wound for several months, seen in early January, completed 7 day course of keflex, and see again 2/8 and treated with 10 day course of augment. Presents with cellulitis   Cx thus far with GPC, continue Unasyn, stop Vanc   Xray neg for osteomyelitis  MRI completed showing Marrow edema and abnormal enhancement of the fifth proximal phalanx in the fifth metatarsal head consistent with osteomyelitis.  Wound consulted   I have consulted with ortho to see if he would benefit from debridement       Hypertension  Assessment & Plan  BP persistently elevated   I have started losartan monitor and adjust as needed   Monitor renal function and K levels     Lactic acidosis  Assessment & Plan  Resolved, stop IV hydration    Acute renal failure (HCC)  Assessment & Plan  Likely prerenal  IV  fluid hydration completed, renal function back to normal   Avoid IV contrast/nephrotoxins/NSAIDs  Dose adjust meds for decreased GFR      Type 2 diabetes mellitus with hyperglycemia, with long-term current use of insulin (HCC)- (present on admission)  Assessment & Plan  Poorly controlled diabetes, Ha1c in Jan 15.1%, repeat is 13.9%   Increaed insulin to 15 units, continue ISS, I have added meal time 5 unts and BG >250  Hypoglycemic protocol in place  Resume metformin 1000 mg BID   I have started actos 15 mg   Diabetes education ordered          VTE prophylaxis:    enoxaparin ppx      I have performed a physical exam and reviewed and updated ROS and Plan today (2/28/2025). In review of yesterday's note (2/27/2025), there are no changes except as documented above.    Greater than 51 minutes spent prepping to see patient (e.g. review of tests) obtaining and/or reviewing separately obtained history. Performing a medically appropriate examination and/ evaluation.  Counseling and educating the patient/family/caregiver.  Ordering medications, tests, or procedures.  Referring and communicating with other health care professionals.  Documenting clinical information in EPIC.  Independently interpreting results and communicating results to patient/family/caregiver.  Care coordination.

## 2025-02-28 NOTE — PROGRESS NOTES
"21:35 - CNA went in the patient's room because the bed alarm went off. Patient is agitated because CNA would not let him go out of the bed on his own. Patient also became agitated when CNA offered to assist with standing edge of bad. CNA educated the patient about the risk of falling. patient verbalized that \"I am walking independently on his 2 feet for 55 years and you are taking my independence away. F**k You guys and I can walk away with my 2 feet\". This RN intervened and the patient became more agitated. This RN tried to educate patient on the risk but patient refused to listen. Patient still refused bed alarm despite education. Charge RN also notified.    "

## 2025-02-28 NOTE — CARE PLAN
The patient is Stable - Low risk of patient condition declining or worsening    Shift Goals  Clinical Goals: NPO, pain control, safety  Patient Goals: pain control, rest  Family Goals: not present    Progress made toward(s) clinical / shift goals:    Problem: Mobility  Goal: Patient's capacity to carry out activities will improve  Outcome: Progressing  Flowsheets (Taken 2/28/2025 0958)  Mobility:   Encouraged mobilization per interdisciplinary team recommendations   Monitored for signs of activity intolerance   Provided assistive devices   Provided rest periods between activities   Administered pain management to allow progressive mobilization   Collaborated with PT/OT     Problem: Self Care  Goal: Patient will have the ability to perform ADLs independently or with assistance (bathe, groom, dress, toilet and feed)  Outcome: Progressing     Problem: Infection - Standard  Goal: Patient will remain free from infection  Outcome: Progressing  Flowsheets (Taken 2/28/2025 0958)  Standard Infection Interventions:   Assessed for signs and symptoms of infection   Implemented standard precautions   Instructed patient/family on signs and symptoms of infection   Provided education on proper hand hygiene and infection prevention measures   Assessed for removal IV, central lines, intra-arterial or urinary catheters     Problem: Wound/ / Incision Healing  Goal: Patient's wound/surgical incision will decrease in size and heals properly  Outcome: Progressing       Patient is not progressing towards the following goals: n/a

## 2025-02-28 NOTE — CARE PLAN
The patient is Stable - Low risk of patient condition declining or worsening    Shift Goals  Clinical Goals: pain mgmt, safety  Patient Goals: pain cotrol, rest, comfort  Family Goals: not present    Progress made toward(s) clinical / shift goals:        Problem: Pain - Standard  Goal: Alleviation of pain or a reduction in pain to the patient’s comfort goal  Outcome: Progressing     Problem: Knowledge Deficit - Standard  Goal: Patient and family/care givers will demonstrate understanding of plan of care, disease process/condition, diagnostic tests and medications  Outcome: Progressing     Problem: Fall Risk  Goal: Patient will remain free from falls  Outcome: Progressing     Problem: Mobility  Goal: Patient's capacity to carry out activities will improve  Outcome: Progressing     Problem: Self Care  Goal: Patient will have the ability to perform ADLs independently or with assistance (bathe, groom, dress, toilet and feed)  Outcome: Progressing     Problem: Infection - Standard  Goal: Patient will remain free from infection  Outcome: Progressing     Problem: Wound/ / Incision Healing  Goal: Patient's wound/surgical incision will decrease in size and heals properly  Outcome: Progressing       Patient is not progressing towards the following goals:

## 2025-02-28 NOTE — HOSPITAL COURSE
Donald Fuller is a 54 y.o. male with a history of poorly controlled diabetes, diabetic neuropathy (Ha1c 15.1%) that presented with increasing redness and swelling of the right foot associated with a non-healing diabetic foot wound. Patient was treated with 10 day course of Keflex in Jan 2025 and a 7 day course of augmentin on 2/8/2025 which he reports completing. Wound has been present for several months. He has been managing it himself up to this point.     In the ED, patient was found to be hyperglycemic with BS as high as 500 as well as an DOMINIC. Xray imaging showed no  signs of osteomyelitis. He was started on IV antibiotics and admitted for further evaluation and care

## 2025-03-01 ENCOUNTER — PHARMACY VISIT (OUTPATIENT)
Dept: PHARMACY | Facility: MEDICAL CENTER | Age: 55
End: 2025-03-01
Payer: COMMERCIAL

## 2025-03-01 VITALS
BODY MASS INDEX: 27.13 KG/M2 | TEMPERATURE: 97.9 F | WEIGHT: 211.42 LBS | OXYGEN SATURATION: 96 % | SYSTOLIC BLOOD PRESSURE: 130 MMHG | RESPIRATION RATE: 16 BRPM | HEART RATE: 100 BPM | DIASTOLIC BLOOD PRESSURE: 94 MMHG | HEIGHT: 74 IN

## 2025-03-01 LAB
ERYTHROCYTE [DISTWIDTH] IN BLOOD BY AUTOMATED COUNT: 38.7 FL (ref 35.9–50)
GLUCOSE BLD STRIP.AUTO-MCNC: 162 MG/DL (ref 65–99)
GLUCOSE BLD STRIP.AUTO-MCNC: 163 MG/DL (ref 65–99)
HCT VFR BLD AUTO: 43.6 % (ref 42–52)
HGB BLD-MCNC: 14.6 G/DL (ref 14–18)
MCH RBC QN AUTO: 28.6 PG (ref 27–33)
MCHC RBC AUTO-ENTMCNC: 33.5 G/DL (ref 32.3–36.5)
MCV RBC AUTO: 85.3 FL (ref 81.4–97.8)
PLATELET # BLD AUTO: 258 K/UL (ref 164–446)
PMV BLD AUTO: 10.3 FL (ref 9–12.9)
RBC # BLD AUTO: 5.11 M/UL (ref 4.7–6.1)
WBC # BLD AUTO: 9 K/UL (ref 4.8–10.8)

## 2025-03-01 PROCEDURE — 85027 COMPLETE CBC AUTOMATED: CPT

## 2025-03-01 PROCEDURE — 700102 HCHG RX REV CODE 250 W/ 637 OVERRIDE(OP): Performed by: STUDENT IN AN ORGANIZED HEALTH CARE EDUCATION/TRAINING PROGRAM

## 2025-03-01 PROCEDURE — 700102 HCHG RX REV CODE 250 W/ 637 OVERRIDE(OP): Performed by: HOSPITALIST

## 2025-03-01 PROCEDURE — 700111 HCHG RX REV CODE 636 W/ 250 OVERRIDE (IP): Mod: JZ | Performed by: HOSPITALIST

## 2025-03-01 PROCEDURE — A9270 NON-COVERED ITEM OR SERVICE: HCPCS | Performed by: HOSPITALIST

## 2025-03-01 PROCEDURE — A9270 NON-COVERED ITEM OR SERVICE: HCPCS | Performed by: STUDENT IN AN ORGANIZED HEALTH CARE EDUCATION/TRAINING PROGRAM

## 2025-03-01 PROCEDURE — 700105 HCHG RX REV CODE 258: Performed by: HOSPITALIST

## 2025-03-01 PROCEDURE — RXMED WILLOW AMBULATORY MEDICATION CHARGE: Performed by: STUDENT IN AN ORGANIZED HEALTH CARE EDUCATION/TRAINING PROGRAM

## 2025-03-01 PROCEDURE — 82962 GLUCOSE BLOOD TEST: CPT | Mod: 91

## 2025-03-01 PROCEDURE — 99239 HOSP IP/OBS DSCHRG MGMT >30: CPT | Performed by: STUDENT IN AN ORGANIZED HEALTH CARE EDUCATION/TRAINING PROGRAM

## 2025-03-01 RX ORDER — PIOGLITAZONE 15 MG/1
15 TABLET ORAL DAILY
Qty: 100 TABLET | Refills: 3 | Status: SHIPPED | OUTPATIENT
Start: 2025-03-02 | End: 2026-04-06

## 2025-03-01 RX ORDER — LOSARTAN POTASSIUM 50 MG/1
50 TABLET ORAL
Status: DISCONTINUED | OUTPATIENT
Start: 2025-03-02 | End: 2025-03-01 | Stop reason: HOSPADM

## 2025-03-01 RX ORDER — ATORVASTATIN CALCIUM 20 MG/1
20 TABLET, FILM COATED ORAL EVERY EVENING
Qty: 100 TABLET | Refills: 3 | Status: SHIPPED | OUTPATIENT
Start: 2025-03-01 | End: 2026-04-05

## 2025-03-01 RX ORDER — INSULIN LISPRO 100 [IU]/ML
2-10 INJECTION, SOLUTION INTRAVENOUS; SUBCUTANEOUS
Qty: 3 ML | Refills: 3 | Status: ACTIVE | OUTPATIENT
Start: 2025-03-01

## 2025-03-01 RX ORDER — LOSARTAN POTASSIUM 50 MG/1
50 TABLET ORAL DAILY
Qty: 100 TABLET | Refills: 3 | Status: SHIPPED | OUTPATIENT
Start: 2025-03-02 | End: 2026-04-06

## 2025-03-01 RX ADMIN — PIOGLITAZONE 15 MG: 15 TABLET ORAL at 05:36

## 2025-03-01 RX ADMIN — INSULIN LISPRO 5 UNITS: 100 INJECTION, SOLUTION INTRAVENOUS; SUBCUTANEOUS at 09:34

## 2025-03-01 RX ADMIN — OXYCODONE HYDROCHLORIDE 5 MG: 5 TABLET ORAL at 15:23

## 2025-03-01 RX ADMIN — AMPICILLIN AND SULBACTAM 3 G: 1; 2 INJECTION, POWDER, FOR SOLUTION INTRAMUSCULAR; INTRAVENOUS at 13:11

## 2025-03-01 RX ADMIN — INSULIN LISPRO 2 UNITS: 100 INJECTION, SOLUTION INTRAVENOUS; SUBCUTANEOUS at 13:22

## 2025-03-01 RX ADMIN — AMPICILLIN AND SULBACTAM 3 G: 1; 2 INJECTION, POWDER, FOR SOLUTION INTRAMUSCULAR; INTRAVENOUS at 05:38

## 2025-03-01 RX ADMIN — FLUOXETINE HYDROCHLORIDE 10 MG: 10 CAPSULE ORAL at 05:36

## 2025-03-01 RX ADMIN — INSULIN LISPRO 5 UNITS: 100 INJECTION, SOLUTION INTRAVENOUS; SUBCUTANEOUS at 13:21

## 2025-03-01 RX ADMIN — METFORMIN HYDROCHLORIDE 1000 MG: 500 TABLET ORAL at 09:32

## 2025-03-01 RX ADMIN — LOSARTAN POTASSIUM 25 MG: 25 TABLET, FILM COATED ORAL at 05:36

## 2025-03-01 RX ADMIN — INSULIN LISPRO 2 UNITS: 100 INJECTION, SOLUTION INTRAVENOUS; SUBCUTANEOUS at 09:34

## 2025-03-01 ASSESSMENT — PAIN DESCRIPTION - PAIN TYPE
TYPE: SURGICAL PAIN
TYPE: SURGICAL PAIN

## 2025-03-01 NOTE — PROGRESS NOTES
"      Orthopaedic Progress Note    Interval changes:  Patient doing well post op  L foot dressings changed by ortho PA due to min sanguinous  Cleared for DC to home by ortho pending medicine and ID team clearance    ROS - Patient denies any new issues.  Pain well controlled.    /86   Pulse 93   Temp 36.7 °C (98 °F) (Temporal)   Resp 16   Ht 1.88 m (6' 2\")   Wt 95.9 kg (211 lb 6.7 oz)   SpO2 96%     Patient seen and examined  No acute distress  Breathing non labored  RRR  L foot dressings changed by ortho PA due to min sanguinous, incision without issues, DNVI, moves remaining toes, cap refill <2 sec distally.     Recent Labs     02/26/25  2136 02/28/25  0103 03/01/25  0056   WBC 7.5 6.8 9.0   RBC 5.13 4.87 5.11   HEMOGLOBIN 14.6 14.1 14.6   HEMATOCRIT 44.4 41.1* 43.6   MCV 86.5 84.4 85.3   MCH 28.5 29.0 28.6   MCHC 32.9 34.3 33.5   RDW 39.0 37.7 38.7   PLATELETCT 269 230 258   MPV 10.4 10.2 10.3       Active Hospital Problems    Diagnosis     Hypertension [I10]     Diabetic ulcer of left midfoot associated with type 2 diabetes mellitus, with bone involvement without evidence of necrosis (HCC) [E11.621, L97.426]     Acute renal failure (HCC) [N17.9]     Lactic acidosis [E87.20]     Type 2 diabetes mellitus with hyperglycemia, with long-term current use of insulin (HCC) [E11.65, Z79.4]        Assessment/Plan:  Patient doing well post op  L foot dressings changed by ortho PA due to min sanguinous  Cleared for DC to home by ortho pending medicine and ID team clearance  POD#1 S/P Left small toe ray resection   Wt bearing status - WBAT in post op un- weighting shoe  Wound care/Drains - Dressings to be changed every other day by nursing. Or PRN for saturation starting POD#2  Future Procedures - none planed  Lovenox: Start 2/28, Duration-until ambulatory > 150'  Sutures/Staples out- 14-21 days post operatively. Removal will completed by ortho mid levels only.  PT/OT-initiated  Antibiotics: unasyn 3g IV Q6  DVT " Prophylaxis- TEDS/SCDs/Foot pumps  Sainz-not needed per ortho  Case Coordination for Discharge Planning - Disposition per therapy recs.

## 2025-03-01 NOTE — CARE PLAN
The patient is Stable - Low risk of patient condition declining or worsening    Shift Goals  Clinical Goals: pain ccontrol, comfort, mobility, PT/OT eval, fsbg checks  Patient Goals: pain controll, mobility, d/c  Family Goals: n/a    Progress made toward(s) clinical / shift goals:       Problem: Pain - Standard  Goal: Alleviation of pain or a reduction in pain to the patient’s comfort goal  Description: Target End Date:  Prior to discharge or change in level of care    Document on Vitals flowsheet    1.  Document pain using the appropriate pain scale per order or unit policy  2.  Educate and implement non-pharmacologic comfort measures (i.e. relaxation, distraction, massage, cold/heat therapy, etc.)  3.  Pain management medications as ordered  4.  Reassess pain after pain med administration per policy  5.  If opiods administered assess patient's response to pain medication is appropriate per POSS sedation scale  6.  Follow pain management plan developed in collaboration with patient and interdisciplinary team (including palliative care or pain specialists if applicable)  Outcome: Progressing     Problem: Knowledge Deficit - Standard  Goal: Patient and family/care givers will demonstrate understanding of plan of care, disease process/condition, diagnostic tests and medications  Description: Target End Date:  1-3 days or as soon as patient condition allows    Document in Patient Education    1.  Patient and family/caregiver oriented to unit, equipment, visitation policy and means for communicating concern  2.  Complete/review Learning Assessment  3.  Assess knowledge level of disease process/condition, treatment plan, diagnostic tests and medications  4.  Explain disease process/condition, treatment plan, diagnostic tests and medications  Outcome: Progressing     Problem: Fall Risk  Goal: Patient will remain free from falls  Description: Target End Date:  Prior to discharge or change in level of care    Document  interventions on the [facility] Fall Risk Assessment    1.  Assess for fall risk factors  2.  Implement fall precautions  Outcome: Progressing     Problem: Mobility  Goal: Patient's capacity to carry out activities will improve  Description: Target End Date:  Prior to discharge or change in level of care    1.  Assess for barriers to mobility/activity  2.  Implement activity per interdisciplinary team recommendations  3.  Target activity level identified and patient/family/caregiver aware of goal  4.  Provide assistive devices  5.  Instruct patient/caregiver on proper use of assistive/adaptive devices  6.  Schedule activities and rest periods to decrease effects of fatigue  7.  Encourage mobilization to extent of ability  8.  Maintain proper body alignment  9.  Provide adequate pain management to allow progressive mobilization  10. Implement pace maker precautions as needed  Outcome: Progressing     Problem: Self Care  Goal: Patient will have the ability to perform ADLs independently or with assistance (bathe, groom, dress, toilet and feed)  Description: Target End Date:  Prior to discharge or change in level of care    Document on ADL flowsheet    1.  Assess the capability and level of deficiency to perform ADLs  2.  Encourage family/care giver involvement  3.  Provide assistive devices  4.  Consider PT/OT evaluations  5.  Maintain support, give positive feedback, encourage self-care allowing extra time and verbal cuing as needed  6.  Avoid doing something for patients they can do themselves, but provide assistance as needed  7.  Assist in anticipating/planning individual needs  8.  Collaborate with Case Management and  to meet discharge needs  Outcome: Progressing     Problem: Infection - Standard  Goal: Patient will remain free from infection  Description: Target End Date:  Prior to discharge or change in level of care    1.  Utilize Standard Precautions at all times to reduce the risk of  transmission of microorganisms from both recognized and  unrecognized sources of infection  2.  Infection prevention handouts provided (general/device/diagnosis specific) and documented in Patient Education  3.  Educate patient and family/caregiver on isolation precautions if applicable  Outcome: Progressing     Problem: Wound/ / Incision Healing  Goal: Patient's wound/surgical incision will decrease in size and heals properly  Description: Target End Date:  Prior to discharge or change in level of care    Document on LDA    1.  Assess and document surgical incision/wound  2.  Provide incision/wound care per policy and/or provider orders  3.  Manage surgical drains per policy if applicable  4.  Encourage adequate nutrition to promote wound healing  5.  Collaborate with Clinical Dietician  Outcome: Progressing     Patient is not progressing towards the following goals:

## 2025-03-01 NOTE — DISCHARGE SUMMARY
Discharge Summary    CHIEF COMPLAINT ON ADMISSION  Chief Complaint   Patient presents with    Foot Pain     Pt reports he was seen for left foot cellulitis x10 days ago and was given antibiotics. Now, pt reports a blister/wound on left foot. Hx of diabetes        Reason for Admission  Foot wound     Admission Date  2/26/2025    CODE STATUS  Full Code    HPI & HOSPITAL COURSE    Donald Fuller is a 54 y.o. male with a history of poorly controlled diabetes, diabetic neuropathy (Ha1c 15.1%) that presented with increasing redness and swelling of the right foot associated with a non-healing diabetic foot wound. Patient was treated with 10 day course of Keflex in Jan 2025 and a 7 day course of augmentin on 2/8/2025 which he reports completing. Wound has been present for several months. He has been managing it himself up to this point.     In the ED, patient was found to be hyperglycemic with BS as high as 500 as well as an DOMINIC. Xray imaging showed no  signs of osteomyelitis. He was started on IV antibiotics and admitted for further evaluation and care. He underwent MRI which did reveal osteomyelitis involving the 5th toe, surgery was consulted and he underwent left small toe ray resection. He did well post op. He was treated with 2 week course of antibiotics and educated on importance of glucose control and orthopedic follow up to ensure wound healing.     Therefore, he is discharged in fair and stable condition to home with close outpatient follow-up.    The patient met 2-midnight criteria for an inpatient stay at the time of discharge.    Discharge Date  3/1/25    FOLLOW UP ITEMS POST DISCHARGE  Post op follow up   Diabetes control     DISCHARGE DIAGNOSES  Principal Problem:    Diabetic ulcer of left midfoot associated with type 2 diabetes mellitus, with bone involvement without evidence of necrosis (HCC) (POA: Yes)  Active Problems:    Type 2 diabetes mellitus with hyperglycemia, with long-term current use of  insulin (HCC) (POA: Yes)    Acute renal failure (HCC) (POA: Unknown)    Lactic acidosis (POA: Unknown)    Hypertension (POA: Unknown)  Resolved Problems:    * No resolved hospital problems. *      FOLLOW UP  Future Appointments   Date Time Provider Department Center   3/3/2025  7:30 AM [facility] PHARMACIST [facility]     No follow-up provider specified.    MEDICATIONS ON DISCHARGE     Medication List        START taking these medications        Instructions   atorvastatin 20 MG Tabs  Commonly known as: Lipitor   Take 1 Tablet by mouth every evening.  Dose: 20 mg     Insulin Pen Needle 32 G x 4 mm   Doctor's comments: Per patient/formulary preference. ICD-10 code: E11.65 Uncontrolled type 2 Diabetes Mellitus  Use one pen needle in pen device to inject insulin four times daily.     losartan 50 MG Tabs  Start taking on: March 2, 2025  Commonly known as: Cozaar   Take 1 Tablet by mouth every day.  Dose: 50 mg     pioglitazone 15 MG Tabs  Start taking on: March 2, 2025  Commonly known as: Actos   Take 1 Tablet by mouth every day.  Dose: 15 mg            CHANGE how you take these medications        Instructions   amoxicillin-clavulanate 875-125 MG Tabs  What changed: additional instructions  Commonly known as: Augmentin   Take 1 Tablet by mouth 2 times a day for 23 doses.  Dose: 1 Tablet     hydrOXYzine HCl 25 MG Tabs  What changed: See the new instructions.  Commonly known as: Atarax   TAKE 1 TABLET BY MOUTH THREE TIMES DAILY AS NEEDED FOR ITCHING OR ANXIETY.     insulin glargine 100 UNIT/ML Sopn injection  What changed:   how much to take  additional instructions  Commonly known as: Lantus   Inject 15 Units under the skin every evening.  Dose: 15 Units     insulin lispro 100 UNIT/ML Sopn injection PEN  What changed: how much to take  Commonly known as: HumaLOG/AdmeLOG   Inject 2-10 Units under the skin 3 times a day before meals. Use 2 units if blood sugar 150-200. 4 units if 200-250, 6 units if 250-300, 8 units if  "300-350, 10 units if 350-400  Dose: 2-10 Units            CONTINUE taking these medications        Instructions   FLUOXETINE HCL PO   Take 1 Caplet by mouth every day. Unknown strength  Dose: 1 Caplet     INSULIN SYRINGE 1CC/31GX5/16\" 31G X 5/16\" 1 ML Misc   Use as directed for SQ injection of insulin.     metformin 1000 MG tablet  Commonly known as: Glucophage   Take 1 Tablet by mouth 2 times a day with meals.  Dose: 1,000 mg            STOP taking these medications      ibuprofen 200 MG Tabs  Commonly known as: Motrin     insulin 70/30 (70-30) 100 UNIT/ML Susp  Commonly known as: HumuLIN 70/30/NovoLIN 70/30     Ozempic (0.25 or 0.5 MG/DOSE) 2 MG/3ML Sopn  Generic drug: Semaglutide(0.25 or 0.5MG/DOS)     pregabalin 100 MG Caps  Commonly known as: Lyrica     simvastatin 20 MG Tabs  Commonly known as: Zocor     venlafaxine XR 37.5 MG Cp24  Commonly known as: Effexor XR              Allergies  No Known Allergies    DIET  Orders Placed This Encounter   Procedures    Diet Order Diet: Consistent CHO (Diabetic)     Standing Status:   Standing     Number of Occurrences:   1     Diet::   Consistent CHO (Diabetic) [4]       ACTIVITY  As tolerated.  Heal touch weight bearing only     CONSULTATIONS  Orthopedic surgery    PROCEDURES  Left small toe ray resection     LABORATORY  Lab Results   Component Value Date    SODIUM 137 02/28/2025    POTASSIUM 4.4 02/28/2025    CHLORIDE 103 02/28/2025    CO2 22 02/28/2025    GLUCOSE 167 (H) 02/28/2025    BUN 16 02/28/2025    CREATININE 0.99 02/28/2025        Lab Results   Component Value Date    WBC 9.0 03/01/2025    HEMOGLOBIN 14.6 03/01/2025    HEMATOCRIT 43.6 03/01/2025    PLATELETCT 258 03/01/2025        Total time of the discharge process exceeds 32 minutes.  "

## 2025-03-01 NOTE — CARE PLAN
The patient is Stable - Low risk of patient condition declining or worsening    Shift Goals  Clinical Goals: pain mgnt, safety  Patient Goals: pain control, rest  Family Goals: not present    Progress made toward(s) clinical / shift goals:        Problem: Pain - Standard  Goal: Alleviation of pain or a reduction in pain to the patient’s comfort goal  Outcome: Progressing     Problem: Knowledge Deficit - Standard  Goal: Patient and family/care givers will demonstrate understanding of plan of care, disease process/condition, diagnostic tests and medications  Outcome: Progressing     Problem: Fall Risk  Goal: Patient will remain free from falls  Outcome: Progressing     Problem: Mobility  Goal: Patient's capacity to carry out activities will improve  Outcome: Progressing       Patient is not progressing towards the following goals:

## 2025-03-01 NOTE — ANESTHESIA POSTPROCEDURE EVALUATION
Patient: Donald Fuller    Procedure Summary       Date: 02/28/25 Room / Location: [place] [id number] / SURGERY [facility]    Anesthesia Start: 1431 Anesthesia Stop: 1507    Procedure: AMPUTATION, FOOT-4TH AND 5TH RAY RESECTION (Left: Foot) Diagnosis: (LEFT FOOT OSTEOMYELITIS)    Surgeons: Jason Davis M.D. Responsible Provider: David Santiago M.D.    Anesthesia Type: general ASA Status: 3            Final Anesthesia Type: general  Last vitals  BP   Blood Pressure: 116/68    Temp   36.1 °C (96.9 °F)    Pulse   84   Resp   13    SpO2   97 %      Anesthesia Post Evaluation    Patient location during evaluation: PACU  Patient participation: complete - patient participated  Level of consciousness: awake and alert    Airway patency: patent  Anesthetic complications: no  Cardiovascular status: hemodynamically stable  Respiratory status: acceptable  Hydration status: euvolemic    PONV: none          No notable events documented.     Nurse Pain Score: 0 (NPRS)

## 2025-03-01 NOTE — DISCHARGE INSTRUCTIONS
Do not bear weight on your forefoot, heal weight bearing only until your wound heals, you have been provided a walking shoe to help with this. Do not wear regular foot wear on your surgical foot   Do not soak your foot in water, keep the wound clean and do change dressing as recommended   You will need to follow up with orthopedic surgery in 2 weeks for wound check and suture removal, if you have questions or concerns, call office   Take the antibiotic given as prescribed until complete, if you develop any redness, increased drainage, fever, chills or other concerning symptoms, seek medical attention   It is extremely important that you get your diabetes under better control, you goal blood sugar should be less than 140. You should be checking your sugars at least 3 days daily and using insulin as prescribed. I have also added actos which is an oral medication to help with blood sugar control   Since you have severe neuropathy it is important that you examine your feet daily, always were protective footwear even in your home and see your doctor regularly to make sure you do not have any foot wounds that can lead to further amputation.

## 2025-03-01 NOTE — CONSULTS
Diabetes education: Pt has diabetes medications listed in med rec to include Lantus, metformin, 70/30 insulin and Ozempic. Per PhT note he was not taking Ozempic as too expensive but note did not list the other dm medications. Per MD outpatient note from 2/7/25, pt said he was on Metformin,  70/30  ( 30 units), Glargine ( 10 units), Lispro ( 2-4 units with meals) and only checks once a day. Pt has no insurance listed.  Pt was admitted with blood sugar of  513, and Hga1c of 13.9% ( down from 15.0% from 1/8/25). Pt is currently on Glarine 15 units pm with Lispro 5 units tid meals and Lispro per sliding scale coverage ac and hs. Blood sugars were 290 ( 5 units), 251 ( 5 + 5 units), 186 ( 2 + 5 units), 92, and 168 ( 2 + 5 units).  Plan: Attempted to see pt this afternoon but he was in surgery. CDE will follow up on Monday if he remains in the hospital.  Unclear what he actually takes for insulin ( why both 70/30, Lispro and Lantus?). If discharged before Monday, please confirm what he is taking and if he uses pens vs vials and has his medications and supplies at home. At this time he has no insurance listed so may need approved services at discharge.

## 2025-03-01 NOTE — WOUND TEAM
Patient went to surgery on 2/28 with Ortho for L small toe ray resection for osteomyelitis with surgical closure.  Please defer to Ortho for management of this surgical site.    Wound team signing off at this time.

## 2025-03-01 NOTE — PROGRESS NOTES
Diabetes education: Attempted to see pt this afternoon, but he was in surgery. Please see consult note.  Plan:   CDE will follow up on Monday if he remains in the hospital.  Unclear what he actually takes for insulin ( why both 70/30, Lispro and Lantus?). If discharged before Monday, please confirm what he is taking and if he uses pens vs vials and has his medications and supplies at home. At this time he has no insurance listed so may need approved services at discharge.

## 2025-03-01 NOTE — PROGRESS NOTES
Contact Custom angled heel offloading shoe ordered through [employer]. For any questions or concerns, please contact [employer] at [phone]

## 2025-03-02 LAB
BACTERIA TISS AEROBE CULT: ABNORMAL
BACTERIA WND AEROBE CULT: ABNORMAL
GRAM STN SPEC: ABNORMAL
GRAM STN SPEC: ABNORMAL
SIGNIFICANT IND 70042: ABNORMAL
SIGNIFICANT IND 70042: ABNORMAL
SITE SITE: ABNORMAL
SITE SITE: ABNORMAL
SOURCE SOURCE: ABNORMAL
SOURCE SOURCE: ABNORMAL

## 2025-03-02 NOTE — PROGRESS NOTES
Notified that custom boot from [employer] can only be delivered on Monday, called [employer] with no success. Sherman LYNN notified, pt is still okay to go home with ortho shoe. [employer] number given to pt for further planning on custom boot delivery.

## 2025-03-02 NOTE — PROGRESS NOTES
Discharge orders received.  Patient arrived to the discharge lounge.  PIV removed by discharge RN. Meds to beds medications verified by discharge RN, bag of medication given to patient. Patient received diabetic foot shoe in discharge lounge. Instructions given, medications reviewed and general discharge education provided to patient.  Follow up appointments discussed.  Patient verbalized understanding of dc instructions and prescriptions.  Patient signed discharge instructions.  Patient verbalized he had all belongings with him, Denied having any home medications locked in our inpatient pharmacy that  he needs back. Patient wheeled from discharge lounge to private vehicle. Patient left via car with spouse to home in stable condition.

## 2025-03-03 LAB — PATHOLOGY CONSULT NOTE: NORMAL

## 2025-03-04 LAB
BACTERIA BLD CULT: NORMAL
BACTERIA BLD CULT: NORMAL
BACTERIA SPEC ANAEROBE CULT: NORMAL
SIGNIFICANT IND 70042: NORMAL
SITE SITE: NORMAL
SOURCE SOURCE: NORMAL

## 2025-03-10 ENCOUNTER — TELEPHONE (OUTPATIENT)
Dept: HEALTH INFORMATION MANAGEMENT | Facility: OTHER | Age: 55
End: 2025-03-10
Payer: COMMERCIAL

## 2025-04-20 ENCOUNTER — HOSPITAL ENCOUNTER (INPATIENT)
Facility: MEDICAL CENTER | Age: 55
LOS: 8 days | End: 2025-04-28
Attending: STUDENT IN AN ORGANIZED HEALTH CARE EDUCATION/TRAINING PROGRAM
Payer: SELF-PAY

## 2025-04-20 ENCOUNTER — APPOINTMENT (OUTPATIENT)
Dept: RADIOLOGY | Facility: MEDICAL CENTER | Age: 55
End: 2025-04-20
Attending: STUDENT IN AN ORGANIZED HEALTH CARE EDUCATION/TRAINING PROGRAM

## 2025-04-20 DIAGNOSIS — N17.9 AKI (ACUTE KIDNEY INJURY) (HCC): ICD-10-CM

## 2025-04-20 DIAGNOSIS — E11.65 TYPE 2 DIABETES MELLITUS WITH HYPERGLYCEMIA, WITH LONG-TERM CURRENT USE OF INSULIN (HCC): ICD-10-CM

## 2025-04-20 DIAGNOSIS — M86.172 OSTEOMYELITIS OF ANKLE OR FOOT, ACUTE, LEFT (HCC): ICD-10-CM

## 2025-04-20 DIAGNOSIS — L03.116 CELLULITIS OF LEFT FOOT: ICD-10-CM

## 2025-04-20 DIAGNOSIS — R73.9 HYPERGLYCEMIA: ICD-10-CM

## 2025-04-20 DIAGNOSIS — R78.81 BACTEREMIA: ICD-10-CM

## 2025-04-20 DIAGNOSIS — M86.172 OTHER ACUTE OSTEOMYELITIS OF LEFT FOOT (HCC): Primary | ICD-10-CM

## 2025-04-20 DIAGNOSIS — Z79.4 TYPE 2 DIABETES MELLITUS WITH HYPERGLYCEMIA, WITH LONG-TERM CURRENT USE OF INSULIN (HCC): ICD-10-CM

## 2025-04-20 LAB
ALBUMIN SERPL BCP-MCNC: 3.8 G/DL (ref 3.2–4.9)
ALBUMIN/GLOB SERPL: 1.1 G/DL
ALP SERPL-CCNC: 91 U/L (ref 30–99)
ALT SERPL-CCNC: 31 U/L (ref 2–50)
ANION GAP SERPL CALC-SCNC: 15 MMOL/L (ref 7–16)
AST SERPL-CCNC: 29 U/L (ref 12–45)
BASOPHILS # BLD AUTO: 0.2 % (ref 0–1.8)
BASOPHILS # BLD: 0.02 K/UL (ref 0–0.12)
BILIRUB SERPL-MCNC: 0.3 MG/DL (ref 0.1–1.5)
BUN SERPL-MCNC: 27 MG/DL (ref 8–22)
CALCIUM ALBUM COR SERPL-MCNC: 9.6 MG/DL (ref 8.5–10.5)
CALCIUM SERPL-MCNC: 9.4 MG/DL (ref 8.5–10.5)
CHLORIDE SERPL-SCNC: 97 MMOL/L (ref 96–112)
CO2 SERPL-SCNC: 23 MMOL/L (ref 20–33)
CREAT SERPL-MCNC: 1.52 MG/DL (ref 0.5–1.4)
CRP SERPL HS-MCNC: 25.1 MG/DL (ref 0–0.75)
EOSINOPHIL # BLD AUTO: 0 K/UL (ref 0–0.51)
EOSINOPHIL NFR BLD: 0 % (ref 0–6.9)
ERYTHROCYTE [DISTWIDTH] IN BLOOD BY AUTOMATED COUNT: 40.4 FL (ref 35.9–50)
ERYTHROCYTE [SEDIMENTATION RATE] IN BLOOD BY WESTERGREN METHOD: 2 MM/HOUR (ref 0–20)
GFR SERPLBLD CREATININE-BSD FMLA CKD-EPI: 54 ML/MIN/1.73 M 2
GLOBULIN SER CALC-MCNC: 3.6 G/DL (ref 1.9–3.5)
GLUCOSE SERPL-MCNC: 325 MG/DL (ref 65–99)
HCT VFR BLD AUTO: 49.3 % (ref 42–52)
HGB BLD-MCNC: 16.6 G/DL (ref 14–18)
IMM GRANULOCYTES # BLD AUTO: 0.02 K/UL (ref 0–0.11)
IMM GRANULOCYTES NFR BLD AUTO: 0.2 % (ref 0–0.9)
INR PPP: 1.07 (ref 0.87–1.13)
LACTATE SERPL-SCNC: 1.8 MMOL/L (ref 0.5–2)
LYMPHOCYTES # BLD AUTO: 0.61 K/UL (ref 1–4.8)
LYMPHOCYTES NFR BLD: 5.9 % (ref 22–41)
MCH RBC QN AUTO: 29 PG (ref 27–33)
MCHC RBC AUTO-ENTMCNC: 33.7 G/DL (ref 32.3–36.5)
MCV RBC AUTO: 86.2 FL (ref 81.4–97.8)
MONOCYTES # BLD AUTO: 0.69 K/UL (ref 0–0.85)
MONOCYTES NFR BLD AUTO: 6.7 % (ref 0–13.4)
NEUTROPHILS # BLD AUTO: 8.99 K/UL (ref 1.82–7.42)
NEUTROPHILS NFR BLD: 87 % (ref 44–72)
NRBC # BLD AUTO: 0 K/UL
NRBC BLD-RTO: 0 /100 WBC (ref 0–0.2)
PLATELET # BLD AUTO: 216 K/UL (ref 164–446)
PMV BLD AUTO: 10 FL (ref 9–12.9)
POTASSIUM SERPL-SCNC: 4.8 MMOL/L (ref 3.6–5.5)
PROT SERPL-MCNC: 7.4 G/DL (ref 6–8.2)
PROTHROMBIN TIME: 13.9 SEC (ref 12–14.6)
RBC # BLD AUTO: 5.72 M/UL (ref 4.7–6.1)
SCCMEC + MECA PNL NOSE NAA+PROBE: NEGATIVE
SODIUM SERPL-SCNC: 135 MMOL/L (ref 135–145)
WBC # BLD AUTO: 10.3 K/UL (ref 4.8–10.8)

## 2025-04-20 PROCEDURE — 87641 MR-STAPH DNA AMP PROBE: CPT

## 2025-04-20 PROCEDURE — 87186 SC STD MICRODIL/AGAR DIL: CPT | Mod: 91

## 2025-04-20 PROCEDURE — 86140 C-REACTIVE PROTEIN: CPT

## 2025-04-20 PROCEDURE — 83605 ASSAY OF LACTIC ACID: CPT

## 2025-04-20 PROCEDURE — 700111 HCHG RX REV CODE 636 W/ 250 OVERRIDE (IP): Performed by: STUDENT IN AN ORGANIZED HEALTH CARE EDUCATION/TRAINING PROGRAM

## 2025-04-20 PROCEDURE — 73630 X-RAY EXAM OF FOOT: CPT | Mod: LT

## 2025-04-20 PROCEDURE — 96367 TX/PROPH/DG ADDL SEQ IV INF: CPT

## 2025-04-20 PROCEDURE — 85652 RBC SED RATE AUTOMATED: CPT

## 2025-04-20 PROCEDURE — 87150 DNA/RNA AMPLIFIED PROBE: CPT

## 2025-04-20 PROCEDURE — 87040 BLOOD CULTURE FOR BACTERIA: CPT | Mod: 91

## 2025-04-20 PROCEDURE — 71045 X-RAY EXAM CHEST 1 VIEW: CPT

## 2025-04-20 PROCEDURE — 96365 THER/PROPH/DIAG IV INF INIT: CPT

## 2025-04-20 PROCEDURE — 770006 HCHG ROOM/CARE - MED/SURG/GYN SEMI*

## 2025-04-20 PROCEDURE — 36415 COLL VENOUS BLD VENIPUNCTURE: CPT

## 2025-04-20 PROCEDURE — 99285 EMERGENCY DEPT VISIT HI MDM: CPT

## 2025-04-20 PROCEDURE — 96366 THER/PROPH/DIAG IV INF ADDON: CPT

## 2025-04-20 PROCEDURE — 87205 SMEAR GRAM STAIN: CPT

## 2025-04-20 PROCEDURE — 87077 CULTURE AEROBIC IDENTIFY: CPT | Mod: 91

## 2025-04-20 PROCEDURE — 85025 COMPLETE CBC W/AUTO DIFF WBC: CPT

## 2025-04-20 PROCEDURE — 87147 CULTURE TYPE IMMUNOLOGIC: CPT | Mod: 91

## 2025-04-20 PROCEDURE — 85610 PROTHROMBIN TIME: CPT

## 2025-04-20 PROCEDURE — 99223 1ST HOSP IP/OBS HIGH 75: CPT | Mod: GC

## 2025-04-20 PROCEDURE — 80053 COMPREHEN METABOLIC PANEL: CPT

## 2025-04-20 PROCEDURE — 700105 HCHG RX REV CODE 258: Performed by: STUDENT IN AN ORGANIZED HEALTH CARE EDUCATION/TRAINING PROGRAM

## 2025-04-20 PROCEDURE — 87070 CULTURE OTHR SPECIMN AEROBIC: CPT

## 2025-04-20 RX ORDER — OXYCODONE HYDROCHLORIDE 10 MG/1
10 TABLET ORAL
Refills: 0 | Status: DISCONTINUED | OUTPATIENT
Start: 2025-04-20 | End: 2025-04-23

## 2025-04-20 RX ORDER — ACETAMINOPHEN 500 MG
1000 TABLET ORAL EVERY 6 HOURS PRN
Status: DISCONTINUED | OUTPATIENT
Start: 2025-04-26 | End: 2025-04-21

## 2025-04-20 RX ORDER — HYDROMORPHONE HYDROCHLORIDE 1 MG/ML
0.5 INJECTION, SOLUTION INTRAMUSCULAR; INTRAVENOUS; SUBCUTANEOUS
Status: DISCONTINUED | OUTPATIENT
Start: 2025-04-20 | End: 2025-04-23

## 2025-04-20 RX ORDER — HYDROXYZINE HYDROCHLORIDE 50 MG/1
25 TABLET, FILM COATED ORAL 3 TIMES DAILY PRN
Status: DISCONTINUED | OUTPATIENT
Start: 2025-04-20 | End: 2025-04-28 | Stop reason: HOSPADM

## 2025-04-20 RX ORDER — ATORVASTATIN CALCIUM 20 MG/1
20 TABLET, FILM COATED ORAL EVERY EVENING
Status: DISCONTINUED | OUTPATIENT
Start: 2025-04-21 | End: 2025-04-28 | Stop reason: HOSPADM

## 2025-04-20 RX ORDER — ACETAMINOPHEN 500 MG
1000 TABLET ORAL EVERY 6 HOURS
Status: DISCONTINUED | OUTPATIENT
Start: 2025-04-21 | End: 2025-04-21

## 2025-04-20 RX ORDER — INSULIN LISPRO 100 [IU]/ML
1-6 INJECTION, SOLUTION INTRAVENOUS; SUBCUTANEOUS EVERY 6 HOURS
Status: DISCONTINUED | OUTPATIENT
Start: 2025-04-21 | End: 2025-04-21

## 2025-04-20 RX ORDER — DEXTROSE MONOHYDRATE 25 G/50ML
25 INJECTION, SOLUTION INTRAVENOUS
Status: DISCONTINUED | OUTPATIENT
Start: 2025-04-20 | End: 2025-04-21

## 2025-04-20 RX ORDER — DEXTROSE MONOHYDRATE 25 G/50ML
25 INJECTION, SOLUTION INTRAVENOUS
Status: DISCONTINUED | OUTPATIENT
Start: 2025-04-20 | End: 2025-04-20

## 2025-04-20 RX ORDER — SODIUM CHLORIDE, SODIUM LACTATE, POTASSIUM CHLORIDE, AND CALCIUM CHLORIDE .6; .31; .03; .02 G/100ML; G/100ML; G/100ML; G/100ML
30 INJECTION, SOLUTION INTRAVENOUS ONCE
Status: COMPLETED | OUTPATIENT
Start: 2025-04-20 | End: 2025-04-21

## 2025-04-20 RX ORDER — OXYCODONE HYDROCHLORIDE 5 MG/1
5 TABLET ORAL
Refills: 0 | Status: DISCONTINUED | OUTPATIENT
Start: 2025-04-20 | End: 2025-04-23

## 2025-04-20 RX ADMIN — SODIUM CHLORIDE, POTASSIUM CHLORIDE, SODIUM LACTATE AND CALCIUM CHLORIDE 2952 ML: 600; 310; 30; 20 INJECTION, SOLUTION INTRAVENOUS at 21:47

## 2025-04-20 RX ADMIN — VANCOMYCIN HYDROCHLORIDE 2500 MG: 5 INJECTION, POWDER, LYOPHILIZED, FOR SOLUTION INTRAVENOUS at 22:27

## 2025-04-20 RX ADMIN — AMPICILLIN SODIUM, SULBACTAM SODIUM 3 G: 2; 1 INJECTION, POWDER, FOR SOLUTION INTRAMUSCULAR; INTRAVENOUS at 21:46

## 2025-04-20 ASSESSMENT — FIBROSIS 4 INDEX: FIB4 SCORE: 0.8

## 2025-04-21 ENCOUNTER — ANESTHESIA EVENT (OUTPATIENT)
Dept: SURGERY | Facility: MEDICAL CENTER | Age: 55
End: 2025-04-21

## 2025-04-21 ENCOUNTER — APPOINTMENT (OUTPATIENT)
Dept: CARDIOLOGY | Facility: MEDICAL CENTER | Age: 55
End: 2025-04-21
Attending: STUDENT IN AN ORGANIZED HEALTH CARE EDUCATION/TRAINING PROGRAM

## 2025-04-21 ENCOUNTER — SURGERY (OUTPATIENT)
Age: 55
End: 2025-04-21
Payer: COMMERCIAL

## 2025-04-21 ENCOUNTER — ANESTHESIA (OUTPATIENT)
Dept: SURGERY | Facility: MEDICAL CENTER | Age: 55
End: 2025-04-21

## 2025-04-21 PROBLEM — A41.9 SEPSIS (HCC): Status: ACTIVE | Noted: 2025-04-21

## 2025-04-21 PROBLEM — R78.81 BACTEREMIA: Status: ACTIVE | Noted: 2025-04-21

## 2025-04-21 LAB
ALBUMIN SERPL BCP-MCNC: 3.1 G/DL (ref 3.2–4.9)
AMORPHOUS CRYSTALS 1764: PRESENT /HPF
APPEARANCE UR: CLEAR
APTT PPP: 26.8 SEC (ref 24.7–36)
BACTERIA #/AREA URNS HPF: ABNORMAL /HPF
BILIRUB UR QL STRIP.AUTO: NEGATIVE
BUN SERPL-MCNC: 23 MG/DL (ref 8–22)
CALCIUM ALBUM COR SERPL-MCNC: 9 MG/DL (ref 8.5–10.5)
CALCIUM SERPL-MCNC: 8.3 MG/DL (ref 8.5–10.5)
CASTS URNS QL MICRO: ABNORMAL /LPF (ref 0–2)
CHLORIDE SERPL-SCNC: 100 MMOL/L (ref 96–112)
CO2 SERPL-SCNC: 20 MMOL/L (ref 20–33)
COLOR UR: YELLOW
CREAT SERPL-MCNC: 1.17 MG/DL (ref 0.5–1.4)
EKG IMPRESSION: NORMAL
EPITHELIAL CELLS 1715: ABNORMAL /HPF (ref 0–5)
ERYTHROCYTE [DISTWIDTH] IN BLOOD BY AUTOMATED COUNT: 40.5 FL (ref 35.9–50)
EST. AVERAGE GLUCOSE BLD GHB EST-MCNC: 324 MG/DL
GFR SERPLBLD CREATININE-BSD FMLA CKD-EPI: 73 ML/MIN/1.73 M 2
GLUCOSE BLD STRIP.AUTO-MCNC: 215 MG/DL (ref 65–99)
GLUCOSE BLD STRIP.AUTO-MCNC: 223 MG/DL (ref 65–99)
GLUCOSE BLD STRIP.AUTO-MCNC: 229 MG/DL (ref 65–99)
GLUCOSE BLD STRIP.AUTO-MCNC: 269 MG/DL (ref 65–99)
GLUCOSE SERPL-MCNC: 267 MG/DL (ref 65–99)
GLUCOSE UR STRIP.AUTO-MCNC: >=1000 MG/DL
GRAM STN SPEC: NORMAL
GRAM STN SPEC: NORMAL
HBA1C MFR BLD: 12.9 % (ref 4–5.6)
HCT VFR BLD AUTO: 41 % (ref 42–52)
HGB BLD-MCNC: 13.6 G/DL (ref 14–18)
HYALINE CAST   1831: PRESENT /LPF
KETONES UR STRIP.AUTO-MCNC: 40 MG/DL
LEUKOCYTE ESTERASE UR QL STRIP.AUTO: NEGATIVE
MCH RBC QN AUTO: 28.7 PG (ref 27–33)
MCHC RBC AUTO-ENTMCNC: 33.2 G/DL (ref 32.3–36.5)
MCV RBC AUTO: 86.5 FL (ref 81.4–97.8)
MICRO URNS: ABNORMAL
NITRITE UR QL STRIP.AUTO: NEGATIVE
PH UR STRIP.AUTO: 6 [PH] (ref 5–8)
PHOSPHATE SERPL-MCNC: 2.7 MG/DL (ref 2.5–4.5)
PLATELET # BLD AUTO: 170 K/UL (ref 164–446)
PMV BLD AUTO: 10.7 FL (ref 9–12.9)
POTASSIUM SERPL-SCNC: 4.3 MMOL/L (ref 3.6–5.5)
PROT UR QL STRIP: 100 MG/DL
RBC # BLD AUTO: 4.74 M/UL (ref 4.7–6.1)
RBC # URNS HPF: ABNORMAL /HPF (ref 0–2)
RBC UR QL AUTO: ABNORMAL
SIGNIFICANT IND 70042: NORMAL
SIGNIFICANT IND 70042: NORMAL
SITE SITE: NORMAL
SITE SITE: NORMAL
SODIUM SERPL-SCNC: 134 MMOL/L (ref 135–145)
SOURCE SOURCE: NORMAL
SOURCE SOURCE: NORMAL
SP GR UR STRIP.AUTO: 1.03
UROBILINOGEN UR STRIP.AUTO-MCNC: 1 EU/DL
WBC # BLD AUTO: 8.7 K/UL (ref 4.8–10.8)
WBC #/AREA URNS HPF: ABNORMAL /HPF

## 2025-04-21 PROCEDURE — 88311 DECALCIFY TISSUE: CPT | Mod: 26 | Performed by: PATHOLOGY

## 2025-04-21 PROCEDURE — 88311 DECALCIFY TISSUE: CPT | Performed by: PATHOLOGY

## 2025-04-21 PROCEDURE — 93010 ELECTROCARDIOGRAM REPORT: CPT | Performed by: INTERNAL MEDICINE

## 2025-04-21 PROCEDURE — 160002 HCHG RECOVERY MINUTES (STAT): Performed by: ORTHOPAEDIC SURGERY

## 2025-04-21 PROCEDURE — 99233 SBSQ HOSP IP/OBS HIGH 50: CPT | Performed by: STUDENT IN AN ORGANIZED HEALTH CARE EDUCATION/TRAINING PROGRAM

## 2025-04-21 PROCEDURE — 700105 HCHG RX REV CODE 258: Performed by: ANESTHESIOLOGY

## 2025-04-21 PROCEDURE — 83036 HEMOGLOBIN GLYCOSYLATED A1C: CPT

## 2025-04-21 PROCEDURE — 700105 HCHG RX REV CODE 258: Performed by: STUDENT IN AN ORGANIZED HEALTH CARE EDUCATION/TRAINING PROGRAM

## 2025-04-21 PROCEDURE — A9270 NON-COVERED ITEM OR SERVICE: HCPCS | Performed by: ANESTHESIOLOGY

## 2025-04-21 PROCEDURE — 160035 HCHG PACU - 1ST 60 MINS PHASE I: Performed by: ORTHOPAEDIC SURGERY

## 2025-04-21 PROCEDURE — 80069 RENAL FUNCTION PANEL: CPT

## 2025-04-21 PROCEDURE — 700111 HCHG RX REV CODE 636 W/ 250 OVERRIDE (IP): Performed by: STUDENT IN AN ORGANIZED HEALTH CARE EDUCATION/TRAINING PROGRAM

## 2025-04-21 PROCEDURE — 160009 HCHG ANES TIME/MIN: Performed by: ORTHOPAEDIC SURGERY

## 2025-04-21 PROCEDURE — 700105 HCHG RX REV CODE 258

## 2025-04-21 PROCEDURE — 160028 HCHG SURGERY MINUTES - 1ST 30 MINS LEVEL 3: Performed by: ORTHOPAEDIC SURGERY

## 2025-04-21 PROCEDURE — 700102 HCHG RX REV CODE 250 W/ 637 OVERRIDE(OP)

## 2025-04-21 PROCEDURE — A9270 NON-COVERED ITEM OR SERVICE: HCPCS | Performed by: STUDENT IN AN ORGANIZED HEALTH CARE EDUCATION/TRAINING PROGRAM

## 2025-04-21 PROCEDURE — 700102 HCHG RX REV CODE 250 W/ 637 OVERRIDE(OP): Performed by: STUDENT IN AN ORGANIZED HEALTH CARE EDUCATION/TRAINING PROGRAM

## 2025-04-21 PROCEDURE — 13160 SEC CLSR SURG WND/DEHSN XTN: CPT | Mod: 80ROC,58 | Performed by: STUDENT IN AN ORGANIZED HEALTH CARE EDUCATION/TRAINING PROGRAM

## 2025-04-21 PROCEDURE — 160048 HCHG OR STATISTICAL LEVEL 1-5: Performed by: ORTHOPAEDIC SURGERY

## 2025-04-21 PROCEDURE — 160039 HCHG SURGERY MINUTES - EA ADDL 1 MIN LEVEL 3: Performed by: ORTHOPAEDIC SURGERY

## 2025-04-21 PROCEDURE — 87075 CULTR BACTERIA EXCEPT BLOOD: CPT

## 2025-04-21 PROCEDURE — 87015 SPECIMEN INFECT AGNT CONCNTJ: CPT

## 2025-04-21 PROCEDURE — 82962 GLUCOSE BLOOD TEST: CPT

## 2025-04-21 PROCEDURE — 700111 HCHG RX REV CODE 636 W/ 250 OVERRIDE (IP): Mod: JZ

## 2025-04-21 PROCEDURE — 36415 COLL VENOUS BLD VENIPUNCTURE: CPT

## 2025-04-21 PROCEDURE — 28810 AMPUTATION TOE & METATARSAL: CPT | Mod: 78,T4 | Performed by: ORTHOPAEDIC SURGERY

## 2025-04-21 PROCEDURE — 770006 HCHG ROOM/CARE - MED/SURG/GYN SEMI*

## 2025-04-21 PROCEDURE — 93005 ELECTROCARDIOGRAM TRACING: CPT | Mod: TC | Performed by: ORTHOPAEDIC SURGERY

## 2025-04-21 PROCEDURE — 88307 TISSUE EXAM BY PATHOLOGIST: CPT | Performed by: PATHOLOGY

## 2025-04-21 PROCEDURE — A9270 NON-COVERED ITEM OR SERVICE: HCPCS

## 2025-04-21 PROCEDURE — 99222 1ST HOSP IP/OBS MODERATE 55: CPT | Mod: 57 | Performed by: ORTHOPAEDIC SURGERY

## 2025-04-21 PROCEDURE — 0Y6N0ZF DETACHMENT AT LEFT FOOT, PARTIAL 5TH RAY, OPEN APPROACH: ICD-10-PCS | Performed by: ORTHOPAEDIC SURGERY

## 2025-04-21 PROCEDURE — 81001 URINALYSIS AUTO W/SCOPE: CPT

## 2025-04-21 PROCEDURE — 87205 SMEAR GRAM STAIN: CPT

## 2025-04-21 PROCEDURE — 87076 CULTURE ANAEROBE IDENT EACH: CPT

## 2025-04-21 PROCEDURE — 85027 COMPLETE CBC AUTOMATED: CPT

## 2025-04-21 PROCEDURE — 87086 URINE CULTURE/COLONY COUNT: CPT

## 2025-04-21 PROCEDURE — 700102 HCHG RX REV CODE 250 W/ 637 OVERRIDE(OP): Performed by: ANESTHESIOLOGY

## 2025-04-21 PROCEDURE — 88307 TISSUE EXAM BY PATHOLOGIST: CPT | Mod: 26 | Performed by: PATHOLOGY

## 2025-04-21 PROCEDURE — 700111 HCHG RX REV CODE 636 W/ 250 OVERRIDE (IP): Mod: JZ | Performed by: ANESTHESIOLOGY

## 2025-04-21 PROCEDURE — 87070 CULTURE OTHR SPECIMN AEROBIC: CPT

## 2025-04-21 PROCEDURE — 160015 HCHG STAT PREOP MINUTES: Performed by: ORTHOPAEDIC SURGERY

## 2025-04-21 PROCEDURE — 110371 HCHG SHELL REV 272: Performed by: ORTHOPAEDIC SURGERY

## 2025-04-21 PROCEDURE — 700101 HCHG RX REV CODE 250: Performed by: ANESTHESIOLOGY

## 2025-04-21 PROCEDURE — 87147 CULTURE TYPE IMMUNOLOGIC: CPT

## 2025-04-21 PROCEDURE — 85730 THROMBOPLASTIN TIME PARTIAL: CPT

## 2025-04-21 RX ORDER — VENLAFAXINE 37.5 MG/1
37.5 TABLET ORAL DAILY
COMMUNITY

## 2025-04-21 RX ORDER — LINEZOLID 2 MG/ML
600 INJECTION, SOLUTION INTRAVENOUS EVERY 12 HOURS
Status: DISCONTINUED | OUTPATIENT
Start: 2025-04-21 | End: 2025-04-21

## 2025-04-21 RX ORDER — HYDROMORPHONE HYDROCHLORIDE 1 MG/ML
0.4 INJECTION, SOLUTION INTRAMUSCULAR; INTRAVENOUS; SUBCUTANEOUS
Status: DISCONTINUED | OUTPATIENT
Start: 2025-04-21 | End: 2025-04-21 | Stop reason: HOSPADM

## 2025-04-21 RX ORDER — HYDROMORPHONE HYDROCHLORIDE 1 MG/ML
0.2 INJECTION, SOLUTION INTRAMUSCULAR; INTRAVENOUS; SUBCUTANEOUS
Status: DISCONTINUED | OUTPATIENT
Start: 2025-04-21 | End: 2025-04-21 | Stop reason: HOSPADM

## 2025-04-21 RX ORDER — DIPHENHYDRAMINE HYDROCHLORIDE 50 MG/ML
12.5 INJECTION, SOLUTION INTRAMUSCULAR; INTRAVENOUS
Status: DISCONTINUED | OUTPATIENT
Start: 2025-04-21 | End: 2025-04-21 | Stop reason: HOSPADM

## 2025-04-21 RX ORDER — LIDOCAINE HYDROCHLORIDE 20 MG/ML
INJECTION, SOLUTION EPIDURAL; INFILTRATION; INTRACAUDAL; PERINEURAL PRN
Status: DISCONTINUED | OUTPATIENT
Start: 2025-04-21 | End: 2025-04-21 | Stop reason: SURG

## 2025-04-21 RX ORDER — PHENYLEPHRINE HCL IN 0.9% NACL 1 MG/10 ML
SYRINGE (ML) INTRAVENOUS PRN
Status: DISCONTINUED | OUTPATIENT
Start: 2025-04-21 | End: 2025-04-21 | Stop reason: SURG

## 2025-04-21 RX ORDER — ACETAMINOPHEN 500 MG
1000 TABLET ORAL 3 TIMES DAILY
Status: DISCONTINUED | OUTPATIENT
Start: 2025-04-21 | End: 2025-04-28 | Stop reason: HOSPADM

## 2025-04-21 RX ORDER — VENLAFAXINE 75 MG/1
37.5 TABLET ORAL DAILY
Status: DISCONTINUED | OUTPATIENT
Start: 2025-04-21 | End: 2025-04-21

## 2025-04-21 RX ORDER — ALBUTEROL SULFATE 5 MG/ML
2.5 SOLUTION RESPIRATORY (INHALATION)
Status: DISCONTINUED | OUTPATIENT
Start: 2025-04-21 | End: 2025-04-21 | Stop reason: HOSPADM

## 2025-04-21 RX ORDER — SODIUM CHLORIDE, SODIUM LACTATE, POTASSIUM CHLORIDE, CALCIUM CHLORIDE 600; 310; 30; 20 MG/100ML; MG/100ML; MG/100ML; MG/100ML
INJECTION, SOLUTION INTRAVENOUS
Status: DISCONTINUED | OUTPATIENT
Start: 2025-04-21 | End: 2025-04-21 | Stop reason: SURG

## 2025-04-21 RX ORDER — INSULIN LISPRO 100 [IU]/ML
1-6 INJECTION, SOLUTION INTRAVENOUS; SUBCUTANEOUS EVERY 6 HOURS
Status: DISCONTINUED | OUTPATIENT
Start: 2025-04-21 | End: 2025-04-24

## 2025-04-21 RX ORDER — METOCLOPRAMIDE HYDROCHLORIDE 5 MG/ML
INJECTION INTRAMUSCULAR; INTRAVENOUS PRN
Status: DISCONTINUED | OUTPATIENT
Start: 2025-04-21 | End: 2025-04-21 | Stop reason: SURG

## 2025-04-21 RX ORDER — EPHEDRINE SULFATE 50 MG/ML
5 INJECTION, SOLUTION INTRAVENOUS
Status: DISCONTINUED | OUTPATIENT
Start: 2025-04-21 | End: 2025-04-21 | Stop reason: HOSPADM

## 2025-04-21 RX ORDER — SODIUM CHLORIDE, SODIUM LACTATE, POTASSIUM CHLORIDE, CALCIUM CHLORIDE 600; 310; 30; 20 MG/100ML; MG/100ML; MG/100ML; MG/100ML
INJECTION, SOLUTION INTRAVENOUS CONTINUOUS
Status: DISCONTINUED | OUTPATIENT
Start: 2025-04-21 | End: 2025-04-21 | Stop reason: HOSPADM

## 2025-04-21 RX ORDER — SODIUM CHLORIDE 9 MG/ML
INJECTION, SOLUTION INTRAVENOUS CONTINUOUS
Status: DISCONTINUED | OUTPATIENT
Start: 2025-04-21 | End: 2025-04-28 | Stop reason: HOSPADM

## 2025-04-21 RX ORDER — ONDANSETRON 2 MG/ML
INJECTION INTRAMUSCULAR; INTRAVENOUS PRN
Status: DISCONTINUED | OUTPATIENT
Start: 2025-04-21 | End: 2025-04-21 | Stop reason: SURG

## 2025-04-21 RX ORDER — DEXTROSE MONOHYDRATE 25 G/50ML
25 INJECTION, SOLUTION INTRAVENOUS
Status: DISCONTINUED | OUTPATIENT
Start: 2025-04-21 | End: 2025-04-24

## 2025-04-21 RX ORDER — OXYCODONE HCL 5 MG/5 ML
10 SOLUTION, ORAL ORAL
Status: COMPLETED | OUTPATIENT
Start: 2025-04-21 | End: 2025-04-21

## 2025-04-21 RX ORDER — HALOPERIDOL 5 MG/ML
1 INJECTION INTRAMUSCULAR
Status: DISCONTINUED | OUTPATIENT
Start: 2025-04-21 | End: 2025-04-21 | Stop reason: HOSPADM

## 2025-04-21 RX ORDER — LABETALOL HYDROCHLORIDE 5 MG/ML
5 INJECTION, SOLUTION INTRAVENOUS
Status: DISCONTINUED | OUTPATIENT
Start: 2025-04-21 | End: 2025-04-21 | Stop reason: HOSPADM

## 2025-04-21 RX ORDER — HYDRALAZINE HYDROCHLORIDE 20 MG/ML
5 INJECTION INTRAMUSCULAR; INTRAVENOUS
Status: DISCONTINUED | OUTPATIENT
Start: 2025-04-21 | End: 2025-04-21 | Stop reason: HOSPADM

## 2025-04-21 RX ORDER — CEFAZOLIN SODIUM 1 G/3ML
INJECTION, POWDER, FOR SOLUTION INTRAMUSCULAR; INTRAVENOUS PRN
Status: DISCONTINUED | OUTPATIENT
Start: 2025-04-21 | End: 2025-04-21 | Stop reason: SURG

## 2025-04-21 RX ORDER — OXYCODONE HCL 5 MG/5 ML
5 SOLUTION, ORAL ORAL
Status: COMPLETED | OUTPATIENT
Start: 2025-04-21 | End: 2025-04-21

## 2025-04-21 RX ORDER — ONDANSETRON 2 MG/ML
4 INJECTION INTRAMUSCULAR; INTRAVENOUS
Status: DISCONTINUED | OUTPATIENT
Start: 2025-04-21 | End: 2025-04-21 | Stop reason: HOSPADM

## 2025-04-21 RX ORDER — MEPERIDINE HYDROCHLORIDE 25 MG/ML
6.25 INJECTION INTRAMUSCULAR; INTRAVENOUS; SUBCUTANEOUS
Status: DISCONTINUED | OUTPATIENT
Start: 2025-04-21 | End: 2025-04-21 | Stop reason: HOSPADM

## 2025-04-21 RX ORDER — HYDROMORPHONE HYDROCHLORIDE 1 MG/ML
0.1 INJECTION, SOLUTION INTRAMUSCULAR; INTRAVENOUS; SUBCUTANEOUS
Status: DISCONTINUED | OUTPATIENT
Start: 2025-04-21 | End: 2025-04-21 | Stop reason: HOSPADM

## 2025-04-21 RX ADMIN — METOCLOPRAMIDE HYDROCHLORIDE 10 MG: 5 INJECTION INTRAMUSCULAR; INTRAVENOUS at 16:08

## 2025-04-21 RX ADMIN — LIDOCAINE HYDROCHLORIDE 100 MG: 20 INJECTION, SOLUTION EPIDURAL; INFILTRATION; INTRACAUDAL; PERINEURAL at 16:08

## 2025-04-21 RX ADMIN — ACETAMINOPHEN 1000 MG: 500 TABLET, FILM COATED ORAL at 12:01

## 2025-04-21 RX ADMIN — AMPICILLIN AND SULBACTAM 3 G: 1; 2 INJECTION, POWDER, FOR SOLUTION INTRAMUSCULAR; INTRAVENOUS at 12:01

## 2025-04-21 RX ADMIN — OXYCODONE HYDROCHLORIDE 10 MG: 10 TABLET ORAL at 00:26

## 2025-04-21 RX ADMIN — FENTANYL CITRATE 25 MCG: 50 INJECTION, SOLUTION INTRAMUSCULAR; INTRAVENOUS at 16:29

## 2025-04-21 RX ADMIN — CEFAZOLIN 2 G: 1 INJECTION, POWDER, FOR SOLUTION INTRAMUSCULAR; INTRAVENOUS at 16:10

## 2025-04-21 RX ADMIN — SODIUM CHLORIDE: 9 INJECTION, SOLUTION INTRAVENOUS at 07:53

## 2025-04-21 RX ADMIN — LINEZOLID 600 MG: 600 INJECTION, SOLUTION INTRAVENOUS at 05:34

## 2025-04-21 RX ADMIN — OXYCODONE 5 MG: 5 TABLET ORAL at 22:12

## 2025-04-21 RX ADMIN — CEFAZOLIN 2 G: 2 INJECTION, POWDER, FOR SOLUTION INTRAMUSCULAR; INTRAVENOUS at 20:54

## 2025-04-21 RX ADMIN — Medication 100 MCG: at 16:22

## 2025-04-21 RX ADMIN — OXYCODONE HYDROCHLORIDE 10 MG: 5 SOLUTION ORAL at 17:00

## 2025-04-21 RX ADMIN — INSULIN LISPRO 3 UNITS: 100 INJECTION, SOLUTION INTRAVENOUS; SUBCUTANEOUS at 05:40

## 2025-04-21 RX ADMIN — Medication 100 MCG: at 16:12

## 2025-04-21 RX ADMIN — Medication 100 MCG: at 16:29

## 2025-04-21 RX ADMIN — SODIUM CHLORIDE, POTASSIUM CHLORIDE, SODIUM LACTATE AND CALCIUM CHLORIDE: 600; 310; 30; 20 INJECTION, SOLUTION INTRAVENOUS at 16:03

## 2025-04-21 RX ADMIN — FENTANYL CITRATE 50 MCG: 50 INJECTION, SOLUTION INTRAMUSCULAR; INTRAVENOUS at 16:07

## 2025-04-21 RX ADMIN — FENTANYL CITRATE 50 MCG: 50 INJECTION, SOLUTION INTRAMUSCULAR; INTRAVENOUS at 16:13

## 2025-04-21 RX ADMIN — Medication 100 MCG: at 16:17

## 2025-04-21 RX ADMIN — PROPOFOL 200 MG: 10 INJECTION, EMULSION INTRAVENOUS at 16:08

## 2025-04-21 RX ADMIN — INSULIN GLARGINE-YFGN 10 UNITS: 100 INJECTION, SOLUTION SUBCUTANEOUS at 02:31

## 2025-04-21 RX ADMIN — ONDANSETRON 4 MG: 2 INJECTION INTRAMUSCULAR; INTRAVENOUS at 16:08

## 2025-04-21 RX ADMIN — AMPICILLIN AND SULBACTAM 3 G: 1; 2 INJECTION, POWDER, FOR SOLUTION INTRAMUSCULAR; INTRAVENOUS at 05:35

## 2025-04-21 ASSESSMENT — COGNITIVE AND FUNCTIONAL STATUS - GENERAL
MOBILITY SCORE: 20
TOILETING: A LITTLE
DAILY ACTIVITIY SCORE: 21
DRESSING REGULAR LOWER BODY CLOTHING: A LITTLE
CLIMB 3 TO 5 STEPS WITH RAILING: A LITTLE
MOVING TO AND FROM BED TO CHAIR: A LITTLE
STANDING UP FROM CHAIR USING ARMS: A LITTLE
HELP NEEDED FOR BATHING: A LITTLE
SUGGESTED CMS G CODE MODIFIER DAILY ACTIVITY: CJ
WALKING IN HOSPITAL ROOM: A LITTLE
SUGGESTED CMS G CODE MODIFIER MOBILITY: CJ

## 2025-04-21 ASSESSMENT — SOCIAL DETERMINANTS OF HEALTH (SDOH)
WITHIN THE LAST YEAR, HAVE YOU BEEN AFRAID OF YOUR PARTNER OR EX-PARTNER?: NO
WITHIN THE LAST YEAR, HAVE YOU BEEN HUMILIATED OR EMOTIONALLY ABUSED IN OTHER WAYS BY YOUR PARTNER OR EX-PARTNER?: NO
WITHIN THE PAST 12 MONTHS, THE FOOD YOU BOUGHT JUST DIDN'T LAST AND YOU DIDN'T HAVE MONEY TO GET MORE: NEVER TRUE
WITHIN THE LAST YEAR, HAVE TO BEEN RAPED OR FORCED TO HAVE ANY KIND OF SEXUAL ACTIVITY BY YOUR PARTNER OR EX-PARTNER?: NO
WITHIN THE PAST 12 MONTHS, YOU WORRIED THAT YOUR FOOD WOULD RUN OUT BEFORE YOU GOT THE MONEY TO BUY MORE: NEVER TRUE
IN THE PAST 12 MONTHS, HAS THE ELECTRIC, GAS, OIL, OR WATER COMPANY THREATENED TO SHUT OFF SERVICE IN YOUR HOME?: NO
WITHIN THE LAST YEAR, HAVE YOU BEEN KICKED, HIT, SLAPPED, OR OTHERWISE PHYSICALLY HURT BY YOUR PARTNER OR EX-PARTNER?: NO

## 2025-04-21 ASSESSMENT — ENCOUNTER SYMPTOMS
NAUSEA: 1
CHILLS: 1
FEVER: 0
CLAUDICATION: 0
VOMITING: 0
ABDOMINAL PAIN: 0
SENSORY CHANGE: 1
CARDIOVASCULAR NEGATIVE: 1
ORTHOPNEA: 0
RESPIRATORY NEGATIVE: 1
PSYCHIATRIC NEGATIVE: 1
DIAPHORESIS: 1
MUSCULOSKELETAL NEGATIVE: 1
NEUROLOGICAL NEGATIVE: 1
CONSTIPATION: 0
GASTROINTESTINAL NEGATIVE: 1
DIARRHEA: 0
EYES NEGATIVE: 1
CONSTITUTIONAL NEGATIVE: 1

## 2025-04-21 ASSESSMENT — PAIN DESCRIPTION - PAIN TYPE
TYPE: SURGICAL PAIN
TYPE: ACUTE PAIN
TYPE: ACUTE PAIN
TYPE: SURGICAL PAIN
TYPE: ACUTE PAIN
TYPE: ACUTE PAIN

## 2025-04-21 ASSESSMENT — LIFESTYLE VARIABLES
TOTAL SCORE: 0
TOTAL SCORE: 0
HAVE PEOPLE ANNOYED YOU BY CRITICIZING YOUR DRINKING: NO
EVER FELT BAD OR GUILTY ABOUT YOUR DRINKING: NO
HOW MANY TIMES IN THE PAST YEAR HAVE YOU HAD 5 OR MORE DRINKS IN A DAY: 0
HAVE YOU EVER FELT YOU SHOULD CUT DOWN ON YOUR DRINKING: NO
ALCOHOL_USE: NO
TOTAL SCORE: 0
EVER HAD A DRINK FIRST THING IN THE MORNING TO STEADY YOUR NERVES TO GET RID OF A HANGOVER: NO
DOES PATIENT WANT TO STOP DRINKING: NO
AVERAGE NUMBER OF DAYS PER WEEK YOU HAVE A DRINK CONTAINING ALCOHOL: 0
CONSUMPTION TOTAL: NEGATIVE
ON A TYPICAL DAY WHEN YOU DRINK ALCOHOL HOW MANY DRINKS DO YOU HAVE: 0

## 2025-04-21 ASSESSMENT — PATIENT HEALTH QUESTIONNAIRE - PHQ9
1. LITTLE INTEREST OR PLEASURE IN DOING THINGS: NOT AT ALL
SUM OF ALL RESPONSES TO PHQ9 QUESTIONS 1 AND 2: 0
2. FEELING DOWN, DEPRESSED, IRRITABLE, OR HOPELESS: NOT AT ALL

## 2025-04-21 ASSESSMENT — FIBROSIS 4 INDEX: FIB4 SCORE: 1.33

## 2025-04-21 NOTE — ED TRIAGE NOTES
Chief Complaint   Patient presents with    Post-Op Complications     Patient had 5th toe of left foot amputated approximately 5 weeks ago. Patient endorsing bleeding and swelling to incision site for approximately 2 days. Patient also complains of difficulty with keeping balance, complaints of chills and hot flashes, starting since yesterday.    Nausea     Patient has been feeling nauseous starting yesterday but has not vomited.      Patient using wheelchair, A&O4 on RA. Patient has boot on left foot. Patient denies seeing pus come from incision site, only blood.

## 2025-04-21 NOTE — CARE PLAN
The patient is Stable - Low risk of patient condition declining or worsening         Progress made toward(s) clinical / shift goals:  Aox4. No c/o pain noted. Skin check done. [facility] All due meds given.   Problem: Knowledge Deficit - Standard  Goal: Patient and family/care givers will demonstrate understanding of plan of care, disease process/condition, diagnostic tests and medications  Outcome: Progressing     Problem: Fall Risk  Goal: Patient will remain free from falls  Outcome: Progressing         Patient is not progressing towards the following goals:

## 2025-04-21 NOTE — CONSULTS
"4/21/2025    HPI: Donald Fuller is a 54 y.o. male with diabetes who presents with wound dehiscense and purulent drainage from his left foot ray amputation.  Worsened over the past few days. Currently complains of pain    Past Medical History:   Diagnosis Date    Diabetes (HCC)        Past Surgical History:   Procedure Laterality Date    FOOT AMPUTATION Left 2/28/2025    Procedure: AMPUTATION, FOOT-4TH AND 5TH RAY RESECTION;  Surgeon: Jason Davis M.D.;  Location: SURGERY [facility];  Service: Orthopedics       Medications  No current facility-administered medications on file prior to encounter.     Current Outpatient Medications on File Prior to Encounter   Medication Sig Dispense Refill    venlafaxine (EFFEXOR) 37.5 MG Tab Take 37.5 mg by mouth every day.      NON SPECIFIED Take 1 Dose by mouth every day. Beet Powder supplement      NON SPECIFIED Take 1 Dose by mouth every day. Muscle recovery powder      insulin lispro 100 UNIT/ML SC SOPN injection PEN Inject 2-10 Units under the skin 3 times a day before meals. Use 2 units if blood sugar 150-200. 4 units if 200-250, 6 units if 250-300, 8 units if 300-350, 10 units if 350-400 3 mL 3    insulin glargine 100 UNIT/ML SC SOPN injection Inject 15 Units under the skin every evening. 3 mL 3    atorvastatin (LIPITOR) 20 MG Tab Take 1 Tablet by mouth every evening. 100 Tablet 3    losartan (COZAAR) 50 MG Tab Take 1 Tablet by mouth every day. 100 Tablet 3    pioglitazone (ACTOS) 15 MG Tab Take 1 Tablet by mouth every day. 100 Tablet 3    Insulin Pen Needle 32 G x 4 mm Use one pen needle in pen device to inject insulin four times daily. 100 Each 0    Insulin Syringe-Needle U-100 (INSULIN SYRINGE 1CC/31GX5/16\") 31G X 5/16\" 1 ML Misc Use as directed for SQ injection of insulin. 400 Each 3    metformin (GLUCOPHAGE) 1000 MG tablet Take 1 Tablet by mouth 2 times a day with meals. 180 Tablet 3    hydrOXYzine HCl (ATARAX) 25 MG Tab TAKE 1 TABLET BY MOUTH THREE " TIMES DAILY AS NEEDED FOR ITCHING OR ANXIETY. (Patient taking differently: Take 25 mg by mouth every day.) 90 Tablet 3       Allergies  Patient has no known allergies.    ROS  Negative except as indicated in the HPI    History reviewed. No pertinent family history.    Social History     Socioeconomic History    Marital status: Single    Highest education level: Bachelor's degree (e.g., BA, AB, BS)   Tobacco Use    Smoking status: Never    Smokeless tobacco: Never   Vaping Use    Vaping status: Some Days    Substances: Nicotine   Substance and Sexual Activity    Alcohol use: Never    Drug use: Never    Sexual activity: Never     Social Drivers of Health     Financial Resource Strain: Low Risk  (8/25/2022)    Overall Financial Resource Strain (CARDIA)     Difficulty of Paying Living Expenses: Not hard at all   Food Insecurity: No Food Insecurity (4/21/2025)    Hunger Vital Sign     Worried About Running Out of Food in the Last Year: Never true     Ran Out of Food in the Last Year: Never true   Transportation Needs: No Transportation Needs (4/21/2025)    PRAPARE - Transportation     Lack of Transportation (Medical): No     Lack of Transportation (Non-Medical): No   Physical Activity: Inactive (8/25/2022)    Exercise Vital Sign     Days of Exercise per Week: 0 days     Minutes of Exercise per Session: 30 min   Stress: No Stress Concern Present (8/25/2022)    [employer] [place] [employer] - Occupational Stress Questionnaire     Feeling of Stress : Not at all   Social Connections: Socially Isolated (8/25/2022)    Social Connection and Isolation Panel [NHANES]     Frequency of Communication with Friends and Family: Once a week     Frequency of Social Gatherings with Friends and Family: Never     Attends Confucianism Services: Never     Active Member of Clubs or Organizations: No     Attends Club or Organization Meetings: Never     Marital Status: Living with partner   Intimate Partner Violence: Not At Risk  "(4/21/2025)    Humiliation, Afraid, Rape, and Kick questionnaire     Fear of Current or Ex-Partner: No     Emotionally Abused: No     Physically Abused: No     Sexually Abused: No   Housing Stability: High Risk (4/21/2025)    Housing Stability Vital Sign     Unable to Pay for Housing in the Last Year: No     Number of Times Moved in the Last Year: 2     Homeless in the Last Year: No       Physical Exam  Vitals  /69   Pulse 85   Temp 36.3 °C (97.4 °F) (Temporal)   Resp 18   Ht 1.854 m (6' 1\")   Wt 98.4 kg (216 lb 14.9 oz)   SpO2 94%   General: NAD  HEENT: Normocephalic, atraumatic  Psych: Normal mood and affect  Neck: no collar in place, normal appearing motion  Lungs: No increased work of breathing  Heart: Regular rate by palpation of peripheral pulse  Abdomen: Nondistended  MSK:   On inspection left lower extremity there is a dressing in place.  On review of clinical photos there is slight wound dehiscence and active purulent drainage from his prior incision.  Remainder of foot is warm and perfused.      Radiographs:  X-ray left foot demonstrates postop changes from partial ray amputation of the fifth ray.  No evidence of osteomyelitis    DX-FOOT-COMPLETE 3+ LEFT   Final Result         1.  Slight permeative appearance of the lateral cortex of the fifth toe metatarsal waist, appearance raising concern for component of osteomyelitis.   2.  Soft tissue gas at the amputation site, consider underlying infectious etiology.      DX-CHEST-PORTABLE (1 VIEW)   Final Result         1.  No acute cardiopulmonary disease.      EC-ECHOCARDIOGRAM COMPLETE W/O CONT    (Results Pending)       Laboratory Values  Recent Labs     04/20/25 2113 04/21/25  0205   WBC 10.3 8.7   RBC 5.72 4.74   HEMOGLOBIN 16.6 13.6*   HEMATOCRIT 49.3 41.0*   MCV 86.2 86.5   MCH 29.0 28.7   MCHC 33.7 33.2   RDW 40.4 40.5   PLATELETCT 216 170   MPV 10.0 10.7     Recent Labs     04/20/25 2113 04/21/25  0205   SODIUM 135 134*   POTASSIUM 4.8 4.3 "   CHLORIDE 97 100   CO2 23 20   GLUCOSE 325* 267*   BUN 27* 23*     Recent Labs     04/20/25  2113 04/21/25  0205   APTT  --  26.8   INR 1.07  --          Assessment: 54-year-old male with wound dehiscence and drainage from his left foot fifth ray amputation    Plan: We discussed the diagnosis and findings with the patient at length.  We reviewed possible non operative and operative interventions and the risks and benefits of each of these.  he had a chance to ask questions and all of these were answered to his satisfaction. The patient chose to proceed with operative intervention to include irrigation and debridement with possible revision left fifth ray amputation. Risks and benefits of surgery were discussed which include but are not limited to bleeding, infection, neurovascular damage, malunion, nonunion, instability, limb length discrepancy, DVT, PE, MI, Stroke and death. They understand these risks and wish to proceed.      N.p.o. for or  I specifically discussed the possibility of wound VAC placement and delayed closure pending the appearance of his soft tissues.  He verbalizes understanding of this.      Nik Andersen MD  Orthopedic Trauma

## 2025-04-21 NOTE — ANESTHESIA PREPROCEDURE EVALUATION
Case: [id number] Date/Time: 04/21/25 1514    Procedure: AMPUTATION REVISION, RAY AMPUTATION (Left: Foot)    Location: [place] [address] / SURGERY [facility]    Surgeons: Nik Andersen M.D.            Relevant Problems   ANESTHESIA (within normal limits)      PULMONARY (within normal limits)      NEURO   (negative) TIA (transient ischemic attack)      CARDIAC   (positive) Cherry angioma   (positive) Hypertension         (positive) Acute renal failure (HCC)      ENDO   (positive) Type 2 diabetes mellitus with hyperglycemia, with long-term current use of insulin (HCC)      Other   (positive) Osteomyelitis of ankle or foot, acute, left (HCC)       Physical Exam    Airway   Mallampati: III  TM distance: >3 FB  Neck ROM: full       Cardiovascular - normal exam  Rhythm: regular  Rate: normal  (-) murmur     Dental - normal exam           Pulmonary - normal exam  Breath sounds clear to auscultation     Abdominal    Neurological - normal exam                   Anesthesia Plan    ASA 3   ASA physical status 3 criteria: diabetes - poorly controlled    Plan - general       Airway plan will be LMA          Induction: intravenous    Postoperative Plan: Postoperative administration of opioids is intended.    Pertinent diagnostic labs and testing reviewed    Informed Consent:    Anesthetic plan and risks discussed with patient.    Use of blood products discussed with: patient whom consented to blood products.           
pt was encountered in bed sitting up using arms to support her in No apparent distress +IV +high flow O2 +tele. Pt was agreeable to participate in PT IE. pt demonstrates generalized weakness and decrease endurance. Pt was left in the chair with +tele +high flow O2 +IV with call bell and phone nearby. RN Archana) made aware.

## 2025-04-21 NOTE — ASSESSMENT & PLAN NOTE
A1c was 13.9 in FEB 2024. On insulin. Reports compliance with medication and significant diet adjustment since February. Glucose 325 on admission.  -Repeat A1C  -Glargine 10. Goal blood glucose 140-180 given active infection.  -SSI  -Hypoglycemia protocol

## 2025-04-21 NOTE — PROGRESS NOTES
[facility] Medicine Daily Progress Note    Date of Service  4/21/2025    Chief Complaint  Donald Fuller is a 54 y.o. male admitted 4/20/2025 with left 5th ray resection surgical site infection    Hospital Course  54 y.o. male with pmh IDDM2, Anxiety, HTN, S/P left 5th ray resection who presented 4/20/2025 with bleeding swelling and draining near amputation site that has been present past two days. Resection was done 2/28 for osteomyelitis, Cultures at that time grew E. Cloacae. Was discharged on 14 day course of augmentin.      Had sutures removed 5 days ago. Noted bleeding and swelling near amputation site 2 days ago, intermittent pain. Says he takes great care in not re-injuring foot, proper wound care. Has not noticed pus. Has had chills past 1 day. Nausea past Today.      In ED: Tachy cardic, tachypneic. Sepsis protocol initiated, received ~3L of IVF. Given Unasyn and vancomycin. Labs significant for Bglc 325, Cr. 1.52, BUN 27, CRP 25.10. Blood and wound cultures drawn. XR foot consistent with osteomyelitis. Ortho consulted, will see in am.    Interval Problem Update  Evaluated at bedside  In NAD this AM, looking forward to OR today  Tachycardia resolved, heart rate in the 90s  Hemodynamically stable  On room air  CBC without leukocytosis  Normocytic anemia 13 today  Acute renal failure resolved  4/20 BCX w/NGTD  4/20 WCX w/NGTD  Continue IV Unasyn/Linezolid  Wound care  Orthopedic surgery following  Aim for OR today  Labs in a.m.    I have discussed this patient's plan of care and discharge plan at IDT rounds today with Case Management, Nursing, Nursing leadership, and other members of the IDT team.    Consultants/Specialty  Orthopedic surgery    Code Status  DNAR/DNI    Disposition  The patient is not medically cleared for discharge to home or a post-acute facility.      I have placed the appropriate orders for post-discharge needs.    Review of Systems  Review of Systems   Constitutional: Negative.     HENT: Negative.     Eyes: Negative.    Respiratory: Negative.     Cardiovascular: Negative.    Gastrointestinal: Negative.    Genitourinary: Negative.    Musculoskeletal:  Positive for joint pain.   Skin: Negative.    Neurological: Negative.    Endo/Heme/Allergies: Negative.    Psychiatric/Behavioral: Negative.          Physical Exam  Temp:  [35.9 °C (96.6 °F)-37.7 °C (99.8 °F)] 35.9 °C (96.6 °F)  Pulse:  [] 96  Resp:  [16-22] 18  BP: (107-150)/(56-90) 119/83  SpO2:  [90 %-96 %] 92 %    Physical Exam  Constitutional:       General: He is not in acute distress.     Appearance: Normal appearance.   HENT:      Head: Normocephalic and atraumatic.      Nose: Nose normal. No congestion.      Mouth/Throat:      Mouth: Mucous membranes are moist.   Eyes:      Extraocular Movements: Extraocular movements intact.      Pupils: Pupils are equal, round, and reactive to light.   Cardiovascular:      Rate and Rhythm: Normal rate and regular rhythm.      Pulses: Normal pulses.      Heart sounds: Normal heart sounds.   Pulmonary:      Effort: Pulmonary effort is normal.      Breath sounds: Normal breath sounds.   Abdominal:      General: Bowel sounds are normal.      Palpations: Abdomen is soft.      Tenderness: There is no abdominal tenderness.   Musculoskeletal:         General: Swelling present. Normal range of motion.      Cervical back: Normal range of motion and neck supple.      Comments: Left 5th ray resection, Wound 1cm anterolateral left foot, erythema   Skin:     General: Skin is warm.      Coloration: Skin is not jaundiced.   Neurological:      General: No focal deficit present.      Mental Status: He is alert and oriented to person, place, and time. Mental status is at baseline.      Cranial Nerves: No cranial nerve deficit.   Psychiatric:         Mood and Affect: Mood normal.         Behavior: Behavior normal.         Thought Content: Thought content normal.         Judgment: Judgment normal.          Fluids    Intake/Output Summary (Last 24 hours) at 4/21/2025 1047  Last data filed at 4/20/2025 2224  Gross per 24 hour   Intake 126.67 ml   Output --   Net 126.67 ml        Laboratory  Recent Labs     04/20/25 2113 04/21/25  0205   WBC 10.3 8.7   RBC 5.72 4.74   HEMOGLOBIN 16.6 13.6*   HEMATOCRIT 49.3 41.0*   MCV 86.2 86.5   MCH 29.0 28.7   MCHC 33.7 33.2   RDW 40.4 40.5   PLATELETCT 216 170   MPV 10.0 10.7     Recent Labs     04/20/25 2113 04/21/25  0205   SODIUM 135 134*   POTASSIUM 4.8 4.3   CHLORIDE 97 100   CO2 23 20   GLUCOSE 325* 267*   BUN 27* 23*   CREATININE 1.52* 1.17   CALCIUM 9.4 8.3*     Recent Labs     04/20/25 2113 04/21/25  0205   APTT  --  26.8   INR 1.07  --                Imaging  DX-FOOT-COMPLETE 3+ LEFT   Final Result         1.  Slight permeative appearance of the lateral cortex of the fifth toe metatarsal waist, appearance raising concern for component of osteomyelitis.   2.  Soft tissue gas at the amputation site, consider underlying infectious etiology.      DX-CHEST-PORTABLE (1 VIEW)   Final Result         1.  No acute cardiopulmonary disease.           Assessment/Plan  * Osteomyelitis of ankle or foot, acute, left (HCC)- (present on admission)  Assessment & Plan  2/28 Left small toe ray resection  for osteomyelitis. Wcx grew MSSA. L foot xray concerning for osteo of left lateral foot near resection site. Received Unasyn and Vancomycin in ED. Ortho consult in ED, to see in AM. S/P Sepsis protocol for SIRS 2/4 and this as likely source of infection. Not febrile, no leukocytosis at presentation.    4/20 BCX w/NGTD  4/20 WCX w/NGTD  Wound care  Continue IV Unasyn/Linezolid  Wound care  Orthopedic surgery following  Aim for OR today    Type 2 diabetes mellitus with hyperglycemia, with long-term current use of insulin (HCC)- (present on admission)  Assessment & Plan  A1c was 13.9 in FEB 2024. On insulin. Reports compliance with medication and significant diet adjustment since  February. Glucose 325 on admission.  -Repeat A1C  -Glargine 10. Goal blood glucose 140-180 given active infection.  -SSI  -Hypoglycemia protocol    Sepsis (HCC)  Assessment & Plan  Resolved    Hypertension- (present on admission)  Assessment & Plan  -Hold home losartan given DOMINIC at presentation    Acute renal failure (HCC)- (present on admission)  Assessment & Plan  Resolved    Anxiety- (present on admission)  Assessment & Plan  Mood ok.  -Continue home venlafaxine       VTE prophylaxis: SCDs, OR today    I have performed a physical exam and reviewed and updated ROS and Plan today (4/21/2025). In review of yesterday's note (4/20/2025), there are no changes except as documented above.

## 2025-04-21 NOTE — CONSULTS
Brief ID note:    Chart reviewed.  54-year-old male with history of type 2 diabetes mellitus, recent hospitalization in February secondary to left fifth toe osteomyelitis status post left fifth toe ray resection on 2/28/2025 with pathology showing clear margins admitted on 4/20/2025 secondary to swelling and drainage in her amputation site.  No report of any fevers or chills.  X-ray of the left foot showed slight permeative appearance of the lateral cortex of the fifth toe metatarsal waist concerning for osteomyelitis.  Blood cultures are now positive for MSSA.  Plan for ray amputation today    -Continue IV cefazolin 2 g every 8 hours  -Repeat blood cultures ordered  -Follow echo    Full consult to follow tomorrow    Preliminary recommendations discussed with hospitalist, Dr. Champion

## 2025-04-21 NOTE — ED NOTES
Med Rec completed per patient      Allergies reviewed: yes     Oral antibiotics in the past 30 days: no    Anticoagulant in past 14 days: no    Dispense history available in EPIC: yes     Pharmacy patient utilizes: [facility]   [phone]

## 2025-04-21 NOTE — ED NOTES
Patient medicated per MAR for pain, tolerated well nad resting comfortably on right side at this time, NAD noted.

## 2025-04-21 NOTE — H&P
[facility] Internal Medicine History & Physical Note    Date of Service  4/20/2025      Attending: Vicente Padron D.o.  Senior Resident: Dr. Oconnor  Intern:  Dr. Irving  Contact Number: [phone]    Primary Care Physician  Kojo Covington M.D.    Consultants  orthopedics        Code Status  DNAR/DNI    Chief Complaint  Chief Complaint   Patient presents with    Post-Op Complications     Patient had 5th toe of left foot amputated approximately 5 weeks ago. Patient endorsing bleeding and swelling to incision site for approximately 2 days. Patient also complains of difficulty with keeping balance, complaints of chills and hot flashes, starting since yesterday.    Nausea     Patient has been feeling nauseous starting yesterday but has not vomited.        History of Presenting Illness (HPI): Donald Fuller is a 54 y.o. male with pmh IDDM2, Anxiety, HTN, S/P left 5th ray resection who presented 4/20/2025 with bleeding swelling and draining near amputation site that has been present past two days. Resection was done 2/28 for osteomyelitis, Cultures at that time grew E. Cloacae. Was discharged on 14 day course of augmentin.     Had sutures removed 5 days ago. Noted bleeding and swelling near amputation site 2 days ago, intermittent pain. Says he takes great care in not re-injuring foot, proper wound care. Has not noticed pus. Has had chills past 1 day. Nausea past Today.     In ED: Tachy cardic, tachypneic. Sepsis protocol initiated, received ~3L of IVF. Given Unasyn and vancomycin. Labs significant for Bglc 325, Cr. 1.52, BUN 27, CRP 25.10. Blood and wound cultures drawn. XR foot consistent with osteomyelitis. Ortho consulted, will see in am.    I discussed the plan of care with patient.    Review of Systems  Review of Systems   Constitutional:  Positive for chills, diaphoresis and malaise/fatigue. Negative for fever.   Eyes: Negative.    Respiratory: Negative.     Cardiovascular:  Negative for chest pain, orthopnea,  "claudication and leg swelling.   Gastrointestinal:  Positive for nausea. Negative for abdominal pain, constipation, diarrhea and vomiting.   Genitourinary: Negative.    Musculoskeletal: Negative.    Skin:         Wound lateral left foot with bleeding and intermittent pain.   Neurological:  Positive for sensory change (Numbness in feet to midshins bilaterally.).       Past Medical History   has a past medical history of Diabetes (HCC).    Surgical History   has a past surgical history that includes foot amputation (Left, 2/28/2025).     Family History  family history is not on file.   Family history reviewed with patient.     Social History  Tobacco: Denies  Alcohol: Denies  Recreational drugs (illegal or prescription): Hx of cocaine use.  Employment: Construction company  Living Situation: [place]  Recent Travel: Denies  Primary Care Provider: Not Reviewed  Other (stressors, spirituality, exposures): Denies    Allergies  No Known Allergies    Medications  Prior to Admission Medications   Prescriptions Last Dose Informant Patient Reported? Taking?   Insulin Pen Needle 32 G x 4 mm  Patient No No   Sig: Use one pen needle in pen device to inject insulin four times daily.   Insulin Syringe-Needle U-100 (INSULIN SYRINGE 1CC/31GX5/16\") 31G X 5/16\" 1 ML Misc  Patient No No   Sig: Use as directed for SQ injection of insulin.   NON SPECIFIED 4/18/2025 Morning Patient Yes Yes   Sig: Take 1 Dose by mouth every day. Beet Powder supplement   NON SPECIFIED 4/18/2025 Morning Patient Yes Yes   Sig: Take 1 Dose by mouth every day. Muscle recovery powder   atorvastatin (LIPITOR) 20 MG Tab 4/18/2025 Evening Patient No No   Sig: Take 1 Tablet by mouth every evening.   hydrOXYzine HCl (ATARAX) 25 MG Tab 4/18/2025 Morning Patient No No   Sig: TAKE 1 TABLET BY MOUTH THREE TIMES DAILY AS NEEDED FOR ITCHING OR ANXIETY.   Patient taking differently: Take 25 mg by mouth every day.   insulin glargine 100 UNIT/ML SC SOPN injection 4/18/2025 " Evening Patient No No   Sig: Inject 15 Units under the skin every evening.   insulin lispro 100 UNIT/ML SC SOPN injection PEN 4/18/2025 Evening Patient No No   Sig: Inject 2-10 Units under the skin 3 times a day before meals. Use 2 units if blood sugar 150-200. 4 units if 200-250, 6 units if 250-300, 8 units if 300-350, 10 units if 350-400   losartan (COZAAR) 50 MG Tab 4/18/2025 Morning Patient No No   Sig: Take 1 Tablet by mouth every day.   metformin (GLUCOPHAGE) 1000 MG tablet 4/5/2025 Morning Patient No No   Sig: Take 1 Tablet by mouth 2 times a day with meals.   pioglitazone (ACTOS) 15 MG Tab 4/18/2025 Morning Patient No No   Sig: Take 1 Tablet by mouth every day.   venlafaxine (EFFEXOR) 37.5 MG Tab 4/18/2025 Morning Patient Yes Yes   Sig: Take 37.5 mg by mouth every day.      Facility-Administered Medications: None       Physical Exam  Temp:  [36.8 °C (98.3 °F)-37.7 °C (99.8 °F)] 37.7 °C (99.8 °F)  Pulse:  [107-123] 118  Resp:  [16-22] 18  BP: (107-149)/(56-82) 115/76  SpO2:  [90 %-96 %] 95 %  Blood Pressure: 115/76   Temperature: 37.7 °C (99.8 °F)   Pulse: (!) 118   Respiration: 18   Pulse Oximetry: 95 %       Physical Exam  Constitutional:       Appearance: Normal appearance.   HENT:      Head: Normocephalic.   Cardiovascular:      Rate and Rhythm: Normal rate and regular rhythm.      Pulses: Normal pulses.      Heart sounds: Normal heart sounds.   Pulmonary:      Effort: Pulmonary effort is normal.      Breath sounds: Normal breath sounds.   Abdominal:      General: Abdomen is flat.      Palpations: Abdomen is soft.   Musculoskeletal:         General: Deformity (Left 5th ray resection. Wound 1cm anterolateral left foot, no visible bone. Purulence with palpation, blood present. Surrounding erythema. Hot to touch.) present. No swelling.   Skin:     General: Skin is warm.   Neurological:      Mental Status: He is alert.         Laboratory:  Recent Labs     04/20/25  2113   WBC 10.3   RBC 5.72   HEMOGLOBIN 16.6  "  HEMATOCRIT 49.3   MCV 86.2   MCH 29.0   MCHC 33.7   RDW 40.4   PLATELETCT 216   MPV 10.0     Recent Labs     04/20/25 2113   SODIUM 135   POTASSIUM 4.8   CHLORIDE 97   CO2 23   GLUCOSE 325*   BUN 27*   CREATININE 1.52*   CALCIUM 9.4     Recent Labs     04/20/25 2113   ALTSGPT 31   ASTSGOT 29   ALKPHOSPHAT 91   TBILIRUBIN 0.3   GLUCOSE 325*     Recent Labs     04/20/25 2113 04/21/25  0205   APTT  --  26.8   INR 1.07  --      No results for input(s): \"NTPROBNP\" in the last 72 hours.      No results for input(s): \"TROPONINT\" in the last 72 hours.    Imaging:  DX-FOOT-COMPLETE 3+ LEFT   Final Result         1.  Slight permeative appearance of the lateral cortex of the fifth toe metatarsal waist, appearance raising concern for component of osteomyelitis.   2.  Soft tissue gas at the amputation site, consider underlying infectious etiology.      DX-CHEST-PORTABLE (1 VIEW)   Final Result         1.  No acute cardiopulmonary disease.          X-Ray:  I have personally reviewed the images and compared with prior images.    Assessment/Plan:  Problem Representation: 54 year old male with pmh of IDDMT2, Hx of osteomyelitis left foot status post left 5th ray resection admitted for osteomyelitis of left foot.    I anticipate this patient will require at least two midnights for appropriate medical management, necessitating inpatient admission because possible surgical procedure and IV abx.    Patient will need a Med/Surg bed on MEDICAL service .  The need is secondary to iv abx.    * Osteomyelitis of ankle or foot, acute, left (HCC)- (present on admission)  Assessment & Plan  2/28 Left small toe ray resection  for osteomyelitis. Wcx grew MSSA. L foot xray concerning for osteo of left lateral foot near resection site. Received Unasyn and Vancomycin in ED. Ortho consult in ED, to see in AM. S/P Sepsis protocol for SIRS 2/4 and this as likely source of infection. Not febrile, no leukocytosis at presentation.  -Continue Unasyn, " Linezolid due to concern for MRSA given recent hospitalization and extended abx coverage.  -Wcx, Blood Cx pending.  -NPO at midnight  -Hold DVT ppx due to potential procedure in am.  -Wound Consult.  -Consider ID consult pending culture results.  -Consider MRI foot in am to confirm osteo      Sepsis (HCC)  Assessment & Plan  This is Sepsis Present on admission  SIRS criteria identified on my evaluation include: Tachycardia, with heart rate greater than 90 BPM and Tachypnea, with respirations greater than 20 per minute  Clinical indicators of end organ dysfunction include Acute On Chronic Renal Failure, with creatinine >0.5 above baseline level  Source is likely Left foot osteomyelitis  Sepsis protocol initiated  Crystalloid Fluid Administration: Fluid resuscitation ordered per standard protocol - 30 mL/kg per current or ideal body weight  IV antibiotics as appropriate for source of sepsis  Reassessment: I have reassessed the patient's hemodynamic status    -ABX as per Osteomyelitis AP    Hypertension- (present on admission)  Assessment & Plan  -Hold home losartan given DOMINIC at presentation    Acute renal failure (HCC)- (present on admission)  Assessment & Plan  Cr 1.52 from baseline ~1.0 in context of previously uncontrolled diabetes and SIRS 2/4 on admission with concern for sepsis.S/P sepsis fluid protocol in ED. Most likely pre-renal  -Bladder scan  -UA pending  -I/O's    Anxiety- (present on admission)  Assessment & Plan  Mood ok.  -Continue home venlafaxine    Type 2 diabetes mellitus with hyperglycemia, with long-term current use of insulin (HCC)- (present on admission)  Assessment & Plan  A1c was 13.9 in FEB 2024. On insulin. Reports compliance with medication and significant diet adjustment since February. Glucose 325 on admission.  -Repeat A1C  -Glargine 10. Goal blood glucose 140-180 given active infection.  -SSI  -Hypoglycemia protocol        VTE prophylaxis: SCDs/TEDs

## 2025-04-21 NOTE — ED NOTES
2nd PIV initiated, 2nd BC collected and sent to the lab. Swabs collected and sent to the lab. Pt medicated per MAR

## 2025-04-21 NOTE — WOUND TEAM
Patient scheduled to go to the OR today. Will complete consult. Please reconsult for any advanced wound care needs.

## 2025-04-21 NOTE — ASSESSMENT & PLAN NOTE
4/20 BCX w/MSSA  4/20 WCX w/NGTD  4/21 ORCX w/Gram stain+ few positive cocci   4/22 BCX w/NGTD  ID following  Continue IV cefazolin   Pending TTE

## 2025-04-21 NOTE — ASSESSMENT & PLAN NOTE
2/28 Left small toe ray resection  for osteomyelitis. Wcx grew MSSA. L foot xray concerning for osteo of left lateral foot near resection site. Received Unasyn and Vancomycin in ED. Ortho consult in ED, to see in AM. S/P Sepsis protocol for SIRS 2/4 and this as likely source of infection. Not febrile, no leukocytosis at presentation.    IV ABX per ID  Wound care  Orthopedic surgery following  Aim for OR on AM

## 2025-04-21 NOTE — ANESTHESIA TIME REPORT
Anesthesia Start and Stop Event Times       Date Time Event    4/21/2025 1526 Ready for Procedure     1603 Anesthesia Start     1648 Anesthesia Stop          Responsible Staff  04/21/25      Name Role Begin End    Oneida Cárdenas M.D. Anesth 1603 1648          Overtime Reason:  no overtime (within assigned shift)    Comments:

## 2025-04-21 NOTE — PROGRESS NOTES
4 Eyes Skin Assessment Completed by ARCHANA Vega and ARCHANA Sauceda.    Head WDL  Ears WDL  Nose WDL  Mouth WDL  Neck WDL  Breast/Chest WDL  Shoulder Blades WDL  Spine WDL  (R) Arm/Elbow/Hand redness on forearms and scabs on forearms  (L) Arm/Elbow/Hand Redness on elbos and scabs on forearms  Abdomen WDL  Groin WDL  Scrotum/Coccyx/Buttocks WDL  (R) Leg WDL  (L) Leg WDL  (R) Heel/Foot/Toe Redness and Blanching  (L) Heel/Foot/Toe Redness and Blanching open wound          Devices In Places Blood Pressure Cuff and Pulse Ox      Interventions In Place Pillows    Possible Skin Injury No    Pictures Uploaded Into Epic Yes  Wound Consult Placed Yes  RN Wound Prevention Protocol Ordered No

## 2025-04-21 NOTE — ANESTHESIA PROCEDURE NOTES
Airway    Date/Time: 4/21/2025 4:09 PM    Performed by: Oneida Cárdenas M.D.  Authorized by: Oneida Cárdenas M.D.    Location:  OR  Urgency:  Elective  Indications for Airway Management:  Anesthesia      Spontaneous Ventilation: absent    Sedation Level:  Deep  Preoxygenated: Yes    Mask Difficulty Assessment:  0 - not attempted  Final Airway Type:  Supraglottic airway  Final Supraglottic Airway:  Standard LMA    SGA Size:  5  Number of Attempts at Approach:  1

## 2025-04-21 NOTE — OP REPORT
DATE OF OPERATION: 4/21/2025     PREOPERATIVE DIAGNOSIS:  Left foot wound dehiscense    POSTOPERATIVE DIAGNOSIS: Same    PROCEDURE PERFORMED:   Left foot fifth ray amputation  Wound vac application <50cm2    SURGEON: Nik Andersen M.D.     ASSISTANT: None    ANESTHESIA: General    SPECIMEN: Tissue culture and pathology specimen    ESTIMATED BLOOD LOSS: 10 mL    IMPLANTS: Single wound VAC sponge    INDICATIONS: The patient is a 54 y.o. male who presented with wound dehiscence and drainage from his prior partial left foot fifth ray amputation.  I discussed the risks and benefits of the above procedure which include but are not limited to risks of infection, wound healing complication, neurovascular injury, pain, malunion, non-union, malrotation, and the medical risks of anesthesia including MI, stroke, and death.  Alternatives to surgery were also discussed, including non-operative management, which I did not recommend.  The patient and/or their POA was in agreement with the plan to proceed, and the informed consent was signed and documented.    DESCRIPTION OF PROCEDURE:  Patient was seen in the preoperative holding area on the day of surgery and marked on the operative site which was the left foot. They were transported to the operating room and positioned supine on the operating table.  Care was taken to pad any bony prominences and prominent neurovascular structures.  Anesthesia was induced.  The operative extremity was prescrubbed with a CHG brush followed by an alcohol bath and then prepped and draped in the usual sterile fashion.  A time out was performed in which the correct patient, site, side, procedure, and surgeon's initials on extremity were confirmed by all operating personnel.     We then turned our attention to the left foot.  I began by excising all the necrotic appearing tissue along the lateral border of the foot in an ellipse fashion.  I carried this dissection down to the level of the  metatarsal.  The end of the metatarsal head mucoid appearing tissue on it.  This was excisionally debrided using a rongeur and sent for tissue culture.  I then elected to excise the remainder of the fifth metatarsal down to the fifth metatarsal base.  Once the metatarsal was completely freed from surrounding soft tissue this was sent off for pathology analysis for osteomyelitis.  The remainder of the soft tissue appeared healthy.  I then irrigated the entire field thoroughly with saline and elected to place a wound VAC.  The field measured approximately 8 x 3.5 cm.  Good seal was achieved.  Patient was then awoken from anesthesia without immediate complication transported the PACU in stable condition.    POSTOPERATIVE PLAN:      Inpatient plan: PACU to floor.  Continue culture directed antibiotics per primary team.  Return to the operating room Wednesday with Dr. Weldon for repeat irrigation and debridement and closure of the left foot wound.  Weightbearing status: Hindfoot weightbearing only left lower extremity  DVT prophylaxis: Per primary  Outpatient plan: Pending definitive closure    ____________________________________   Nik Andresen M.D.   DD: 4/21/2025  4:37 PM

## 2025-04-21 NOTE — ED NOTES
Pt wheeled back to blue 16. Pt assisted to ren, placed on cont cardiac and VS monitoring. PIV initiated, blood collected and sent to the lab

## 2025-04-21 NOTE — ED PROVIDER NOTES
ER Provider Note    Scribed for Bandar Mustafa M.d. by Genna Monteiro. 4/20/2025  9:18 PM    Primary Care Provider: Kojo Covington M.D.    CHIEF COMPLAINT   Chief Complaint   Patient presents with    Post-Op Complications     Patient had 5th toe of left foot amputated approximately 5 weeks ago. Patient endorsing bleeding and swelling to incision site for approximately 2 days. Patient also complains of difficulty with keeping balance, complaints of chills and hot flashes, starting since yesterday.    Nausea     Patient has been feeling nauseous starting yesterday but has not vomited.      EXTERNAL RECORDS REVIEWED  Outpatient Notes The patient has history of fifth toe amputation done by Dr. Davis in February of this year. He was seen 4 days ago in the orthopedists office. Wound was evaluated and looked okay at that time.    HPI/ROS  LIMITATION TO HISTORY   Select: : None  OUTSIDE HISTORIAN(S):  None.    Donald Fuller is a 54 y.o. male who has history of diabetes presents to the ED for evaluation of bleeding, swelling and draining to his amputation incision site onset yesterday. He describes that he had a 5th toe amputation of the left foot about 5 weeks ago. The patient reports that he has had bleeding, swelling and redness to the incision site since yesterday. He notes that he has been having difficulty with keeping his balance. The patient states he also has nausea, fevers, and chills, but denies any vomiting. He denies any cardiac history.     PAST MEDICAL HISTORY  Past Medical History:   Diagnosis Date    Diabetes (HCC)      SURGICAL HISTORY  Past Surgical History:   Procedure Laterality Date    FOOT AMPUTATION Left 2/28/2025    Procedure: AMPUTATION, FOOT-4TH AND 5TH RAY RESECTION;  Surgeon: Jason Davis M.D.;  Location: SURGERY [facility];  Service: Orthopedics     FAMILY HISTORY  History reviewed. No pertinent family history.    SOCIAL HISTORY   reports that he has  "never smoked. He has never used smokeless tobacco. He reports that he does not drink alcohol and does not use drugs.    CURRENT MEDICATIONS  Previous Medications    ATORVASTATIN (LIPITOR) 20 MG TAB    Take 1 Tablet by mouth every evening.    FLUOXETINE HCL PO    Take 1 Caplet by mouth every day. Unknown strength    HYDROXYZINE HCL (ATARAX) 25 MG TAB    TAKE 1 TABLET BY MOUTH THREE TIMES DAILY AS NEEDED FOR ITCHING OR ANXIETY.    INSULIN GLARGINE 100 UNIT/ML SC SOPN INJECTION    Inject 15 Units under the skin every evening.    INSULIN LISPRO 100 UNIT/ML SC SOPN INJECTION PEN    Inject 2-10 Units under the skin 3 times a day before meals. Use 2 units if blood sugar 150-200. 4 units if 200-250, 6 units if 250-300, 8 units if 300-350, 10 units if 350-400    INSULIN PEN NEEDLE 32 G X 4 MM    Use one pen needle in pen device to inject insulin four times daily.    INSULIN SYRINGE-NEEDLE U-100 (INSULIN SYRINGE 1CC/31GX5/16\") 31G X 5/16\" 1 ML MISC    Use as directed for SQ injection of insulin.    LOSARTAN (COZAAR) 50 MG TAB    Take 1 Tablet by mouth every day.    METFORMIN (GLUCOPHAGE) 1000 MG TABLET    Take 1 Tablet by mouth 2 times a day with meals.    PIOGLITAZONE (ACTOS) 15 MG TAB    Take 1 Tablet by mouth every day.     ALLERGIES  Patient has no known allergies.      PHYSICAL EXAM  /73   Pulse (!) 115   Temp 36.8 °C (98.3 °F) (Temporal)   Resp (!) 22   Ht 1.854 m (6' 1\")   Wt 98.4 kg (217 lb)   SpO2 95%   BMI 28.63 kg/m²     Constitutional: Well developed, Well nourished, No acute distress, Non-toxic appearance.   HENT: Normocephalic, Atraumatic, Bilateral external ears normal, Oropharynx is clear mucous membranes are moist. No oral exudates or nasal discharge.   Eyes: Pupils are equal round and reactive, EOMI, Conjunctiva normal, No discharge.   Neck: Normal range of motion, No tenderness, Supple, No stridor. No meningismus.  Lymphatic: No lymphadenopathy noted.   Cardiovascular: Regular rate and rhythm " without murmur rub or gallop.  Thorax & Lungs: Clear breath sounds bilaterally without wheezes, rhonchi or rales. There is no chest wall tenderness.   Abdomen: Soft non-tender non-distended. There is no rebound or guarding. No organomegaly is appreciated. Bowel sounds are normal.  Skin: Normal without rash.   Back: No CVA or spinal tenderness.   Extremities: Open wounds to the area of amputation of left fifth digit. Surrounding redness, purulent drainage, and erythema that extends over the dorsum of the foot. Palpable pulses; both DP and PT. Mild fluctuance that decreased after manual expression, which had about 4 cc of foul smelling, purulent drainage from the wound. Intact distal pulses, No edema, No tenderness, No cyanosis, No clubbing. Capillary refill is less than 2 seconds.  Musculoskeletal: Good range of motion in all major joints. No tenderness to palpation or major deformities noted.   Neurologic: Alert & oriented x 3, Normal motor function, Normal sensory function, No focal deficits noted. Reflexes are normal.  Psychiatric: Affect normal, Judgment normal, Mood normal. There is no suicidal ideation or patient reported hallucinations.  '      DIAGNOSTIC STUDIES    LABS  Labs Reviewed   CBC WITH DIFFERENTIAL - Abnormal; Notable for the following components:       Result Value    Neutrophils-Polys 87.00 (*)     Lymphocytes 5.90 (*)     Neutrophils (Absolute) 8.99 (*)     Lymphs (Absolute) 0.61 (*)     All other components within normal limits   COMP METABOLIC PANEL - Abnormal; Notable for the following components:    Glucose 325 (*)     Bun 27 (*)     Creatinine 1.52 (*)     Globulin 3.6 (*)     All other components within normal limits   CRP QUANTITIVE (NON-CARDIAC) - Abnormal; Notable for the following components:    Stat C-Reactive Protein 25.10 (*)     All other components within normal limits   ESTIMATED GFR - Abnormal; Notable for the following components:    GFR (CKD-EPI) 54 (*)     All other components  within normal limits   LACTIC ACID   SED RATE   PROTHROMBIN TIME   URINALYSIS   URINE CULTURE(NEW)   BLOOD CULTURE   BLOOD CULTURE   CULTURE WOUND W/ GRAM STAIN   APTT   MRSA BY PCR (AMP)        RADIOLOGY/PROCEDURES   The attending emergency physician has independently interpreted the diagnostic imaging associated with this visit and am waiting the final reading from the radiologist.   My preliminary interpretation is a follows: X-ray concerning for osteomyelitis of the lateral cortex of the fifth toe metatarsal waist    Radiologist interpretation:  DX-FOOT-COMPLETE 3+ LEFT   Final Result         1.  Slight permeative appearance of the lateral cortex of the fifth toe metatarsal waist, appearance raising concern for component of osteomyelitis.   2.  Soft tissue gas at the amputation site, consider underlying infectious etiology.      DX-CHEST-PORTABLE (1 VIEW)   Final Result         1.  No acute cardiopulmonary disease.          COURSE & MEDICAL DECISION MAKING     ASSESSMENT, COURSE AND PLAN  Care Narrative:     9:18 PM - Patient seen and examined at bedside. This is a 54 year old man who presents to the emergency department for evaluation of bleeding, swelling and drainage to his left fifth toe amputation site that started yesterday. The patient also complains of nausea, fever and chills.  Physical exam as above concerning for underlying purulent bacterial infection with overlying dorsal foot cellulitis.  I have concerns for osteomyelitis.  Patient did arrive and had suspected infection was tachycardic with normal blood pressure so I will initiate a code sepsis.  Discussed plan of care, including performing lab work and imaging. Patient agrees to the plan of care. The patient will be resuscitated with 2,952 mL LR IV and medicated with Unasyn 3 g and vancomycin 2,500 mg. Ordered for Culture Wound w/ Gram Stain, Sed Rate, C Reactive Protein Quantitative, UA, Blood Culture x2, Urine Culture, CMP, CBC w/ Diff., Lactic  Acid, MRSA by PCR, APTT, Prothrombin Time, DX-Chest and DX-Foot (Left) to evaluate his symptoms.      Sepsis: Infection was suspected 9:18 PM (Time). Sepsis pathway was initiated. 30cc/kg bolus given. Antibiotics were given per protocol.    Labs reviewed that showed no significant leukocytosis but does have a left shift, elevated glucose, DOMINIC, elevated CRP.  X-ray shows concern over osteomyelitis.    10:56 PM - I discussed the patient's case and the above findings with Dr. Andersen (Orthopedics) who will see if the patient needs surgical debridement.    11:07 PM - The patient was reevaluated at bedside. The patient will require hospitalization. He was given an opportunity to ask questions. He is agreeable and understanding to the new plan of care.      11:18 PM - I discussed the patient's case and the above findings with Dr. Padron ([facility] internal medicine team) who will hospitalize the patient for further evaluation and care.       Hydration: Based on the patient's presentation of Sepsis and Tachycardia the patient was given IV fluids. IV Hydration was used because oral hydration was not adequate alone. Upon recheck following hydration, the patient was improved.     ADDITIONAL PROBLEM LIST  Osteomyelitis    DISPOSITION AND DISCUSSIONS  I have discussed management of the patient with the following physicians and TALI's:  Dr. Andersen (Orthopedics), Dr. Padron ([facility] internal medicine team)    Discussion of management with other [facility] or appropriate source(s): None       DISPOSITION:  Patient will be hospitalized by Dr. Padron ([facility] internal medicine team) in guarded condition.     FINAL DIAGNOSIS  1. Other acute osteomyelitis of left foot (HCC) Acute   2. DOMINIC (acute kidney injury) ([facility]) Acute   3. Hyperglycemia Acute   4. Cellulitis of left foot Acute      I, Genna Monteiro (Scribe), am scribing for, and in the presence of, Bandar Mustafa M.D..    Electronically signed by: Genna Quinn  Cayetano (Scribe), 4/20/2025    IBandar M.D. personally performed the services described in this documentation, as scribed by Genna Monteiro in my presence, and it is both accurate and complete.      The note accurately reflects work and decisions made by me.  Bandar Mustafa M.D.  4/21/2025  4:01 AM

## 2025-04-21 NOTE — ED NOTES
"Pt resting in [facility]. VSS, NAD. Pt updated on POC and has no further requests at this time    /56   Pulse (!) 109   Temp 36.8 °C (98.3 °F) (Temporal)   Resp 19   Ht 1.854 m (6' 1\")   Wt 98.4 kg (217 lb)   SpO2 90%   BMI 28.63 kg/m²     "

## 2025-04-22 ENCOUNTER — ANESTHESIA EVENT (OUTPATIENT)
Dept: SURGERY | Facility: MEDICAL CENTER | Age: 55
End: 2025-04-22

## 2025-04-22 ENCOUNTER — APPOINTMENT (OUTPATIENT)
Dept: ENDOCRINOLOGY | Facility: MEDICAL CENTER | Age: 55
End: 2025-04-22

## 2025-04-22 ENCOUNTER — APPOINTMENT (OUTPATIENT)
Dept: CARDIOLOGY | Facility: MEDICAL CENTER | Age: 55
End: 2025-04-22
Attending: STUDENT IN AN ORGANIZED HEALTH CARE EDUCATION/TRAINING PROGRAM

## 2025-04-22 ENCOUNTER — APPOINTMENT (OUTPATIENT)
Dept: RADIOLOGY | Facility: MEDICAL CENTER | Age: 55
End: 2025-04-22
Attending: STUDENT IN AN ORGANIZED HEALTH CARE EDUCATION/TRAINING PROGRAM

## 2025-04-22 PROBLEM — B95.61 MSSA BACTEREMIA: Status: ACTIVE | Noted: 2025-04-21

## 2025-04-22 LAB
ALBUMIN SERPL BCP-MCNC: 3 G/DL (ref 3.2–4.9)
ALBUMIN/GLOB SERPL: 1.1 G/DL
ALP SERPL-CCNC: 63 U/L (ref 30–99)
ALT SERPL-CCNC: 14 U/L (ref 2–50)
ANION GAP SERPL CALC-SCNC: 9 MMOL/L (ref 7–16)
AST SERPL-CCNC: 14 U/L (ref 12–45)
BILIRUB SERPL-MCNC: 0.2 MG/DL (ref 0.1–1.5)
BUN SERPL-MCNC: 16 MG/DL (ref 8–22)
CALCIUM ALBUM COR SERPL-MCNC: 9.3 MG/DL (ref 8.5–10.5)
CALCIUM SERPL-MCNC: 8.5 MG/DL (ref 8.5–10.5)
CHLORIDE SERPL-SCNC: 101 MMOL/L (ref 96–112)
CO2 SERPL-SCNC: 21 MMOL/L (ref 20–33)
CREAT SERPL-MCNC: 0.94 MG/DL (ref 0.5–1.4)
ERYTHROCYTE [DISTWIDTH] IN BLOOD BY AUTOMATED COUNT: 41.4 FL (ref 35.9–50)
GFR SERPLBLD CREATININE-BSD FMLA CKD-EPI: 96 ML/MIN/1.73 M 2
GLOBULIN SER CALC-MCNC: 2.7 G/DL (ref 1.9–3.5)
GLUCOSE BLD STRIP.AUTO-MCNC: 208 MG/DL (ref 65–99)
GLUCOSE BLD STRIP.AUTO-MCNC: 253 MG/DL (ref 65–99)
GLUCOSE BLD STRIP.AUTO-MCNC: 273 MG/DL (ref 65–99)
GLUCOSE BLD STRIP.AUTO-MCNC: 315 MG/DL (ref 65–99)
GLUCOSE SERPL-MCNC: 218 MG/DL (ref 65–99)
HCT VFR BLD AUTO: 40.5 % (ref 42–52)
HGB BLD-MCNC: 13.2 G/DL (ref 14–18)
LV EJECT FRACT MOD 2C 99903: 59.2
LV EJECT FRACT MOD 4C 99902: 69.37
LV EJECT FRACT MOD BP 99901: 65.29
MCH RBC QN AUTO: 28.6 PG (ref 27–33)
MCHC RBC AUTO-ENTMCNC: 32.6 G/DL (ref 32.3–36.5)
MCV RBC AUTO: 87.7 FL (ref 81.4–97.8)
PATHOLOGY CONSULT NOTE: NORMAL
PLATELET # BLD AUTO: 163 K/UL (ref 164–446)
PMV BLD AUTO: 10.1 FL (ref 9–12.9)
POTASSIUM SERPL-SCNC: 4.1 MMOL/L (ref 3.6–5.5)
PROT SERPL-MCNC: 5.7 G/DL (ref 6–8.2)
RBC # BLD AUTO: 4.62 M/UL (ref 4.7–6.1)
SODIUM SERPL-SCNC: 131 MMOL/L (ref 135–145)
WBC # BLD AUTO: 8.2 K/UL (ref 4.8–10.8)

## 2025-04-22 PROCEDURE — 99255 IP/OBS CONSLTJ NEW/EST HI 80: CPT | Performed by: INTERNAL MEDICINE

## 2025-04-22 PROCEDURE — 82962 GLUCOSE BLOOD TEST: CPT | Mod: 91

## 2025-04-22 PROCEDURE — 87147 CULTURE TYPE IMMUNOLOGIC: CPT

## 2025-04-22 PROCEDURE — 770006 HCHG ROOM/CARE - MED/SURG/GYN SEMI*

## 2025-04-22 PROCEDURE — A9270 NON-COVERED ITEM OR SERVICE: HCPCS | Performed by: STUDENT IN AN ORGANIZED HEALTH CARE EDUCATION/TRAINING PROGRAM

## 2025-04-22 PROCEDURE — 700111 HCHG RX REV CODE 636 W/ 250 OVERRIDE (IP): Performed by: STUDENT IN AN ORGANIZED HEALTH CARE EDUCATION/TRAINING PROGRAM

## 2025-04-22 PROCEDURE — 700102 HCHG RX REV CODE 250 W/ 637 OVERRIDE(OP)

## 2025-04-22 PROCEDURE — 80053 COMPREHEN METABOLIC PANEL: CPT

## 2025-04-22 PROCEDURE — 93306 TTE W/DOPPLER COMPLETE: CPT

## 2025-04-22 PROCEDURE — 87040 BLOOD CULTURE FOR BACTERIA: CPT | Mod: 91

## 2025-04-22 PROCEDURE — 87150 DNA/RNA AMPLIFIED PROBE: CPT | Mod: 91

## 2025-04-22 PROCEDURE — A9270 NON-COVERED ITEM OR SERVICE: HCPCS

## 2025-04-22 PROCEDURE — 99233 SBSQ HOSP IP/OBS HIGH 50: CPT | Performed by: STUDENT IN AN ORGANIZED HEALTH CARE EDUCATION/TRAINING PROGRAM

## 2025-04-22 PROCEDURE — 36415 COLL VENOUS BLD VENIPUNCTURE: CPT

## 2025-04-22 PROCEDURE — 93306 TTE W/DOPPLER COMPLETE: CPT | Mod: 26 | Performed by: STUDENT IN AN ORGANIZED HEALTH CARE EDUCATION/TRAINING PROGRAM

## 2025-04-22 PROCEDURE — 700111 HCHG RX REV CODE 636 W/ 250 OVERRIDE (IP): Mod: JZ

## 2025-04-22 PROCEDURE — 700102 HCHG RX REV CODE 250 W/ 637 OVERRIDE(OP): Performed by: STUDENT IN AN ORGANIZED HEALTH CARE EDUCATION/TRAINING PROGRAM

## 2025-04-22 PROCEDURE — 700105 HCHG RX REV CODE 258: Performed by: STUDENT IN AN ORGANIZED HEALTH CARE EDUCATION/TRAINING PROGRAM

## 2025-04-22 PROCEDURE — 85027 COMPLETE CBC AUTOMATED: CPT

## 2025-04-22 RX ORDER — HEPARIN SODIUM 5000 [USP'U]/ML
5000 INJECTION, SOLUTION INTRAVENOUS; SUBCUTANEOUS EVERY 8 HOURS
Status: COMPLETED | OUTPATIENT
Start: 2025-04-22 | End: 2025-04-22

## 2025-04-22 RX ADMIN — ACETAMINOPHEN 1000 MG: 500 TABLET, FILM COATED ORAL at 06:21

## 2025-04-22 RX ADMIN — OXYCODONE 5 MG: 5 TABLET ORAL at 15:38

## 2025-04-22 RX ADMIN — INSULIN LISPRO 3 UNITS: 100 INJECTION, SOLUTION INTRAVENOUS; SUBCUTANEOUS at 00:43

## 2025-04-22 RX ADMIN — HYDROMORPHONE HYDROCHLORIDE 0.5 MG: 1 INJECTION, SOLUTION INTRAMUSCULAR; INTRAVENOUS; SUBCUTANEOUS at 17:50

## 2025-04-22 RX ADMIN — ATORVASTATIN CALCIUM 20 MG: 20 TABLET, FILM COATED ORAL at 17:27

## 2025-04-22 RX ADMIN — INSULIN LISPRO 4 UNITS: 100 INJECTION, SOLUTION INTRAVENOUS; SUBCUTANEOUS at 23:59

## 2025-04-22 RX ADMIN — HEPARIN SODIUM 5000 UNITS: 5000 INJECTION, SOLUTION INTRAVENOUS; SUBCUTANEOUS at 21:40

## 2025-04-22 RX ADMIN — INSULIN LISPRO 2 UNITS: 100 INJECTION, SOLUTION INTRAVENOUS; SUBCUTANEOUS at 06:17

## 2025-04-22 RX ADMIN — OXYCODONE 5 MG: 5 TABLET ORAL at 08:02

## 2025-04-22 RX ADMIN — HEPARIN SODIUM 5000 UNITS: 5000 INJECTION, SOLUTION INTRAVENOUS; SUBCUTANEOUS at 15:34

## 2025-04-22 RX ADMIN — ACETAMINOPHEN 1000 MG: 500 TABLET, FILM COATED ORAL at 17:27

## 2025-04-22 RX ADMIN — INSULIN LISPRO 3 UNITS: 100 INJECTION, SOLUTION INTRAVENOUS; SUBCUTANEOUS at 17:35

## 2025-04-22 RX ADMIN — CEFAZOLIN 2 G: 2 INJECTION, POWDER, FOR SOLUTION INTRAMUSCULAR; INTRAVENOUS at 12:17

## 2025-04-22 RX ADMIN — CEFAZOLIN 2 G: 2 INJECTION, POWDER, FOR SOLUTION INTRAMUSCULAR; INTRAVENOUS at 02:57

## 2025-04-22 RX ADMIN — CEFAZOLIN 2 G: 2 INJECTION, POWDER, FOR SOLUTION INTRAMUSCULAR; INTRAVENOUS at 19:59

## 2025-04-22 RX ADMIN — OXYCODONE HYDROCHLORIDE 10 MG: 10 TABLET ORAL at 22:55

## 2025-04-22 RX ADMIN — HYDROMORPHONE HYDROCHLORIDE 0.5 MG: 1 INJECTION, SOLUTION INTRAMUSCULAR; INTRAVENOUS; SUBCUTANEOUS at 00:20

## 2025-04-22 RX ADMIN — INSULIN LISPRO 4 UNITS: 100 INJECTION, SOLUTION INTRAVENOUS; SUBCUTANEOUS at 12:28

## 2025-04-22 RX ADMIN — ACETAMINOPHEN 1000 MG: 500 TABLET, FILM COATED ORAL at 12:20

## 2025-04-22 RX ADMIN — INSULIN GLARGINE-YFGN 10 UNITS: 100 INJECTION, SOLUTION SUBCUTANEOUS at 17:45

## 2025-04-22 ASSESSMENT — ENCOUNTER SYMPTOMS
EYES NEGATIVE: 1
CARDIOVASCULAR NEGATIVE: 1
CONSTITUTIONAL NEGATIVE: 1
NEUROLOGICAL NEGATIVE: 1
PSYCHIATRIC NEGATIVE: 1
RESPIRATORY NEGATIVE: 1
GASTROINTESTINAL NEGATIVE: 1

## 2025-04-22 ASSESSMENT — PAIN DESCRIPTION - PAIN TYPE
TYPE: ACUTE PAIN

## 2025-04-22 NOTE — CARE PLAN
The patient is Stable - Low risk of patient condition declining or worsening    Shift Goals  Clinical Goals: Revision of toe amputation of left foot procedure, post-op vitals, pain management  Patient Goals: Pain management for shooting pain  Family Goals: YARELI    Patient a/o x4. Patient denies pain. Patient uses urinal. Patient has been NPO and went down for revision of toe amputation.     Progress made toward(s) clinical / shift goals:    Problem: Knowledge Deficit - Standard  Goal: Patient and family/care givers will demonstrate understanding of plan of care, disease process/condition, diagnostic tests and medications  Outcome: Progressing     Problem: Fall Risk  Goal: Patient will remain free from falls  Outcome: Progressing     Problem: Hemodynamics  Goal: Patient's hemodynamics, fluid balance and neurologic status will be stable or improve  Outcome: Progressing     Problem: Fluid Volume  Goal: Fluid volume balance will be maintained  Outcome: Progressing     Problem: Urinary - Renal Perfusion  Goal: Ability to achieve and maintain adequate renal perfusion and functioning will improve  Outcome: Progressing     Problem: Respiratory  Goal: Patient will achieve/maintain optimum respiratory ventilation and gas exchange  Outcome: Progressing     Problem: Mechanical Ventilation  Goal: Safe management of artificial airway and ventilation  Outcome: Progressing  Goal: Successful weaning off mechanical ventilator, spontaneously maintains adequate gas exchange  Outcome: Progressing  Goal: Patient will be able to express needs and understand communication  Outcome: Progressing     Problem: Physical Regulation  Goal: Diagnostic test results will improve  Outcome: Progressing  Goal: Signs and symptoms of infection will decrease  Outcome: Progressing     Problem: Pain - Standard  Goal: Alleviation of pain or a reduction in pain to the patient’s comfort goal  Outcome: Progressing       Patient is not progressing towards the  following goals:

## 2025-04-22 NOTE — PROGRESS NOTES
4 Eyes Skin Assessment Completed by Jennifer Schoening, RN and Gary Singh, RN.    Head WDL  Ears WDL  Nose WDL  Mouth WDL  Neck WDL  Breast/Chest WDL  Shoulder Blades WDL  Spine Redness and Blanching  (R) Arm/Elbow/Hand Scab  (L) Arm/Elbow/Hand Scab  Abdomen WDL  Groin WDL  Scrotum/Coccyx/Buttocks WDL  (R) Leg WDL  (L) Leg WDL  (R) Heel/Foot/Toe WDL  (L) Heel/Foot/Toe Redness, Blanching, and Swelling, ace bandage and wound vac in place          Devices In Places BP cuff      Interventions In Place     Possible Skin Injury No    Pictures Uploaded Into Epic Yes, previously  Wound Consult Placed Yes, previously  RN Wound Prevention Protocol Ordered No

## 2025-04-22 NOTE — PROGRESS NOTES
[facility] Medicine Daily Progress Note    Date of Service  4/22/2025    Chief Complaint  Donald Fuller is a 54 y.o. male admitted 4/20/2025 with left 5th ray resection surgical site infection    Hospital Course  54 y.o. male with pmh IDDM2, Anxiety, HTN, S/P left 5th ray resection who presented 4/20/2025 with bleeding swelling and draining near amputation site that has been present past two days. Resection was done 2/28 for osteomyelitis, Cultures at that time grew E. Cloacae. Was discharged on 14 day course of augmentin.      Had sutures removed 5 days ago. Noted bleeding and swelling near amputation site 2 days ago, intermittent pain. Says he takes great care in not re-injuring foot, proper wound care. Has not noticed pus. Has had chills past 1 day. Nausea past Today.      He was admitted to the medical floor on 4/20/2025 for surgical site infection.  Prior to admission, blood cultures were collected and patient started on broad-spectrum IV antibiotics. Orthopedic surgery following for which patient underwent left foot fifth ray amputation with wound VAC placement on 4/21/2025.    4/20/2025 blood cultures growing MSSA.  Infect disease following first patient was transition to an Ancef antibiotic regimen.    Interval Problem Update  Evaluated at bedside  In NAD this AM, getting TTE  Postsurgical pain tolerable  Stable VS   ON 0.5L NC, titrate  On room air  CBC without leukocytosis  4/20 BCX w/MSSA  4/20 WCX w/NGTD  4/21 ORCX w/Gram stain+ few positive cocci   4/22 BCX w/NGTD  ID following  Continue IV cefazolin   Pending TTE    Orthopedic surgery following  Aim for OR on AM  Labs in a.m.    I have discussed this patient's plan of care and discharge plan at IDT rounds today with Case Management, Nursing, Nursing leadership, and other members of the IDT team.    Consultants/Specialty  Orthopedic surgery  ID    Code Status  DNAR/DNI    Disposition  The patient is not medically cleared for discharge to home or  a post-acute facility.     I have placed the appropriate orders for post-discharge needs.    Review of Systems  Review of Systems   Constitutional: Negative.    HENT: Negative.     Eyes: Negative.    Respiratory: Negative.     Cardiovascular: Negative.    Gastrointestinal: Negative.    Genitourinary: Negative.    Musculoskeletal:  Positive for joint pain.   Skin: Negative.    Neurological: Negative.    Endo/Heme/Allergies: Negative.    Psychiatric/Behavioral: Negative.          Physical Exam  Temp:  [36.1 °C (97 °F)-37.3 °C (99.2 °F)] 36.4 °C (97.6 °F)  Pulse:  [] 86  Resp:  [11-18] 18  BP: (105-149)/(68-93) 113/73  SpO2:  [90 %-99 %] 93 %    Physical Exam  Constitutional:       General: He is not in acute distress.     Appearance: Normal appearance.   HENT:      Head: Normocephalic and atraumatic.      Nose: Nose normal. No congestion.      Mouth/Throat:      Mouth: Mucous membranes are moist.   Eyes:      Extraocular Movements: Extraocular movements intact.      Pupils: Pupils are equal, round, and reactive to light.   Cardiovascular:      Rate and Rhythm: Normal rate and regular rhythm.      Pulses: Normal pulses.      Heart sounds: Normal heart sounds.   Pulmonary:      Effort: Pulmonary effort is normal.      Breath sounds: Normal breath sounds.   Abdominal:      General: Bowel sounds are normal.      Palpations: Abdomen is soft.      Tenderness: There is no abdominal tenderness.   Musculoskeletal:         General: Swelling present. Normal range of motion.      Cervical back: Normal range of motion and neck supple.      Comments: Left 5th ray resection, Wound 1cm anterolateral left foot, erythema   Skin:     General: Skin is warm.      Coloration: Skin is not jaundiced.   Neurological:      General: No focal deficit present.      Mental Status: He is alert and oriented to person, place, and time. Mental status is at baseline.      Cranial Nerves: No cranial nerve deficit.   Psychiatric:         Mood and  Affect: Mood normal.         Behavior: Behavior normal.         Thought Content: Thought content normal.         Judgment: Judgment normal.         Fluids    Intake/Output Summary (Last 24 hours) at 4/22/2025 1008  Last data filed at 4/22/2025 0759  Gross per 24 hour   Intake 740 ml   Output 1500 ml   Net -760 ml        Laboratory  Recent Labs     04/20/25 2113 04/21/25 0205 04/22/25  0527   WBC 10.3 8.7 8.2   RBC 5.72 4.74 4.62*   HEMOGLOBIN 16.6 13.6* 13.2*   HEMATOCRIT 49.3 41.0* 40.5*   MCV 86.2 86.5 87.7   MCH 29.0 28.7 28.6   MCHC 33.7 33.2 32.6   RDW 40.4 40.5 41.4   PLATELETCT 216 170 163*   MPV 10.0 10.7 10.1     Recent Labs     04/20/25 2113 04/21/25 0205 04/22/25 0527   SODIUM 135 134* 131*   POTASSIUM 4.8 4.3 4.1   CHLORIDE 97 100 101   CO2 23 20 21   GLUCOSE 325* 267* 218*   BUN 27* 23* 16   CREATININE 1.52* 1.17 0.94   CALCIUM 9.4 8.3* 8.5     Recent Labs     04/20/25 2113 04/21/25 0205   APTT  --  26.8   INR 1.07  --                Imaging  EC-ECHOCARDIOGRAM COMPLETE W/O CONT         DX-FOOT-COMPLETE 3+ LEFT   Final Result         1.  Slight permeative appearance of the lateral cortex of the fifth toe metatarsal waist, appearance raising concern for component of osteomyelitis.   2.  Soft tissue gas at the amputation site, consider underlying infectious etiology.      DX-CHEST-PORTABLE (1 VIEW)   Final Result         1.  No acute cardiopulmonary disease.           Assessment/Plan  * MSSA bacteremia  Assessment & Plan  4/20 BCX w/MSSA  4/20 WCX w/NGTD  4/21 ORCX w/Gram stain+ few positive cocci   4/22 BCX w/NGTD  ID following  Continue IV cefazolin   Pending TTE    Osteomyelitis of ankle or foot, acute, left (HCC)- (present on admission)  Assessment & Plan  2/28 Left small toe ray resection  for osteomyelitis. Wcx grew MSSA. L foot xray concerning for osteo of left lateral foot near resection site. Received Unasyn and Vancomycin in ED. Ortho consult in ED, to see in AM. S/P Sepsis protocol for  SIRS 2/4 and this as likely source of infection. Not febrile, no leukocytosis at presentation.    IV ABX per ID  Wound care  Orthopedic surgery following  Aim for OR on AM    Type 2 diabetes mellitus with hyperglycemia, with long-term current use of insulin (HCC)- (present on admission)  Assessment & Plan  A1c was 13.9 in FEB 2024. On insulin. Reports compliance with medication and significant diet adjustment since February. Glucose 325 on admission.  -Repeat A1C  -Glargine 10. Goal blood glucose 140-180 given active infection.  -SSI  -Hypoglycemia protocol    Sepsis (HCC)  Assessment & Plan  Resolved    Hypertension- (present on admission)  Assessment & Plan  -Hold home losartan given DOMINIC at presentation    Acute renal failure (HCC)- (present on admission)  Assessment & Plan  Resolved    Anxiety- (present on admission)  Assessment & Plan  Mood ok.  -Continue home venlafaxine       VTE prophylaxis: Heparin ppx, DC at [facility], aim for OR on AM    I have performed a physical exam and reviewed and updated ROS and Plan today (4/22/2025). In review of yesterday's note (4/21/2025), there are no changes except as documented above.    Greater than 51 minutes spent prepping to see patient (e.g. review of tests) obtaining and/or reviewing separately obtained history. Performing a medically appropriate examination and/ evaluation.  Counseling and educating the patient/family/caregiver.  Ordering medications, tests, or procedures.  Referring and communicating with other health care professionals.  Documenting clinical information in EPIC.  Independently interpreting results and communicating results to patient/family/caregiver.  Care coordination

## 2025-04-22 NOTE — PROGRESS NOTES
4 Eyes Skin Assessment Completed by ARCHANA Vega and ARCHANA Jurado.    Head WDL  Ears WDL  Nose WDL  Mouth WDL  Neck WDL  Breast/Chest WDL  Shoulder Blades WDL  Spine Redness and Blanching  (R) Arm/Elbow/Hand Scab  (L) Arm/Elbow/Hand Scab  Abdomen WDL  Groin WDL  Scrotum/Coccyx/Buttocks WDL  (R) Leg WDL  (L) Leg WDL   (R) Heel/Foot/Toe WDL  (L) Heel/Foot/Toe Redness, Blanching, and SwellingDressing in place with wound vac.          Devices In Places Pulse Ox, SCD's, and Nasal Cannula      Interventions In Place NC W/Ear Foams and Pillows    Possible Skin Injury No    Pictures Uploaded Into Epic N/A  Wound Consult Placed N/A  RN Wound Prevention Protocol Ordered No

## 2025-04-22 NOTE — ANESTHESIA POSTPROCEDURE EVALUATION
Patient: Donald Fuller    Procedure Summary       Date: 04/21/25 Room / Location: [place] [address] / SURGERY [facility]    Anesthesia Start: 1603 Anesthesia Stop: 1648    Procedure: AMPUTATION REVISION, RAY AMPUTATION (Left: Foot) Diagnosis: (wound dehiscence)    Surgeons: Nik Andersen M.D. Responsible Provider: Oneida Cárdenas M.D.    Anesthesia Type: general ASA Status: 3            Final Anesthesia Type: general  Last vitals  BP   Blood Pressure: 116/69    Temp   36.8 °C (98.3 °F)    Pulse   79   Resp   (!) 11    SpO2   96 %      Anesthesia Post Evaluation    Patient location during evaluation: PACU  Patient participation: complete - patient participated  Level of consciousness: awake and alert    Airway patency: patent  Anesthetic complications: no  Cardiovascular status: hemodynamically stable  Respiratory status: acceptable  Hydration status: euvolemic    PONV: none          No notable events documented.     Nurse Pain Score: 3 (NPRS)

## 2025-04-22 NOTE — CARE PLAN
The patient is Stable - Low risk of patient condition declining or worsening    Shift Goals  Clinical Goals: Revision of toe amputation of left foot procedure, post-op vitals, pain management  Patient Goals: Pain management for shooting pain  Family Goals: YARELI    Progress made toward(s) clinical / shift goals:  Aox4. Wound vac in place and draining. SCD on right leg. With c/o headache, managed per MAR. Refused bed alarm. Education provided regarding fall risk. Urinal provided. Bed in lowest position.    Patient is not progressing towards the following goals:

## 2025-04-22 NOTE — CONSULTS
INFECTIOUS DISEASES INPATIENT CONSULT NOTE     Date of Service: 4/22/2025    Consult Requested By: Eric Douglass M.D.    Reason for Consultation: MSSA bacteremia, diabetic foot infection    History of Present Illness:   Donald Fuller is a 54 y.o. male with a history of uncontrolled type 2 diabetes mellitus, recent hospitalization in February 2025 secondary to left fifth toe osteomyelitis status post left fifth toe ray resection on 2/28/2025 with pathology showing clear margins admitted 4/20/2025 secondary to swelling and drainage at the amputation site.  Extensive review of emergency physician notes, hospital medicine notes and consultant notes performed.  Patient had associated fevers and chills.  X-ray of the left foot showed slight permeative appearance of the lateral cortex of the fifth toe metatarsal waist concerning for osteomyelitis. Blood cultures are now positive for MSSA.  Patient underwent left foot fifth ray amputation with wound VAC placement on 4/21/2025.  Dissection was carried down to the level of the metatarsal.  Patient will plan to return to the OR on Wednesday for repeat I&D and closure of the left foot.  Pathology pending.  Patient is currently on IV cefazolin.  Repeat blood cultures are negative to date.  Echo pending.  Infectious disease service consulted for antibiotic recommendations.      All other review of systems reviewed and negative except those documented above in the HPI.     PMH:   Past Medical History:   Diagnosis Date    Diabetes (HCC)        PSH:  Past Surgical History:   Procedure Laterality Date    FOOT AMPUTATION Left 2/28/2025    Procedure: AMPUTATION, FOOT-4TH AND 5TH RAY RESECTION;  Surgeon: Jason Davis M.D.;  Location: SURGERY [facility];  Service: Orthopedics       FAMILY HX:  Reviewed and not pertinent to the patient's clinical presentation    SOCIAL HX:  Social History     Socioeconomic History    Marital status: Single     Spouse name:  Not on file    Number of children: Not on file    Years of education: Not on file    Highest education level: Bachelor's degree (e.g., BA, AB, BS)   Occupational History    Not on file   Tobacco Use    Smoking status: Never    Smokeless tobacco: Never   Vaping Use    Vaping status: Some Days    Substances: Nicotine   Substance and Sexual Activity    Alcohol use: Never    Drug use: Never    Sexual activity: Never   Other Topics Concern    Not on file   Social History Narrative    Not on file     Social Drivers of Health     Financial Resource Strain: Low Risk  (8/25/2022)    Overall Financial Resource Strain (CARDIA)     Difficulty of Paying Living Expenses: Not hard at all   Food Insecurity: No Food Insecurity (4/21/2025)    Hunger Vital Sign     Worried About Running Out of Food in the Last Year: Never true     Ran Out of Food in the Last Year: Never true   Transportation Needs: No Transportation Needs (4/21/2025)    PRAPARE - Transportation     Lack of Transportation (Medical): No     Lack of Transportation (Non-Medical): No   Physical Activity: Inactive (8/25/2022)    Exercise Vital Sign     Days of Exercise per Week: 0 days     Minutes of Exercise per Session: 30 min   Stress: No Stress Concern Present (8/25/2022)    [employer] - Occupational Stress Questionnaire     Feeling of Stress : Not at all   Social Connections: Socially Isolated (8/25/2022)    Social Connection and Isolation Panel [NHANES]     Frequency of Communication with Friends and Family: Once a week     Frequency of Social Gatherings with Friends and Family: Never     Attends Congregation Services: Never     Active Member of Clubs or Organizations: No     Attends Club or Organization Meetings: Never     Marital Status: Living with partner   Intimate Partner Violence: Not At Risk (4/21/2025)    Humiliation, Afraid, Rape, and Kick questionnaire     Fear of Current or Ex-Partner: No     Emotionally Abused: No     Physically  Abused: No     Sexually Abused: No   Housing Stability: High Risk (4/21/2025)    Housing Stability Vital Sign     Unable to Pay for Housing in the Last Year: No     Number of Times Moved in the Last Year: 2     Homeless in the Last Year: No     Social History     Tobacco Use   Smoking Status Never   Smokeless Tobacco Never     Social History     Substance and Sexual Activity   Alcohol Use Never       Allergies/Intolerances:  No Known Allergies    History reviewed with the patient     Other Current Medications:    Current Facility-Administered Medications:     insulin GLARGINE (Lantus,Semglee) injection, 10 Units, Subcutaneous, Q EVENING, 10 Units at 04/21/25 0231 **AND** insulin lispro (HumaLOG,AdmeLOG) subcutaneous injection, 1-6 Units, Subcutaneous, Q6HRS, 2 Units at 04/22/25 0617 **AND** POC blood glucose manual result, , , Q6H **AND** NOTIFY MD and PharmD, , , Once **AND** Administer 20 grams of glucose (approximately 8 ounces of fruit juice) every 15 minutes PRN FSBG less than 70 mg/dL, , , PRN **AND** dextrose 50 % (D50W) injection 25 g, 25 g, Intravenous, Q15 MIN PRN, Jesus Irving M.D.    NS infusion, , Intravenous, Continuous, Vicente Padron D.O., Last Rate: 30 mL/hr at 04/21/25 0753, New Bag at 04/21/25 0753    acetaminophen (Tylenol) tablet 1,000 mg, 1,000 mg, Oral, TID, Eric Douglass M.D., 1,000 mg at 04/22/25 0621    ceFAZolin (Ancef) 2 g in  mL IVPB, 2 g, Intravenous, Q8HRS, Eric Douglass M.D., Stopped at 04/22/25 0327    Pharmacy Consult Request ...Pain Management Review 1 Each, 1 Each, Other, PHARMACY TO DOSE, Jesus Irving M.D.    oxyCODONE immediate-release (Roxicodone) tablet 5 mg, 5 mg, Oral, Q3HRS PRN, 5 mg at 04/22/25 0802 **OR** oxyCODONE immediate release (Roxicodone) tablet 10 mg, 10 mg, Oral, Q3HRS PRN, 10 mg at 04/21/25 0026 **OR** HYDROmorphone (Dilaudid) injection 0.5 mg, 0.5 mg, Intravenous, Q3HRS PRN, Jesus Irving M.D., 0.5 mg at 04/22/25 0020    " atorvastatin (Lipitor) tablet 20 mg, 20 mg, Oral, Q EVENING, Jesus Irving M.D.    hydrOXYzine HCl (Atarax) tablet 25 mg, 25 mg, Oral, TID PRN, Jesus Irving M.D.  [unfilled]    Most Recent Vital Signs:  /73   Pulse 86   Temp 36.4 °C (97.6 °F) (Temporal)   Resp 16   Ht 1.854 m (6' 1\")   Wt 98.4 kg (216 lb 14.9 oz)   SpO2 93%   BMI 28.62 kg/m²   Temp  Av.5 °C (97.7 °F)  Min: 35.9 °C (96.6 °F)  Max: 37.7 °C (99.8 °F)    Physical Exam:  General: well nourished, no diaphoresis, well-appearing, no acute distress  HEENT: sclera anicteric, PERRL, extraocular muscles intact, mucous membranes moist, oropharynx clear and moist, no oral lesions or exudate  Neck: supple, no lymphadenopathy  Chest: CTAB, no rales, rhonchi or wheezes, normal work of breathing.  Cardiac: regular rate and rhythm, normal S1 S2, no murmurs, rubs or gallops  Abdomen: + bowel sounds, soft, non-tender, non-distended, no hepatosplenomegaly  Extremities: Left foot in surgical dressing with Hemovac in place  Skin: warm and dry, no rashes or worrisome lesions  Neuro: Alert and oriented times 3, non-focal exam, speech fluent, full range of motion to bilateral upper and lower extremities  Psych: normal mood and behavior, pleasant; memory intact, normal judgement    Pertinent Lab Results:  Recent Labs     25   WBC 10.3 8.7 8.2      Recent Labs     25   HEMOGLOBIN 16.6 13.6* 13.2*   HEMATOCRIT 49.3 41.0* 40.5*   MCV 86.2 86.5 87.7   MCH 29.0 28.7 28.6   PLATELETCT 216 170 163*         Recent Labs     04/20/25  2113 04/21/25  0205 04/22/25  0527   SODIUM 135 134* 131*   POTASSIUM 4.8 4.3 4.1   CHLORIDE 97 100 101   CO2 23 20 21   CREATININE 1.52* 1.17 0.94        Recent Labs     25  2113 25  0205 25  0527   ALBUMIN 3.8 3.1* 3.0*        Pertinent Micro:  Results       Procedure Component Value Units Date/Time    BLOOD CULTURE " [[id number]] Collected: 04/22/25 0527    Order Status: Completed Specimen: Blood from Peripheral Updated: 04/22/25 0808     Significant Indicator NEG     Source BLD     Site PERIPHERAL     Culture Result No Growth  Note: Blood cultures are incubated for 5 days and  are monitored continuously.Positive blood cultures  are called to the RN and reported as soon as  they are identified.      BLOOD CULTURE [[id number]] Collected: 04/22/25 0527    Order Status: Completed Specimen: Blood from Peripheral Updated: 04/22/25 0808     Significant Indicator NEG     Source BLD     Site PERIPHERAL     Culture Result No Growth  Note: Blood cultures are incubated for 5 days and  are monitored continuously.Positive blood cultures  are called to the RN and reported as soon as  they are identified.      GRAM STAIN [[id number]] Collected: 04/21/25 1624    Order Status: Completed Specimen: Bone Updated: 04/21/25 2356     Significant Indicator .     Source BONE     Site left foot     Gram Stain Result Few WBCs.  Few Gram positive cocci.      CULTURE TISSUE W/ GRM STAIN [[id number]] Collected: 04/21/25 1624    Order Status: No result Specimen: Bone Updated: 04/21/25 2356     Significant Indicator NEG     Source BONE     Site left foot     Culture Result -     Gram Stain Result Few WBCs.  Few Gram positive cocci.      Anaerobic Culture [[id number]] Collected: 04/21/25 1624    Order Status: No result Specimen: Bone Updated: 04/21/25 2356     Significant Indicator NEG     Source BONE     Site left foot     Culture Result -    Blood Culture - Draw one from central line and one from peripheral site [[id number]]  (Abnormal) Collected: 04/20/25 2113    Order Status: Completed Specimen: Blood from Line Updated: 04/21/25 1303     Significant Indicator POS     Source BLD     Site Peripheral     Culture Result Growth detected by automated blood culture system.  Gram Stain: Gram positive cocci in clusters.  Staphylococcus aureus (methicillin  "sensitive)  detected by PCR.  Susceptibility to follow.      Blood Culture - Draw one from central line and one from peripheral site [[id number]]  (Abnormal) Collected: 04/20/25 2145    Order Status: Completed Specimen: Blood from Peripheral Updated: 04/21/25 1240     Significant Indicator POS     Source BLD     Site PERIPHERAL     Culture Result Growth detected by automated blood culture system. @Gram  Stain: Gram positive cocci in clusters.      Urinalysis [[id number]]  (Abnormal) Collected: 04/21/25 0740    Order Status: Completed Specimen: Urine Updated: 04/21/25 0811     Color Yellow     Character Clear     Specific Gravity 1.028     Ph 6.0     Glucose >=1000 mg/dL      Ketones 40 mg/dL      Protein 100 mg/dL      Bilirubin Negative     Urobilinogen, Urine 1.0 EU/dL      Nitrite Negative     Leukocyte Esterase Negative     Occult Blood Moderate     Micro Urine Req Microscopic    Urine Culture (New) [[id number]] Collected: 04/21/25 0740    Order Status: Sent Specimen: Urine Updated: 04/21/25 0748    Culture Wound With Gram Stain [[id number]] Collected: 04/20/25 2145    Order Status: Sent Specimen: Wound from Left Foot Updated: 04/21/25 0302     Significant Indicator NEG     Source WND     Site LEFT FOOT     Culture Result -     Gram Stain Result Few WBCs.  Many Gram positive cocci.      GRAM STAIN [[id number]] Collected: 04/20/25 2145    Order Status: Completed Specimen: Wound Updated: 04/21/25 0302     Significant Indicator .     Source WND     Site LEFT FOOT     Gram Stain Result Few WBCs.  Many Gram positive cocci.      Blood Culture [[id number]]     Order Status: Canceled Specimen: Blood from Peripheral     Blood Culture [[id number]]     Order Status: Canceled Specimen: Blood from Line     MRSA By PCR (Amp) [[id number]] Collected: 04/20/25 2145    Order Status: Completed Specimen: Respirate from Nares Updated: 04/20/25 2326     MRSA by PCR Negative          No results found for: \"BLOODCULTU\", \"BLDCULT\", \"BCHOLD\" "     Studies:  DX-FOOT-COMPLETE 3+ LEFT  Result Date: 4/20/2025 4/20/2025 9:28 PM HISTORY/REASON FOR EXAM: Atraumatic Pain/Swelling/Deformity TECHNIQUE/EXAM DESCRIPTION:  AP, lateral, and oblique views of the LEFT foot. COMPARISON:  February 26, 2025 FINDINGS: Postoperative changes of transmetatarsal amputation of the fifth toe is seen. Slight permeative appearance of the lateral cortex of the fifth toe metatarsal waist is seen. There is soft tissue gas in small bony fragments adjacent to the fracture site.     1.  Slight permeative appearance of the lateral cortex of the fifth toe metatarsal waist, appearance raising concern for component of osteomyelitis. 2.  Soft tissue gas at the amputation site, consider underlying infectious etiology.    DX-CHEST-PORTABLE (1 VIEW)  Result Date: 4/20/2025 4/20/2025 9:28 PM HISTORY/REASON FOR EXAM: Sepsis TECHNIQUE/EXAM DESCRIPTION:  Single AP view of the chest. COMPARISON: None FINDINGS: Overlying cardiac leads are present. The cardiac silhouette appears within normal limits. The mediastinal contour appears within normal limits.  The central pulmonary vasculature appears normal. The lungs appear well expanded bilaterally.  Bilateral lungs are clear. No significant pleural effusions are identified. The bony structures appear age-appropriate.     1.  No acute cardiopulmonary disease.      IMPRESSION:   MSSA bacteremia  Left foot osteomyelitis status post left fifth ray amputation on 4/21  Thrombocytopenia  History of left fifth toe amputation in February  Uncontrolled type 2 diabetes mellitus, hemoglobin A1c 12.9      ASSESSMENT/PLAN:   Donald Fuller is a 54 y.o. male with history of uncontrolled type 2 diabetes mellitus, prior left fifth toe amputation in February admitted on 4/20/2025 secondary to worsening left foot swelling and drainage of prior amputation site.  X-ray revealed findings concerning for osteomyelitis.  He is now status post left fifth ray  amputation with wound VAC placement on 4/21/2025.  Hospital course complicated by MSSA bacteremia.  He will return to the OR on 4/23 for repeat I&D and possible closure.    -Continue IV cefazolin 2 g every 8 hours  -Blood cultures x 2 on 4/20+ MSSA  -Repeat blood cultures on 4/22-negative to date  -OR cultures-pending.  Gram stain+ few positive cocci  -Pathology-pending  -Surgery following-will plan to return to the OR for repeat I&D on 4/23  -Continue to monitor for any new joint pain or back pain-patient currently denies  -Diabetes education and blood sugar control  -Recommend referral to endocrinologist  -Anticipate a 4 to 6-week course of IV antibiotics    This infection poses a threat to life and further limb function    Disposition: Patient [facility]    Need for PICC line: Yes, once repeat blood cultures have been negative for at least 48 hours    Follow-up in ID clinic with NP    Plan of care discussed with IM Eric Douglass M.D.. Will continue to follow    Marisol Garcia M.D.      Please note that this dictation was created using voice recognition software. I have worked with [facility] to optimize the interface.  I have made every reasonable attempt to correct obvious errors, but there may be errors of grammar and possibly content that I did not discover before finalizing the note.

## 2025-04-22 NOTE — PROGRESS NOTES
"      Orthopaedic Progress Note    Interval changes:  Patient doing well post op  LLE vac in place CDI without leak  Return to OR tomorrow  NPO after midnight     ROS - Patient denies any new issues.  Pain well controlled.    /76   Pulse 90   Temp 36.5 °C (97.7 °F) (Temporal)   Resp 17   Ht 1.854 m (6' 1\")   Wt 98.4 kg (216 lb 14.9 oz)   SpO2 95%     Patient seen and examined  No acute distress  Breathing non labored  RRR  LLE vac in placd CDI without leak, DNVI, moves all remaining toes, cap refill <2 sec.     Recent Labs     04/20/25  2113 04/21/25  0205 04/22/25  0527   WBC 10.3 8.7 8.2   RBC 5.72 4.74 4.62*   HEMOGLOBIN 16.6 13.6* 13.2*   HEMATOCRIT 49.3 41.0* 40.5*   MCV 86.2 86.5 87.7   MCH 29.0 28.7 28.6   MCHC 33.7 33.2 32.6   RDW 40.4 40.5 41.4   PLATELETCT 216 170 163*   MPV 10.0 10.7 10.1       Active Hospital Problems    Diagnosis     Sepsis ([facility]) [A41.9]     MSSA bacteremia [R78.81, B95.61]     Osteomyelitis of ankle or foot, acute, left ([facility]) [M86.172]     Hypertension [I10]     Acute renal failure ([facility]) [N17.9]     Anxiety [F41.9]     Type 2 diabetes mellitus with hyperglycemia, with long-term current use of insulin ([facility]) [E11.65, Z79.4]        Assessment/Plan:  Patient doing well post op  LLE vac in place CDI without leak  Return to OR tomorrow  NPO after midnight   POD#1 S/P:  Left foot fifth ray amputation  Wound vac application <50cm2  Wt bearing status - Heel WB   Wound care/Drains - Dressings to be changed every other day by nursing. Or PRN for saturation    Future Procedures - tomorrow   Sutures/Staples out- 14-21 days post operatively. Removal by ortho mid levels only.  PT/OT-initiated  Antibiotics: ancef 2g IV Q8  DVT Prophylaxis- TEDS/SCDs/Foot pumps/heparin  Sainz-not needed per ortho  Case Coordination for Discharge Planning - Disposition per therapy recs.    "

## 2025-04-22 NOTE — CARE PLAN
The patient is Stable - Low risk of patient condition declining or worsening    Shift Goals  Clinical Goals: Monitor wound vac and pain management  Patient Goals: Pain management  Family Goals: YARELI    Patient a/o x4. Patient stated pain 6/10. Pain medication given. Wound vac is in place and patent. Call light and belongings are within reach. Bed is in low locked position.     Progress made toward(s) clinical / shift goals:    Problem: Knowledge Deficit - Standard  Goal: Patient and family/care givers will demonstrate understanding of plan of care, disease process/condition, diagnostic tests and medications  Outcome: Progressing     Problem: Fall Risk  Goal: Patient will remain free from falls  Outcome: Progressing     Problem: Hemodynamics  Goal: Patient's hemodynamics, fluid balance and neurologic status will be stable or improve  Outcome: Progressing     Problem: Fluid Volume  Goal: Fluid volume balance will be maintained  Outcome: Progressing     Problem: Urinary - Renal Perfusion  Goal: Ability to achieve and maintain adequate renal perfusion and functioning will improve  Outcome: Progressing     Problem: Respiratory  Goal: Patient will achieve/maintain optimum respiratory ventilation and gas exchange  Outcome: Progressing     Problem: Mechanical Ventilation  Goal: Safe management of artificial airway and ventilation  Outcome: Progressing  Goal: Successful weaning off mechanical ventilator, spontaneously maintains adequate gas exchange  Outcome: Progressing  Goal: Patient will be able to express needs and understand communication  Outcome: Progressing     Problem: Physical Regulation  Goal: Diagnostic test results will improve  Outcome: Progressing  Goal: Signs and symptoms of infection will decrease  Outcome: Progressing     Problem: Pain - Standard  Goal: Alleviation of pain or a reduction in pain to the patient’s comfort goal  Outcome: Progressing       Patient is not progressing towards the following  goals:

## 2025-04-23 ENCOUNTER — APPOINTMENT (OUTPATIENT)
Dept: RADIOLOGY | Facility: MEDICAL CENTER | Age: 55
End: 2025-04-23
Attending: STUDENT IN AN ORGANIZED HEALTH CARE EDUCATION/TRAINING PROGRAM

## 2025-04-23 ENCOUNTER — SURGERY (OUTPATIENT)
Age: 55
End: 2025-04-23
Payer: COMMERCIAL

## 2025-04-23 ENCOUNTER — ANESTHESIA (OUTPATIENT)
Dept: SURGERY | Facility: MEDICAL CENTER | Age: 55
End: 2025-04-23

## 2025-04-23 LAB
ALBUMIN SERPL BCP-MCNC: 3.1 G/DL (ref 3.2–4.9)
ALBUMIN/GLOB SERPL: 1.1 G/DL
ALP SERPL-CCNC: 69 U/L (ref 30–99)
ALT SERPL-CCNC: 12 U/L (ref 2–50)
ANION GAP SERPL CALC-SCNC: 13 MMOL/L (ref 7–16)
AST SERPL-CCNC: 11 U/L (ref 12–45)
BACTERIA BLD CULT: ABNORMAL
BACTERIA TISS AEROBE CULT: ABNORMAL
BACTERIA TISS AEROBE CULT: ABNORMAL
BACTERIA UR CULT: NORMAL
BACTERIA WND AEROBE CULT: ABNORMAL
BACTERIA WND AEROBE CULT: ABNORMAL
BILIRUB SERPL-MCNC: 0.2 MG/DL (ref 0.1–1.5)
BUN SERPL-MCNC: 15 MG/DL (ref 8–22)
CALCIUM ALBUM COR SERPL-MCNC: 9.6 MG/DL (ref 8.5–10.5)
CALCIUM SERPL-MCNC: 8.9 MG/DL (ref 8.5–10.5)
CHLORIDE SERPL-SCNC: 100 MMOL/L (ref 96–112)
CO2 SERPL-SCNC: 22 MMOL/L (ref 20–33)
CREAT SERPL-MCNC: 1.01 MG/DL (ref 0.5–1.4)
ERYTHROCYTE [DISTWIDTH] IN BLOOD BY AUTOMATED COUNT: 39.1 FL (ref 35.9–50)
GFR SERPLBLD CREATININE-BSD FMLA CKD-EPI: 88 ML/MIN/1.73 M 2
GLOBULIN SER CALC-MCNC: 2.7 G/DL (ref 1.9–3.5)
GLUCOSE BLD STRIP.AUTO-MCNC: 241 MG/DL (ref 65–99)
GLUCOSE BLD STRIP.AUTO-MCNC: 296 MG/DL (ref 65–99)
GLUCOSE BLD STRIP.AUTO-MCNC: 301 MG/DL (ref 65–99)
GLUCOSE BLD STRIP.AUTO-MCNC: 387 MG/DL (ref 65–99)
GLUCOSE SERPL-MCNC: 306 MG/DL (ref 65–99)
GRAM STN SPEC: ABNORMAL
GRAM STN SPEC: ABNORMAL
HCT VFR BLD AUTO: 39.4 % (ref 42–52)
HGB BLD-MCNC: 13.4 G/DL (ref 14–18)
MCH RBC QN AUTO: 29.1 PG (ref 27–33)
MCHC RBC AUTO-ENTMCNC: 34 G/DL (ref 32.3–36.5)
MCV RBC AUTO: 85.5 FL (ref 81.4–97.8)
PLATELET # BLD AUTO: 177 K/UL (ref 164–446)
PMV BLD AUTO: 10.3 FL (ref 9–12.9)
POTASSIUM SERPL-SCNC: 3.9 MMOL/L (ref 3.6–5.5)
PROT SERPL-MCNC: 5.8 G/DL (ref 6–8.2)
RBC # BLD AUTO: 4.61 M/UL (ref 4.7–6.1)
SIGNIFICANT IND 70042: ABNORMAL
SIGNIFICANT IND 70042: NORMAL
SITE SITE: ABNORMAL
SITE SITE: NORMAL
SODIUM SERPL-SCNC: 135 MMOL/L (ref 135–145)
SOURCE SOURCE: ABNORMAL
SOURCE SOURCE: NORMAL
WBC # BLD AUTO: 7.4 K/UL (ref 4.8–10.8)

## 2025-04-23 PROCEDURE — 99233 SBSQ HOSP IP/OBS HIGH 50: CPT | Performed by: STUDENT IN AN ORGANIZED HEALTH CARE EDUCATION/TRAINING PROGRAM

## 2025-04-23 PROCEDURE — 700111 HCHG RX REV CODE 636 W/ 250 OVERRIDE (IP): Performed by: STUDENT IN AN ORGANIZED HEALTH CARE EDUCATION/TRAINING PROGRAM

## 2025-04-23 PROCEDURE — 160036 HCHG PACU - EA ADDL 30 MINS PHASE I: Performed by: ORTHOPAEDIC SURGERY

## 2025-04-23 PROCEDURE — 36415 COLL VENOUS BLD VENIPUNCTURE: CPT

## 2025-04-23 PROCEDURE — 160035 HCHG PACU - 1ST 60 MINS PHASE I: Performed by: ORTHOPAEDIC SURGERY

## 2025-04-23 PROCEDURE — A9270 NON-COVERED ITEM OR SERVICE: HCPCS

## 2025-04-23 PROCEDURE — 700111 HCHG RX REV CODE 636 W/ 250 OVERRIDE (IP): Mod: JZ

## 2025-04-23 PROCEDURE — 160015 HCHG STAT PREOP MINUTES: Performed by: ORTHOPAEDIC SURGERY

## 2025-04-23 PROCEDURE — 700105 HCHG RX REV CODE 258: Performed by: STUDENT IN AN ORGANIZED HEALTH CARE EDUCATION/TRAINING PROGRAM

## 2025-04-23 PROCEDURE — 700117 HCHG RX CONTRAST REV CODE 255: Performed by: STUDENT IN AN ORGANIZED HEALTH CARE EDUCATION/TRAINING PROGRAM

## 2025-04-23 PROCEDURE — 71275 CT ANGIOGRAPHY CHEST: CPT

## 2025-04-23 PROCEDURE — 85027 COMPLETE CBC AUTOMATED: CPT

## 2025-04-23 PROCEDURE — 700111 HCHG RX REV CODE 636 W/ 250 OVERRIDE (IP): Mod: JZ | Performed by: ORTHOPAEDIC SURGERY

## 2025-04-23 PROCEDURE — 13160 SEC CLSR SURG WND/DEHSN XTN: CPT | Mod: 58 | Performed by: ORTHOPAEDIC SURGERY

## 2025-04-23 PROCEDURE — 770006 HCHG ROOM/CARE - MED/SURG/GYN SEMI*

## 2025-04-23 PROCEDURE — 160027 HCHG SURGERY MINUTES - 1ST 30 MINS LEVEL 2: Performed by: ORTHOPAEDIC SURGERY

## 2025-04-23 PROCEDURE — A9270 NON-COVERED ITEM OR SERVICE: HCPCS | Performed by: STUDENT IN AN ORGANIZED HEALTH CARE EDUCATION/TRAINING PROGRAM

## 2025-04-23 PROCEDURE — 82962 GLUCOSE BLOOD TEST: CPT | Mod: 91

## 2025-04-23 PROCEDURE — 160009 HCHG ANES TIME/MIN: Performed by: ORTHOPAEDIC SURGERY

## 2025-04-23 PROCEDURE — 700102 HCHG RX REV CODE 250 W/ 637 OVERRIDE(OP)

## 2025-04-23 PROCEDURE — 160048 HCHG OR STATISTICAL LEVEL 1-5: Performed by: ORTHOPAEDIC SURGERY

## 2025-04-23 PROCEDURE — 700101 HCHG RX REV CODE 250: Performed by: ORTHOPAEDIC SURGERY

## 2025-04-23 PROCEDURE — 0JBR0ZZ EXCISION OF LEFT FOOT SUBCUTANEOUS TISSUE AND FASCIA, OPEN APPROACH: ICD-10-PCS | Performed by: ORTHOPAEDIC SURGERY

## 2025-04-23 PROCEDURE — 80053 COMPREHEN METABOLIC PANEL: CPT

## 2025-04-23 PROCEDURE — 160002 HCHG RECOVERY MINUTES (STAT): Performed by: ORTHOPAEDIC SURGERY

## 2025-04-23 PROCEDURE — 160038 HCHG SURGERY MINUTES - EA ADDL 1 MIN LEVEL 2: Performed by: ORTHOPAEDIC SURGERY

## 2025-04-23 PROCEDURE — 700101 HCHG RX REV CODE 250: Performed by: STUDENT IN AN ORGANIZED HEALTH CARE EDUCATION/TRAINING PROGRAM

## 2025-04-23 PROCEDURE — 700102 HCHG RX REV CODE 250 W/ 637 OVERRIDE(OP): Performed by: STUDENT IN AN ORGANIZED HEALTH CARE EDUCATION/TRAINING PROGRAM

## 2025-04-23 RX ORDER — ALBUTEROL SULFATE 5 MG/ML
2.5 SOLUTION RESPIRATORY (INHALATION)
Status: DISCONTINUED | OUTPATIENT
Start: 2025-04-23 | End: 2025-04-23 | Stop reason: HOSPADM

## 2025-04-23 RX ORDER — LIDOCAINE HYDROCHLORIDE 20 MG/ML
INJECTION, SOLUTION EPIDURAL; INFILTRATION; INTRACAUDAL; PERINEURAL PRN
Status: DISCONTINUED | OUTPATIENT
Start: 2025-04-23 | End: 2025-04-23 | Stop reason: SURG

## 2025-04-23 RX ORDER — HYDROMORPHONE HYDROCHLORIDE 1 MG/ML
0.1 INJECTION, SOLUTION INTRAMUSCULAR; INTRAVENOUS; SUBCUTANEOUS
Status: DISCONTINUED | OUTPATIENT
Start: 2025-04-23 | End: 2025-04-23 | Stop reason: HOSPADM

## 2025-04-23 RX ORDER — HYDROMORPHONE HYDROCHLORIDE 1 MG/ML
0.2 INJECTION, SOLUTION INTRAMUSCULAR; INTRAVENOUS; SUBCUTANEOUS
Status: DISCONTINUED | OUTPATIENT
Start: 2025-04-23 | End: 2025-04-23 | Stop reason: HOSPADM

## 2025-04-23 RX ORDER — SODIUM CHLORIDE, SODIUM LACTATE, POTASSIUM CHLORIDE, CALCIUM CHLORIDE 600; 310; 30; 20 MG/100ML; MG/100ML; MG/100ML; MG/100ML
INJECTION, SOLUTION INTRAVENOUS
Status: DISCONTINUED | OUTPATIENT
Start: 2025-04-23 | End: 2025-04-23 | Stop reason: SURG

## 2025-04-23 RX ORDER — CEFAZOLIN SODIUM 1 G/3ML
INJECTION, POWDER, FOR SOLUTION INTRAMUSCULAR; INTRAVENOUS PRN
Status: DISCONTINUED | OUTPATIENT
Start: 2025-04-23 | End: 2025-04-23 | Stop reason: SURG

## 2025-04-23 RX ORDER — LABETALOL HYDROCHLORIDE 5 MG/ML
5 INJECTION, SOLUTION INTRAVENOUS
Status: DISCONTINUED | OUTPATIENT
Start: 2025-04-23 | End: 2025-04-23 | Stop reason: HOSPADM

## 2025-04-23 RX ORDER — ONDANSETRON 2 MG/ML
INJECTION INTRAMUSCULAR; INTRAVENOUS PRN
Status: DISCONTINUED | OUTPATIENT
Start: 2025-04-23 | End: 2025-04-23 | Stop reason: SURG

## 2025-04-23 RX ORDER — METOPROLOL TARTRATE 1 MG/ML
1 INJECTION, SOLUTION INTRAVENOUS
Status: DISCONTINUED | OUTPATIENT
Start: 2025-04-23 | End: 2025-04-23 | Stop reason: HOSPADM

## 2025-04-23 RX ORDER — MIDAZOLAM HYDROCHLORIDE 1 MG/ML
1 INJECTION INTRAMUSCULAR; INTRAVENOUS
Status: DISCONTINUED | OUTPATIENT
Start: 2025-04-23 | End: 2025-04-23 | Stop reason: HOSPADM

## 2025-04-23 RX ORDER — OXYCODONE HCL 5 MG/5 ML
10 SOLUTION, ORAL ORAL
Status: DISCONTINUED | OUTPATIENT
Start: 2025-04-23 | End: 2025-04-23 | Stop reason: HOSPADM

## 2025-04-23 RX ORDER — ONDANSETRON 2 MG/ML
4 INJECTION INTRAMUSCULAR; INTRAVENOUS
Status: DISCONTINUED | OUTPATIENT
Start: 2025-04-23 | End: 2025-04-23 | Stop reason: HOSPADM

## 2025-04-23 RX ORDER — DIPHENHYDRAMINE HYDROCHLORIDE 50 MG/ML
12.5 INJECTION, SOLUTION INTRAMUSCULAR; INTRAVENOUS
Status: DISCONTINUED | OUTPATIENT
Start: 2025-04-23 | End: 2025-04-23 | Stop reason: HOSPADM

## 2025-04-23 RX ORDER — SODIUM CHLORIDE, SODIUM LACTATE, POTASSIUM CHLORIDE, CALCIUM CHLORIDE 600; 310; 30; 20 MG/100ML; MG/100ML; MG/100ML; MG/100ML
INJECTION, SOLUTION INTRAVENOUS CONTINUOUS
Status: DISCONTINUED | OUTPATIENT
Start: 2025-04-23 | End: 2025-04-23 | Stop reason: HOSPADM

## 2025-04-23 RX ORDER — HYDRALAZINE HYDROCHLORIDE 20 MG/ML
5 INJECTION INTRAMUSCULAR; INTRAVENOUS
Status: DISCONTINUED | OUTPATIENT
Start: 2025-04-23 | End: 2025-04-23 | Stop reason: HOSPADM

## 2025-04-23 RX ORDER — HALOPERIDOL 5 MG/ML
1 INJECTION INTRAMUSCULAR
Status: DISCONTINUED | OUTPATIENT
Start: 2025-04-23 | End: 2025-04-23 | Stop reason: HOSPADM

## 2025-04-23 RX ORDER — MAGNESIUM HYDROXIDE 1200 MG/15ML
LIQUID ORAL
Status: COMPLETED | OUTPATIENT
Start: 2025-04-23 | End: 2025-04-23

## 2025-04-23 RX ORDER — OXYCODONE HCL 5 MG/5 ML
5 SOLUTION, ORAL ORAL
Status: DISCONTINUED | OUTPATIENT
Start: 2025-04-23 | End: 2025-04-23 | Stop reason: HOSPADM

## 2025-04-23 RX ORDER — EPHEDRINE SULFATE 50 MG/ML
5 INJECTION, SOLUTION INTRAVENOUS
Status: DISCONTINUED | OUTPATIENT
Start: 2025-04-23 | End: 2025-04-23 | Stop reason: HOSPADM

## 2025-04-23 RX ORDER — HYDROMORPHONE HYDROCHLORIDE 1 MG/ML
0.5 INJECTION, SOLUTION INTRAMUSCULAR; INTRAVENOUS; SUBCUTANEOUS
Status: DISCONTINUED | OUTPATIENT
Start: 2025-04-23 | End: 2025-04-24

## 2025-04-23 RX ORDER — DEXAMETHASONE SODIUM PHOSPHATE 4 MG/ML
INJECTION, SOLUTION INTRA-ARTICULAR; INTRALESIONAL; INTRAMUSCULAR; INTRAVENOUS; SOFT TISSUE PRN
Status: DISCONTINUED | OUTPATIENT
Start: 2025-04-23 | End: 2025-04-23 | Stop reason: SURG

## 2025-04-23 RX ORDER — HYDROMORPHONE HYDROCHLORIDE 1 MG/ML
0.4 INJECTION, SOLUTION INTRAMUSCULAR; INTRAVENOUS; SUBCUTANEOUS
Status: DISCONTINUED | OUTPATIENT
Start: 2025-04-23 | End: 2025-04-23 | Stop reason: HOSPADM

## 2025-04-23 RX ORDER — EPHEDRINE SULFATE 50 MG/ML
INJECTION, SOLUTION INTRAVENOUS PRN
Status: DISCONTINUED | OUTPATIENT
Start: 2025-04-23 | End: 2025-04-23 | Stop reason: SURG

## 2025-04-23 RX ORDER — POLYETHYLENE GLYCOL 3350 17 G/17G
2 POWDER, FOR SOLUTION ORAL 2 TIMES DAILY
Status: DISCONTINUED | OUTPATIENT
Start: 2025-04-23 | End: 2025-04-27

## 2025-04-23 RX ORDER — PHENYLEPHRINE HCL IN 0.9% NACL 1 MG/10 ML
SYRINGE (ML) INTRAVENOUS PRN
Status: DISCONTINUED | OUTPATIENT
Start: 2025-04-23 | End: 2025-04-23 | Stop reason: SURG

## 2025-04-23 RX ORDER — OXYCODONE HYDROCHLORIDE 10 MG/1
10 TABLET ORAL EVERY 6 HOURS PRN
Refills: 0 | Status: DISCONTINUED | OUTPATIENT
Start: 2025-04-23 | End: 2025-04-28 | Stop reason: HOSPADM

## 2025-04-23 RX ORDER — VANCOMYCIN HYDROCHLORIDE 1 G/20ML
INJECTION, POWDER, LYOPHILIZED, FOR SOLUTION INTRAVENOUS
Status: COMPLETED | OUTPATIENT
Start: 2025-04-23 | End: 2025-04-23

## 2025-04-23 RX ADMIN — EPHEDRINE SULFATE 5 MG: 50 INJECTION, SOLUTION INTRAVENOUS at 07:48

## 2025-04-23 RX ADMIN — INSULIN LISPRO 2 UNITS: 100 INJECTION, SOLUTION INTRAVENOUS; SUBCUTANEOUS at 05:46

## 2025-04-23 RX ADMIN — IOHEXOL 100 ML: 350 INJECTION, SOLUTION INTRAVENOUS at 14:15

## 2025-04-23 RX ADMIN — HYDROMORPHONE HYDROCHLORIDE 0.5 MG: 1 INJECTION, SOLUTION INTRAMUSCULAR; INTRAVENOUS; SUBCUTANEOUS at 14:29

## 2025-04-23 RX ADMIN — ACETAMINOPHEN 1000 MG: 500 TABLET, FILM COATED ORAL at 17:32

## 2025-04-23 RX ADMIN — CEFAZOLIN 2 G: 2 INJECTION, POWDER, FOR SOLUTION INTRAMUSCULAR; INTRAVENOUS at 20:34

## 2025-04-23 RX ADMIN — CEFAZOLIN 2 G: 1 INJECTION, POWDER, FOR SOLUTION INTRAMUSCULAR; INTRAVENOUS at 07:30

## 2025-04-23 RX ADMIN — ACETAMINOPHEN 1000 MG: 500 TABLET, FILM COATED ORAL at 11:55

## 2025-04-23 RX ADMIN — CEFAZOLIN 2 G: 2 INJECTION, POWDER, FOR SOLUTION INTRAMUSCULAR; INTRAVENOUS at 12:02

## 2025-04-23 RX ADMIN — ACETAMINOPHEN 1000 MG: 500 TABLET, FILM COATED ORAL at 04:13

## 2025-04-23 RX ADMIN — SODIUM CHLORIDE, POTASSIUM CHLORIDE, SODIUM LACTATE AND CALCIUM CHLORIDE: 600; 310; 30; 20 INJECTION, SOLUTION INTRAVENOUS at 07:26

## 2025-04-23 RX ADMIN — CEFAZOLIN 2 G: 2 INJECTION, POWDER, FOR SOLUTION INTRAMUSCULAR; INTRAVENOUS at 04:12

## 2025-04-23 RX ADMIN — DEXAMETHASONE SODIUM PHOSPHATE 4 MG: 4 INJECTION INTRA-ARTICULAR; INTRALESIONAL; INTRAMUSCULAR; INTRAVENOUS; SOFT TISSUE at 07:35

## 2025-04-23 RX ADMIN — INSULIN LISPRO 5 UNITS: 100 INJECTION, SOLUTION INTRAVENOUS; SUBCUTANEOUS at 17:43

## 2025-04-23 RX ADMIN — HYDROXYZINE HYDROCHLORIDE 25 MG: 50 TABLET, FILM COATED ORAL at 05:42

## 2025-04-23 RX ADMIN — FENTANYL CITRATE 50 MCG: 50 INJECTION, SOLUTION INTRAMUSCULAR; INTRAVENOUS at 07:41

## 2025-04-23 RX ADMIN — INSULIN GLARGINE-YFGN 10 UNITS: 100 INJECTION, SOLUTION SUBCUTANEOUS at 17:49

## 2025-04-23 RX ADMIN — INSULIN LISPRO 3 UNITS: 100 INJECTION, SOLUTION INTRAVENOUS; SUBCUTANEOUS at 12:11

## 2025-04-23 RX ADMIN — LIDOCAINE HYDROCHLORIDE 100 MG: 20 INJECTION, SOLUTION EPIDURAL; INFILTRATION; INTRACAUDAL; PERINEURAL at 07:31

## 2025-04-23 RX ADMIN — ATORVASTATIN CALCIUM 20 MG: 20 TABLET, FILM COATED ORAL at 17:33

## 2025-04-23 RX ADMIN — INSULIN LISPRO 4 UNITS: 100 INJECTION, SOLUTION INTRAVENOUS; SUBCUTANEOUS at 23:42

## 2025-04-23 RX ADMIN — SODIUM CHLORIDE 1000 ML: 900 IRRIGANT IRRIGATION at 07:41

## 2025-04-23 RX ADMIN — OXYCODONE HYDROCHLORIDE 10 MG: 10 TABLET ORAL at 11:54

## 2025-04-23 RX ADMIN — PROPOFOL 200 MG: 10 INJECTION, EMULSION INTRAVENOUS at 07:31

## 2025-04-23 RX ADMIN — POLYETHYLENE GLYCOL 3350 2 PACKET: 17 POWDER, FOR SOLUTION ORAL at 17:33

## 2025-04-23 RX ADMIN — ONDANSETRON 4 MG: 2 INJECTION INTRAMUSCULAR; INTRAVENOUS at 07:35

## 2025-04-23 RX ADMIN — Medication 100 MCG: at 07:31

## 2025-04-23 RX ADMIN — OXYCODONE HYDROCHLORIDE 10 MG: 10 TABLET ORAL at 17:56

## 2025-04-23 RX ADMIN — FENTANYL CITRATE 100 MCG: 50 INJECTION, SOLUTION INTRAMUSCULAR; INTRAVENOUS at 07:31

## 2025-04-23 RX ADMIN — Medication 100 MCG: at 07:38

## 2025-04-23 RX ADMIN — VANCOMYCIN HYDROCHLORIDE 1000 MG: 1 INJECTION, POWDER, LYOPHILIZED, FOR SOLUTION INTRAVENOUS at 07:41

## 2025-04-23 ASSESSMENT — PAIN DESCRIPTION - PAIN TYPE
TYPE: SURGICAL PAIN
TYPE: ACUTE PAIN
TYPE: SURGICAL PAIN
TYPE: SURGICAL PAIN
TYPE: ACUTE PAIN
TYPE: ACUTE PAIN
TYPE: SURGICAL PAIN
TYPE: ACUTE PAIN

## 2025-04-23 ASSESSMENT — ENCOUNTER SYMPTOMS
CONSTITUTIONAL NEGATIVE: 1
PSYCHIATRIC NEGATIVE: 1
NEUROLOGICAL NEGATIVE: 1
GASTROINTESTINAL NEGATIVE: 1
CARDIOVASCULAR NEGATIVE: 1
RESPIRATORY NEGATIVE: 1
EYES NEGATIVE: 1

## 2025-04-23 NOTE — PROGRESS NOTES
CT tech clarifying order for CT of aorta to have specific reason for the imaging request. Notified oncall hospitalist Aurora Wright and was advised to clarify the order to the attending tomorrow since order is not STAT at this time. Will endorse to day shift RN.

## 2025-04-23 NOTE — PROGRESS NOTES
Hospital Medicine Daily Progress Note    Date of Service  4/23/2025    Chief Complaint  Donald Fuller is a 54 y.o. male admitted 4/20/2025 with left 5th ray resection surgical site infection    Hospital Course  54 y.o. male with pmh IDDM2, Anxiety, HTN, S/P left 5th ray resection who presented 4/20/2025 with bleeding swelling and draining near amputation site that has been present past two days. Resection was done 2/28 for osteomyelitis, Cultures at that time grew E. Cloacae. Was discharged on 14 day course of augmentin.      Had sutures removed 5 days ago. Noted bleeding and swelling near amputation site 2 days ago, intermittent pain. Says he takes great care in not re-injuring foot, proper wound care. Has not noticed pus. Has had chills past 1 day. Nausea past Today.      He was admitted to the medical floor on 4/20/2025 for surgical site infection.  Prior to admission, blood cultures were collected and patient started on broad-spectrum IV antibiotics. Orthopedic surgery following for which patient underwent left foot fifth ray amputation with wound VAC placement on 4/21/2025 and I/D on 4/23/2025 4/20/2025 blood cultures growing MSSA.  Infect disease following first patient was transition to an Ancef antibiotic regimen.    Interval Problem Update  Evaluated at bedside  In NAD this AM, getting TTE  Postsurgical pain tolerable  Stable VS   On 2L NC, titrate  On room air  CBC without leukocytosis  4/20 BCX w/MSSA  4/20 WCX w/MSSA  4/21 ORCX w/MSSA   4/22 BCX w/NGTD  ID following  Continue IV Cefazolin   Pending AO CTA  Orthopedic surgery following  Labs in a.m.    I have discussed this patient's plan of care and discharge plan at IDT rounds today with Case Management, Nursing, Nursing leadership, and other members of the IDT team.    Consultants/Specialty  Orthopedic surgery  ID    Code Status  DNAR/DNI    Disposition  The patient is not medically cleared for discharge to home or a post-acute facility.      I have placed the appropriate orders for post-discharge needs.    Review of Systems  Review of Systems   Constitutional: Negative.    HENT: Negative.     Eyes: Negative.    Respiratory: Negative.     Cardiovascular: Negative.    Gastrointestinal: Negative.    Genitourinary: Negative.    Musculoskeletal:  Positive for joint pain.   Skin: Negative.    Neurological: Negative.    Endo/Heme/Allergies: Negative.    Psychiatric/Behavioral: Negative.          Physical Exam  Temp:  [36 °C (96.8 °F)-36.7 °C (98.1 °F)] 36 °C (96.8 °F)  Pulse:  [80-92] 80  Resp:  [14-18] 14  BP: (109-163)/(71-91) 163/91  SpO2:  [85 %-97 %] 97 %    Physical Exam  Constitutional:       General: He is not in acute distress.     Appearance: Normal appearance.   HENT:      Head: Normocephalic and atraumatic.      Nose: Nose normal. No congestion.      Mouth/Throat:      Mouth: Mucous membranes are moist.   Eyes:      Extraocular Movements: Extraocular movements intact.      Pupils: Pupils are equal, round, and reactive to light.   Cardiovascular:      Rate and Rhythm: Normal rate and regular rhythm.      Pulses: Normal pulses.      Heart sounds: Normal heart sounds.   Pulmonary:      Effort: Pulmonary effort is normal.      Breath sounds: Normal breath sounds.   Abdominal:      General: Bowel sounds are normal.      Palpations: Abdomen is soft.      Tenderness: There is no abdominal tenderness.   Musculoskeletal:         General: Swelling present. Normal range of motion.      Cervical back: Normal range of motion and neck supple.      Comments: Left 5th ray resection, Wound 1cm anterolateral left foot, erythema   Skin:     General: Skin is warm.      Coloration: Skin is not jaundiced.   Neurological:      General: No focal deficit present.      Mental Status: He is alert and oriented to person, place, and time. Mental status is at baseline.      Cranial Nerves: No cranial nerve deficit.   Psychiatric:         Mood and Affect: Mood normal.          Behavior: Behavior normal.         Thought Content: Thought content normal.         Judgment: Judgment normal.       Fluids    Intake/Output Summary (Last 24 hours) at 4/23/2025 0714  Last data filed at 4/23/2025 0455  Gross per 24 hour   Intake 840 ml   Output 3450 ml   Net -2610 ml        Laboratory  Recent Labs     04/21/25  0205 04/22/25  0527 04/23/25  0035   WBC 8.7 8.2 7.4   RBC 4.74 4.62* 4.61*   HEMOGLOBIN 13.6* 13.2* 13.4*   HEMATOCRIT 41.0* 40.5* 39.4*   MCV 86.5 87.7 85.5   MCH 28.7 28.6 29.1   MCHC 33.2 32.6 34.0   RDW 40.5 41.4 39.1   PLATELETCT 170 163* 177   MPV 10.7 10.1 10.3     Recent Labs     04/21/25  0205 04/22/25  0527 04/23/25  0035   SODIUM 134* 131* 135   POTASSIUM 4.3 4.1 3.9   CHLORIDE 100 101 100   CO2 20 21 22   GLUCOSE 267* 218* 306*   BUN 23* 16 15   CREATININE 1.17 0.94 1.01   CALCIUM 8.3* 8.5 8.9     Recent Labs     04/20/25 2113 04/21/25  0205   APTT  --  26.8   INR 1.07  --                Imaging  EC-ECHOCARDIOGRAM COMPLETE W/O CONT   Final Result      DX-FOOT-COMPLETE 3+ LEFT   Final Result         1.  Slight permeative appearance of the lateral cortex of the fifth toe metatarsal waist, appearance raising concern for component of osteomyelitis.   2.  Soft tissue gas at the amputation site, consider underlying infectious etiology.      DX-CHEST-PORTABLE (1 VIEW)   Final Result         1.  No acute cardiopulmonary disease.      CT-CTA COMPLETE THORACOABDOMINAL AORTA    (Results Pending)        Assessment/Plan  * MSSA bacteremia  Assessment & Plan  4/20 BCX w/MSSA  4/20 WCX w/NGTD  4/21 ORCX w/Gram stain+ few positive cocci   4/22 BCX w/NGTD  ID following  Continue IV cefazolin   Pending TTE    Osteomyelitis of ankle or foot, acute, left (HCC)- (present on admission)  Assessment & Plan  2/28 Left small toe ray resection  for osteomyelitis. Wcx grew MSSA. L foot xray concerning for osteo of left lateral foot near resection site. Received Unasyn and Vancomycin in ED. Ortho consult  in ED, to see in AM. S/P Sepsis protocol for SIRS 2/4 and this as likely source of infection. Not febrile, no leukocytosis at presentation.    IV ABX per ID  Wound care  Orthopedic surgery following  Aim for OR on AM    Type 2 diabetes mellitus with hyperglycemia, with long-term current use of insulin (HCC)- (present on admission)  Assessment & Plan  A1c was 13.9 in FEB 2024. On insulin. Reports compliance with medication and significant diet adjustment since February. Glucose 325 on admission.  -Repeat A1C  -Glargine 10. Goal blood glucose 140-180 given active infection.  -SSI  -Hypoglycemia protocol    Sepsis (HCC)  Assessment & Plan  Resolved    Hypertension- (present on admission)  Assessment & Plan  -Hold home losartan given DOMINIC at presentation    Acute renal failure (HCC)- (present on admission)  Assessment & Plan  Resolved    Anxiety- (present on admission)  Assessment & Plan  Mood ok.  -Continue home venlafaxine       VTE prophylaxis: Heparin ppx, DC at [facility], aim for OR on AM    I have performed a physical exam and reviewed and updated ROS and Plan today (4/23/2025). In review of yesterday's note (4/22/2025), there are no changes except as documented above.    Greater than 51 minutes spent prepping to see patient (e.g. review of tests) obtaining and/or reviewing separately obtained history. Performing a medically appropriate examination and/ evaluation.  Counseling and educating the patient/family/caregiver.  Ordering medications, tests, or procedures.  Referring and communicating with other health care professionals.  Documenting clinical information in EPIC.  Independently interpreting results and communicating results to patient/family/caregiver.  Care coordination

## 2025-04-23 NOTE — CARE PLAN
The patient is Stable - Low risk of patient condition declining or worsening    Shift Goals  Clinical Goals: Monitor dressing on left foot, CT, and pain management  Patient Goals: Pain management  Family Goals: YARELI    Patient a/o x4. Patient had I/D of left foot and CT today. Patient stated pain 8/10. Pain medication given. Patient currently on room air and saturation is 90%. Patient's call light and belongings are within reach. Bed is in low locked position.     Progress made toward(s) clinical / shift goals:    Problem: Knowledge Deficit - Standard  Goal: Patient and family/care givers will demonstrate understanding of plan of care, disease process/condition, diagnostic tests and medications  Outcome: Progressing     Problem: Fall Risk  Goal: Patient will remain free from falls  Outcome: Progressing     Problem: Hemodynamics  Goal: Patient's hemodynamics, fluid balance and neurologic status will be stable or improve  Outcome: Progressing     Problem: Fluid Volume  Goal: Fluid volume balance will be maintained  Outcome: Progressing     Problem: Urinary - Renal Perfusion  Goal: Ability to achieve and maintain adequate renal perfusion and functioning will improve  Outcome: Progressing     Problem: Respiratory  Goal: Patient will achieve/maintain optimum respiratory ventilation and gas exchange  Outcome: Progressing     Problem: Physical Regulation  Goal: Diagnostic test results will improve  Outcome: Progressing  Goal: Signs and symptoms of infection will decrease  Outcome: Progressing     Problem: Pain - Standard  Goal: Alleviation of pain or a reduction in pain to the patient’s comfort goal  Outcome: Progressing       Patient is not progressing towards the following goals:

## 2025-04-23 NOTE — OR NURSING
Patient A+Ox4. Denies pain or nause.  VSS. Resp spont and reg.  Left foot elevated on pillow with ace wrap dressing clean and dry.  Toes cool and cap refill greater than 2 sec.  Plan for patient to return to floor.  Mom updated by text.

## 2025-04-23 NOTE — CARE PLAN
The patient is Stable - Low risk of patient condition declining or worsening    Shift Goals  Clinical Goals: Monitor wound vac and pain management  Patient Goals: Pain management  Family Goals: YARELI    Progress made toward(s) clinical / shift goals: Patient remained safe throughout shift by having appropriate measures in place- call light & belongings within reach, bed locked & in lowest position, and hourly rounding performed. Patient is a high fall risk but is refusing bed alarm; patient educated & verbalized understanding regarding using call light when needed. Charge RN notified. CT and procedure to be done in the AM.         Problem: Knowledge Deficit - Standard  Goal: Patient and family/care givers will demonstrate understanding of plan of care, disease process/condition, diagnostic tests and medications  Description: Target End Date:  1-3 days or as soon as patient condition allowsDocument in Patient Education1.  Patient and family/caregiver oriented to unit, equipment, visitation policy and means for communicating concern2.  Complete/review Learning Assessment3.  Assess knowledge level of disease process/condition, treatment plan, diagnostic tests and medications4.  Explain disease process/condition, treatment plan, diagnostic tests and medications  Outcome: Progressing     Problem: Fall Risk  Goal: Patient will remain free from falls  Description: Target End Date:  Prior to discharge or change in level of careDocument interventions on the [facility] Fall Risk Assessment1.  Assess for fall risk factors2.  Implement fall precautions  Outcome: Progressing     Problem: Pain - Standard  Goal: Alleviation of pain or a reduction in pain to the patient’s comfort goal  Description: Target End Date:  Prior to discharge or change in level of careDocument on Vitals flowsheet1.  Document pain using the appropriate pain scale per order or unit policy2.  Educate and implement non-pharmacologic comfort measures (i.e.  relaxation, distraction, massage, cold/heat therapy, etc.)3.  Pain management medications as ordered4.  Reassess pain after pain med administration per policy5.  If opiods administered assess patient's response to pain medication is appropriate per POSS sedation scale6.  Follow pain management plan developed in collaboration with patient and interdisciplinary team (including palliative care or pain specialists if applicable)  Outcome: Progressing       Patient is not progressing towards the following goals:

## 2025-04-23 NOTE — ANESTHESIA TIME REPORT
Anesthesia Start and Stop Event Times       Date Time Event    4/23/2025 0706 Ready for Procedure     0726 Anesthesia Start     0810 Anesthesia Stop          Responsible Staff  04/23/25      Name Role Begin End    Raghu Ferreira M.D. Anesth 0726 0810          Overtime Reason:  no overtime (within assigned shift)    Comments:

## 2025-04-23 NOTE — ANESTHESIA POSTPROCEDURE EVALUATION
Patient: Donald Fuller    Procedure Summary       Date: 04/23/25 Room / Location: [place] [id number] / SURGERY [facility]    Anesthesia Start: 0726 Anesthesia Stop: 0810    Procedure: DEBRIDEMENT AND WOUND CLOSURE LEFT FOOT (Left: Foot) Diagnosis: (left foot osteomyelitis, s/p wound vac placement)    Surgeons: Johnathan Weldon M.D. Responsible Provider: Raghu Ferreira M.D.    Anesthesia Type: general ASA Status: 3            Final Anesthesia Type: general  Last vitals  BP   Blood Pressure: 138/82    Temp   36.3 °C (97.3 °F)    Pulse   85   Resp   18    SpO2   96 %      Anesthesia Post Evaluation    Patient location during evaluation: PACU  Patient participation: complete - patient participated  Level of consciousness: awake and alert    Airway patency: patent  Anesthetic complications: no  Cardiovascular status: hemodynamically stable  Respiratory status: acceptable  Hydration status: euvolemic    PONV: none          No notable events documented.     Nurse Pain Score: 0 (NPRS)

## 2025-04-23 NOTE — ANESTHESIA PROCEDURE NOTES
Airway    Date/Time: 4/23/2025 7:33 AM    Performed by: Raghu Ferreira M.D.  Authorized by: Raghu Ferreira M.D.    Location:  OR  Urgency:  Elective  Indications for Airway Management:  Anesthesia      Spontaneous Ventilation: absent    Sedation Level:  Deep  Preoxygenated: Yes    Mask Difficulty Assessment:  1 - vent by mask  Final Airway Type:  Supraglottic airway  Final Supraglottic Airway:  Standard LMA    SGA Size:  5  Number of Attempts at Approach:  1

## 2025-04-23 NOTE — OP REPORT
DATE OF OPERATION: 4/23/2025     PREOPERATIVE DIAGNOSIS: Left foot osteomyelitis, open left foot surgical wound, type 2 diabetes    POSTOPERATIVE DIAGNOSIS: Same    PROCEDURE PERFORMED: Debridement and secondary closure of surgical wound left foot partial amputation    SURGEON: Johnathan Weldon M.D.     ASSISTANT: Randall Thompson MD     ANESTHESIA: General    SPECIMEN: None    ESTIMATED BLOOD LOSS: Less than 5 mL    IMPLANTS: None      INDICATIONS: The patient is a 54 y.o. male who presented with left diabetic foot wounds and osteomyelitis of the fifth metatarsal.  He underwent a previous fifth metatarsal amputation and the wound was left open due to the contaminated infected nature of the wound.  He has had a wound VAC on the last several days and the soft tissues have improved to the point that he is now ready for closure.  I discussed the risks and benefits of the procedure which include but are not limited to risks of infection, wound healing complication, neurovascular injury, pain, possible need for additional procedures if wound healing completely and the medical risks of anesthesia including MI, stroke, and death.  Alternatives to surgery were also discussed, including non-operative management, which I did not recommend.  The patient was in agreement with the plan to proceed, and the informed consent was signed and documented.  I met with the patient pre-operatively and marked the operative extremity with their agreement.  We proceeded to the operating room.     DESCRIPTION OF PROCEDURE:  Patient was seen in the preoperative holding area on the day of surgery. The operative site was marked with my initials.  he was taken to the operating room and placed supine on the operative table.  Anesthesia was induced.  The operative extremity was prepped and draped in the normal sterile fashion.  Operative pause was conducted and the correct patient, site, side, procedure, and surgeon's initials on extremity were  identified.  The wound was thoroughly inspected.  The overall appearance of the wound was healthy.  There was some marginal necrosis on the superior skin flap that was excised sharply with a knife.  This carried down to the underlying fascia.  The deeper tissues appeared healthy.  The wounds were thoroughly irrigated normal saline.  The wound edges were mobilized and there was no tension for closure.  This was closed in a layered fashion.  2-0 PDS suture was used to reapproximate the deeper and subcutaneous tissues and then the skin edges were reapproximated using 3-0 nylon.  Sterile soft dressings were applied.  Patient awoken the operating room and was taken to PACU in stable condition.    POSTOPERATIVE PLAN: Heel weightbearing to the left lower extremity.  Elevate the foot at rest.  Minimize time on the left foot.  Mobilize with therapy.  Antibiotics.  Suture removal in clinic in 2 to 3 weeks.      ____________________________________   Johnathan Weldon M.D.   DD: 4/23/2025  8:22 AM

## 2025-04-23 NOTE — ANESTHESIA PREPROCEDURE EVALUATION
Case: [id number] Date/Time: 04/23/25 0715    Procedures:       IRRIGATION AND DEBRIDEMENT, WOUND LEFT FOOT      CLOSURE, WOUND POSSIBLE    Location: [facility] OR 16 / SURGERY [facility]    Surgeons: Johnathan Weldon M.D.            54M PMHx uncontrolled DM, osteo of left foot, HTN, TIA    Relevant Problems   CARDIAC   (positive) Cherry angioma   (positive) Hypertension         (positive) Acute renal failure (HCC)      ENDO   (positive) Type 2 diabetes mellitus with hyperglycemia, with long-term current use of insulin (HCC)      Other   (positive) Osteomyelitis of ankle or foot, acute, left (HCC)       Physical Exam    Airway   Mallampati: II  TM distance: >3 FB  Neck ROM: full       Cardiovascular - normal exam  Rhythm: regular  Rate: normal  (-) murmur     Dental    Pulmonary - normal exam     Abdominal    Neurological - normal exam                 TTE 4/22/25  FINDINGS  Left Ventricle  Normal left ventricular chamber size. Mild concentric left ventricular   hypertrophy. Normal left ventricular systolic function. The left   ventricular ejection fraction is visually estimated to be 65%. No   regional wall motion abnormalities. Indeterminate diastolic function.     Right Ventricle  Normal right ventricular size and systolic function.     Right Atrium  Normal right atrial size. Normal inferior vena cava size and   inspiratory collapse.     Left Atrium  Normal left atrial size. Left atrial volume index is 20 mL/sq m.     Mitral Valve  Structurally normal mitral valve without significant stenosis or   regurgitation.     Aortic Valve  Tricuspid aortic valve. Aortic valve sclerosis without significant   stenosis. No aortic insufficiency.     Tricuspid Valve  Structurally normal tricuspid valve without significant stenosis. Trace   tricuspid regurgitation. Right atrial pressure is estimated to be 3   mmHg. Right ventricular systolic pressure is estimated to be 26 mmHg.     Pulmonic Valve  Structurally normal pulmonic valve  without significant stenosis or   regurgitation.     Pericardium  No significant pericardial effusion.     Aorta  Normal aortic root for body surface area. The ascending aorta diameter   is 3.1 cm. Semimobile echodensity seen at the junction of the aorta and   brachiocephalic artery.      Anesthesia Plan    ASA 3   ASA physical status 3 criteria: diabetes - poorly controlled    Plan - general       Airway plan will be LMA          Induction: intravenous    Postoperative Plan: Postoperative administration of opioids is intended.    Pertinent diagnostic labs and testing reviewed    Informed Consent:    Anesthetic plan and risks discussed with patient.    Use of blood products discussed with: patient whom consented to blood products.

## 2025-04-23 NOTE — PROGRESS NOTES
0526: Report given to ARCHANA Perry at Pre-op. [facility] done    0638: Transport arrived and taken patient to Pre-op

## 2025-04-24 ENCOUNTER — APPOINTMENT (OUTPATIENT)
Dept: RADIOLOGY | Facility: MEDICAL CENTER | Age: 55
End: 2025-04-24
Attending: STUDENT IN AN ORGANIZED HEALTH CARE EDUCATION/TRAINING PROGRAM

## 2025-04-24 LAB
ALBUMIN SERPL BCP-MCNC: 2.9 G/DL (ref 3.2–4.9)
ALBUMIN/GLOB SERPL: 1 G/DL
ALP SERPL-CCNC: 65 U/L (ref 30–99)
ALT SERPL-CCNC: 7 U/L (ref 2–50)
ANION GAP SERPL CALC-SCNC: 10 MMOL/L (ref 7–16)
AST SERPL-CCNC: 9 U/L (ref 12–45)
BACTERIA SPEC ANAEROBE CULT: ABNORMAL
BACTERIA SPEC ANAEROBE CULT: ABNORMAL
BILIRUB SERPL-MCNC: 0.2 MG/DL (ref 0.1–1.5)
BUN SERPL-MCNC: 16 MG/DL (ref 8–22)
CALCIUM ALBUM COR SERPL-MCNC: 9.8 MG/DL (ref 8.5–10.5)
CALCIUM SERPL-MCNC: 8.9 MG/DL (ref 8.5–10.5)
CHLORIDE SERPL-SCNC: 103 MMOL/L (ref 96–112)
CO2 SERPL-SCNC: 23 MMOL/L (ref 20–33)
CREAT SERPL-MCNC: 0.87 MG/DL (ref 0.5–1.4)
ERYTHROCYTE [DISTWIDTH] IN BLOOD BY AUTOMATED COUNT: 37.9 FL (ref 35.9–50)
GFR SERPLBLD CREATININE-BSD FMLA CKD-EPI: 102 ML/MIN/1.73 M 2
GLOBULIN SER CALC-MCNC: 3 G/DL (ref 1.9–3.5)
GLUCOSE BLD STRIP.AUTO-MCNC: 269 MG/DL (ref 65–99)
GLUCOSE BLD STRIP.AUTO-MCNC: 284 MG/DL (ref 65–99)
GLUCOSE BLD STRIP.AUTO-MCNC: 287 MG/DL (ref 65–99)
GLUCOSE BLD STRIP.AUTO-MCNC: 344 MG/DL (ref 65–99)
GLUCOSE BLD STRIP.AUTO-MCNC: 349 MG/DL (ref 65–99)
GLUCOSE SERPL-MCNC: 247 MG/DL (ref 65–99)
HCT VFR BLD AUTO: 39.1 % (ref 42–52)
HGB BLD-MCNC: 13.5 G/DL (ref 14–18)
MCH RBC QN AUTO: 28.9 PG (ref 27–33)
MCHC RBC AUTO-ENTMCNC: 34.5 G/DL (ref 32.3–36.5)
MCV RBC AUTO: 83.7 FL (ref 81.4–97.8)
PLATELET # BLD AUTO: 204 K/UL (ref 164–446)
PMV BLD AUTO: 9.7 FL (ref 9–12.9)
POTASSIUM SERPL-SCNC: 3.8 MMOL/L (ref 3.6–5.5)
PROT SERPL-MCNC: 5.9 G/DL (ref 6–8.2)
RBC # BLD AUTO: 4.67 M/UL (ref 4.7–6.1)
SIGNIFICANT IND 70042: ABNORMAL
SITE SITE: ABNORMAL
SODIUM SERPL-SCNC: 136 MMOL/L (ref 135–145)
SOURCE SOURCE: ABNORMAL
WBC # BLD AUTO: 7.5 K/UL (ref 4.8–10.8)

## 2025-04-24 PROCEDURE — 87040 BLOOD CULTURE FOR BACTERIA: CPT

## 2025-04-24 PROCEDURE — A9270 NON-COVERED ITEM OR SERVICE: HCPCS

## 2025-04-24 PROCEDURE — 99233 SBSQ HOSP IP/OBS HIGH 50: CPT | Performed by: STUDENT IN AN ORGANIZED HEALTH CARE EDUCATION/TRAINING PROGRAM

## 2025-04-24 PROCEDURE — 85027 COMPLETE CBC AUTOMATED: CPT

## 2025-04-24 PROCEDURE — 82962 GLUCOSE BLOOD TEST: CPT | Mod: 91

## 2025-04-24 PROCEDURE — 700102 HCHG RX REV CODE 250 W/ 637 OVERRIDE(OP)

## 2025-04-24 PROCEDURE — A9270 NON-COVERED ITEM OR SERVICE: HCPCS | Performed by: STUDENT IN AN ORGANIZED HEALTH CARE EDUCATION/TRAINING PROGRAM

## 2025-04-24 PROCEDURE — 700102 HCHG RX REV CODE 250 W/ 637 OVERRIDE(OP): Performed by: STUDENT IN AN ORGANIZED HEALTH CARE EDUCATION/TRAINING PROGRAM

## 2025-04-24 PROCEDURE — 700105 HCHG RX REV CODE 258: Performed by: STUDENT IN AN ORGANIZED HEALTH CARE EDUCATION/TRAINING PROGRAM

## 2025-04-24 PROCEDURE — 36415 COLL VENOUS BLD VENIPUNCTURE: CPT

## 2025-04-24 PROCEDURE — 770006 HCHG ROOM/CARE - MED/SURG/GYN SEMI*

## 2025-04-24 PROCEDURE — 80053 COMPREHEN METABOLIC PANEL: CPT

## 2025-04-24 PROCEDURE — 99233 SBSQ HOSP IP/OBS HIGH 50: CPT | Performed by: INTERNAL MEDICINE

## 2025-04-24 PROCEDURE — 700111 HCHG RX REV CODE 636 W/ 250 OVERRIDE (IP): Performed by: STUDENT IN AN ORGANIZED HEALTH CARE EDUCATION/TRAINING PROGRAM

## 2025-04-24 RX ORDER — INSULIN LISPRO 100 [IU]/ML
1-6 INJECTION, SOLUTION INTRAVENOUS; SUBCUTANEOUS
Status: DISCONTINUED | OUTPATIENT
Start: 2025-04-24 | End: 2025-04-25

## 2025-04-24 RX ORDER — CYCLOBENZAPRINE HCL 10 MG
5 TABLET ORAL ONCE
Status: COMPLETED | OUTPATIENT
Start: 2025-04-24 | End: 2025-04-24

## 2025-04-24 RX ORDER — DEXTROSE MONOHYDRATE 25 G/50ML
25 INJECTION, SOLUTION INTRAVENOUS
Status: DISCONTINUED | OUTPATIENT
Start: 2025-04-24 | End: 2025-04-25

## 2025-04-24 RX ORDER — ENOXAPARIN SODIUM 100 MG/ML
40 INJECTION SUBCUTANEOUS DAILY
Status: DISCONTINUED | OUTPATIENT
Start: 2025-04-24 | End: 2025-04-28 | Stop reason: HOSPADM

## 2025-04-24 RX ORDER — DIAZEPAM 10 MG/2ML
5 INJECTION, SOLUTION INTRAMUSCULAR; INTRAVENOUS
Status: COMPLETED | OUTPATIENT
Start: 2025-04-24 | End: 2025-04-25

## 2025-04-24 RX ADMIN — OXYCODONE HYDROCHLORIDE 10 MG: 10 TABLET ORAL at 00:08

## 2025-04-24 RX ADMIN — CEFAZOLIN 2 G: 2 INJECTION, POWDER, FOR SOLUTION INTRAMUSCULAR; INTRAVENOUS at 04:06

## 2025-04-24 RX ADMIN — INSULIN LISPRO 3 UNITS: 100 INJECTION, SOLUTION INTRAVENOUS; SUBCUTANEOUS at 17:18

## 2025-04-24 RX ADMIN — OXYCODONE HYDROCHLORIDE 10 MG: 10 TABLET ORAL at 23:02

## 2025-04-24 RX ADMIN — CYCLOBENZAPRINE 5 MG: 10 TABLET, FILM COATED ORAL at 23:03

## 2025-04-24 RX ADMIN — OXYCODONE HYDROCHLORIDE 10 MG: 10 TABLET ORAL at 16:38

## 2025-04-24 RX ADMIN — HYDROXYZINE HYDROCHLORIDE 25 MG: 50 TABLET, FILM COATED ORAL at 21:55

## 2025-04-24 RX ADMIN — ACETAMINOPHEN 1000 MG: 500 TABLET, FILM COATED ORAL at 11:25

## 2025-04-24 RX ADMIN — OXYCODONE HYDROCHLORIDE 10 MG: 10 TABLET ORAL at 10:09

## 2025-04-24 RX ADMIN — ATORVASTATIN CALCIUM 20 MG: 20 TABLET, FILM COATED ORAL at 16:39

## 2025-04-24 RX ADMIN — ACETAMINOPHEN 1000 MG: 500 TABLET, FILM COATED ORAL at 04:03

## 2025-04-24 RX ADMIN — INSULIN LISPRO 3 UNITS: 100 INJECTION, SOLUTION INTRAVENOUS; SUBCUTANEOUS at 21:46

## 2025-04-24 RX ADMIN — ACETAMINOPHEN 1000 MG: 500 TABLET, FILM COATED ORAL at 16:39

## 2025-04-24 RX ADMIN — INSULIN LISPRO 3 UNITS: 100 INJECTION, SOLUTION INTRAVENOUS; SUBCUTANEOUS at 05:30

## 2025-04-24 RX ADMIN — ENOXAPARIN SODIUM 40 MG: 100 INJECTION SUBCUTANEOUS at 17:17

## 2025-04-24 RX ADMIN — INSULIN LISPRO 4 UNITS: 100 INJECTION, SOLUTION INTRAVENOUS; SUBCUTANEOUS at 12:48

## 2025-04-24 RX ADMIN — CEFAZOLIN 2 G: 2 INJECTION, POWDER, FOR SOLUTION INTRAMUSCULAR; INTRAVENOUS at 11:25

## 2025-04-24 RX ADMIN — OXYCODONE HYDROCHLORIDE 10 MG: 10 TABLET ORAL at 06:25

## 2025-04-24 RX ADMIN — CEFAZOLIN 2 G: 2 INJECTION, POWDER, FOR SOLUTION INTRAMUSCULAR; INTRAVENOUS at 17:15

## 2025-04-24 RX ADMIN — INSULIN GLARGINE-YFGN 5 UNITS: 100 INJECTION, SOLUTION SUBCUTANEOUS at 13:16

## 2025-04-24 ASSESSMENT — ENCOUNTER SYMPTOMS
EYES NEGATIVE: 1
PSYCHIATRIC NEGATIVE: 1
FEVER: 0
CONSTITUTIONAL NEGATIVE: 1
RESPIRATORY NEGATIVE: 1
CARDIOVASCULAR NEGATIVE: 1
CHILLS: 0
GASTROINTESTINAL NEGATIVE: 1
NEUROLOGICAL NEGATIVE: 1

## 2025-04-24 ASSESSMENT — PAIN DESCRIPTION - PAIN TYPE
TYPE: ACUTE PAIN
TYPE: ACUTE PAIN;SURGICAL PAIN
TYPE: ACUTE PAIN
TYPE: ACUTE PAIN;SURGICAL PAIN
TYPE: ACUTE PAIN;SURGICAL PAIN
TYPE: ACUTE PAIN
TYPE: ACUTE PAIN

## 2025-04-24 NOTE — CARE PLAN
The patient is Stable - Low risk of patient condition declining or worsening    Shift Goals  Clinical Goals: Patient will remain safe throughout shift. Pain will be <4 by end of shift. Monitor dressing and post-op vitals throughout shift.  Patient Goals: Pain management, rest.  Family Goals: YARELI    Progress made toward(s) clinical / shift goals:  Skin check performed on this shift. Patient remained safe throughout this shift by having appropriate measures in place- call light & belongings within reach, bed locked & in lowest position, and hourly rounding performed. Patient continues to refuse bed alarm; education provided and patient verbalized understanding of the importance to call if help is needed. Charge RN aware. Patient's pain managed via pharmacological methods. Dressing on LLE remains clean, dry, and intact.       Problem: Knowledge Deficit - Standard  Goal: Patient and family/care givers will demonstrate understanding of plan of care, disease process/condition, diagnostic tests and medications  Description: Target End Date:  1-3 days or as soon as patient condition allowsDocument in Patient Education1.  Patient and family/caregiver oriented to unit, equipment, visitation policy and means for communicating concern2.  Complete/review Learning Assessment3.  Assess knowledge level of disease process/condition, treatment plan, diagnostic tests and medications4.  Explain disease process/condition, treatment plan, diagnostic tests and medications  Outcome: Progressing     Problem: Fall Risk  Goal: Patient will remain free from falls  Description: Target End Date:  Prior to discharge or change in level of careDocument interventions on the [facility] Fall Risk Assessment1.  Assess for fall risk factors2.  Implement fall precautions  Outcome: Progressing     Problem: Pain - Standard  Goal: Alleviation of pain or a reduction in pain to the patient’s comfort goal  Description: Target End Date:  Prior to discharge or  change in level of careDocument on Vitals flowsheet1.  Document pain using the appropriate pain scale per order or unit policy2.  Educate and implement non-pharmacologic comfort measures (i.e. relaxation, distraction, massage, cold/heat therapy, etc.)3.  Pain management medications as ordered4.  Reassess pain after pain med administration per policy5.  If opiods administered assess patient's response to pain medication is appropriate per POSS sedation scale6.  Follow pain management plan developed in collaboration with patient and interdisciplinary team (including palliative care or pain specialists if applicable)  Outcome: Progressing         Patient is not progressing towards the following goals: Patient continues to have high blood sugars despite pharmacological interventions.

## 2025-04-24 NOTE — PROGRESS NOTES
Infectious Disease Progress Note    Author: Marisol Garcia M.D. Date & Time of service: 2025  7:57 AM    Chief Complaint:  Follow-up for MSSA bacteremia, diabetic foot infection    Interval History:   patient afebrile, WBC 7.5.  Patient underwent debridement and secondary closure of surgical wound yesterday.  Per the procedure note, the overall appearance of the wound was healthy there was some marginal necrosis that was excised.  Repeat blood cultures are negative to date.  Patient anxious to be discharged.  Discussed with patient that we are awaiting pathology to determine final antibiotic course    Labs Reviewed, Medications Reviewed, and Wound Reviewed.    Review of Systems:  Review of Systems   Constitutional:  Negative for chills and fever.       Hemodynamics:  Temp (24hrs), Av.4 °C (97.5 °F), Min:36.1 °C (97 °F), Max:36.8 °C (98.2 °F)  Temperature: 36.3 °C (97.4 °F)  Pulse  Av.2  Min: 73  Max: 123   Blood Pressure: (!) 149/96 (RN notified)       Physical Exam:  Physical Exam  Vitals and nursing note reviewed.   Eyes:      Pupils: Pupils are equal, round, and reactive to light.   Cardiovascular:      Rate and Rhythm: Normal rate.   Pulmonary:      Effort: Pulmonary effort is normal.   Musculoskeletal:      Comments: Left foot in surgical dressing   Neurological:      General: No focal deficit present.      Mental Status: He is alert and oriented to person, place, and time.   Psychiatric:         Mood and Affect: Mood normal.         Behavior: Behavior normal.         Meds:    Current Facility-Administered Medications:     polyethylene glycol/lytes    [DISCONTINUED] oxyCODONE immediate-release **OR** oxyCODONE immediate-release **OR** HYDROmorphone    insulin GLARGINE **AND** insulin lispro **AND** POC blood glucose manual result **AND** NOTIFY MD and PharmD **AND** Administer 20 grams of glucose (approximately 8 ounces of fruit juice) every 15 minutes PRN FSBG less than 70 mg/dL **AND**  dextrose bolus    NS    acetaminophen    ceFAZolin    Pharmacy Consult Request    atorvastatin    hydrOXYzine HCl    Labs:  Recent Labs     04/22/25  0527 04/23/25  0035 04/24/25  0736   WBC 8.2 7.4 7.5   RBC 4.62* 4.61* 4.67*   HEMOGLOBIN 13.2* 13.4* 13.5*   HEMATOCRIT 40.5* 39.4* 39.1*   MCV 87.7 85.5 83.7   MCH 28.6 29.1 28.9   RDW 41.4 39.1 37.9   PLATELETCT 163* 177 204   MPV 10.1 10.3 9.7     Recent Labs     04/22/25  0527 04/23/25  0035   SODIUM 131* 135   POTASSIUM 4.1 3.9   CHLORIDE 101 100   CO2 21 22   GLUCOSE 218* 306*   BUN 16 15     Recent Labs     04/22/25 0527 04/23/25  0035   ALBUMIN 3.0* 3.1*   TBILIRUBIN 0.2 0.2   ALKPHOSPHAT 63 69   TOTPROTEIN 5.7* 5.8*   ALTSGPT 14 12   ASTSGOT 14 11*   CREATININE 0.94 1.01       Imaging:  CT-CTA COMPLETE THORACOABDOMINAL AORTA  Result Date: 4/23/2025 4/23/2025 1:43 PM HISTORY/REASON FOR EXAM:  Chest pain. TECHNIQUE/EXAM DESCRIPTION: CT angiogram without and with contrast, with reconstructions. Initial precontrast thick helical sections were obtained from the top of the aortic arch through the diaphragmatic domes. Following this, thin-section postcontrast helical images were obtained from the lung apices through the iliac crests following the bolus administration of mL of nonionic Omnipaque 350 contrast.  CT angiographic technique was utilized. Parasagittal reconstructed images of the aorta were generated utilizing multiplanar volume reformat technique. Low dose optimization technique was utilized for this CT exam including automated exposure control and adjustment of the mA and/or kV according to patient size. COMPARISON: None available. FINDINGS: Aorta: Pre-contrast images demonstrate no evidence of displaced calcified intima or intramural hematoma. Aortic arch: Branching pattern is conventional. Diameter: The ascending and descending aorta are of normal caliber. Dissection: Absent. Celiac artery origin: Widely patent. Superior mesenteric artery origin:  Widely patent. Renal artery origins: Widely patent. Inferior mesenteric artery: Patent. Common iliac arteries: Nonaneurysmal and patent. Heart: There is atherosclerotic change of the coronary arteries especially involving the left anterior descending artery origin. 3D angiographic/MIP images of the vasculature confirm the vascular findings as described above. Lungs: There is bibasilar atelectasis. Liver: There is fatty change. There is an enhancing mass involving the right lobe posteriorly measuring 2.3 cm size likely representing hemangioma. Biliary system: No intrahepatic or extrahepatic ductal dilatation. The gallbladder is grossly unremarkable. Spleen: Unremarkable. Adrenal glands: There is 17 mm left adrenal nodule. As 12 mm right adrenal nodule. Pancreas: Unremarkable. Kidneys: There are small bilateral simple appearing renal cysts. No follow-up recommended. Bowel: There is moderate constipation. No bowel obstruction or focal inflammatory change. Ascites: None. Lymph nodes: No adenopathy is identified. Bones: There is partial fusion across the left SI joint.     1.  No evidence of aortic aneurysm or dissection. 2.  Atherosclerotic calcification of the coronary arteries especially involving the left anterior ascending artery origin 3.  Fatty liver with a 2.3 cm right lobe hepatic hemangioma. 4.  Bilateral adrenal nodules measuring 17 mm on the left and 12 mm on the right. 5.  Moderate constipation.     EC-ECHOCARDIOGRAM COMPLETE W/O CONT  Result Date: 4/22/2025  Transthoracic Echo Report Echocardiography Laboratory CONCLUSIONS Normal left ventricular systolic function. The left ventricular ejection fraction is visually estimated to be 65% Mild concentric left ventricular hypertrophy. Normal right ventricular size and systolic function. Aortic valve sclerosis without significant stenosis. Right ventricular systolic pressure is estimated to be 26 mmHg (normal). Semimobile echodensity seen at the junction of the  aorta and brachiocephalic artery - recommend further imaging of the aorta. No prior study is available for comparison. Primary team is notified of findings. REX ECHEVARRIA Exam Date:         2025                    08:52 Exam Location:     Inpatient Priority:          Routine Ordering Physician:        BRIGID MARCIAL Referring Physician:       [id number] Sonographer:               Yaneth Jade Age:    54     Gender:    M MRN:    [id number] :    1970 BSA:    2.22   Ht (in):    73     Wt (lb):    216 Exam Type:     Complete Indications:     Bacteremia ICD Codes:       R78.81 CPT Codes:       62028 BP:   119    /   83     HR: Technical Quality:       Fair MEASUREMENTS  (Male / Female) Normal Values 2D ECHO LV Diastolic Diameter PLAX        3.9 cm                4.2 - 5.9 / 3.9 - 5.3 cm LV Systolic Diameter PLAX         2 cm                  2.1 - 4.0 cm IVS Diastolic Thickness           1.2 cm                LVPW Diastolic Thickness          1.2 cm                LVOT Diameter                     2 cm                  LV Ejection Fraction MOD BP       65.3 %                >= 55  % LV Ejection Fraction MOD 4C       69.4 %                LV Ejection Fraction MOD 2C       59.2 %                LV Ejection Fraction 4C AL        73.2 %                LV Ejection Fraction 2C AL        63.1 %                LA Volume Index                   21.6 cm3/m2           16 - 28 cm3/m2 DOPPLER AV Peak Velocity                  1.3 m/s               AV Peak Gradient                  7.1 mmHg              AV Mean Gradient                  4 mmHg                LVOT Peak Velocity                1.1 m/s               AV Area Cont Eq vti               2.7 cm2               Mitral E Point Velocity           1.2 m/s               Mitral E to A Ratio               1.4                   MV Pressure Half Time             55 ms                 MV Area PHT                        4 cm2                 MV Deceleration Time              187 ms                TR Peak Velocity                  239 cm/s              PV Peak Velocity                  1 m/s                 PV Peak Gradient                  4.3 mmHg              RVOT Peak Velocity                0.75 m/s              LV E' Lateral Velocity            11.1 cm/s             Mitral E to LV E' Lateral Ratio   10.5                  LV E' Septal Velocity             7.3 cm/s              Mitral E to LV E' Septal Ratio    16                    * Indicates values subject to auto-interpretation LV EF:        % FINDINGS Left Ventricle Normal left ventricular chamber size. Mild concentric left ventricular hypertrophy. Normal left ventricular systolic function. The left ventricular ejection fraction is visually estimated to be 65%. No regional wall motion abnormalities. Indeterminate diastolic function. Right Ventricle Normal right ventricular size and systolic function. Right Atrium Normal right atrial size. Normal inferior vena cava size and inspiratory collapse. Left Atrium Normal left atrial size. Left atrial volume index is 20 mL/sq m. Mitral Valve Structurally normal mitral valve without significant stenosis or regurgitation. Aortic Valve Tricuspid aortic valve. Aortic valve sclerosis without significant stenosis. No aortic insufficiency. Tricuspid Valve Structurally normal tricuspid valve without significant stenosis. Trace tricuspid regurgitation. Right atrial pressure is estimated to be 3 mmHg. Right ventricular systolic pressure is estimated to be 26 mmHg. Pulmonic Valve Structurally normal pulmonic valve without significant stenosis or regurgitation. Pericardium No significant pericardial effusion. Aorta Normal aortic root for body surface area. The ascending aorta diameter is 3.1 cm. Semimobile echodensity seen at the junction of the aorta and brachiocephalic artery. Celestino Jay MD (Electronically Signed)  Final Date:     22 April 2025                 10:44    DX-FOOT-COMPLETE 3+ LEFT  Result Date: 4/20/2025 4/20/2025 9:28 PM HISTORY/REASON FOR EXAM: Atraumatic Pain/Swelling/Deformity TECHNIQUE/EXAM DESCRIPTION:  AP, lateral, and oblique views of the LEFT foot. COMPARISON:  February 26, 2025 FINDINGS: Postoperative changes of transmetatarsal amputation of the fifth toe is seen. Slight permeative appearance of the lateral cortex of the fifth toe metatarsal waist is seen. There is soft tissue gas in small bony fragments adjacent to the fracture site.     1.  Slight permeative appearance of the lateral cortex of the fifth toe metatarsal waist, appearance raising concern for component of osteomyelitis. 2.  Soft tissue gas at the amputation site, consider underlying infectious etiology.    DX-CHEST-PORTABLE (1 VIEW)  Result Date: 4/20/2025 4/20/2025 9:28 PM HISTORY/REASON FOR EXAM: Sepsis TECHNIQUE/EXAM DESCRIPTION:  Single AP view of the chest. COMPARISON: None FINDINGS: Overlying cardiac leads are present. The cardiac silhouette appears within normal limits. The mediastinal contour appears within normal limits.  The central pulmonary vasculature appears normal. The lungs appear well expanded bilaterally.  Bilateral lungs are clear. No significant pleural effusions are identified. The bony structures appear age-appropriate.     1.  No acute cardiopulmonary disease.      Micro:  Results       Procedure Component Value Units Date/Time    CULTURE TISSUE W/ GRM STAIN [[id number]]  (Abnormal) Collected: 04/21/25 1624    Order Status: Completed Specimen: Bone Updated: 04/23/25 1006     Significant Indicator POS     Source BONE     Site left foot     Culture Result -     Gram Stain Result Few WBCs.  Few Gram positive cocci.       Culture Result Staphylococcus aureus  Moderate growth  Methicillin sensitive by screening method  See previous culture for sensitivity report.      Anaerobic Culture [[id number]] Collected: 04/21/25  1624    Order Status: Completed Specimen: Bone Updated: 04/23/25 1006     Significant Indicator NEG     Source BONE     Site left foot     Culture Result Culture in progress.    Urine Culture (New) [[id number]] Collected: 04/21/25 0740    Order Status: Completed Specimen: Urine Updated: 04/23/25 0943     Significant Indicator NEG     Source UR     Site -     Culture Result No growth at 48 hours.    Culture Wound With Gram Stain [[id number]]  (Abnormal)  (Susceptibility) Collected: 04/20/25 2145    Order Status: Completed Specimen: Wound from Left Foot Updated: 04/23/25 0931     Significant Indicator POS     Source WND     Site LEFT FOOT     Culture Result Light growth usual skin heather.     Gram Stain Result Few WBCs.  Many Gram positive cocci.       Culture Result Staphylococcus aureus  Heavy growth  Methicillin sensitive by screening method      Susceptibility       Staphylococcus aureus (1)       Antibiotic Interpretation Microscan   Method Status    Ampicillin/sulbactam Sensitive <=8/4 mcg/mL YESENIA Final    Clindamycin Sensitive <=0.25 mcg/mL YESENIA Final    Cefazolin Sensitive <=8 mcg/mL YESENIA Final    Cefepime Sensitive <=4 mcg/mL YESENIA Final    Daptomycin Sensitive 1 mcg/mL YESENIA Final    Erythromycin Resistant >4 mcg/mL YESENIA Final    Oxacillin Sensitive 0.5 mcg/mL YESENIA Final    Trimeth/Sulfa Sensitive <=0.5/9.5 mcg/mL YESENIA Final    Tetracycline Sensitive <=4 mcg/mL YESENIA Final    Vancomycin Sensitive 1 mcg/mL YESENIA Final                       Blood Culture - Draw one from central line and one from peripheral site [[id number]]  (Abnormal) Collected: 04/20/25 2145    Order Status: Completed Specimen: Blood from Peripheral Updated: 04/23/25 0916     Significant Indicator POS     Source BLD     Site PERIPHERAL     Culture Result Growth detected by automated blood culture system.      Staphylococcus aureus  Methicillin sensitive by screening method  See previous culture for sensitivity report.      Blood Culture - Draw one from  central line and one from peripheral site [[id number]]  (Abnormal)  (Susceptibility) Collected: 04/20/25 2113    Order Status: Completed Specimen: Blood from Line Updated: 04/23/25 0915     Significant Indicator POS     Source BLD     Site Peripheral     Culture Result Growth detected by automated blood culture system.  Staphylococcus aureus (methicillin sensitive)  detected by PCR.        Staphylococcus aureus    Susceptibility       Staphylococcus aureus (1)       Antibiotic Interpretation Microscan   Method Status    Ampicillin/sulbactam Sensitive <=8/4 mcg/mL YESENIA Final    Clindamycin Sensitive 0.5 mcg/mL YESENIA Final    Cefazolin Sensitive <=8 mcg/mL YESENIA Final    Cefepime Sensitive <=4 mcg/mL YESENIA Final    Daptomycin Sensitive 1 mcg/mL YESENIA Final    Erythromycin Resistant >4 mcg/mL YESENIA Final    Oxacillin Sensitive 0.5 mcg/mL YESENIA Final    Trimeth/Sulfa Sensitive <=0.5/9.5 mcg/mL YESENIA Final    Tetracycline Sensitive <=4 mcg/mL YESENIA Final    Vancomycin Sensitive 1 mcg/mL YESENIA Final                       BLOOD CULTURE [[id number]] Collected: 04/22/25 0527    Order Status: Completed Specimen: Blood from Peripheral Updated: 04/22/25 0808     Significant Indicator NEG     Source BLD     Site PERIPHERAL     Culture Result No Growth  Note: Blood cultures are incubated for 5 days and  are monitored continuously.Positive blood cultures  are called to the RN and reported as soon as  they are identified.      BLOOD CULTURE [[id number]] Collected: 04/22/25 0527    Order Status: Completed Specimen: Blood from Peripheral Updated: 04/22/25 0808     Significant Indicator NEG     Source BLD     Site PERIPHERAL     Culture Result No Growth  Note: Blood cultures are incubated for 5 days and  are monitored continuously.Positive blood cultures  are called to the RN and reported as soon as  they are identified.      GRAM STAIN [[id number]] Collected: 04/21/25 1624    Order Status: Completed Specimen: Bone Updated: 04/21/25 3466     Significant  Indicator .     Source BONE     Site left foot     Gram Stain Result Few WBCs.  Few Gram positive cocci.      Urinalysis [[id number]]  (Abnormal) Collected: 04/21/25 0740    Order Status: Completed Specimen: Urine Updated: 04/21/25 0811     Color Yellow     Character Clear     Specific Gravity 1.028     Ph 6.0     Glucose >=1000 mg/dL      Ketones 40 mg/dL      Protein 100 mg/dL      Bilirubin Negative     Urobilinogen, Urine 1.0 EU/dL      Nitrite Negative     Leukocyte Esterase Negative     Occult Blood Moderate     Micro Urine Req Microscopic    GRAM STAIN [[id number]] Collected: 04/20/25 2145    Order Status: Completed Specimen: Wound Updated: 04/21/25 0302     Significant Indicator .     Source WND     Site LEFT FOOT     Gram Stain Result Few WBCs.  Many Gram positive cocci.      Blood Culture [[id number]]     Order Status: Canceled Specimen: Blood from Peripheral     Blood Culture [[id number]]     Order Status: Canceled Specimen: Blood from Line     MRSA By PCR (Amp) [[id number]] Collected: 04/20/25 2145    Order Status: Completed Specimen: Respirate from Nares Updated: 04/20/25 2326     MRSA by PCR Negative            Assessment:  Active Hospital Problems    Diagnosis     *MSSA bacteremia [R78.81, B95.61]     Sepsis ([facility]) [A41.9]     Osteomyelitis of ankle or foot, acute, left ([facility]) [M86.172]     Hypertension [I10]     Acute renal failure ([facility]) [N17.9]     Anxiety [F41.9]     Type 2 diabetes mellitus with hyperglycemia, with long-term current use of insulin ([facility]) [E11.65, Z79.4]      54 y.o. male with history of uncontrolled type 2 diabetes mellitus, prior left fifth toe amputation in February admitted on 4/20/2025 secondary to worsening left foot swelling and drainage of prior amputation site.  X-ray revealed findings concerning for osteomyelitis.  He is now status post left fifth ray amputation with wound VAC placement on 4/21/2025.  Hospital course complicated by MSSA bacteremia.     Pertinent diagnoses:  MSSA  bacteremia, source secondary to below  Left foot osteomyelitis status post left fifth ray amputation on 4/21, repeat I&D and closure on 4/23  Thrombocytopenia, resolved  History of left fifth toe amputation in February  Uncontrolled type 2 diabetes mellitus, hemoglobin A1c 12.9    Plan:  -Continue IV cefazolin 2 g every 8 hours  -Blood cultures x 2 on 4/20+ MSSA  -Repeat blood cultures on 4/22-negative to date  -OR cultures-MSSA.  Gram stain+ few positive cocci  -Pathology-pending  -TTE negative for any valvular abnormalities however there was a semimobile echodensity at the junction of the aorta and brachiocephalic artery  -CTA aorta was negative for aortic aneurysm or dissection.  Showed atherosclerotic calcification in the coronary arteries  - Status post debridement and secondary closure of surgical wound left foot amputation on 4/23/2025  -Continue to monitor for any new joint pain or back pain-patient currently denies  -Diabetes education and blood sugar control  -Recommend referral to endocrinologist  -Anticipate a 4 to 6-week course of IV antibiotics pending pathology results.  If path is consistent with residual osteomyelitis, will plan a 6-week course of IV antibiotics from date of first negative blood culture.  If path shows clear margins, will plan a 4-week course of IV antibiotics from date of first negative blood culture     This infection poses a threat to life and further limb function     Disposition: Patient prefers [facility]     Need for PICC line: Yes, once repeat blood cultures have been negative for at least 48 hours     Follow-up in ID clinic with NP     Plan of care discussed with SAHRA Douglass M.D.. Will continue to follow    Addendum:  1 out of 2 blood cultures from 4/22 is now positive  Repeat blood cultures x 2 today  Anticipate a 6-week course of IV antibiotics from date of first negative blood culture    Updated plan of care d/w Dr. Champion

## 2025-04-24 NOTE — PROGRESS NOTES
[facility] Medicine Daily Progress Note    Date of Service  4/24/2025    Chief Complaint  Donald Fuller is a 54 y.o. male admitted 4/20/2025 with left 5th ray resection surgical site infection    Hospital Course  54 y.o. male with pmh IDDM2, Anxiety, HTN, S/P left 5th ray resection who presented 4/20/2025 with bleeding swelling and draining near amputation site that has been present past two days. Resection was done 2/28 for osteomyelitis, Cultures at that time grew E. Cloacae. Was discharged on 14 day course of augmentin.      Had sutures removed 5 days ago. Noted bleeding and swelling near amputation site 2 days ago, intermittent pain. Says he takes great care in not re-injuring foot, proper wound care. Has not noticed pus. Has had chills past 1 day. Nausea past Today.      He was admitted to the medical floor on 4/20/2025 for surgical site infection.  Prior to admission, blood cultures were collected and patient started on broad-spectrum IV antibiotics. Orthopedic surgery following for which patient underwent left foot fifth ray amputation with wound VAC placement on 4/21/2025 and I/D on 4/23/2025 4/20/2025 blood cultures growing MSSA.  Infect disease following first patient was transition to an Ancef antibiotic regimen.    Interval Problem Update  Evaluated at bedside  Reporting back pain today  Stable VS   On room air  CBC without leukocytosis  4/20 BCX w/MSSA  4/20 WCX w/MSSA  4/21 ORCX w/MSSA   4/22 BCX w/GPC  ID following  Continue IV Cefazolin   Pending Path  Spinal MRI  Repeat blood cultures  Labs in a.m.    I have discussed this patient's plan of care and discharge plan at IDT rounds today with Case Management, Nursing, Nursing leadership, and other members of the IDT team.    Consultants/Specialty  Orthopedic surgery  ID    Code Status  Full Code    Disposition  The patient is not medically cleared for discharge to home or a post-acute facility.     I have placed the appropriate orders for  post-discharge needs.    Review of Systems  Review of Systems   Constitutional: Negative.    HENT: Negative.     Eyes: Negative.    Respiratory: Negative.     Cardiovascular: Negative.    Gastrointestinal: Negative.    Genitourinary: Negative.    Musculoskeletal:  Positive for joint pain.   Skin: Negative.    Neurological: Negative.    Endo/Heme/Allergies: Negative.    Psychiatric/Behavioral: Negative.          Physical Exam  Temp:  [36.2 °C (97.1 °F)-36.6 °C (97.9 °F)] 36.2 °C (97.1 °F)  Pulse:  [71-97] 71  Resp:  [16-18] 17  BP: (126-149)/(77-96) 127/77  SpO2:  [90 %-96 %] 96 %    Physical Exam  Constitutional:       General: He is not in acute distress.     Appearance: Normal appearance.   HENT:      Head: Normocephalic and atraumatic.      Nose: Nose normal. No congestion.      Mouth/Throat:      Mouth: Mucous membranes are moist.   Eyes:      Extraocular Movements: Extraocular movements intact.      Pupils: Pupils are equal, round, and reactive to light.   Cardiovascular:      Rate and Rhythm: Normal rate and regular rhythm.      Pulses: Normal pulses.      Heart sounds: Normal heart sounds.   Pulmonary:      Effort: Pulmonary effort is normal.      Breath sounds: Normal breath sounds.   Abdominal:      General: Bowel sounds are normal.      Palpations: Abdomen is soft.      Tenderness: There is no abdominal tenderness.   Musculoskeletal:         General: Swelling present. Normal range of motion.      Cervical back: Normal range of motion and neck supple.      Comments: Left 5th ray resection, Wound 1cm anterolateral left foot, erythema   Skin:     General: Skin is warm.      Coloration: Skin is not jaundiced.   Neurological:      General: No focal deficit present.      Mental Status: He is alert and oriented to person, place, and time. Mental status is at baseline.      Cranial Nerves: No cranial nerve deficit.   Psychiatric:         Mood and Affect: Mood normal.         Behavior: Behavior normal.          Thought Content: Thought content normal.         Judgment: Judgment normal.         Fluids    Intake/Output Summary (Last 24 hours) at 4/24/2025 1252  Last data filed at 4/24/2025 0900  Gross per 24 hour   Intake 600 ml   Output 2050 ml   Net -1450 ml        Laboratory  Recent Labs     04/22/25  0527 04/23/25  0035 04/24/25  0736   WBC 8.2 7.4 7.5   RBC 4.62* 4.61* 4.67*   HEMOGLOBIN 13.2* 13.4* 13.5*   HEMATOCRIT 40.5* 39.4* 39.1*   MCV 87.7 85.5 83.7   MCH 28.6 29.1 28.9   MCHC 32.6 34.0 34.5   RDW 41.4 39.1 37.9   PLATELETCT 163* 177 204   MPV 10.1 10.3 9.7     Recent Labs     04/22/25 0527 04/23/25  0035 04/24/25  0736   SODIUM 131* 135 136   POTASSIUM 4.1 3.9 3.8   CHLORIDE 101 100 103   CO2 21 22 23   GLUCOSE 218* 306* 247*   BUN 16 15 16   CREATININE 0.94 1.01 0.87   CALCIUM 8.5 8.9 8.9                     Imaging  CT-CTA COMPLETE THORACOABDOMINAL AORTA   Final Result      1.  No evidence of aortic aneurysm or dissection.      2.  Atherosclerotic calcification of the coronary arteries especially involving the left anterior ascending artery origin      3.  Fatty liver with a 2.3 cm right lobe hepatic hemangioma.      4.  Bilateral adrenal nodules measuring 17 mm on the left and 12 mm on the right.      5.  Moderate constipation.                        EC-ECHOCARDIOGRAM COMPLETE W/O CONT   Final Result      DX-FOOT-COMPLETE 3+ LEFT   Final Result         1.  Slight permeative appearance of the lateral cortex of the fifth toe metatarsal waist, appearance raising concern for component of osteomyelitis.   2.  Soft tissue gas at the amputation site, consider underlying infectious etiology.      DX-CHEST-PORTABLE (1 VIEW)   Final Result         1.  No acute cardiopulmonary disease.      MR-LUMBAR SPINE-WITH    (Results Pending)   MR-THORACIC SPINE-WITH    (Results Pending)   MR-CERVICAL SPINE-WITH    (Results Pending)        Assessment/Plan  * MSSA bacteremia  Assessment & Plan  4/20 BCX w/MSSA  4/20 WCX  w/NGTD  4/21 ORCX w/Gram stain+ few positive cocci   4/22 BCX w/NGTD  ID following  Continue IV cefazolin   Pending TTE    Osteomyelitis of ankle or foot, acute, left (HCC)- (present on admission)  Assessment & Plan  2/28 Left small toe ray resection  for osteomyelitis. Wcx grew MSSA. L foot xray concerning for osteo of left lateral foot near resection site. Received Unasyn and Vancomycin in ED. Ortho consult in ED, to see in AM. S/P Sepsis protocol for SIRS 2/4 and this as likely source of infection. Not febrile, no leukocytosis at presentation.    IV ABX per ID  Wound care  Orthopedic surgery following  Aim for OR on AM    Type 2 diabetes mellitus with hyperglycemia, with long-term current use of insulin (HCC)- (present on admission)  Assessment & Plan  A1c was 13.9 in FEB 2024. On insulin. Reports compliance with medication and significant diet adjustment since February. Glucose 325 on admission.  -Repeat A1C  -Glargine 10. Goal blood glucose 140-180 given active infection.  -SSI  -Hypoglycemia protocol    Sepsis (HCC)  Assessment & Plan  Resolved    Hypertension- (present on admission)  Assessment & Plan  -Hold home losartan given DOMINIC at presentation    Acute renal failure (HCC)- (present on admission)  Assessment & Plan  Resolved    Anxiety- (present on admission)  Assessment & Plan  Mood ok.  -Continue home venlafaxine       VTE prophylaxis: Lovenox    I have performed a physical exam and reviewed and updated ROS and Plan today (4/24/2025). In review of yesterday's note (4/23/2025), there are no changes except as documented above.    Greater than 51 minutes spent prepping to see patient (e.g. review of tests) obtaining and/or reviewing separately obtained history. Performing a medically appropriate examination and/ evaluation.  Counseling and educating the patient/family/caregiver.  Ordering medications, tests, or procedures.  Referring and communicating with other health care professionals.  Documenting  clinical information in EPIC.  Independently interpreting results and communicating results to patient/family/caregiver.  Care coordination

## 2025-04-24 NOTE — PROGRESS NOTES
"      Orthopaedic Progress Note    Interval changes:  Patient doing well post op  LLE dressing CDI  Cleared for DC home by ortho pending medicine clearance    ROS - Patient denies any new issues.  Pain well controlled.    /77   Pulse 71   Temp 36.2 °C (97.1 °F) (Temporal)   Resp 17   Ht 1.854 m (6' 1\")   Wt 98.4 kg (216 lb 14.9 oz)   SpO2 96%     Patient seen and examined  No acute distress  Breathing non labored  RRR  LLE dressing CDI, DNVI, moves all remaining toes, cap refill <2 sec.     Recent Labs     04/22/25  0527 04/23/25  0035 04/24/25  0736   WBC 8.2 7.4 7.5   RBC 4.62* 4.61* 4.67*   HEMOGLOBIN 13.2* 13.4* 13.5*   HEMATOCRIT 40.5* 39.4* 39.1*   MCV 87.7 85.5 83.7   MCH 28.6 29.1 28.9   MCHC 32.6 34.0 34.5   RDW 41.4 39.1 37.9   PLATELETCT 163* 177 204   MPV 10.1 10.3 9.7       Active Hospital Problems    Diagnosis     Sepsis ([facility]) [A41.9]     MSSA bacteremia [R78.81, B95.61]     Osteomyelitis of ankle or foot, acute, left ([facility]) [M86.172]     Hypertension [I10]     Acute renal failure ([facility]) [N17.9]     Anxiety [F41.9]     Type 2 diabetes mellitus with hyperglycemia, with long-term current use of insulin ([facility]) [E11.65, Z79.4]        Assessment/Plan:  Patient doing well post op  LLE dressing CDI  Cleared for DC home by ortho pending medicine clearance  POD#1 S/P Debridement and secondary closure of surgical wound left foot partial amputation   POD#3 S/P:  Left foot fifth ray amputation  Wound vac application <50cm2  Wt bearing status - Heel WB   Wound care/Drains - Dressings to be changed every other day by nursing. Or PRN for saturation    Future Procedures - none planned    Sutures/Staples out- 14-21 days post operatively. Removal by ortho mid levels only.  PT/OT-initiated  Antibiotics: ancef 2g IV Q8  DVT Prophylaxis- TEDS/SCDs/Foot pumps/heparin  Sainz-not needed per ortho  Case Coordination for Discharge Planning - Disposition per therapy recs.    "

## 2025-04-24 NOTE — PROGRESS NOTES
4 Eyes Skin Assessment Completed by Ramya Loja, RN and Anabell Avila, ARCHANA.    Head WDL  Ears WDL  Nose WDL  Mouth WDL  Neck WDL  Breast/Chest WDL  Shoulder Blades WDL  Spine WDL  (R) Arm/Elbow/Hand WDL  (L) Arm/Elbow/Hand WDL  Abdomen WDL  Groin WDL  Scrotum/Coccyx/Buttocks WDL  (R) Leg WDL  (L) Leg has dressing in place (ace bandage)  (R) Heel/Foot/Toe WDL  (L) Heel/Foot/Toe WDL          Devices In Places Blood Pressure Cuff and Pulse Ox      Interventions In Place Pillows and Heels Loaded W/Pillows    Possible Skin Injury No    Pictures Uploaded Into Epic N/A  Wound Consult Placed N/A  RN Wound Prevention Protocol Ordered No

## 2025-04-25 ENCOUNTER — APPOINTMENT (OUTPATIENT)
Dept: RADIOLOGY | Facility: MEDICAL CENTER | Age: 55
End: 2025-04-25
Attending: STUDENT IN AN ORGANIZED HEALTH CARE EDUCATION/TRAINING PROGRAM

## 2025-04-25 ENCOUNTER — ANESTHESIA EVENT (OUTPATIENT)
Dept: RADIOLOGY | Facility: MEDICAL CENTER | Age: 55
End: 2025-04-25

## 2025-04-25 ENCOUNTER — SURGERY (OUTPATIENT)
Age: 55
End: 2025-04-25
Payer: COMMERCIAL

## 2025-04-25 ENCOUNTER — ANESTHESIA (OUTPATIENT)
Dept: RADIOLOGY | Facility: MEDICAL CENTER | Age: 55
End: 2025-04-25

## 2025-04-25 LAB
ALBUMIN SERPL BCP-MCNC: 3.4 G/DL (ref 3.2–4.9)
ALBUMIN/GLOB SERPL: 1.1 G/DL
ALP SERPL-CCNC: 82 U/L (ref 30–99)
ALT SERPL-CCNC: 18 U/L (ref 2–50)
ANION GAP SERPL CALC-SCNC: 13 MMOL/L (ref 7–16)
AST SERPL-CCNC: 35 U/L (ref 12–45)
BACTERIA BLD CULT: ABNORMAL
BILIRUB SERPL-MCNC: <0.2 MG/DL (ref 0.1–1.5)
BUN SERPL-MCNC: 23 MG/DL (ref 8–22)
CALCIUM ALBUM COR SERPL-MCNC: 10 MG/DL (ref 8.5–10.5)
CALCIUM SERPL-MCNC: 9.5 MG/DL (ref 8.5–10.5)
CHLORIDE SERPL-SCNC: 99 MMOL/L (ref 96–112)
CO2 SERPL-SCNC: 24 MMOL/L (ref 20–33)
CREAT SERPL-MCNC: 1.4 MG/DL (ref 0.5–1.4)
ERYTHROCYTE [DISTWIDTH] IN BLOOD BY AUTOMATED COUNT: 38.6 FL (ref 35.9–50)
GFR SERPLBLD CREATININE-BSD FMLA CKD-EPI: 59 ML/MIN/1.73 M 2
GLOBULIN SER CALC-MCNC: 3.2 G/DL (ref 1.9–3.5)
GLUCOSE BLD STRIP.AUTO-MCNC: 233 MG/DL (ref 65–99)
GLUCOSE BLD STRIP.AUTO-MCNC: 290 MG/DL (ref 65–99)
GLUCOSE BLD STRIP.AUTO-MCNC: 348 MG/DL (ref 65–99)
GLUCOSE SERPL-MCNC: 262 MG/DL (ref 65–99)
HCT VFR BLD AUTO: 41.7 % (ref 42–52)
HGB BLD-MCNC: 14.2 G/DL (ref 14–18)
MAGNESIUM SERPL-MCNC: 2 MG/DL (ref 1.5–2.5)
MCH RBC QN AUTO: 28.7 PG (ref 27–33)
MCHC RBC AUTO-ENTMCNC: 34.1 G/DL (ref 32.3–36.5)
MCV RBC AUTO: 84.4 FL (ref 81.4–97.8)
PHOSPHATE SERPL-MCNC: 3.2 MG/DL (ref 2.5–4.5)
PLATELET # BLD AUTO: 219 K/UL (ref 164–446)
PMV BLD AUTO: 10.5 FL (ref 9–12.9)
POTASSIUM SERPL-SCNC: 4.7 MMOL/L (ref 3.6–5.5)
PROT SERPL-MCNC: 6.6 G/DL (ref 6–8.2)
PTH-INTACT SERPL-MCNC: 30 PG/ML (ref 14–72)
RBC # BLD AUTO: 4.94 M/UL (ref 4.7–6.1)
SIGNIFICANT IND 70042: ABNORMAL
SIGNIFICANT IND 70042: ABNORMAL
SITE SITE: ABNORMAL
SITE SITE: ABNORMAL
SODIUM SERPL-SCNC: 136 MMOL/L (ref 135–145)
SOURCE SOURCE: ABNORMAL
SOURCE SOURCE: ABNORMAL
WBC # BLD AUTO: 9.9 K/UL (ref 4.8–10.8)

## 2025-04-25 PROCEDURE — 700102 HCHG RX REV CODE 250 W/ 637 OVERRIDE(OP): Performed by: ANESTHESIOLOGY

## 2025-04-25 PROCEDURE — A9270 NON-COVERED ITEM OR SERVICE: HCPCS

## 2025-04-25 PROCEDURE — 700105 HCHG RX REV CODE 258: Performed by: STUDENT IN AN ORGANIZED HEALTH CARE EDUCATION/TRAINING PROGRAM

## 2025-04-25 PROCEDURE — 160002 HCHG RECOVERY MINUTES (STAT)

## 2025-04-25 PROCEDURE — 700111 HCHG RX REV CODE 636 W/ 250 OVERRIDE (IP): Performed by: STUDENT IN AN ORGANIZED HEALTH CARE EDUCATION/TRAINING PROGRAM

## 2025-04-25 PROCEDURE — 700117 HCHG RX CONTRAST REV CODE 255: Mod: JZ | Performed by: STUDENT IN AN ORGANIZED HEALTH CARE EDUCATION/TRAINING PROGRAM

## 2025-04-25 PROCEDURE — 83735 ASSAY OF MAGNESIUM: CPT

## 2025-04-25 PROCEDURE — 85027 COMPLETE CBC AUTOMATED: CPT

## 2025-04-25 PROCEDURE — A9270 NON-COVERED ITEM OR SERVICE: HCPCS | Performed by: ANESTHESIOLOGY

## 2025-04-25 PROCEDURE — 700102 HCHG RX REV CODE 250 W/ 637 OVERRIDE(OP)

## 2025-04-25 PROCEDURE — A9579 GAD-BASE MR CONTRAST NOS,1ML: HCPCS | Mod: JZ | Performed by: STUDENT IN AN ORGANIZED HEALTH CARE EDUCATION/TRAINING PROGRAM

## 2025-04-25 PROCEDURE — 99233 SBSQ HOSP IP/OBS HIGH 50: CPT | Performed by: INTERNAL MEDICINE

## 2025-04-25 PROCEDURE — 700111 HCHG RX REV CODE 636 W/ 250 OVERRIDE (IP): Performed by: INTERNAL MEDICINE

## 2025-04-25 PROCEDURE — 72157 MRI CHEST SPINE W/O & W/DYE: CPT

## 2025-04-25 PROCEDURE — 72158 MRI LUMBAR SPINE W/O & W/DYE: CPT

## 2025-04-25 PROCEDURE — 700111 HCHG RX REV CODE 636 W/ 250 OVERRIDE (IP): Performed by: NURSE PRACTITIONER

## 2025-04-25 PROCEDURE — A9270 NON-COVERED ITEM OR SERVICE: HCPCS | Performed by: STUDENT IN AN ORGANIZED HEALTH CARE EDUCATION/TRAINING PROGRAM

## 2025-04-25 PROCEDURE — 83970 ASSAY OF PARATHORMONE: CPT

## 2025-04-25 PROCEDURE — 770006 HCHG ROOM/CARE - MED/SURG/GYN SEMI*

## 2025-04-25 PROCEDURE — 84100 ASSAY OF PHOSPHORUS: CPT

## 2025-04-25 PROCEDURE — 99233 SBSQ HOSP IP/OBS HIGH 50: CPT | Performed by: STUDENT IN AN ORGANIZED HEALTH CARE EDUCATION/TRAINING PROGRAM

## 2025-04-25 PROCEDURE — 36415 COLL VENOUS BLD VENIPUNCTURE: CPT

## 2025-04-25 PROCEDURE — 82962 GLUCOSE BLOOD TEST: CPT | Mod: 91

## 2025-04-25 PROCEDURE — 700101 HCHG RX REV CODE 250: Performed by: ANESTHESIOLOGY

## 2025-04-25 PROCEDURE — 700102 HCHG RX REV CODE 250 W/ 637 OVERRIDE(OP): Performed by: STUDENT IN AN ORGANIZED HEALTH CARE EDUCATION/TRAINING PROGRAM

## 2025-04-25 PROCEDURE — 160035 HCHG PACU - 1ST 60 MINS PHASE I

## 2025-04-25 PROCEDURE — 80053 COMPREHEN METABOLIC PANEL: CPT

## 2025-04-25 PROCEDURE — 72156 MRI NECK SPINE W/O & W/DYE: CPT

## 2025-04-25 PROCEDURE — 700105 HCHG RX REV CODE 258: Performed by: ANESTHESIOLOGY

## 2025-04-25 PROCEDURE — 700111 HCHG RX REV CODE 636 W/ 250 OVERRIDE (IP): Mod: JZ | Performed by: ANESTHESIOLOGY

## 2025-04-25 PROCEDURE — 700105 HCHG RX REV CODE 258: Performed by: INTERNAL MEDICINE

## 2025-04-25 RX ORDER — CALCIUM CHLORIDE 100 MG/ML
INJECTION INTRAVENOUS; INTRAVENTRICULAR PRN
Status: DISCONTINUED | OUTPATIENT
Start: 2025-04-25 | End: 2025-04-25 | Stop reason: SURG

## 2025-04-25 RX ORDER — HALOPERIDOL 5 MG/ML
1 INJECTION INTRAMUSCULAR
Status: DISCONTINUED | OUTPATIENT
Start: 2025-04-25 | End: 2025-04-25 | Stop reason: HOSPADM

## 2025-04-25 RX ORDER — LIDOCAINE HYDROCHLORIDE 20 MG/ML
INJECTION, SOLUTION EPIDURAL; INFILTRATION; INTRACAUDAL; PERINEURAL PRN
Status: DISCONTINUED | OUTPATIENT
Start: 2025-04-25 | End: 2025-04-25 | Stop reason: SURG

## 2025-04-25 RX ORDER — MIDAZOLAM HYDROCHLORIDE 1 MG/ML
1 INJECTION INTRAMUSCULAR; INTRAVENOUS
Status: DISCONTINUED | OUTPATIENT
Start: 2025-04-25 | End: 2025-04-25 | Stop reason: HOSPADM

## 2025-04-25 RX ORDER — DEXTROSE MONOHYDRATE 25 G/50ML
25 INJECTION, SOLUTION INTRAVENOUS
Status: DISCONTINUED | OUTPATIENT
Start: 2025-04-25 | End: 2025-04-26

## 2025-04-25 RX ORDER — OXYCODONE HCL 5 MG/5 ML
5 SOLUTION, ORAL ORAL
Status: COMPLETED | OUTPATIENT
Start: 2025-04-25 | End: 2025-04-25

## 2025-04-25 RX ORDER — IPRATROPIUM BROMIDE AND ALBUTEROL SULFATE 2.5; .5 MG/3ML; MG/3ML
3 SOLUTION RESPIRATORY (INHALATION)
Status: DISCONTINUED | OUTPATIENT
Start: 2025-04-25 | End: 2025-04-25 | Stop reason: HOSPADM

## 2025-04-25 RX ORDER — OXYCODONE HCL 5 MG/5 ML
10 SOLUTION, ORAL ORAL
Status: COMPLETED | OUTPATIENT
Start: 2025-04-25 | End: 2025-04-25

## 2025-04-25 RX ORDER — PHENYLEPHRINE HYDROCHLORIDE 10 MG/ML
INJECTION, SOLUTION INTRAMUSCULAR; INTRAVENOUS; SUBCUTANEOUS PRN
Status: DISCONTINUED | OUTPATIENT
Start: 2025-04-25 | End: 2025-04-25 | Stop reason: SURG

## 2025-04-25 RX ORDER — ONDANSETRON 2 MG/ML
4 INJECTION INTRAMUSCULAR; INTRAVENOUS
Status: DISCONTINUED | OUTPATIENT
Start: 2025-04-25 | End: 2025-04-25 | Stop reason: HOSPADM

## 2025-04-25 RX ORDER — MEPERIDINE HYDROCHLORIDE 25 MG/ML
12.5 INJECTION INTRAMUSCULAR; INTRAVENOUS; SUBCUTANEOUS
Status: DISCONTINUED | OUTPATIENT
Start: 2025-04-25 | End: 2025-04-25 | Stop reason: HOSPADM

## 2025-04-25 RX ORDER — EPHEDRINE SULFATE 50 MG/ML
INJECTION, SOLUTION INTRAVENOUS PRN
Status: DISCONTINUED | OUTPATIENT
Start: 2025-04-25 | End: 2025-04-25 | Stop reason: SURG

## 2025-04-25 RX ORDER — SODIUM CHLORIDE, SODIUM LACTATE, POTASSIUM CHLORIDE, CALCIUM CHLORIDE 600; 310; 30; 20 MG/100ML; MG/100ML; MG/100ML; MG/100ML
INJECTION, SOLUTION INTRAVENOUS
Status: DISCONTINUED | OUTPATIENT
Start: 2025-04-25 | End: 2025-04-25 | Stop reason: SURG

## 2025-04-25 RX ORDER — INSULIN LISPRO 100 [IU]/ML
1-6 INJECTION, SOLUTION INTRAVENOUS; SUBCUTANEOUS
Status: DISCONTINUED | OUTPATIENT
Start: 2025-04-25 | End: 2025-04-26

## 2025-04-25 RX ORDER — HYDROMORPHONE HYDROCHLORIDE 1 MG/ML
0.2 INJECTION, SOLUTION INTRAMUSCULAR; INTRAVENOUS; SUBCUTANEOUS
Status: DISCONTINUED | OUTPATIENT
Start: 2025-04-25 | End: 2025-04-25 | Stop reason: HOSPADM

## 2025-04-25 RX ORDER — CYCLOBENZAPRINE HCL 10 MG
5 TABLET ORAL ONCE
Status: COMPLETED | OUTPATIENT
Start: 2025-04-25 | End: 2025-04-25

## 2025-04-25 RX ORDER — HYDROMORPHONE HYDROCHLORIDE 1 MG/ML
1 INJECTION, SOLUTION INTRAMUSCULAR; INTRAVENOUS; SUBCUTANEOUS
Status: DISCONTINUED | OUTPATIENT
Start: 2025-04-25 | End: 2025-04-25 | Stop reason: HOSPADM

## 2025-04-25 RX ORDER — HYDROMORPHONE HYDROCHLORIDE 1 MG/ML
1 INJECTION, SOLUTION INTRAMUSCULAR; INTRAVENOUS; SUBCUTANEOUS EVERY 6 HOURS PRN
Status: DISCONTINUED | OUTPATIENT
Start: 2025-04-25 | End: 2025-04-28

## 2025-04-25 RX ORDER — DIPHENHYDRAMINE HYDROCHLORIDE 50 MG/ML
12.5 INJECTION, SOLUTION INTRAMUSCULAR; INTRAVENOUS
Status: DISCONTINUED | OUTPATIENT
Start: 2025-04-25 | End: 2025-04-25 | Stop reason: HOSPADM

## 2025-04-25 RX ORDER — HYDROMORPHONE HYDROCHLORIDE 1 MG/ML
0.4 INJECTION, SOLUTION INTRAMUSCULAR; INTRAVENOUS; SUBCUTANEOUS
Status: DISCONTINUED | OUTPATIENT
Start: 2025-04-25 | End: 2025-04-25 | Stop reason: HOSPADM

## 2025-04-25 RX ADMIN — OXYCODONE HYDROCHLORIDE 10 MG: 10 TABLET ORAL at 05:23

## 2025-04-25 RX ADMIN — CYCLOBENZAPRINE 5 MG: 10 TABLET, FILM COATED ORAL at 01:02

## 2025-04-25 RX ADMIN — ROCURONIUM BROMIDE 20 MG: 10 INJECTION, SOLUTION INTRAVENOUS at 11:45

## 2025-04-25 RX ADMIN — DIAZEPAM 5 MG: 10 INJECTION, SOLUTION INTRAMUSCULAR; INTRAVENOUS at 07:41

## 2025-04-25 RX ADMIN — INSULIN LISPRO 4 UNITS: 100 INJECTION, SOLUTION INTRAVENOUS; SUBCUTANEOUS at 21:17

## 2025-04-25 RX ADMIN — POLYETHYLENE GLYCOL 3350 2 PACKET: 17 POWDER, FOR SOLUTION ORAL at 17:52

## 2025-04-25 RX ADMIN — ACETAMINOPHEN 1000 MG: 500 TABLET, FILM COATED ORAL at 17:51

## 2025-04-25 RX ADMIN — ACETAMINOPHEN 1000 MG: 500 TABLET, FILM COATED ORAL at 05:23

## 2025-04-25 RX ADMIN — INSULIN LISPRO 2 UNITS: 100 INJECTION, SOLUTION INTRAVENOUS; SUBCUTANEOUS at 09:41

## 2025-04-25 RX ADMIN — OXYCODONE HYDROCHLORIDE 10 MG: 10 TABLET ORAL at 17:52

## 2025-04-25 RX ADMIN — HYDROMORPHONE HYDROCHLORIDE 1 MG: 1 INJECTION, SOLUTION INTRAMUSCULAR; INTRAVENOUS; SUBCUTANEOUS at 21:08

## 2025-04-25 RX ADMIN — EPHEDRINE SULFATE 10 MG: 50 INJECTION, SOLUTION INTRAVENOUS at 12:26

## 2025-04-25 RX ADMIN — ENOXAPARIN SODIUM 40 MG: 100 INJECTION SUBCUTANEOUS at 17:36

## 2025-04-25 RX ADMIN — OXYCODONE HYDROCHLORIDE 10 MG: 5 SOLUTION ORAL at 13:33

## 2025-04-25 RX ADMIN — CEFAZOLIN 2 G: 2 INJECTION, POWDER, FOR SOLUTION INTRAMUSCULAR; INTRAVENOUS at 17:56

## 2025-04-25 RX ADMIN — ROCURONIUM BROMIDE 20 MG: 10 INJECTION, SOLUTION INTRAVENOUS at 12:10

## 2025-04-25 RX ADMIN — ATORVASTATIN CALCIUM 20 MG: 20 TABLET, FILM COATED ORAL at 17:51

## 2025-04-25 RX ADMIN — ROCURONIUM BROMIDE 10 MG: 10 INJECTION, SOLUTION INTRAVENOUS at 12:26

## 2025-04-25 RX ADMIN — PHENYLEPHRINE HYDROCHLORIDE 100 MCG: 10 INJECTION INTRAVENOUS at 11:40

## 2025-04-25 RX ADMIN — INSULIN LISPRO 3 UNITS: 100 INJECTION, SOLUTION INTRAVENOUS; SUBCUTANEOUS at 17:59

## 2025-04-25 RX ADMIN — CALCIUM CHLORIDE 1 G: 100 INJECTION, SOLUTION INTRAVENOUS at 11:35

## 2025-04-25 RX ADMIN — SODIUM CHLORIDE, POTASSIUM CHLORIDE, SODIUM LACTATE AND CALCIUM CHLORIDE: 600; 310; 30; 20 INJECTION, SOLUTION INTRAVENOUS at 11:15

## 2025-04-25 RX ADMIN — PHENYLEPHRINE HYDROCHLORIDE 100 MCG: 10 INJECTION INTRAVENOUS at 12:20

## 2025-04-25 RX ADMIN — PROPOFOL 120 MG: 10 INJECTION, EMULSION INTRAVENOUS at 11:23

## 2025-04-25 RX ADMIN — LIDOCAINE HYDROCHLORIDE 50 MG: 20 INJECTION, SOLUTION EPIDURAL; INFILTRATION; INTRACAUDAL; PERINEURAL at 11:23

## 2025-04-25 RX ADMIN — ROCURONIUM BROMIDE 50 MG: 10 INJECTION, SOLUTION INTRAVENOUS at 11:23

## 2025-04-25 RX ADMIN — CEFAZOLIN 2 G: 2 INJECTION, POWDER, FOR SOLUTION INTRAMUSCULAR; INTRAVENOUS at 03:17

## 2025-04-25 RX ADMIN — GADOTERIDOL 20 ML: 279.3 INJECTION, SOLUTION INTRAVENOUS at 13:43

## 2025-04-25 RX ADMIN — POLYETHYLENE GLYCOL 3350 2 PACKET: 17 POWDER, FOR SOLUTION ORAL at 05:27

## 2025-04-25 ASSESSMENT — PAIN SCALES - GENERAL: PAIN_LEVEL: 0

## 2025-04-25 ASSESSMENT — PAIN DESCRIPTION - PAIN TYPE
TYPE: ACUTE PAIN

## 2025-04-25 ASSESSMENT — ENCOUNTER SYMPTOMS
CONSTITUTIONAL NEGATIVE: 1
GASTROINTESTINAL NEGATIVE: 1
CARDIOVASCULAR NEGATIVE: 1
NEUROLOGICAL NEGATIVE: 1
FEVER: 0
EYES NEGATIVE: 1
CHILLS: 0
PSYCHIATRIC NEGATIVE: 1
RESPIRATORY NEGATIVE: 1

## 2025-04-25 ASSESSMENT — FIBROSIS 4 INDEX: FIB4 SCORE: 2.07

## 2025-04-25 NOTE — CARE PLAN
The patient is Stable - Low risk of patient condition declining or worsening    Shift Goals  Clinical Goals: Pt will report pain as 6 or  less out of 10 after interventions during this shift.  Patient Goals: Sleep  Family Goals: Not at bedside    Pleasant and cooperative. Foot pain was managed with oxy but pt reported muscle pain in the posterior shoulder. 10 mg Flexiril given per Zion LYNN; did not resolve pain. Bed in lowest, locked position. All needs met at this time.    Progress made toward(s) clinical / shift goals:    Problem: Knowledge Deficit - Standard  Goal: Patient and family/care givers will demonstrate understanding of plan of care, disease process/condition, diagnostic tests and medications  Outcome: Progressing     Problem: Fall Risk  Goal: Patient will remain free from falls  Outcome: Progressing     Problem: Hemodynamics  Goal: Patient's hemodynamics, fluid balance and neurologic status will be stable or improve  Outcome: Progressing     Problem: Physical Regulation  Goal: Diagnostic test results will improve  Outcome: Progressing     Problem: Pain - Standard  Goal: Alleviation of pain or a reduction in pain to the patient’s comfort goal  Outcome: Progressing       Patient is not progressing towards the following goals:

## 2025-04-25 NOTE — ANESTHESIA POSTPROCEDURE EVALUATION
Patient: Donald Fuller    Procedure Summary       Date: 04/25/25 Room / Location: [facility]    Anesthesia Start: 1115 Anesthesia Stop: 1306    Procedure: MR-CERVICAL SPINE-WITH & W/O Diagnosis: (Back pain in the setting of persistent MSSA bacteremia)    Scheduled Providers: Rich Plummer III, M.D. Responsible Provider: Rich Plummer III, M.D.    Anesthesia Type: general ASA Status: 3            Final Anesthesia Type: general  Last vitals  BP   Blood Pressure: 123/67    Temp   36.7 °C (98.1 °F)    Pulse   96   Resp   20    SpO2   95 %      Anesthesia Post Evaluation    Patient location during evaluation: PACU  Patient participation: complete - patient participated  Level of consciousness: awake and alert  Pain score: 0    Airway patency: patent  Anesthetic complications: no  Cardiovascular status: hemodynamically stable  Respiratory status: acceptable  Hydration status: euvolemic    PONV: none          No notable events documented.     Nurse Pain Score: 5 (NPRS)

## 2025-04-25 NOTE — PROGRESS NOTES
[facility] Medicine Daily Progress Note    Date of Service  4/25/2025    Chief Complaint  Donald Fuller is a 54 y.o. male admitted 4/20/2025 with left 5th ray resection surgical site infection    Hospital Course  54 y.o. male with pmh IDDM2, Anxiety, HTN, S/P left 5th ray resection who presented 4/20/2025 with bleeding swelling and draining near amputation site that has been present past two days. Resection was done 2/28 for osteomyelitis, Cultures at that time grew E. Cloacae. Was discharged on 14 day course of augmentin.      Had sutures removed 5 days ago. Noted bleeding and swelling near amputation site 2 days ago, intermittent pain. Says he takes great care in not re-injuring foot, proper wound care. Has not noticed pus. Has had chills past 1 day. Nausea past Today.      He was admitted to the medical floor on 4/20/2025 for surgical site infection.  Prior to admission, blood cultures were collected and patient started on broad-spectrum IV antibiotics. Orthopedic surgery following for which patient underwent left foot fifth ray amputation with wound VAC placement on 4/21/2025 and I/D on 4/23/2025 4/20/2025 blood cultures growing MSSA.  Infect disease following first patient was transition to an Ancef antibiotic regimen.    Interval Problem Update  Evaluated at bedside  Back pain worst today  Stable VS   On room air  CBC without leukocytosis  -280  4/20 BCX w/MSSA  4/20 WCX w/MSSA  4/21 ORCX w/MSSA   4/22 BCX w/MSSA  4/24 BCX w/NGTD  ID following  Continue IV Cefazolin   Aim for Spinal MRI under anesthesia today   Labs in a.m.    I have discussed this patient's plan of care and discharge plan at IDT rounds today with Case Management, Nursing, Nursing leadership, and other members of the IDT team.    Consultants/Specialty  Orthopedic surgery  ID    Code Status  Full Code    Disposition  The patient is not medically cleared for discharge to home or a post-acute facility.     I have placed the  appropriate orders for post-discharge needs.    Review of Systems  Review of Systems   Constitutional: Negative.    HENT: Negative.     Eyes: Negative.    Respiratory: Negative.     Cardiovascular: Negative.    Gastrointestinal: Negative.    Genitourinary: Negative.    Musculoskeletal:  Positive for joint pain.   Skin: Negative.    Neurological: Negative.    Endo/Heme/Allergies: Negative.    Psychiatric/Behavioral: Negative.          Physical Exam  Temp:  [36.2 °C (97.1 °F)-37.3 °C (99.2 °F)] 37.3 °C (99.2 °F)  Pulse:  [] 102  Resp:  [16-21] 18  BP: (127-168)/() 165/97  SpO2:  [90 %-97 %] 90 %    Physical Exam  Constitutional:       General: He is not in acute distress.     Appearance: Normal appearance.   HENT:      Head: Normocephalic and atraumatic.      Nose: Nose normal. No congestion.      Mouth/Throat:      Mouth: Mucous membranes are moist.   Eyes:      Extraocular Movements: Extraocular movements intact.      Pupils: Pupils are equal, round, and reactive to light.   Cardiovascular:      Rate and Rhythm: Normal rate and regular rhythm.      Pulses: Normal pulses.      Heart sounds: Normal heart sounds.   Pulmonary:      Effort: Pulmonary effort is normal.      Breath sounds: Normal breath sounds.   Abdominal:      General: Bowel sounds are normal.      Palpations: Abdomen is soft.      Tenderness: There is no abdominal tenderness.   Musculoskeletal:         General: Swelling present. Normal range of motion.      Cervical back: Normal range of motion and neck supple.      Comments: Left 5th ray resection, Wound 1cm anterolateral left foot, erythema   Skin:     General: Skin is warm.      Coloration: Skin is not jaundiced.   Neurological:      General: No focal deficit present.      Mental Status: He is alert and oriented to person, place, and time. Mental status is at baseline.      Cranial Nerves: No cranial nerve deficit.   Psychiatric:         Mood and Affect: Mood normal.         Behavior:  Behavior normal.         Thought Content: Thought content normal.         Judgment: Judgment normal.         Fluids    Intake/Output Summary (Last 24 hours) at 4/25/2025 0751  Last data filed at 4/25/2025 0100  Gross per 24 hour   Intake 900 ml   Output --   Net 900 ml        Laboratory  Recent Labs     04/23/25  0035 04/24/25  0736 04/25/25  0118   WBC 7.4 7.5 9.9   RBC 4.61* 4.67* 4.94   HEMOGLOBIN 13.4* 13.5* 14.2   HEMATOCRIT 39.4* 39.1* 41.7*   MCV 85.5 83.7 84.4   MCH 29.1 28.9 28.7   MCHC 34.0 34.5 34.1   RDW 39.1 37.9 38.6   PLATELETCT 177 204 219   MPV 10.3 9.7 10.5     Recent Labs     04/23/25  0035 04/24/25  0736 04/25/25  0118   SODIUM 135 136 136   POTASSIUM 3.9 3.8 4.7   CHLORIDE 100 103 99   CO2 22 23 24   GLUCOSE 306* 247* 262*   BUN 15 16 23*   CREATININE 1.01 0.87 1.40   CALCIUM 8.9 8.9 9.5                     Imaging  CT-CTA COMPLETE THORACOABDOMINAL AORTA   Final Result      1.  No evidence of aortic aneurysm or dissection.      2.  Atherosclerotic calcification of the coronary arteries especially involving the left anterior ascending artery origin      3.  Fatty liver with a 2.3 cm right lobe hepatic hemangioma.      4.  Bilateral adrenal nodules measuring 17 mm on the left and 12 mm on the right.      5.  Moderate constipation.                        EC-ECHOCARDIOGRAM COMPLETE W/O CONT   Final Result      DX-FOOT-COMPLETE 3+ LEFT   Final Result         1.  Slight permeative appearance of the lateral cortex of the fifth toe metatarsal waist, appearance raising concern for component of osteomyelitis.   2.  Soft tissue gas at the amputation site, consider underlying infectious etiology.      DX-CHEST-PORTABLE (1 VIEW)   Final Result         1.  No acute cardiopulmonary disease.      MR-LUMBAR SPINE-WITH    (Results Pending)   MR-THORACIC SPINE-WITH    (Results Pending)   MR-CERVICAL SPINE-WITH    (Results Pending)        Assessment/Plan  * MSSA bacteremia  Assessment & Plan  4/20 BCX w/MSSA  4/20  WCX w/NGTD  4/21 ORCX w/Gram stain+ few positive cocci   4/22 BCX w/NGTD  ID following  Continue IV cefazolin   Pending TTE    Osteomyelitis of ankle or foot, acute, left (HCC)- (present on admission)  Assessment & Plan  2/28 Left small toe ray resection  for osteomyelitis. Wcx grew MSSA. L foot xray concerning for osteo of left lateral foot near resection site. Received Unasyn and Vancomycin in ED. Ortho consult in ED, to see in AM. S/P Sepsis protocol for SIRS 2/4 and this as likely source of infection. Not febrile, no leukocytosis at presentation.    IV ABX per ID  Wound care  Orthopedic surgery following  Aim for OR on AM    Type 2 diabetes mellitus with hyperglycemia, with long-term current use of insulin ([facility])- (present on admission)  Assessment & Plan  A1c was 13.9 in FEB 2024. On insulin. Reports compliance with medication and significant diet adjustment since February. Glucose 325 on admission.  -Repeat A1C  -Glargine 10. Goal blood glucose 140-180 given active infection.  -SSI  -Hypoglycemia protocol    Sepsis (HCC)  Assessment & Plan  Resolved    Hypertension- (present on admission)  Assessment & Plan  -Hold home losartan given DOMINIC at presentation    Acute renal failure (HCC)- (present on admission)  Assessment & Plan  Resolved    Anxiety- (present on admission)  Assessment & Plan  Mood ok.  -Continue home venlafaxine       VTE prophylaxis: Lovenox    I have performed a physical exam and reviewed and updated ROS and Plan today (4/25/2025). In review of yesterday's note (4/24/2025), there are no changes except as documented above.    Greater than 51 minutes spent prepping to see patient (e.g. review of tests) obtaining and/or reviewing separately obtained history. Performing a medically appropriate examination and/ evaluation.  Counseling and educating the patient/family/caregiver.  Ordering medications, tests, or procedures.  Referring and communicating with other health care professionals.  Documenting  clinical information in EPIC.  Independently interpreting results and communicating results to patient/family/caregiver.  Care coordination

## 2025-04-25 NOTE — ANESTHESIA PREPROCEDURE EVALUATION
Date/Time: 04/25/25 1100    Scheduled providers: Rich Plummer III, M.D.    Procedure: MR-CERVICAL SPINE-WITH    Indications: Back pain in the setting of persistent MSSA bacteremia    Location: [facility] - [facility]            Relevant Problems   CARDIAC   (positive) Cherry angioma   (positive) Hypertension         (positive) Acute renal failure (HCC)      ENDO   (positive) Type 2 diabetes mellitus with hyperglycemia, with long-term current use of insulin (HCC)      Other   (positive) Osteomyelitis of ankle or foot, acute, left (HCC)       Physical Exam    Airway   Mallampati: III  TM distance: >3 FB  Neck ROM: limited       Cardiovascular - normal exam  Rhythm: regular  Rate: normal  (-) murmur     Dental - normal exam           Pulmonary - normal exam  Breath sounds clear to auscultation     Abdominal    Neurological - normal exam                   Anesthesia Plan    ASA 3       Plan - general       Airway plan will be LMA          Induction: intravenous    Postoperative Plan: Postoperative administration of opioids is intended.    Pertinent diagnostic labs and testing reviewed    Informed Consent:    Anesthetic plan and risks discussed with patient.    Use of blood products discussed with: patient whom consented to blood products.

## 2025-04-25 NOTE — PROGRESS NOTES
Infectious Disease Progress Note    Author: Marisol Garcia M.D. Date & Time of service: 2025  7:57 AM    Chief Complaint:  Follow-up for MSSA bacteremia, diabetic foot infection    Interval History:   patient afebrile, WBC 7.5.  Patient underwent debridement and secondary closure of surgical wound yesterday.  Per the procedure note, the overall appearance of the wound was healthy there was some marginal necrosis that was excised.  Repeat blood cultures are negative to date.  Patient anxious to be discharged.  Discussed with patient that we are awaiting pathology to determine final antibiotic course   patient afebrile, WBC 9.9.  Repeat blood cultures from yesterday are negative thus far.  Path consistent with acute osteomyelitis.  Patient claustrophobic.  Plan for MRI under general anesthesia hopefully today.  Patient anxious to be discharged home.  Plan of care discussed with patient    Labs Reviewed, Medications Reviewed, and Wound Reviewed.    Review of Systems:  Review of Systems   Constitutional:  Negative for chills and fever.   Musculoskeletal:         Shoulder pain       Hemodynamics:  Temp (24hrs), Av.6 °C (97.8 °F), Min:36.2 °C (97.1 °F), Max:37.3 °C (99.2 °F)  Temperature: 37.3 °C (99.2 °F)  Pulse  Av.8  Min: 71  Max: 123   Blood Pressure: (!) 165/97 (rn aware)       Physical Exam:  Physical Exam  Vitals and nursing note reviewed.   Eyes:      Pupils: Pupils are equal, round, and reactive to light.   Cardiovascular:      Rate and Rhythm: Normal rate.   Pulmonary:      Effort: Pulmonary effort is normal.   Musculoskeletal:      Comments: Left foot in surgical dressing   Neurological:      General: No focal deficit present.      Mental Status: He is alert and oriented to person, place, and time.   Psychiatric:         Mood and Affect: Mood normal.         Behavior: Behavior normal.         Meds:    Current Facility-Administered Medications:     insulin GLARGINE **AND** insulin  lispro **AND** POC blood glucose manual result **AND** NOTIFY MD and PharmD **AND** Administer 20 grams of glucose (approximately 8 ounces of fruit juice) every 15 minutes PRN FSBG less than 70 mg/dL **AND** dextrose bolus    enoxaparin (LOVENOX) injection    polyethylene glycol/lytes    [DISCONTINUED] oxyCODONE immediate-release **OR** oxyCODONE immediate-release **OR** [DISCONTINUED] HYDROmorphone    NS    acetaminophen    ceFAZolin    Pharmacy Consult Request    atorvastatin    hydrOXYzine HCl    Labs:  Recent Labs     04/23/25  0035 04/24/25  0736 04/25/25  0118   WBC 7.4 7.5 9.9   RBC 4.61* 4.67* 4.94   HEMOGLOBIN 13.4* 13.5* 14.2   HEMATOCRIT 39.4* 39.1* 41.7*   MCV 85.5 83.7 84.4   MCH 29.1 28.9 28.7   RDW 39.1 37.9 38.6   PLATELETCT 177 204 219   MPV 10.3 9.7 10.5     Recent Labs     04/23/25  0035 04/24/25  0736 04/25/25  0118   SODIUM 135 136 136   POTASSIUM 3.9 3.8 4.7   CHLORIDE 100 103 99   CO2 22 23 24   GLUCOSE 306* 247* 262*   BUN 15 16 23*     Recent Labs     04/23/25  0035 04/24/25  0736 04/25/25  0118   ALBUMIN 3.1* 2.9* 3.4   TBILIRUBIN 0.2 0.2 <0.2   ALKPHOSPHAT 69 65 82   TOTPROTEIN 5.8* 5.9* 6.6   ALTSGPT 12 7 18   ASTSGOT 11* 9* 35   CREATININE 1.01 0.87 1.40       Imaging:  CT-CTA COMPLETE THORACOABDOMINAL AORTA  Result Date: 4/23/2025 4/23/2025 1:43 PM HISTORY/REASON FOR EXAM:  Chest pain. TECHNIQUE/EXAM DESCRIPTION: CT angiogram without and with contrast, with reconstructions. Initial precontrast thick helical sections were obtained from the top of the aortic arch through the diaphragmatic domes. Following this, thin-section postcontrast helical images were obtained from the lung apices through the iliac crests following the bolus administration of mL of nonionic Omnipaque 350 contrast.  CT angiographic technique was utilized. Parasagittal reconstructed images of the aorta were generated utilizing multiplanar volume reformat technique. Low dose optimization technique was utilized for this  CT exam including automated exposure control and adjustment of the mA and/or kV according to patient size. COMPARISON: None available. FINDINGS: Aorta: Pre-contrast images demonstrate no evidence of displaced calcified intima or intramural hematoma. Aortic arch: Branching pattern is conventional. Diameter: The ascending and descending aorta are of normal caliber. Dissection: Absent. Celiac artery origin: Widely patent. Superior mesenteric artery origin: Widely patent. Renal artery origins: Widely patent. Inferior mesenteric artery: Patent. Common iliac arteries: Nonaneurysmal and patent. Heart: There is atherosclerotic change of the coronary arteries especially involving the left anterior descending artery origin. 3D angiographic/MIP images of the vasculature confirm the vascular findings as described above. Lungs: There is bibasilar atelectasis. Liver: There is fatty change. There is an enhancing mass involving the right lobe posteriorly measuring 2.3 cm size likely representing hemangioma. Biliary system: No intrahepatic or extrahepatic ductal dilatation. The gallbladder is grossly unremarkable. Spleen: Unremarkable. Adrenal glands: There is 17 mm left adrenal nodule. As 12 mm right adrenal nodule. Pancreas: Unremarkable. Kidneys: There are small bilateral simple appearing renal cysts. No follow-up recommended. Bowel: There is moderate constipation. No bowel obstruction or focal inflammatory change. Ascites: None. Lymph nodes: No adenopathy is identified. Bones: There is partial fusion across the left SI joint.     1.  No evidence of aortic aneurysm or dissection. 2.  Atherosclerotic calcification of the coronary arteries especially involving the left anterior ascending artery origin 3.  Fatty liver with a 2.3 cm right lobe hepatic hemangioma. 4.  Bilateral adrenal nodules measuring 17 mm on the left and 12 mm on the right. 5.  Moderate constipation.     EC-ECHOCARDIOGRAM COMPLETE W/O CONT  Result Date:  2025  Transthoracic Echo Report Echocardiography Laboratory CONCLUSIONS Normal left ventricular systolic function. The left ventricular ejection fraction is visually estimated to be 65% Mild concentric left ventricular hypertrophy. Normal right ventricular size and systolic function. Aortic valve sclerosis without significant stenosis. Right ventricular systolic pressure is estimated to be 26 mmHg (normal). Semimobile echodensity seen at the junction of the aorta and brachiocephalic artery - recommend further imaging of the aorta. No prior study is available for comparison. Primary team is notified of findings. REX ECHEVARRIA Exam Date:         2025                    08:52 Exam Location:     Inpatient Priority:          Routine Ordering Physician:        BRIGID MARCIAL Referring Physician:       [id number] Manning Sonographer:               Yaneth Jade Age:    54     Gender:    M MRN:    [id number] :    1970 BSA:    2.22   Ht (in):    73     Wt (lb):    216 Exam Type:     Complete Indications:     Bacteremia ICD Codes:       R78.81 CPT Codes:       84324 BP:   119    /   83     HR: Technical Quality:       Fair MEASUREMENTS  (Male / Female) Normal Values 2D ECHO LV Diastolic Diameter PLAX        3.9 cm                4.2 - 5.9 / 3.9 - 5.3 cm LV Systolic Diameter PLAX         2 cm                  2.1 - 4.0 cm IVS Diastolic Thickness           1.2 cm                LVPW Diastolic Thickness          1.2 cm                LVOT Diameter                     2 cm                  LV Ejection Fraction MOD BP       65.3 %                >= 55  % LV Ejection Fraction MOD 4C       69.4 %                LV Ejection Fraction MOD 2C       59.2 %                LV Ejection Fraction 4C AL        73.2 %                LV Ejection Fraction 2C AL        63.1 %                LA Volume Index                   21.6 cm3/m2           16 - 28 cm3/m2  DOPPLER AV Peak Velocity                  1.3 m/s               AV Peak Gradient                  7.1 mmHg              AV Mean Gradient                  4 mmHg                LVOT Peak Velocity                1.1 m/s               AV Area Cont Eq vti               2.7 cm2               Mitral E Point Velocity           1.2 m/s               Mitral E to A Ratio               1.4                   MV Pressure Half Time             55 ms                 MV Area PHT                       4 cm2                 MV Deceleration Time              187 ms                TR Peak Velocity                  239 cm/s              PV Peak Velocity                  1 m/s                 PV Peak Gradient                  4.3 mmHg              RVOT Peak Velocity                0.75 m/s              LV E' Lateral Velocity            11.1 cm/s             Mitral E to LV E' Lateral Ratio   10.5                  LV E' Septal Velocity             7.3 cm/s              Mitral E to LV E' Septal Ratio    16                    * Indicates values subject to auto-interpretation LV EF:        % FINDINGS Left Ventricle Normal left ventricular chamber size. Mild concentric left ventricular hypertrophy. Normal left ventricular systolic function. The left ventricular ejection fraction is visually estimated to be 65%. No regional wall motion abnormalities. Indeterminate diastolic function. Right Ventricle Normal right ventricular size and systolic function. Right Atrium Normal right atrial size. Normal inferior vena cava size and inspiratory collapse. Left Atrium Normal left atrial size. Left atrial volume index is 20 mL/sq m. Mitral Valve Structurally normal mitral valve without significant stenosis or regurgitation. Aortic Valve Tricuspid aortic valve. Aortic valve sclerosis without significant stenosis. No aortic insufficiency. Tricuspid Valve Structurally normal tricuspid valve without significant stenosis. Trace tricuspid regurgitation. Right  atrial pressure is estimated to be 3 mmHg. Right ventricular systolic pressure is estimated to be 26 mmHg. Pulmonic Valve Structurally normal pulmonic valve without significant stenosis or regurgitation. Pericardium No significant pericardial effusion. Aorta Normal aortic root for body surface area. The ascending aorta diameter is 3.1 cm. Semimobile echodensity seen at the junction of the aorta and brachiocephalic artery. Celestino Jay MD (Electronically Signed) Final Date:     22 April 2025                 10:44    DX-FOOT-COMPLETE 3+ LEFT  Result Date: 4/20/2025 4/20/2025 9:28 PM HISTORY/REASON FOR EXAM: Atraumatic Pain/Swelling/Deformity TECHNIQUE/EXAM DESCRIPTION:  AP, lateral, and oblique views of the LEFT foot. COMPARISON:  February 26, 2025 FINDINGS: Postoperative changes of transmetatarsal amputation of the fifth toe is seen. Slight permeative appearance of the lateral cortex of the fifth toe metatarsal waist is seen. There is soft tissue gas in small bony fragments adjacent to the fracture site.     1.  Slight permeative appearance of the lateral cortex of the fifth toe metatarsal waist, appearance raising concern for component of osteomyelitis. 2.  Soft tissue gas at the amputation site, consider underlying infectious etiology.    DX-CHEST-PORTABLE (1 VIEW)  Result Date: 4/20/2025 4/20/2025 9:28 PM HISTORY/REASON FOR EXAM: Sepsis TECHNIQUE/EXAM DESCRIPTION:  Single AP view of the chest. COMPARISON: None FINDINGS: Overlying cardiac leads are present. The cardiac silhouette appears within normal limits. The mediastinal contour appears within normal limits.  The central pulmonary vasculature appears normal. The lungs appear well expanded bilaterally.  Bilateral lungs are clear. No significant pleural effusions are identified. The bony structures appear age-appropriate.     1.  No acute cardiopulmonary disease.      Micro:  Results       Procedure Component Value Units Date/Time    BLOOD CULTURE [[id number]]  Collected: 04/24/25 1259    Order Status: Completed Specimen: Blood from Peripheral Updated: 04/24/25 1808     Significant Indicator NEG     Source BLD     Site PERIPHERAL     Culture Result No Growth  Note: Blood cultures are incubated for 5 days and  are monitored continuously.Positive blood cultures  are called to the RN and reported as soon as  they are identified.      BLOOD CULTURE [[id number]] Collected: 04/24/25 1259    Order Status: Completed Specimen: Blood from Peripheral Updated: 04/24/25 1808     Significant Indicator NEG     Source BLD     Site PERIPHERAL     Culture Result No Growth  Note: Blood cultures are incubated for 5 days and  are monitored continuously.Positive blood cultures  are called to the RN and reported as soon as  they are identified.      CULTURE TISSUE W/ GRM STAIN [[id number]]  (Abnormal) Collected: 04/21/25 1624    Order Status: Completed Specimen: Bone Updated: 04/24/25 1539     Significant Indicator POS     Source BONE     Site left foot     Culture Result -     Gram Stain Result Few WBCs.  Few Gram positive cocci.       Culture Result Staphylococcus aureus  Moderate growth  Methicillin sensitive by screening method  See previous culture for sensitivity report.      Anaerobic Culture [[id number]]  (Abnormal) Collected: 04/21/25 1624    Order Status: Completed Specimen: Bone Updated: 04/24/25 1539     Significant Indicator POS     Source BONE     Site left foot     Culture Result -      Finegoldia magna  Moderate growth      BLOOD CULTURE [[id number]]  (Abnormal) Collected: 04/22/25 0527    Order Status: Completed Specimen: Blood from Peripheral Updated: 04/24/25 1011     Significant Indicator POS     Source BLD     Site PERIPHERAL     Culture Result Growth detected by automated blood culture system. 10:10  Gram Stain: Gram positive cocci in clusters.      Urine Culture (New) [[id number]] Collected: 04/21/25 0740    Order Status: Completed Specimen: Urine Updated: 04/23/25 0917      Significant Indicator NEG     Source UR     Site -     Culture Result No growth at 48 hours.    Culture Wound With Gram Stain [[id number]]  (Abnormal)  (Susceptibility) Collected: 04/20/25 2145    Order Status: Completed Specimen: Wound from Left Foot Updated: 04/23/25 0931     Significant Indicator POS     Source WND     Site LEFT FOOT     Culture Result Light growth usual skin heather.     Gram Stain Result Few WBCs.  Many Gram positive cocci.       Culture Result Staphylococcus aureus  Heavy growth  Methicillin sensitive by screening method      Susceptibility       Staphylococcus aureus (1)       Antibiotic Interpretation Microscan   Method Status    Ampicillin/sulbactam Sensitive <=8/4 mcg/mL YESENIA Final    Clindamycin Sensitive <=0.25 mcg/mL YESENIA Final    Cefazolin Sensitive <=8 mcg/mL YESENIA Final    Cefepime Sensitive <=4 mcg/mL YESENIA Final    Daptomycin Sensitive 1 mcg/mL YESENIA Final    Erythromycin Resistant >4 mcg/mL YESENIA Final    Oxacillin Sensitive 0.5 mcg/mL YESENIA Final    Trimeth/Sulfa Sensitive <=0.5/9.5 mcg/mL YESENIA Final    Tetracycline Sensitive <=4 mcg/mL YESENIA Final    Vancomycin Sensitive 1 mcg/mL YESENIA Final                       Blood Culture - Draw one from central line and one from peripheral site [[id number]]  (Abnormal) Collected: 04/20/25 2145    Order Status: Completed Specimen: Blood from Peripheral Updated: 04/23/25 0916     Significant Indicator POS     Source BLD     Site PERIPHERAL     Culture Result Growth detected by automated blood culture system.      Staphylococcus aureus  Methicillin sensitive by screening method  See previous culture for sensitivity report.      Blood Culture - Draw one from central line and one from peripheral site [[id number]]  (Abnormal)  (Susceptibility) Collected: 04/20/25 2113    Order Status: Completed Specimen: Blood from Line Updated: 04/23/25 0915     Significant Indicator POS     Source BLD     Site Peripheral     Culture Result Growth detected by automated blood culture  system.  Staphylococcus aureus (methicillin sensitive)  detected by PCR.        Staphylococcus aureus    Susceptibility       Staphylococcus aureus (1)       Antibiotic Interpretation Microscan   Method Status    Ampicillin/sulbactam Sensitive <=8/4 mcg/mL YESENIA Final    Clindamycin Sensitive 0.5 mcg/mL YEESNIA Final    Cefazolin Sensitive <=8 mcg/mL YESENIA Final    Cefepime Sensitive <=4 mcg/mL YESENIA Final    Daptomycin Sensitive 1 mcg/mL YESENIA Final    Erythromycin Resistant >4 mcg/mL YESENIA Final    Oxacillin Sensitive 0.5 mcg/mL YESENIA Final    Trimeth/Sulfa Sensitive <=0.5/9.5 mcg/mL YESENIA Final    Tetracycline Sensitive <=4 mcg/mL YESENIA Final    Vancomycin Sensitive 1 mcg/mL YESENIA Final                       BLOOD CULTURE [[id number]] Collected: 04/22/25 0527    Order Status: Completed Specimen: Blood from Peripheral Updated: 04/22/25 0808     Significant Indicator NEG     Source BLD     Site PERIPHERAL     Culture Result No Growth  Note: Blood cultures are incubated for 5 days and  are monitored continuously.Positive blood cultures  are called to the RN and reported as soon as  they are identified.      GRAM STAIN [[id number]] Collected: 04/21/25 1624    Order Status: Completed Specimen: Bone Updated: 04/21/25 2356     Significant Indicator .     Source BONE     Site left foot     Gram Stain Result Few WBCs.  Few Gram positive cocci.      Urinalysis [[id number]]  (Abnormal) Collected: 04/21/25 0740    Order Status: Completed Specimen: Urine Updated: 04/21/25 0811     Color Yellow     Character Clear     Specific Gravity 1.028     Ph 6.0     Glucose >=1000 mg/dL      Ketones 40 mg/dL      Protein 100 mg/dL      Bilirubin Negative     Urobilinogen, Urine 1.0 EU/dL      Nitrite Negative     Leukocyte Esterase Negative     Occult Blood Moderate     Micro Urine Req Microscopic    GRAM STAIN [[id number]] Collected: 04/20/25 2145    Order Status: Completed Specimen: Wound Updated: 04/21/25 0302     Significant Indicator .     Source WND      Site LEFT FOOT     Gram Stain Result Few WBCs.  Many Gram positive cocci.      Blood Culture [[id number]]     Order Status: Canceled Specimen: Blood from Peripheral     Blood Culture [[id number]]     Order Status: Canceled Specimen: Blood from Line     MRSA By PCR (Amp) [[id number]] Collected: 04/20/25 2145    Order Status: Completed Specimen: Respirate from Nares Updated: 04/20/25 2326     MRSA by PCR Negative            Assessment:  54 y.o. male with history of uncontrolled type 2 diabetes mellitus, prior left fifth toe amputation in February admitted on 4/20/2025 secondary to worsening left foot swelling and drainage of prior amputation site.  X-ray revealed findings concerning for osteomyelitis.  He is now status post left fifth ray amputation with wound VAC placement on 4/21/2025.  Hospital course complicated by MSSA bacteremia.     Pertinent diagnoses:  MSSA bacteremia, source secondary to below, persistent  Left foot osteomyelitis status post left fifth ray amputation on 4/21, repeat I&D and closure on 4/23  History of left fifth toe amputation in February  Uncontrolled type 2 diabetes mellitus, hemoglobin A1c 12.9    Plan:  -Continue IV cefazolin 2 g every 8 hours  -Blood cultures x 2 on 4/20+ MSSA  -Repeat blood cultures on 4/22-negative to date  -OR cultures-MSSA.  Gram stain+ few positive cocci  -Pathology-acute osteomyelitis  -TTE negative for any valvular abnormalities however there was a semimobile echodensity at the junction of the aorta and brachiocephalic artery  -CTA aorta was negative for aortic aneurysm or dissection.  Showed atherosclerotic calcification in the coronary arteries  - Status post debridement and secondary closure of surgical wound left foot amputation on 4/23/2025  -Diabetes education and blood sugar control  -Recommend referral to endocrinologist  - Will plan a 6-week course of IV antibiotics from 4/24 with stop date of 06/05/2025  -PICC line when repeat blood cultures have been  negative for at least 48 hours-possibly tomorrow  -MRI of the entire spine-pending.  Plan for today under general anesthesia as patient claustrophobic    This infection poses a threat to life and further limb function     Disposition: Patient prefers [facility]     Need for PICC line: Yes     Follow-up in ID clinic with NP     Plan of care discussed with SAHRA Douglass M.D.. Will continue to follow

## 2025-04-25 NOTE — PROGRESS NOTES
"      Orthopaedic Progress Note    Interval changes:  Patient doing well    LLE dressing CDI  Cleared for DC home by ortho pending medicine clearance    ROS - Patient denies any new issues.  Pain well controlled.    /67   Pulse 96   Temp 36.7 °C (98.1 °F) (Temporal)   Resp 20   Ht 1.854 m (6' 0.99\")   Wt 98.4 kg (216 lb 14.9 oz)   SpO2 95%     Patient seen and examined  No acute distress  Breathing non labored  RRR  LLE dressing CDI, DNVI, moves all remaining toes, cap refill <2 sec.     Recent Labs     04/23/25  0035 04/24/25  0736 04/25/25  0118   WBC 7.4 7.5 9.9   RBC 4.61* 4.67* 4.94   HEMOGLOBIN 13.4* 13.5* 14.2   HEMATOCRIT 39.4* 39.1* 41.7*   MCV 85.5 83.7 84.4   MCH 29.1 28.9 28.7   MCHC 34.0 34.5 34.1   RDW 39.1 37.9 38.6   PLATELETCT 177 204 219   MPV 10.3 9.7 10.5       Active Hospital Problems    Diagnosis     Sepsis ([facility]) [A41.9]     MSSA bacteremia [R78.81, B95.61]     Osteomyelitis of ankle or foot, acute, left ([facility]) [M86.172]     Hypertension [I10]     Acute renal failure ([facility]) [N17.9]     Anxiety [F41.9]     Type 2 diabetes mellitus with hyperglycemia, with long-term current use of insulin ([facility]) [E11.65, Z79.4]        Assessment/Plan:  Patient doing well    LLE dressing CDI  Cleared for DC home by ortho pending medicine clearance  POD#2 S/P Debridement and secondary closure of surgical wound left foot partial amputation   POD#4 S/P:  Left foot fifth ray amputation  Wound vac application <50cm2  Wt bearing status - Heel WB   Wound care/Drains - Dressings to be changed every other day by nursing. Or PRN for saturation    Future Procedures - none planned    Sutures/Staples out- 14-21 days post operatively. Removal by ortho mid levels only.  PT/OT-initiated  Antibiotics: ancef 2g IV Q8  DVT Prophylaxis- TEDS/SCDs/Foot pumps/heparin  Sainz-not needed per ortho  Case Coordination for Discharge Planning - Disposition per therapy recs.    "

## 2025-04-25 NOTE — ANESTHESIA TIME REPORT
Anesthesia Start and Stop Event Times       Date Time Event    4/25/2025 1115 Ready for Procedure     1115 Anesthesia Start     1306 Anesthesia Stop          Responsible Staff  04/25/25      Name Role Begin End    Rich Plummer III, M.D. Anesth 1115 1306          Overtime Reason:  no overtime (within assigned shift)    Comments:

## 2025-04-25 NOTE — ANESTHESIA PROCEDURE NOTES
Airway    Date/Time: 4/25/2025 11:23 AM    Performed by: Rich Plummer III, M.D.  Authorized by: Rich Plummer III, M.D.    Location:  OR  Urgency:  Elective  Difficult Airway: No    Indications for Airway Management:  Anesthesia      Spontaneous Ventilation: absent    Sedation Level:  Deep  Preoxygenated: Yes    Final Airway Type:  Supraglottic airway  Final Supraglottic Airway:  Standard LMA    SGA Size:  4  Number of Attempts at Approach:  1

## 2025-04-25 NOTE — OR NURSING
Pt's VSS. MRI complete. LMA removed. Pt on 6L simple mask. Pt transported back to PACU with monitor and full oxygen tank. MD Plummer at bedside.

## 2025-04-26 ENCOUNTER — APPOINTMENT (OUTPATIENT)
Dept: RADIOLOGY | Facility: MEDICAL CENTER | Age: 55
End: 2025-04-26
Attending: STUDENT IN AN ORGANIZED HEALTH CARE EDUCATION/TRAINING PROGRAM

## 2025-04-26 LAB
ALBUMIN SERPL BCP-MCNC: 3.1 G/DL (ref 3.2–4.9)
ALBUMIN/GLOB SERPL: 1 G/DL
ALP SERPL-CCNC: 78 U/L (ref 30–99)
ALT SERPL-CCNC: 24 U/L (ref 2–50)
ANION GAP SERPL CALC-SCNC: 8 MMOL/L (ref 7–16)
AST SERPL-CCNC: 27 U/L (ref 12–45)
BILIRUB SERPL-MCNC: 0.2 MG/DL (ref 0.1–1.5)
BUN SERPL-MCNC: 18 MG/DL (ref 8–22)
CALCIUM ALBUM COR SERPL-MCNC: 9.9 MG/DL (ref 8.5–10.5)
CALCIUM SERPL-MCNC: 9.2 MG/DL (ref 8.5–10.5)
CHLORIDE SERPL-SCNC: 99 MMOL/L (ref 96–112)
CO2 SERPL-SCNC: 26 MMOL/L (ref 20–33)
CREAT SERPL-MCNC: 1.1 MG/DL (ref 0.5–1.4)
ERYTHROCYTE [DISTWIDTH] IN BLOOD BY AUTOMATED COUNT: 39.7 FL (ref 35.9–50)
GFR SERPLBLD CREATININE-BSD FMLA CKD-EPI: 79 ML/MIN/1.73 M 2
GLOBULIN SER CALC-MCNC: 3.1 G/DL (ref 1.9–3.5)
GLUCOSE BLD STRIP.AUTO-MCNC: 288 MG/DL (ref 65–99)
GLUCOSE BLD STRIP.AUTO-MCNC: 299 MG/DL (ref 65–99)
GLUCOSE BLD STRIP.AUTO-MCNC: 331 MG/DL (ref 65–99)
GLUCOSE BLD STRIP.AUTO-MCNC: 394 MG/DL (ref 65–99)
GLUCOSE BLD STRIP.AUTO-MCNC: 421 MG/DL (ref 65–99)
GLUCOSE SERPL-MCNC: 321 MG/DL (ref 65–99)
HCT VFR BLD AUTO: 40.1 % (ref 42–52)
HGB BLD-MCNC: 13 G/DL (ref 14–18)
MCH RBC QN AUTO: 28 PG (ref 27–33)
MCHC RBC AUTO-ENTMCNC: 32.4 G/DL (ref 32.3–36.5)
MCV RBC AUTO: 86.4 FL (ref 81.4–97.8)
PLATELET # BLD AUTO: 261 K/UL (ref 164–446)
PMV BLD AUTO: 9.7 FL (ref 9–12.9)
POTASSIUM SERPL-SCNC: 4.5 MMOL/L (ref 3.6–5.5)
PROT SERPL-MCNC: 6.2 G/DL (ref 6–8.2)
RBC # BLD AUTO: 4.64 M/UL (ref 4.7–6.1)
SODIUM SERPL-SCNC: 133 MMOL/L (ref 135–145)
WBC # BLD AUTO: 9.3 K/UL (ref 4.8–10.8)

## 2025-04-26 PROCEDURE — A9270 NON-COVERED ITEM OR SERVICE: HCPCS | Performed by: STUDENT IN AN ORGANIZED HEALTH CARE EDUCATION/TRAINING PROGRAM

## 2025-04-26 PROCEDURE — 700105 HCHG RX REV CODE 258: Performed by: INTERNAL MEDICINE

## 2025-04-26 PROCEDURE — 700111 HCHG RX REV CODE 636 W/ 250 OVERRIDE (IP): Performed by: INTERNAL MEDICINE

## 2025-04-26 PROCEDURE — 700102 HCHG RX REV CODE 250 W/ 637 OVERRIDE(OP): Performed by: STUDENT IN AN ORGANIZED HEALTH CARE EDUCATION/TRAINING PROGRAM

## 2025-04-26 PROCEDURE — 99233 SBSQ HOSP IP/OBS HIGH 50: CPT | Performed by: STUDENT IN AN ORGANIZED HEALTH CARE EDUCATION/TRAINING PROGRAM

## 2025-04-26 PROCEDURE — 770006 HCHG ROOM/CARE - MED/SURG/GYN SEMI*

## 2025-04-26 PROCEDURE — 82962 GLUCOSE BLOOD TEST: CPT | Mod: 91

## 2025-04-26 PROCEDURE — 700111 HCHG RX REV CODE 636 W/ 250 OVERRIDE (IP): Mod: JZ | Performed by: STUDENT IN AN ORGANIZED HEALTH CARE EDUCATION/TRAINING PROGRAM

## 2025-04-26 PROCEDURE — 700102 HCHG RX REV CODE 250 W/ 637 OVERRIDE(OP)

## 2025-04-26 PROCEDURE — 80053 COMPREHEN METABOLIC PANEL: CPT

## 2025-04-26 PROCEDURE — 74177 CT ABD & PELVIS W/CONTRAST: CPT

## 2025-04-26 PROCEDURE — 74018 RADEX ABDOMEN 1 VIEW: CPT

## 2025-04-26 PROCEDURE — 700111 HCHG RX REV CODE 636 W/ 250 OVERRIDE (IP): Mod: JZ | Performed by: INTERNAL MEDICINE

## 2025-04-26 PROCEDURE — A9270 NON-COVERED ITEM OR SERVICE: HCPCS

## 2025-04-26 PROCEDURE — 85027 COMPLETE CBC AUTOMATED: CPT

## 2025-04-26 PROCEDURE — 36415 COLL VENOUS BLD VENIPUNCTURE: CPT

## 2025-04-26 PROCEDURE — 700117 HCHG RX CONTRAST REV CODE 255: Performed by: STUDENT IN AN ORGANIZED HEALTH CARE EDUCATION/TRAINING PROGRAM

## 2025-04-26 RX ORDER — INSULIN LISPRO 100 [IU]/ML
1-6 INJECTION, SOLUTION INTRAVENOUS; SUBCUTANEOUS
Status: DISCONTINUED | OUTPATIENT
Start: 2025-04-26 | End: 2025-04-27

## 2025-04-26 RX ORDER — DEXTROSE MONOHYDRATE 25 G/50ML
25 INJECTION, SOLUTION INTRAVENOUS
Status: DISCONTINUED | OUTPATIENT
Start: 2025-04-26 | End: 2025-04-27

## 2025-04-26 RX ADMIN — AMPICILLIN AND SULBACTAM 3 G: 1; 2 INJECTION, POWDER, FOR SOLUTION INTRAMUSCULAR; INTRAVENOUS at 23:54

## 2025-04-26 RX ADMIN — INSULIN LISPRO 4 UNITS: 100 INJECTION, SOLUTION INTRAVENOUS; SUBCUTANEOUS at 17:16

## 2025-04-26 RX ADMIN — HYDROMORPHONE HYDROCHLORIDE 1 MG: 1 INJECTION, SOLUTION INTRAMUSCULAR; INTRAVENOUS; SUBCUTANEOUS at 16:51

## 2025-04-26 RX ADMIN — ACETAMINOPHEN 1000 MG: 500 TABLET, FILM COATED ORAL at 04:05

## 2025-04-26 RX ADMIN — INSULIN LISPRO 3 UNITS: 100 INJECTION, SOLUTION INTRAVENOUS; SUBCUTANEOUS at 08:18

## 2025-04-26 RX ADMIN — CEFAZOLIN 2 G: 2 INJECTION, POWDER, FOR SOLUTION INTRAMUSCULAR; INTRAVENOUS at 00:16

## 2025-04-26 RX ADMIN — ENOXAPARIN SODIUM 40 MG: 100 INJECTION SUBCUTANEOUS at 17:14

## 2025-04-26 RX ADMIN — HYDROMORPHONE HYDROCHLORIDE 1 MG: 1 INJECTION, SOLUTION INTRAMUSCULAR; INTRAVENOUS; SUBCUTANEOUS at 10:10

## 2025-04-26 RX ADMIN — INSULIN LISPRO 5 UNITS: 100 INJECTION, SOLUTION INTRAVENOUS; SUBCUTANEOUS at 12:00

## 2025-04-26 RX ADMIN — POLYETHYLENE GLYCOL 3350 2 PACKET: 17 POWDER, FOR SOLUTION ORAL at 17:13

## 2025-04-26 RX ADMIN — ACETAMINOPHEN 1000 MG: 500 TABLET, FILM COATED ORAL at 11:52

## 2025-04-26 RX ADMIN — CEFAZOLIN 2 G: 2 INJECTION, POWDER, FOR SOLUTION INTRAMUSCULAR; INTRAVENOUS at 06:21

## 2025-04-26 RX ADMIN — ATORVASTATIN CALCIUM 20 MG: 20 TABLET, FILM COATED ORAL at 17:15

## 2025-04-26 RX ADMIN — OXYCODONE HYDROCHLORIDE 10 MG: 10 TABLET ORAL at 20:53

## 2025-04-26 RX ADMIN — OXYCODONE HYDROCHLORIDE 10 MG: 10 TABLET ORAL at 00:12

## 2025-04-26 RX ADMIN — INSULIN LISPRO 3 UNITS: 100 INJECTION, SOLUTION INTRAVENOUS; SUBCUTANEOUS at 20:55

## 2025-04-26 RX ADMIN — OXYCODONE HYDROCHLORIDE 10 MG: 10 TABLET ORAL at 07:56

## 2025-04-26 RX ADMIN — ACETAMINOPHEN 1000 MG: 500 TABLET, FILM COATED ORAL at 17:14

## 2025-04-26 RX ADMIN — IOHEXOL 96 ML: 350 INJECTION, SOLUTION INTRAVENOUS at 15:37

## 2025-04-26 RX ADMIN — OXYCODONE HYDROCHLORIDE 10 MG: 10 TABLET ORAL at 14:54

## 2025-04-26 RX ADMIN — HYDROMORPHONE HYDROCHLORIDE 1 MG: 1 INJECTION, SOLUTION INTRAMUSCULAR; INTRAVENOUS; SUBCUTANEOUS at 04:04

## 2025-04-26 RX ADMIN — POLYETHYLENE GLYCOL 3350 2 PACKET: 17 POWDER, FOR SOLUTION ORAL at 04:05

## 2025-04-26 RX ADMIN — CEFAZOLIN 2 G: 2 INJECTION, POWDER, FOR SOLUTION INTRAMUSCULAR; INTRAVENOUS at 14:10

## 2025-04-26 ASSESSMENT — PAIN DESCRIPTION - PAIN TYPE
TYPE: ACUTE PAIN

## 2025-04-26 ASSESSMENT — ENCOUNTER SYMPTOMS
NEUROLOGICAL NEGATIVE: 1
EYES NEGATIVE: 1
PSYCHIATRIC NEGATIVE: 1
CARDIOVASCULAR NEGATIVE: 1
CONSTITUTIONAL NEGATIVE: 1
RESPIRATORY NEGATIVE: 1
GASTROINTESTINAL NEGATIVE: 1

## 2025-04-26 NOTE — PROGRESS NOTES
"      Orthopaedic Progress Note    Interval changes:  Patient doing well    LLE dressing changed incision without issues   Cleared for DC home by ortho pending medicine clearance    ROS - Patient denies any new issues.  Pain well controlled.    BP (!) 158/100   Pulse 91   Temp 37.1 °C (98.8 °F) (Temporal)   Resp 16   Ht 1.854 m (6' 0.99\")   Wt 98.4 kg (216 lb 14.9 oz)   SpO2 92%     Patient seen and examined  No acute distress  Breathing non labored  RRR  LLE dressing changed incision without issues, DNVI, moves all remaining toes, cap refill <2 sec.     Recent Labs     04/24/25  0736 04/25/25  0118 04/26/25  0653   WBC 7.5 9.9 9.3   RBC 4.67* 4.94 4.64*   HEMOGLOBIN 13.5* 14.2 13.0*   HEMATOCRIT 39.1* 41.7* 40.1*   MCV 83.7 84.4 86.4   MCH 28.9 28.7 28.0   MCHC 34.5 34.1 32.4   RDW 37.9 38.6 39.7   PLATELETCT 204 219 261   MPV 9.7 10.5 9.7       Active Hospital Problems    Diagnosis     Sepsis ([facility]) [A41.9]     MSSA bacteremia [R78.81, B95.61]     Osteomyelitis of ankle or foot, acute, left ([facility]) [M86.172]     Hypertension [I10]     Acute renal failure ([facility]) [N17.9]     Anxiety [F41.9]     Type 2 diabetes mellitus with hyperglycemia, with long-term current use of insulin ([facility]) [E11.65, Z79.4]        Assessment/Plan:  Patient doing well    LLE dressing changed incision without issues   Cleared for DC home by ortho pending medicine clearance  POD#3 S/P Debridement and secondary closure of surgical wound left foot partial amputation   POD#5 S/P:  Left foot fifth ray amputation  Wound vac application <50cm2  Wt bearing status - Heel WB   Wound care/Drains - Dressings to be changed every other day by nursing. Or PRN for saturation    Future Procedures - none planned    Sutures/Staples out- 14-21 days post operatively. Removal by ortho mid levels only.  PT/OT-initiated  Antibiotics: unasyn 3g q6  DVT Prophylaxis- TEDS/SCDs/Foot pumps/heparin  Sainz-not needed per ortho  Case Coordination for Discharge Planning - " Disposition per therapy recs.

## 2025-04-26 NOTE — CARE PLAN
The patient is Watcher - Medium risk of patient condition declining or worsening    Shift Goals  Clinical Goals: Reported pain <6/10  Patient Goals: Manage pain <6/10  Family Goals: YARELI    Progress made toward(s) clinical / shift goals:  Ja's pain is tolerated with ambulation and PRN medications. His back was causing a lot of discomfort but was alleviated with a large bowel movement. He will have a follow-up CT scan at 1500.    Patient is not progressing towards the following goals:

## 2025-04-26 NOTE — PROGRESS NOTES
Brief ID note:    Chart reviewed.  Patient afebrile, WBC 9.3.  MRI of the entire spine with contrast negative for infection.    Repeat blood cultures from 4/24 are negative to date however 1 blood culture set did trigger a CO2 production.  Will need to follow this blood culture over the weekend to ensure it is negative prior to placing a PICC line    -Continue IV cefazolin 2 g every 8 hours  -Will plan a 6-week course of IV antibiotics from date of first negative blood culture  -Okay with PICC line once repeat blood cultures have been negative for 72 hours    Discussed with hospitalist, Dr. Champion.  ID will continue to follow    ADDENDUM:  Bone cx also growing finegoldia magna  Change cefazolin to unasyn    Updated plan of care with Dr. Champion.

## 2025-04-26 NOTE — PROGRESS NOTES
RN notified provider (Jaycee) as pt has not has BM since 4/19, only miralax ordered and administered. Pt is passing gas. Provider acknowledged, ordered stat xray.

## 2025-04-26 NOTE — CARE PLAN
The patient is Stable - Low risk of patient condition declining or worsening    Shift Goals  Clinical Goals: Pt will complete MRI this shift and have pain controlled to comfort level  Patient Goals: Pain control, updates  Family Goals: YARELI    Progress made toward(s) clinical / shift goals:        Problem: Knowledge Deficit - Standard  Goal: Patient and family/care givers will demonstrate understanding of plan of care, disease process/condition, diagnostic tests and medications  Outcome: Progressing     Problem: Fall Risk  Goal: Patient will remain free from falls  Outcome: Progressing     Problem: Hemodynamics  Goal: Patient's hemodynamics, fluid balance and neurologic status will be stable or improve  Outcome: Progressing       Patient is not progressing towards the following goals:      Problem: Pain - Standard  Goal: Alleviation of pain or a reduction in pain to the patient’s comfort goal  Outcome: Not Progressing

## 2025-04-26 NOTE — CARE PLAN
The patient is Stable - Low risk of patient condition declining or worsening    Shift Goals  Clinical Goals: Pt will report pain as 6 or  less out of 10 after interventions during this shift.  Patient Goals: Sleep  Family Goals: Not at bedside    Pt pleasant and cooperative. Pain managed with oxy and dilaudid. No BM. Abx continued. Bed in locked, lowest position. All needs met at this time.    Progress made toward(s) clinical / shift goals:    Problem: Knowledge Deficit - Standard  Goal: Patient and family/care givers will demonstrate understanding of plan of care, disease process/condition, diagnostic tests and medications  Outcome: Progressing     Problem: Fall Risk  Goal: Patient will remain free from falls  Outcome: Progressing     Problem: Hemodynamics  Goal: Patient's hemodynamics, fluid balance and neurologic status will be stable or improve  Outcome: Progressing     Problem: Fluid Volume  Goal: Fluid volume balance will be maintained  Outcome: Progressing     Problem: Pain - Standard  Goal: Alleviation of pain or a reduction in pain to the patient’s comfort goal  Outcome: Progressing       Patient is not progressing towards the following goals:

## 2025-04-26 NOTE — PROGRESS NOTES
"RN notified provider (Jaycee) of fsbg results. First check was 421, second 394. RN administered 5u of insulin per sliding scale. RN also advanced \"full liquid diet\" to CHO for lunch.     Provider acknowledged.   "

## 2025-04-26 NOTE — PROGRESS NOTES
[facility] Medicine Daily Progress Note    Date of Service  4/26/2025    Chief Complaint  Donald Fuller is a 54 y.o. male admitted 4/20/2025 with left 5th ray resection surgical site infection    Hospital Course  54 y.o. male with pmh IDDM2, Anxiety, HTN, S/P left 5th ray resection who presented 4/20/2025 with bleeding swelling and draining near amputation site that has been present past two days. Resection was done 2/28 for osteomyelitis, Cultures at that time grew E. Cloacae. Was discharged on 14 day course of augmentin.      Had sutures removed 5 days ago. Noted bleeding and swelling near amputation site 2 days ago, intermittent pain. Says he takes great care in not re-injuring foot, proper wound care. Has not noticed pus. Has had chills past 1 day. Nausea past Today.      He was admitted to the medical floor on 4/20/2025 for surgical site infection.  Prior to admission, blood cultures were collected and patient started on broad-spectrum IV antibiotics. Orthopedic surgery following for which patient underwent left foot fifth ray amputation with wound VAC placement on 4/21/2025 and I/D on 4/23/2025 4/20/2025 blood cultures growing MSSA.  Infectious  disease following for which patient was transition to an Ancef antibiotic regimen.    4/25 C spine MRI Moderate central canal stenosis at C5-6   4/25 T spine MRI mild chronic T7-T8 compression deformity, no acute findings or evidence of infection   4/25 L spine MRI no acute findings    Interval Problem Update  Evaluated at bedside  Back pain worst today  Stable VS   On room air  CBC without leukocytosis  -280  4/20 BCX w/MSSA  4/20 WCX w/MSSA  4/21 ORCX w/MSSA   4/22 BCX w/MSSA  4/24 BCX w/inconclusive   ID following large amount of stool within the colon   Continue IV Cefazolin   Hold of on PICC line for now    KUB Large amount of stool within the colon   Miralax BID  Stat enema  ABD/pelvic CT  Labs on AM    I have discussed this patient's plan of  care and discharge plan at IDT rounds today with Case Management, Nursing, Nursing leadership, and other members of the IDT team.    Consultants/Specialty  Orthopedic surgery  ID    Code Status  Full Code    Disposition  The patient is not medically cleared for discharge to home or a post-acute facility.     I have placed the appropriate orders for post-discharge needs.    Review of Systems  Review of Systems   Constitutional: Negative.    HENT: Negative.     Eyes: Negative.    Respiratory: Negative.     Cardiovascular: Negative.    Gastrointestinal: Negative.    Genitourinary: Negative.    Musculoskeletal:  Positive for joint pain.   Skin: Negative.    Neurological: Negative.    Endo/Heme/Allergies: Negative.    Psychiatric/Behavioral: Negative.          Physical Exam  Temp:  [36.2 °C (97.2 °F)-37.1 °C (98.8 °F)] 37.1 °C (98.8 °F)  Pulse:  [] 91  Resp:  [12-20] 16  BP: ()/() 158/100  SpO2:  [90 %-100 %] 92 %    Physical Exam  Constitutional:       General: He is not in acute distress.     Appearance: Normal appearance.   HENT:      Head: Normocephalic and atraumatic.      Nose: Nose normal. No congestion.      Mouth/Throat:      Mouth: Mucous membranes are moist.   Eyes:      Extraocular Movements: Extraocular movements intact.      Pupils: Pupils are equal, round, and reactive to light.   Cardiovascular:      Rate and Rhythm: Normal rate and regular rhythm.      Pulses: Normal pulses.      Heart sounds: Normal heart sounds.   Pulmonary:      Effort: Pulmonary effort is normal.      Breath sounds: Normal breath sounds.   Abdominal:      General: Bowel sounds are normal.      Palpations: Abdomen is soft.      Tenderness: There is no abdominal tenderness.   Musculoskeletal:         General: Swelling present. Normal range of motion.      Cervical back: Normal range of motion and neck supple.      Comments: Left 5th ray resection, Wound 1cm anterolateral left foot, erythema   Skin:     General: Skin  is warm.      Coloration: Skin is not jaundiced.   Neurological:      General: No focal deficit present.      Mental Status: He is alert and oriented to person, place, and time. Mental status is at baseline.      Cranial Nerves: No cranial nerve deficit.   Psychiatric:         Mood and Affect: Mood normal.         Behavior: Behavior normal.         Thought Content: Thought content normal.         Judgment: Judgment normal.         Fluids    Intake/Output Summary (Last 24 hours) at 4/26/2025 1042  Last data filed at 4/26/2025 0800  Gross per 24 hour   Intake 1240 ml   Output 1725 ml   Net -485 ml        Laboratory  Recent Labs     04/24/25  0736 04/25/25  0118 04/26/25  0653   WBC 7.5 9.9 9.3   RBC 4.67* 4.94 4.64*   HEMOGLOBIN 13.5* 14.2 13.0*   HEMATOCRIT 39.1* 41.7* 40.1*   MCV 83.7 84.4 86.4   MCH 28.9 28.7 28.0   MCHC 34.5 34.1 32.4   RDW 37.9 38.6 39.7   PLATELETCT 204 219 261   MPV 9.7 10.5 9.7     Recent Labs     04/24/25  0736 04/25/25  0118 04/26/25  0653   SODIUM 136 136 133*   POTASSIUM 3.8 4.7 4.5   CHLORIDE 103 99 99   CO2 23 24 26   GLUCOSE 247* 262* 321*   BUN 16 23* 18   CREATININE 0.87 1.40 1.10   CALCIUM 8.9 9.5 9.2                     Imaging  OE-UTAMNIA-0 VIEW   Final Result      Large amount of stool within the colon.      MR-LUMBAR SPINE-WITH & W/O   Final Result      1.  There is no evidence of infection in the lumbar spine.   2.  Mild degenerative changes      MR-THORACIC SPINE-WITH & W/O   Final Result      1.  Mild chronic compression deformity at T7 and T8.   2.  No acute fracture.   3.  Mild degenerative disease.   4.  No evidence of infection.      MR-CERVICAL SPINE-WITH & W/O   Final Result      1.  Postsurgical and degenerative changes in the cervical spine as described above.   2.  Moderate central canal stenosis at C5-6.   3.  Multifocal neural foraminal stenosis.   4.  There is no evidence of infection.      CT-CTA COMPLETE THORACOABDOMINAL AORTA   Final Result      1.  No evidence  of aortic aneurysm or dissection.      2.  Atherosclerotic calcification of the coronary arteries especially involving the left anterior ascending artery origin      3.  Fatty liver with a 2.3 cm right lobe hepatic hemangioma.      4.  Bilateral adrenal nodules measuring 17 mm on the left and 12 mm on the right.      5.  Moderate constipation.                        EC-ECHOCARDIOGRAM COMPLETE W/O CONT   Final Result      DX-FOOT-COMPLETE 3+ LEFT   Final Result         1.  Slight permeative appearance of the lateral cortex of the fifth toe metatarsal waist, appearance raising concern for component of osteomyelitis.   2.  Soft tissue gas at the amputation site, consider underlying infectious etiology.      DX-CHEST-PORTABLE (1 VIEW)   Final Result         1.  No acute cardiopulmonary disease.           Assessment/Plan  * MSSA bacteremia  Assessment & Plan  4/20 BCX w/MSSA  4/20 WCX w/NGTD  4/21 ORCX w/Gram stain+ few positive cocci   4/22 BCX w/NGTD  ID following  Continue IV cefazolin   Pending TTE    Osteomyelitis of ankle or foot, acute, left (HCC)- (present on admission)  Assessment & Plan  2/28 Left small toe ray resection  for osteomyelitis. Wcx grew MSSA. L foot xray concerning for osteo of left lateral foot near resection site. Received Unasyn and Vancomycin in ED. Ortho consult in ED, to see in AM. S/P Sepsis protocol for SIRS 2/4 and this as likely source of infection. Not febrile, no leukocytosis at presentation.    IV ABX per ID  Wound care  Orthopedic surgery following  Aim for OR on AM    Type 2 diabetes mellitus with hyperglycemia, with long-term current use of insulin ([facility])- (present on admission)  Assessment & Plan  A1c was 13.9 in FEB 2024. On insulin. Reports compliance with medication and significant diet adjustment since February. Glucose 325 on admission.  -Repeat A1C  -Glargine 10. Goal blood glucose 140-180 given active infection.  -SSI  -Hypoglycemia protocol    Sepsis ([facility])  Assessment &  Plan  Resolved    Hypertension- (present on admission)  Assessment & Plan  -Hold home losartan given DOMINIC at presentation    Acute renal failure (HCC)- (present on admission)  Assessment & Plan  Resolved    Anxiety- (present on admission)  Assessment & Plan  Mood ok.  -Continue home venlafaxine       VTE prophylaxis: Lovenox    I have performed a physical exam and reviewed and updated ROS and Plan today (4/26/2025). In review of yesterday's note (4/25/2025), there are no changes except as documented above.    Greater than 51 minutes spent prepping to see patient (e.g. review of tests) obtaining and/or reviewing separately obtained history. Performing a medically appropriate examination and/ evaluation.  Counseling and educating the patient/family/caregiver.  Ordering medications, tests, or procedures.  Referring and communicating with other health care professionals.  Documenting clinical information in EPIC.  Independently interpreting results and communicating results to patient/family/caregiver.  Care coordination

## 2025-04-26 NOTE — PROGRESS NOTES
Pt left floor during walk with friend. RN called him to return to room and informed him that he cannot leave the floor without a staff member. Pt returned to room. Transport awaiting to take pt to CT. Pt signed consent and left with transport via wheelchair.

## 2025-04-27 LAB
ALBUMIN SERPL BCP-MCNC: 3.2 G/DL (ref 3.2–4.9)
ALBUMIN/GLOB SERPL: 1 G/DL
ALP SERPL-CCNC: 83 U/L (ref 30–99)
ALT SERPL-CCNC: 19 U/L (ref 2–50)
ANION GAP SERPL CALC-SCNC: 11 MMOL/L (ref 7–16)
AST SERPL-CCNC: 21 U/L (ref 12–45)
BACTERIA BLD CULT: NORMAL
BILIRUB SERPL-MCNC: 0.3 MG/DL (ref 0.1–1.5)
BUN SERPL-MCNC: 17 MG/DL (ref 8–22)
CALCIUM ALBUM COR SERPL-MCNC: 9.7 MG/DL (ref 8.5–10.5)
CALCIUM SERPL-MCNC: 9.1 MG/DL (ref 8.5–10.5)
CHLORIDE SERPL-SCNC: 98 MMOL/L (ref 96–112)
CO2 SERPL-SCNC: 24 MMOL/L (ref 20–33)
CREAT SERPL-MCNC: 1.06 MG/DL (ref 0.5–1.4)
ERYTHROCYTE [DISTWIDTH] IN BLOOD BY AUTOMATED COUNT: 38.2 FL (ref 35.9–50)
GFR SERPLBLD CREATININE-BSD FMLA CKD-EPI: 83 ML/MIN/1.73 M 2
GLOBULIN SER CALC-MCNC: 3.2 G/DL (ref 1.9–3.5)
GLUCOSE BLD STRIP.AUTO-MCNC: 263 MG/DL (ref 65–99)
GLUCOSE BLD STRIP.AUTO-MCNC: 307 MG/DL (ref 65–99)
GLUCOSE BLD STRIP.AUTO-MCNC: 317 MG/DL (ref 65–99)
GLUCOSE BLD STRIP.AUTO-MCNC: 332 MG/DL (ref 65–99)
GLUCOSE SERPL-MCNC: 268 MG/DL (ref 65–99)
HCT VFR BLD AUTO: 39.2 % (ref 42–52)
HGB BLD-MCNC: 12.9 G/DL (ref 14–18)
MAGNESIUM SERPL-MCNC: 1.8 MG/DL (ref 1.5–2.5)
MCH RBC QN AUTO: 28 PG (ref 27–33)
MCHC RBC AUTO-ENTMCNC: 32.9 G/DL (ref 32.3–36.5)
MCV RBC AUTO: 85 FL (ref 81.4–97.8)
PHOSPHATE SERPL-MCNC: 2.5 MG/DL (ref 2.5–4.5)
PLATELET # BLD AUTO: 291 K/UL (ref 164–446)
PMV BLD AUTO: 9.6 FL (ref 9–12.9)
POTASSIUM SERPL-SCNC: 4.2 MMOL/L (ref 3.6–5.5)
PROT SERPL-MCNC: 6.4 G/DL (ref 6–8.2)
RBC # BLD AUTO: 4.61 M/UL (ref 4.7–6.1)
SIGNIFICANT IND 70042: NORMAL
SITE SITE: NORMAL
SODIUM SERPL-SCNC: 133 MMOL/L (ref 135–145)
SOURCE SOURCE: NORMAL
WBC # BLD AUTO: 9.3 K/UL (ref 4.8–10.8)

## 2025-04-27 PROCEDURE — 700102 HCHG RX REV CODE 250 W/ 637 OVERRIDE(OP)

## 2025-04-27 PROCEDURE — A9270 NON-COVERED ITEM OR SERVICE: HCPCS

## 2025-04-27 PROCEDURE — 80053 COMPREHEN METABOLIC PANEL: CPT

## 2025-04-27 PROCEDURE — 82962 GLUCOSE BLOOD TEST: CPT | Mod: 91

## 2025-04-27 PROCEDURE — A9270 NON-COVERED ITEM OR SERVICE: HCPCS | Performed by: STUDENT IN AN ORGANIZED HEALTH CARE EDUCATION/TRAINING PROGRAM

## 2025-04-27 PROCEDURE — 700111 HCHG RX REV CODE 636 W/ 250 OVERRIDE (IP): Mod: JZ | Performed by: INTERNAL MEDICINE

## 2025-04-27 PROCEDURE — 700111 HCHG RX REV CODE 636 W/ 250 OVERRIDE (IP): Mod: JZ | Performed by: STUDENT IN AN ORGANIZED HEALTH CARE EDUCATION/TRAINING PROGRAM

## 2025-04-27 PROCEDURE — 700105 HCHG RX REV CODE 258: Performed by: INTERNAL MEDICINE

## 2025-04-27 PROCEDURE — 700102 HCHG RX REV CODE 250 W/ 637 OVERRIDE(OP): Performed by: STUDENT IN AN ORGANIZED HEALTH CARE EDUCATION/TRAINING PROGRAM

## 2025-04-27 PROCEDURE — 84100 ASSAY OF PHOSPHORUS: CPT

## 2025-04-27 PROCEDURE — 83735 ASSAY OF MAGNESIUM: CPT

## 2025-04-27 PROCEDURE — 85027 COMPLETE CBC AUTOMATED: CPT

## 2025-04-27 PROCEDURE — 99232 SBSQ HOSP IP/OBS MODERATE 35: CPT | Performed by: STUDENT IN AN ORGANIZED HEALTH CARE EDUCATION/TRAINING PROGRAM

## 2025-04-27 PROCEDURE — 770006 HCHG ROOM/CARE - MED/SURG/GYN SEMI*

## 2025-04-27 RX ORDER — INSULIN LISPRO 100 [IU]/ML
1-6 INJECTION, SOLUTION INTRAVENOUS; SUBCUTANEOUS
Status: DISCONTINUED | OUTPATIENT
Start: 2025-04-27 | End: 2025-04-28 | Stop reason: HOSPADM

## 2025-04-27 RX ORDER — POLYETHYLENE GLYCOL 3350 17 G/17G
2 POWDER, FOR SOLUTION ORAL DAILY
Status: DISCONTINUED | OUTPATIENT
Start: 2025-04-28 | End: 2025-04-28 | Stop reason: HOSPADM

## 2025-04-27 RX ORDER — CYCLOBENZAPRINE HCL 10 MG
5 TABLET ORAL 3 TIMES DAILY PRN
Status: DISCONTINUED | OUTPATIENT
Start: 2025-04-27 | End: 2025-04-28 | Stop reason: HOSPADM

## 2025-04-27 RX ORDER — DEXTROSE MONOHYDRATE 25 G/50ML
25 INJECTION, SOLUTION INTRAVENOUS
Status: DISCONTINUED | OUTPATIENT
Start: 2025-04-27 | End: 2025-04-28 | Stop reason: HOSPADM

## 2025-04-27 RX ADMIN — HYDROMORPHONE HYDROCHLORIDE 1 MG: 1 INJECTION, SOLUTION INTRAMUSCULAR; INTRAVENOUS; SUBCUTANEOUS at 14:10

## 2025-04-27 RX ADMIN — OXYCODONE HYDROCHLORIDE 10 MG: 10 TABLET ORAL at 21:48

## 2025-04-27 RX ADMIN — HYDROMORPHONE HYDROCHLORIDE 1 MG: 1 INJECTION, SOLUTION INTRAMUSCULAR; INTRAVENOUS; SUBCUTANEOUS at 20:16

## 2025-04-27 RX ADMIN — HYDROMORPHONE HYDROCHLORIDE 1 MG: 1 INJECTION, SOLUTION INTRAMUSCULAR; INTRAVENOUS; SUBCUTANEOUS at 02:40

## 2025-04-27 RX ADMIN — AMPICILLIN AND SULBACTAM 3 G: 1; 2 INJECTION, POWDER, FOR SOLUTION INTRAMUSCULAR; INTRAVENOUS at 12:07

## 2025-04-27 RX ADMIN — ENOXAPARIN SODIUM 40 MG: 100 INJECTION SUBCUTANEOUS at 17:34

## 2025-04-27 RX ADMIN — ACETAMINOPHEN 1000 MG: 500 TABLET, FILM COATED ORAL at 04:52

## 2025-04-27 RX ADMIN — AMPICILLIN AND SULBACTAM 3 G: 1; 2 INJECTION, POWDER, FOR SOLUTION INTRAMUSCULAR; INTRAVENOUS at 05:56

## 2025-04-27 RX ADMIN — ACETAMINOPHEN 1000 MG: 500 TABLET, FILM COATED ORAL at 12:03

## 2025-04-27 RX ADMIN — HYDROXYZINE HYDROCHLORIDE 25 MG: 50 TABLET, FILM COATED ORAL at 04:58

## 2025-04-27 RX ADMIN — ACETAMINOPHEN 1000 MG: 500 TABLET, FILM COATED ORAL at 17:34

## 2025-04-27 RX ADMIN — INSULIN LISPRO 4 UNITS: 100 INJECTION, SOLUTION INTRAVENOUS; SUBCUTANEOUS at 20:26

## 2025-04-27 RX ADMIN — OXYCODONE HYDROCHLORIDE 10 MG: 10 TABLET ORAL at 04:51

## 2025-04-27 RX ADMIN — INSULIN LISPRO 4 UNITS: 100 INJECTION, SOLUTION INTRAVENOUS; SUBCUTANEOUS at 17:35

## 2025-04-27 RX ADMIN — CYCLOBENZAPRINE 5 MG: 10 TABLET, FILM COATED ORAL at 17:34

## 2025-04-27 RX ADMIN — INSULIN LISPRO 4 UNITS: 100 INJECTION, SOLUTION INTRAVENOUS; SUBCUTANEOUS at 07:49

## 2025-04-27 RX ADMIN — OXYCODONE HYDROCHLORIDE 10 MG: 10 TABLET ORAL at 13:04

## 2025-04-27 RX ADMIN — INSULIN LISPRO 3 UNITS: 100 INJECTION, SOLUTION INTRAVENOUS; SUBCUTANEOUS at 12:02

## 2025-04-27 RX ADMIN — AMPICILLIN AND SULBACTAM 3 G: 1; 2 INJECTION, POWDER, FOR SOLUTION INTRAMUSCULAR; INTRAVENOUS at 17:43

## 2025-04-27 RX ADMIN — POLYETHYLENE GLYCOL 3350 2 PACKET: 17 POWDER, FOR SOLUTION ORAL at 04:52

## 2025-04-27 RX ADMIN — ATORVASTATIN CALCIUM 20 MG: 20 TABLET, FILM COATED ORAL at 17:34

## 2025-04-27 ASSESSMENT — PAIN DESCRIPTION - PAIN TYPE
TYPE: ACUTE PAIN

## 2025-04-27 ASSESSMENT — ENCOUNTER SYMPTOMS
CARDIOVASCULAR NEGATIVE: 1
EYES NEGATIVE: 1
GASTROINTESTINAL NEGATIVE: 1
RESPIRATORY NEGATIVE: 1
PSYCHIATRIC NEGATIVE: 1
NEUROLOGICAL NEGATIVE: 1
CONSTITUTIONAL NEGATIVE: 1

## 2025-04-27 NOTE — PROGRESS NOTES
[facility] Medicine Daily Progress Note    Date of Service  4/27/2025    Chief Complaint  Donald Fuller is a 54 y.o. male admitted 4/20/2025 with left 5th ray resection surgical site infection    Hospital Course  54 y.o. male with pmh IDDM2, Anxiety, HTN, S/P left 5th ray resection who presented 4/20/2025 with bleeding swelling and draining near amputation site that has been present past two days. Resection was done 2/28 for osteomyelitis, Cultures at that time grew E. Cloacae. Was discharged on 14 day course of augmentin.      Had sutures removed 5 days ago. Noted bleeding and swelling near amputation site 2 days ago, intermittent pain. Says he takes great care in not re-injuring foot, proper wound care. Has not noticed pus. Has had chills past 1 day. Nausea past Today.      He was admitted to the medical floor on 4/20/2025 for surgical site infection.  Prior to admission, blood cultures were collected and patient started on broad-spectrum IV antibiotics. Orthopedic surgery following for which patient underwent left foot fifth ray amputation with wound VAC placement on 4/21/2025 and I/D on 4/23/2025 4/20/2025 blood cultures growing MSSA.  Infectious  disease following for which patient was transition to an Ancef antibiotic regimen.    4/25 C spine MRI Moderate central canal stenosis at C5-6   4/25 T spine MRI mild chronic T7-T8 compression deformity, no acute findings or evidence of infection   4/25 L spine MRI no acute findings    KUB large amount of stool within the colon     Interval Problem Update  Evaluated at bedside  Still having back pain but better today  Big BM yesterday   Stable VS   On room air  CBC without leukocytosis  -300  Incrased Lanstus  4/20 BCX w/MSSA  4/20 WCX w/MSSA  4/21 ORCX w/MSSA and Finegoldia magna  4/22 BCX w/MSSA  4/24 BCX w/inconclusive   ID following, IV Unasyn   Hold of on PICC line for now  Labs on AM    I have discussed this patient's plan of care and discharge  plan at IDT rounds today with Case Management, Nursing, Nursing leadership, and other members of the IDT team.    Consultants/Specialty  Orthopedic surgery  ID    Code Status  Full Code    Disposition  The patient is not medically cleared for discharge to home or a post-acute facility.     I have placed the appropriate orders for post-discharge needs.    Review of Systems  Review of Systems   Constitutional: Negative.    HENT: Negative.     Eyes: Negative.    Respiratory: Negative.     Cardiovascular: Negative.    Gastrointestinal: Negative.    Genitourinary: Negative.    Musculoskeletal:  Positive for joint pain.   Skin: Negative.    Neurological: Negative.    Endo/Heme/Allergies: Negative.    Psychiatric/Behavioral: Negative.          Physical Exam  Temp:  [36.6 °C (97.8 °F)-37 °C (98.6 °F)] 36.8 °C (98.2 °F)  Pulse:  [88-95] 88  Resp:  [16-19] 18  BP: (135-156)/(83-95) 135/83  SpO2:  [92 %-94 %] 92 %    Physical Exam  Constitutional:       General: He is not in acute distress.     Appearance: Normal appearance.   HENT:      Head: Normocephalic and atraumatic.      Nose: Nose normal. No congestion.      Mouth/Throat:      Mouth: Mucous membranes are moist.   Eyes:      Extraocular Movements: Extraocular movements intact.      Pupils: Pupils are equal, round, and reactive to light.   Cardiovascular:      Rate and Rhythm: Normal rate and regular rhythm.      Pulses: Normal pulses.      Heart sounds: Normal heart sounds.   Pulmonary:      Effort: Pulmonary effort is normal.      Breath sounds: Normal breath sounds.   Abdominal:      General: Bowel sounds are normal.      Palpations: Abdomen is soft.      Tenderness: There is no abdominal tenderness.   Musculoskeletal:         General: Swelling present. Normal range of motion.      Cervical back: Normal range of motion and neck supple.      Comments: Left 5th ray resection, Wound 1cm anterolateral left foot, erythema   Skin:     General: Skin is warm.      Coloration:  Skin is not jaundiced.   Neurological:      General: No focal deficit present.      Mental Status: He is alert and oriented to person, place, and time. Mental status is at baseline.      Cranial Nerves: No cranial nerve deficit.   Psychiatric:         Mood and Affect: Mood normal.         Behavior: Behavior normal.         Thought Content: Thought content normal.         Judgment: Judgment normal.         Fluids    Intake/Output Summary (Last 24 hours) at 4/27/2025 0807  Last data filed at 4/26/2025 2058  Gross per 24 hour   Intake 1400 ml   Output 400 ml   Net 1000 ml        Laboratory  Recent Labs     04/25/25  0118 04/26/25  0653   WBC 9.9 9.3   RBC 4.94 4.64*   HEMOGLOBIN 14.2 13.0*   HEMATOCRIT 41.7* 40.1*   MCV 84.4 86.4   MCH 28.7 28.0   MCHC 34.1 32.4   RDW 38.6 39.7   PLATELETCT 219 261   MPV 10.5 9.7     Recent Labs     04/25/25  0118 04/26/25  0653   SODIUM 136 133*   POTASSIUM 4.7 4.5   CHLORIDE 99 99   CO2 24 26   GLUCOSE 262* 321*   BUN 23* 18   CREATININE 1.40 1.10   CALCIUM 9.5 9.2                     Imaging  CT-ABDOMEN-PELVIS WITH   Final Result      1.  No bowel obstruction.   2.  Stable bilateral adrenal nodules.   3.  Bibasilar subsegmental atelectasis.         PM-[id number]-3 VIEW   Final Result      Large amount of stool within the colon.      MR-LUMBAR SPINE-WITH & W/O   Final Result      1.  There is no evidence of infection in the lumbar spine.   2.  Mild degenerative changes      MR-THORACIC SPINE-WITH & W/O   Final Result      1.  Mild chronic compression deformity at T7 and T8.   2.  No acute fracture.   3.  Mild degenerative disease.   4.  No evidence of infection.      MR-CERVICAL SPINE-WITH & W/O   Final Result      1.  Postsurgical and degenerative changes in the cervical spine as described above.   2.  Moderate central canal stenosis at C5-6.   3.  Multifocal neural foraminal stenosis.   4.  There is no evidence of infection.      CT-CTA COMPLETE THORACOABDOMINAL AORTA   Final Result       1.  No evidence of aortic aneurysm or dissection.      2.  Atherosclerotic calcification of the coronary arteries especially involving the left anterior ascending artery origin      3.  Fatty liver with a 2.3 cm right lobe hepatic hemangioma.      4.  Bilateral adrenal nodules measuring 17 mm on the left and 12 mm on the right.      5.  Moderate constipation.                        EC-ECHOCARDIOGRAM COMPLETE W/O CONT   Final Result      DX-FOOT-COMPLETE 3+ LEFT   Final Result         1.  Slight permeative appearance of the lateral cortex of the fifth toe metatarsal waist, appearance raising concern for component of osteomyelitis.   2.  Soft tissue gas at the amputation site, consider underlying infectious etiology.      DX-CHEST-PORTABLE (1 VIEW)   Final Result         1.  No acute cardiopulmonary disease.           Assessment/Plan  * MSSA bacteremia  Assessment & Plan  4/20 BCX w/MSSA  4/20 WCX w/NGTD  4/21 ORCX w/Gram stain+ few positive cocci   4/22 BCX w/NGTD  ID following  Continue IV cefazolin   Pending TTE    Osteomyelitis of ankle or foot, acute, left (HCC)- (present on admission)  Assessment & Plan  2/28 Left small toe ray resection  for osteomyelitis. Wcx grew MSSA. L foot xray concerning for osteo of left lateral foot near resection site. Received Unasyn and Vancomycin in ED. Ortho consult in ED, to see in AM. S/P Sepsis protocol for SIRS 2/4 and this as likely source of infection. Not febrile, no leukocytosis at presentation.    IV ABX per ID  Wound care  Orthopedic surgery following  Aim for OR on AM    Type 2 diabetes mellitus with hyperglycemia, with long-term current use of insulin ([facility])- (present on admission)  Assessment & Plan  A1c was 13.9 in FEB 2024. On insulin. Reports compliance with medication and significant diet adjustment since February. Glucose 325 on admission.  -Repeat A1C  -Glargine 10. Goal blood glucose 140-180 given active infection.  -SSI  -Hypoglycemia protocol    Sepsis  (HCC)  Assessment & Plan  Resolved    Hypertension- (present on admission)  Assessment & Plan  -Hold home losartan given DOMINIC at presentation    Acute renal failure (HCC)- (present on admission)  Assessment & Plan  Resolved    Anxiety- (present on admission)  Assessment & Plan  Mood ok.  -Continue home venlafaxine       VTE prophylaxis: Lovenox    I have performed a physical exam and reviewed and updated ROS and Plan today (4/27/2025). In review of yesterday's note (4/26/2025), there are no changes except as documented above.

## 2025-04-27 NOTE — PROGRESS NOTES
RN notified provider (Jaycee) as pt complaining of severe back pain, oxy and dilaudid not helping. Pt ambulated and applied heat with no relief. Provider acknowledged.

## 2025-04-27 NOTE — PROGRESS NOTES
"      Orthopaedic Progress Note    Interval changes:  Patient doing well    LLE dressing CDI  Cleared for DC home by ortho pending medicine clearance    ROS - Patient denies any new issues.  Pain well controlled.    BP (!) 146/89   Pulse 86   Temp 36.4 °C (97.6 °F) (Temporal)   Resp 18   Ht 1.854 m (6' 0.99\")   Wt 98.4 kg (216 lb 14.9 oz)   SpO2 93%     Patient seen and examined  No acute distress  Breathing non labored  RRR  LLE dressing CDI, DNVI, moves all remaining toes, cap refill <2 sec.     Recent Labs     04/25/25  0118 04/26/25  0653 04/27/25  0909   WBC 9.9 9.3 9.3   RBC 4.94 4.64* 4.61*   HEMOGLOBIN 14.2 13.0* 12.9*   HEMATOCRIT 41.7* 40.1* 39.2*   MCV 84.4 86.4 85.0   MCH 28.7 28.0 28.0   MCHC 34.1 32.4 32.9   RDW 38.6 39.7 38.2   PLATELETCT 219 261 291   MPV 10.5 9.7 9.6       Active Hospital Problems    Diagnosis     Sepsis ([facility]) [A41.9]     MSSA bacteremia [R78.81, B95.61]     Osteomyelitis of ankle or foot, acute, left ([facility]) [M86.172]     Hypertension [I10]     Acute renal failure ([facility]) [N17.9]     Anxiety [F41.9]     Type 2 diabetes mellitus with hyperglycemia, with long-term current use of insulin ([facility]) [E11.65, Z79.4]        Assessment/Plan:  Patient doing well    LLE dressing CDI  Cleared for DC home by ortho pending medicine clearance  POD#4 S/P Debridement and secondary closure of surgical wound left foot partial amputation   POD#6 S/P:  Left foot fifth ray amputation  Wound vac application <50cm2  Wt bearing status - Heel WB   Wound care/Drains - Dressings to be changed every other day by nursing. Or PRN for saturation    Future Procedures - none planned    Sutures/Staples out- 14-21 days post operatively. Removal by ortho mid levels only.  PT/OT-initiated  Antibiotics: unasyn 3g q6  DVT Prophylaxis- TEDS/SCDs/Foot pumps/heparin  Sainz-not needed per ortho  Case Coordination for Discharge Planning - Disposition per therapy recs.    "

## 2025-04-27 NOTE — CARE PLAN
The patient is Watcher - Medium risk of patient condition declining or worsening    Shift Goals  Clinical Goals: Blood sugars will be <300 throughout shift  Patient Goals: Pain will be <6/10 throughout shift  Family Goals: YARELI    Progress made toward(s) clinical / shift goals:  Jacques blood sugar has been controlled with the adjusted sliding scale and his diet. His initial blood sugar was >300 but has been subsequently lower throughout the shift.     Patient is not progressing towards the following goals:      Problem: Pain - Standard  Goal: Alleviation of pain or a reduction in pain to the patient’s comfort goal  Description: Target End Date:  Prior to discharge or change in level of careDocument on Vitals flowsheet1.  Document pain using the appropriate pain scale per order or unit policy2.  Educate and implement non-pharmacologic comfort measures (i.e. relaxation, distraction, massage, cold/heat therapy, etc.)3.  Pain management medications as ordered4.  Reassess pain after pain med administration per policy5.  If opiods administered assess patient's response to pain medication is appropriate per POSS sedation scale6.  Follow pain management plan developed in collaboration with patient and interdisciplinary team (including palliative care or pain specialists if applicable)  Outcome: Not Progressing

## 2025-04-27 NOTE — CARE PLAN
The patient is Stable - Low risk of patient condition declining or worsening    Shift Goals  Clinical Goals: abx, patient will report pain < 6 by end of shift  Patient Goals: rest  Family Goals: amy    Patient A&Ox4, on room air. Patient reports pain, pain managed per MAR indications. Patient Fall precautions in place. Call light within reach, all needs met at this time.     Progress made toward(s) clinical / shift goals:        Problem: Knowledge Deficit - Standard  Goal: Patient and family/care givers will demonstrate understanding of plan of care, disease process/condition, diagnostic tests and medications  Outcome: Progressing     Problem: Pain - Standard  Goal: Alleviation of pain or a reduction in pain to the patient’s comfort goal  Outcome: Progressing

## 2025-04-28 ENCOUNTER — APPOINTMENT (OUTPATIENT)
Dept: RADIOLOGY | Facility: MEDICAL CENTER | Age: 55
End: 2025-04-28
Attending: INTERNAL MEDICINE

## 2025-04-28 ENCOUNTER — PHARMACY VISIT (OUTPATIENT)
Dept: PHARMACY | Facility: MEDICAL CENTER | Age: 55
End: 2025-04-28
Payer: COMMERCIAL

## 2025-04-28 VITALS
HEART RATE: 75 BPM | RESPIRATION RATE: 18 BRPM | SYSTOLIC BLOOD PRESSURE: 133 MMHG | TEMPERATURE: 97.3 F | WEIGHT: 216.93 LBS | HEIGHT: 73 IN | DIASTOLIC BLOOD PRESSURE: 91 MMHG | BODY MASS INDEX: 28.75 KG/M2 | OXYGEN SATURATION: 95 %

## 2025-04-28 LAB
ALBUMIN SERPL BCP-MCNC: 3.1 G/DL (ref 3.2–4.9)
ALBUMIN/GLOB SERPL: 1 G/DL
ALP SERPL-CCNC: 84 U/L (ref 30–99)
ALT SERPL-CCNC: 15 U/L (ref 2–50)
ANION GAP SERPL CALC-SCNC: 9 MMOL/L (ref 7–16)
AST SERPL-CCNC: 15 U/L (ref 12–45)
BILIRUB SERPL-MCNC: <0.2 MG/DL (ref 0.1–1.5)
BUN SERPL-MCNC: 21 MG/DL (ref 8–22)
CALCIUM ALBUM COR SERPL-MCNC: 9.9 MG/DL (ref 8.5–10.5)
CALCIUM SERPL-MCNC: 9.2 MG/DL (ref 8.5–10.5)
CHLORIDE SERPL-SCNC: 99 MMOL/L (ref 96–112)
CO2 SERPL-SCNC: 27 MMOL/L (ref 20–33)
CREAT SERPL-MCNC: 1.07 MG/DL (ref 0.5–1.4)
ERYTHROCYTE [DISTWIDTH] IN BLOOD BY AUTOMATED COUNT: 38.1 FL (ref 35.9–50)
GFR SERPLBLD CREATININE-BSD FMLA CKD-EPI: 82 ML/MIN/1.73 M 2
GLOBULIN SER CALC-MCNC: 3.2 G/DL (ref 1.9–3.5)
GLUCOSE BLD STRIP.AUTO-MCNC: 193 MG/DL (ref 65–99)
GLUCOSE BLD STRIP.AUTO-MCNC: 298 MG/DL (ref 65–99)
GLUCOSE SERPL-MCNC: 260 MG/DL (ref 65–99)
HCT VFR BLD AUTO: 38.8 % (ref 42–52)
HGB BLD-MCNC: 13.3 G/DL (ref 14–18)
MCH RBC QN AUTO: 29.1 PG (ref 27–33)
MCHC RBC AUTO-ENTMCNC: 34.3 G/DL (ref 32.3–36.5)
MCV RBC AUTO: 84.9 FL (ref 81.4–97.8)
PLATELET # BLD AUTO: 320 K/UL (ref 164–446)
PMV BLD AUTO: 9.7 FL (ref 9–12.9)
POTASSIUM SERPL-SCNC: 4.2 MMOL/L (ref 3.6–5.5)
PROT SERPL-MCNC: 6.3 G/DL (ref 6–8.2)
RBC # BLD AUTO: 4.57 M/UL (ref 4.7–6.1)
SODIUM SERPL-SCNC: 135 MMOL/L (ref 135–145)
WBC # BLD AUTO: 8.3 K/UL (ref 4.8–10.8)

## 2025-04-28 PROCEDURE — 99239 HOSP IP/OBS DSCHRG MGMT >30: CPT | Performed by: STUDENT IN AN ORGANIZED HEALTH CARE EDUCATION/TRAINING PROGRAM

## 2025-04-28 PROCEDURE — 36573 INSJ PICC RS&I 5 YR+: CPT

## 2025-04-28 PROCEDURE — RXMED WILLOW AMBULATORY MEDICATION CHARGE: Performed by: STUDENT IN AN ORGANIZED HEALTH CARE EDUCATION/TRAINING PROGRAM

## 2025-04-28 PROCEDURE — 82962 GLUCOSE BLOOD TEST: CPT

## 2025-04-28 PROCEDURE — 700105 HCHG RX REV CODE 258: Performed by: INTERNAL MEDICINE

## 2025-04-28 PROCEDURE — 02HV33Z INSERTION OF INFUSION DEVICE INTO SUPERIOR VENA CAVA, PERCUTANEOUS APPROACH: ICD-10-PCS | Performed by: INTERNAL MEDICINE

## 2025-04-28 PROCEDURE — 700111 HCHG RX REV CODE 636 W/ 250 OVERRIDE (IP): Mod: JZ | Performed by: INTERNAL MEDICINE

## 2025-04-28 PROCEDURE — 80053 COMPREHEN METABOLIC PANEL: CPT

## 2025-04-28 PROCEDURE — 76937 US GUIDE VASCULAR ACCESS: CPT

## 2025-04-28 PROCEDURE — A9270 NON-COVERED ITEM OR SERVICE: HCPCS | Performed by: STUDENT IN AN ORGANIZED HEALTH CARE EDUCATION/TRAINING PROGRAM

## 2025-04-28 PROCEDURE — 85027 COMPLETE CBC AUTOMATED: CPT

## 2025-04-28 PROCEDURE — 700111 HCHG RX REV CODE 636 W/ 250 OVERRIDE (IP): Mod: JZ | Performed by: STUDENT IN AN ORGANIZED HEALTH CARE EDUCATION/TRAINING PROGRAM

## 2025-04-28 PROCEDURE — 700102 HCHG RX REV CODE 250 W/ 637 OVERRIDE(OP): Performed by: STUDENT IN AN ORGANIZED HEALTH CARE EDUCATION/TRAINING PROGRAM

## 2025-04-28 PROCEDURE — 99233 SBSQ HOSP IP/OBS HIGH 50: CPT | Performed by: INTERNAL MEDICINE

## 2025-04-28 RX ORDER — OXYCODONE HYDROCHLORIDE 10 MG/1
10 TABLET ORAL EVERY 6 HOURS PRN
Qty: 12 TABLET | Refills: 0 | Status: SHIPPED | OUTPATIENT
Start: 2025-04-28 | End: 2025-05-01

## 2025-04-28 RX ORDER — CYCLOBENZAPRINE HCL 5 MG
5 TABLET ORAL 3 TIMES DAILY PRN
Qty: 30 TABLET | Refills: 0 | Status: SHIPPED | OUTPATIENT
Start: 2025-04-28

## 2025-04-28 RX ORDER — 0.9 % SODIUM CHLORIDE 0.9 %
VIAL (ML) INJECTION PRN
Status: CANCELLED | OUTPATIENT
Start: 2025-04-28

## 2025-04-28 RX ORDER — 0.9 % SODIUM CHLORIDE 0.9 %
10 VIAL (ML) INJECTION PRN
Status: CANCELLED | OUTPATIENT
Start: 2025-04-28

## 2025-04-28 RX ORDER — 0.9 % SODIUM CHLORIDE 0.9 %
3 VIAL (ML) INJECTION PRN
Status: CANCELLED | OUTPATIENT
Start: 2025-04-28

## 2025-04-28 RX ORDER — OXYCODONE HCL 10 MG/1
10 TABLET, FILM COATED, EXTENDED RELEASE ORAL ONCE
Refills: 0 | Status: DISCONTINUED | OUTPATIENT
Start: 2025-04-28 | End: 2025-04-28 | Stop reason: CLARIF

## 2025-04-28 RX ORDER — ACETAMINOPHEN 500 MG
1000 TABLET ORAL 3 TIMES DAILY
Qty: 30 TABLET | Refills: 0 | Status: SHIPPED | OUTPATIENT
Start: 2025-04-28

## 2025-04-28 RX ORDER — OXYCODONE HYDROCHLORIDE 10 MG/1
10 TABLET ORAL ONCE
Refills: 0 | Status: COMPLETED | OUTPATIENT
Start: 2025-04-28 | End: 2025-04-28

## 2025-04-28 RX ADMIN — ACETAMINOPHEN 1000 MG: 500 TABLET, FILM COATED ORAL at 12:43

## 2025-04-28 RX ADMIN — INSULIN LISPRO 3 UNITS: 100 INJECTION, SOLUTION INTRAVENOUS; SUBCUTANEOUS at 12:47

## 2025-04-28 RX ADMIN — OXYCODONE HYDROCHLORIDE 10 MG: 10 TABLET ORAL at 05:50

## 2025-04-28 RX ADMIN — INSULIN LISPRO 1 UNITS: 100 INJECTION, SOLUTION INTRAVENOUS; SUBCUTANEOUS at 08:24

## 2025-04-28 RX ADMIN — OXYCODONE HYDROCHLORIDE 10 MG: 10 TABLET ORAL at 14:30

## 2025-04-28 RX ADMIN — AMPICILLIN AND SULBACTAM 3 G: 1; 2 INJECTION, POWDER, FOR SOLUTION INTRAMUSCULAR; INTRAVENOUS at 00:03

## 2025-04-28 RX ADMIN — HYDROMORPHONE HYDROCHLORIDE 1 MG: 1 INJECTION, SOLUTION INTRAMUSCULAR; INTRAVENOUS; SUBCUTANEOUS at 03:40

## 2025-04-28 RX ADMIN — CYCLOBENZAPRINE 5 MG: 10 TABLET, FILM COATED ORAL at 10:50

## 2025-04-28 RX ADMIN — ACETAMINOPHEN 1000 MG: 500 TABLET, FILM COATED ORAL at 05:50

## 2025-04-28 RX ADMIN — AMPICILLIN AND SULBACTAM 3 G: 1; 2 INJECTION, POWDER, FOR SOLUTION INTRAMUSCULAR; INTRAVENOUS at 05:56

## 2025-04-28 RX ADMIN — AMPICILLIN AND SULBACTAM 3 G: 1; 2 INJECTION, POWDER, FOR SOLUTION INTRAMUSCULAR; INTRAVENOUS at 12:41

## 2025-04-28 RX ADMIN — OXYCODONE HYDROCHLORIDE 10 MG: 10 TABLET ORAL at 10:15

## 2025-04-28 ASSESSMENT — PAIN DESCRIPTION - PAIN TYPE
TYPE: ACUTE PAIN
TYPE: ACUTE PAIN
TYPE: ACUTE PAIN;SURGICAL PAIN
TYPE: ACUTE PAIN

## 2025-04-28 ASSESSMENT — ENCOUNTER SYMPTOMS
FEVER: 0
CHILLS: 0

## 2025-04-28 NOTE — PROGRESS NOTES
PIV removed per pt request before going to dc unge.  Left foot wound dressing changed prior to discharge.

## 2025-04-28 NOTE — DISCHARGE PLANNING
Care Transition Team Assessment    Primary emergency Contact is pt's Father at [phone]     LMSW met with pt at bedside to complete discharge assessment and chart review was completed to obtain the information used in this assessment. Pt is A/Ox4 and agreeable to assessment. Pt verified information on face sheet.      - Prior to admission, pt lived alone at [address] [address]   - Per pt, family is good support for discharge back into community.   - Pt stated to be independent with ADL/IADLs and drives self-prior to admission.   - Per pt, no DME or O2 was owned/used prior to admission   - Pt is retired and uninsured  - Preferred pharmacy is the [facility] at [address], [address], [place] [address]  -PCP is Kojo Covington M.D. in [place]  -Patient does not report any history of ANN or MH    LMSW met with the patient at bedside to discuss infusion options. Patient understands that the cost of his daily infusion at [facility] is $1,111.25. Patient is in agreement to pay. 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 Patient is aware he will be held responsible for his bill. [facility] notified. Patient's  is a diligent support for him and will bring him to his appointments.     Information Source  Orientation Level: Oriented X4  Information Given By: Patient  Who is responsible for making decisions for patient? : Patient    Readmission Evaluation  Is this a readmission?: No    Elopement Risk  Legal Hold: No  Ambulatory or Self Mobile in Wheelchair: Yes  Disoriented: No  Psychiatric Symptoms: None  History of Wandering: No  Elopement this Admit: No  Vocalizing Wanting to Leave: No  Displays Behaviors, Body Language Wanting to Leave: No-Not at Risk for Elopement  Elopement Risk: Not at Risk for Elopement    Interdisciplinary Discharge Planning  Lives with - Patient's Self Care Capacity: Alone and Able to  Care For Self  Patient or legal guardian wants to designate a caregiver: No  Support Systems: Other (Comments) ()  Housing / Facility: 2 Story Apartment / Condo, 2 Story House    Discharge Preparedness  What is your plan after discharge?: Home with help  What are your discharge supports?: Other (comment) ( and  sister)  Prior Functional Level: Independent with Activities of Daily Living, Independent with Medication Management, Ambulatory  Difficulity with ADLs: None  Difficulity with IADLs: None    Functional Assesment  Prior Functional Level: Independent with Activities of Daily Living, Independent with Medication Management, Ambulatory    Finances  Financial Barriers to Discharge: No  Prescription Coverage: No  Prescription Coverage Comments:  (Patient does not have insurance)    Vision / Hearing Impairment  Vision Impairment : No  Right Eye Vision: Impaired, Wears Glasses  Left Eye Vision: Impaired, Wears Glasses  Hearing Impairment : No         Advance Directive  Advance Directive?: None    Domestic Abuse  Have you ever been the victim of abuse or violence?: No  Possible Abuse/Neglect Reported to:: Not Applicable    Psychological Assessment  History of Substance Abuse: None  History of Psychiatric Problems: No  Non-compliant with Treatment: No  Newly Diagnosed Illness: No    Discharge Risks or Barriers  Discharge risks or barriers?: Uninsured / underinsured  Patient risk factors: Uninsured or underinsured    Anticipated Discharge Information  Discharge Disposition: Discharged to home/self care (01)

## 2025-04-28 NOTE — PROGRESS NOTES
Pt sent to [facility] via wheel chair with transport. All belongings with pt.  No additional needs at this time.

## 2025-04-28 NOTE — DISCHARGE SUMMARY
Discharge Summary    CHIEF COMPLAINT ON ADMISSION  Chief Complaint   Patient presents with    Post-Op Complications     Patient had 5th toe of left foot amputated approximately 5 weeks ago. Patient endorsing bleeding and swelling to incision site for approximately 2 days. Patient also complains of difficulty with keeping balance, complaints of chills and hot flashes, starting since yesterday.    Nausea     Patient has been feeling nauseous starting yesterday but has not vomited.        Reason for Admission  Post op comp.     Admission Date  4/20/2025    CODE STATUS  Full Code    HPI & HOSPITAL COURSE  54 y.o. male with pmh IDDM2, Anxiety, HTN, and S/P left 5th ray resection was admitted on 4/20/2025 was admitted to the medical floor on 4/20/2025 for surgical site infection.  Prior to admission, blood cultures were collected and patient was started on broad-spectrum IV antibiotics. Orthopedic surgery following for which patient underwent left foot fifth ray amputation with wound VAC placement on 4/21/2025 and I/D on 4/23/2025. Blood cultures growing MSSA. Infectious  disease following for which patient was transition to an Ancef antibiotic regimen. 4/21 ORCX w/MSSA and Finegoldia magna and 4/22 BCX w/MSSA. He was extension to a Unasyn antibiotic regimen per ID.  Echocardiogram without valvular disease. 4/25 spine MRI showing moderate central canal stenosis at C5-6,  mild chronic T7-T8 compression deformity, otherwise no acute findings or evidence of infection. As per infectious disease patient is to continue an IV Entrapartum regimen which ends on 6/5/2025.   [facility] infusions were scheduled prior to discharge.  Stable patient within chronic condition was discharged home and instructed to follow-up with his primary care provider, orthopedic surgeon and infectious disease in the outpatient setting.     All results and plan of action were discussed with the patient for which he voiced understanding and agreement  with the primary care team.  Patient was instructed to return to emergency department if symptoms were to worsen.    Therefore, he is discharged in good and stable condition to home with close outpatient follow-up.    The patient met 2-midnight criteria for an inpatient stay at the time of discharge.    Discharge Date  4/28/2025    FOLLOW UP ITEMS POST DISCHARGE  Primary care provider follow posthospital discharge care    Infectious disease    Plan:  -Continue IV cefazolin 2 g every 8 hours  -Blood cultures x 2 on 4/20+ MSSA  -Repeat blood cultures 1 out of 2 sets on 4/22-MSSA  -Repeat blood cultures on 4/24-negative to date.  1 blood cultures that triggered a CO2 production but cultures have been negative  -OR cultures-MSSA.    -Pathology-acute osteomyelitis  -TTE negative for any valvular abnormalities however there was a semimobile echodensity at the junction of the aorta and brachiocephalic artery  -CTA aorta was negative for aortic aneurysm or dissection.  Showed atherosclerotic calcification in the coronary arteries  - Status post debridement and secondary closure of surgical wound left foot amputation on 4/23/2025  -Diabetes education and blood sugar control  -Recommend referral to endocrinologist  - Will plan a 6-week course of IV antibiotics from 4/24 with stop date of 06/05/2025  -PICC line ordered today  -MRI of the entire spine with contrast-negative for infection  -[facility] orders for IV ertapenem 1 g daily done today     This infection poses a threat to life and further limb function     Disposition: Patient prefers [facility]     Need for PICC line: Yes     Follow-up in ID clinic with NP    DISCHARGE DIAGNOSES  Principal Problem:    MSSA bacteremia (POA: No)  Active Problems:    Osteomyelitis of ankle or foot, acute, left (HCC) (POA: Yes)    Type 2 diabetes mellitus with hyperglycemia, with long-term current use of insulin ([facility]) (POA: Yes)    Anxiety (POA: Yes)    Acute renal failure ([facility]CC) (POA:  Yes)    Hypertension (POA: Yes)    Sepsis ([facility]CC) (POA: No)  Resolved Problems:    * No resolved hospital problems. *      FOLLOW UP  Future Appointments   Date Time Provider Department Center   5/14/2025  2:45 PM Deandre Marte P.A.-C. [facility]   6/3/2025  9:20 AM MABLE Lal     No follow-up provider specified.    MEDICATIONS ON DISCHARGE     Medication List        START taking these medications        Instructions   acetaminophen 500 MG Tabs  Commonly known as: Tylenol   Take 2 Tablets by mouth 3 times a day.  Dose: 1,000 mg     cyclobenzaprine 5 MG tablet  Commonly known as: Flexeril   Take 1 Tablet by mouth 3 times a day as needed for Muscle Spasms.  Dose: 5 mg     oxyCODONE immediate release 10 MG immediate release tablet  Commonly known as: Roxicodone   Take 1 Tablet by mouth every 6 hours as needed for Severe Pain for up to 3 days.  Dose: 10 mg            CHANGE how you take these medications        Instructions   insulin glargine 100 UNIT/ML Sopn injection  What changed: how much to take  Commonly known as: Lantus   Inject 30 Units under the skin every evening.  Dose: 30 Units            CONTINUE taking these medications        Instructions   atorvastatin 20 MG Tabs  Commonly known as: Lipitor   Take 1 Tablet by mouth every evening.  Dose: 20 mg     BD Pen Needle Jing U/F  Generic drug: Insulin Pen Needle 32 G x 4 mm   Doctor's comments: Per patient/formulary preference. ICD-10 code: E11.65 Uncontrolled type 2 Diabetes Mellitus  Use one pen needle in pen device to inject insulin four times daily.     hydrOXYzine HCl 25 MG Tabs  Commonly known as: Atarax   TAKE 1 TABLET BY MOUTH THREE TIMES DAILY AS NEEDED FOR ITCHING OR ANXIETY.     insulin lispro 100 UNIT/ML Sopn injection PEN  Commonly known as: HumaLOG/AdmeLOG   Inject 2-10 Units under the skin 3 times a day before meals. Use 2 units if blood sugar 150-200. 4 units if 200-250, 6 units if 250-300, 8 units if  "300-350, 10 units if 350-400  Dose: 2-10 Units     INSULIN SYRINGE 1CC/31GX5/16\" 31G X 5/16\" 1 ML Misc   Use as directed for SQ injection of insulin.     losartan 50 MG Tabs  Commonly known as: Cozaar   Take 1 Tablet by mouth every day.  Dose: 50 mg     metformin 1000 MG tablet  Commonly known as: Glucophage   Take 1 Tablet by mouth 2 times a day with meals.  Dose: 1,000 mg     pioglitazone 15 MG Tabs  Commonly known as: Actos   Take 1 Tablet by mouth every day.  Dose: 15 mg     venlafaxine 37.5 MG Tabs  Commonly known as: Effexor   Take 37.5 mg by mouth every day.  Dose: 37.5 mg            STOP taking these medications      NON SPECIFIED     NON SPECIFIED              Allergies  No Known Allergies    DIET  Orders Placed This Encounter   Procedures    Diet Order Diet: Consistent CHO (Diabetic)     Encourage carbohydrate containing liquids in PACU (milk, juice, or Impact).     Standing Status:   Standing     Number of Occurrences:   1     Diet::   Consistent CHO (Diabetic) [4]       ACTIVITY  As tolerated.  Weight bearing as tolerated    CONSULTATIONS  Orthopedic surgery  Infectious disease    PROCEDURES  4/23  Debridement and secondary closure of surgical wound left foot partial amputation.     4/21  PROCEDURE PERFORMED:   Left foot fifth ray amputation  Wound vac application <50cm2    LABORATORY  Lab Results   Component Value Date    SODIUM 133 (L) 04/27/2025    POTASSIUM 4.2 04/27/2025    CHLORIDE 98 04/27/2025    CO2 24 04/27/2025    GLUCOSE 268 (H) 04/27/2025    BUN 17 04/27/2025    CREATININE 1.06 04/27/2025        Lab Results   Component Value Date    WBC 8.3 04/28/2025    HEMOGLOBIN 13.3 (L) 04/28/2025    HEMATOCRIT 38.8 (L) 04/28/2025    PLATELETCT 320 04/28/2025        Total time of the discharge process exceeds 36 minutes.  "

## 2025-04-28 NOTE — CARE PLAN
The patient is Stable - Low risk of patient condition declining or worsening    Shift Goals  Clinical Goals: pain management, monitor bs, PICC placed  Patient Goals: pain control  Family Goals: none present    Progress made toward(s) clinical / shift goals:  Pt pain managed with PO medications and alternative options such as heat packs. Pt BS checked before breakfast and lunch during shift before discharge. PICC placed bedside.       Problem: Fall Risk  Goal: Patient will remain free from falls  Outcome: Progressing     Problem: Fluid Volume  Goal: Fluid volume balance will be maintained  Outcome: Progressing     Problem: Pain - Standard  Goal: Alleviation of pain or a reduction in pain to the patient’s comfort goal  Outcome: Progressing     Problem: Bowel Elimination  Goal: Establish and maintain regular bowel function  Outcome: Progressing     Problem: Self Care  Goal: Patient will have the ability to perform ADLs independently or with assistance (bathe, groom, dress, toilet and feed)  Outcome: Progressing       Patient is not progressing towards the following goals:

## 2025-04-28 NOTE — PROCEDURES
Vascular Access Team     Date of Insertion: 4/28/2025  Arm Circumference: 36  Internal length: 49  External Length: 4  Vein Occupancy %: 34   Reason for PICC: abx  Labs: WBC 8.3, , BUN 21, Cr 1.07, GFR 82, INR 1.07     Consents confirmed, vessel patency confirmed with ultrasound. Risks and benefits of procedure explained to patient and education regarding central line associated bloodstream infections provided. Questions answered.      PICC placed in LUE per licensed provider order with ultrasound guidance.  4 Fr, 1 lumen PICC placed in basilic vein after 1 attempt(s). 2 mL of 1% lidocaine injected intradermally at the insertion site. A 21 gauge microintroducer needle was visualized entering the vein and modified Seldinger technique was used to obtain access to the vein. 49 cm catheter inserted and brisk blood return was observed from each lumen upon aspiration. Line secured at the 4 cm marker. TCS stylet removed and observed to be fully intact. Each lumen flushed using pulsatile method without resistance with 10 mL 0.9% normal saline. PICC line secured with Biopatch and Tegaderm.     PICC tip placement location is confirmed by nurse to be in the Superior Vena Cava (SVC) utilizing 3CG technology. PICC line is appropriate for use at this time. Patient tolerated procedure well, without complications.  Patient condition relayed to primary RN or ordering physician via this post procedure note in the EMR.      Ultrasound images uploaded to PACS and viewable in the EMR - yes  Ultrasound imaged printed and placed in paper chart - no     BARD Power PICC ref # [id number], Lot # [id number], Expiration Date 02/28/2026

## 2025-04-28 NOTE — DISCHARGE PLANNING
Case Management Discharge Planning    Admission Date: 4/20/2025  GMLOS: 9.2  ALOS: 8    6-Clicks ADL Score: 21  6-Clicks Mobility Score: 20      Anticipated Discharge Dispo: Discharge Disposition: Discharged to home/self care (01)    DME Needed: No    Action(s) Taken: LMSW was updated that Final ID recs have been made and orders have been sent through [facility] to [facility]. LMSW spoke with [facility], [facility] is going to get an estimate for the cost and get back to this LMSW.     Addendum @ 1323: LMSW spoke with [facility], Cost per day for infusions is $1111.25 per day until end date of 06/25/2024.     Addendum @ 1341: LMSW met with the patient at bedside to discuss cost of infusions. Patient is able to pay for infusions, but the patient only receives monthly checks. Patient makes about $15,000 to $25,000 a month and he is retired. Patient gets paid on the 5th of every month. [facility] will [facility]. Patient will sign a form accepting responsibility for the bill at his daily infusions.     Addendum @ 1430: Infusion appointments set up and provided to the patient.     Escalations Completed: None    Medically Clear: Yes    Next Steps: Pending [facility] appointments to be set up.     Barriers to Discharge: Outpatient Infusion required    Is the patient up for discharge tomorrow: No

## 2025-04-28 NOTE — CARE PLAN
The patient is Stable - Low risk of patient condition declining or worsening    Shift Goals  Clinical Goals: Patient will report pain <7 by end of shift  Patient Goals: pain relief, rest  Family Goals: amy    Patient A&Ox4, on room air. Patient ambulates well with steady gait. Patient reports pain. Pain managed per MAR indications and heat packs. Patient call light within reach, all needs met at this time.     Progress made toward(s) clinical / shift goals:        Problem: Knowledge Deficit - Standard  Goal: Patient and family/care givers will demonstrate understanding of plan of care, disease process/condition, diagnostic tests and medications  Outcome: Progressing     Problem: Fall Risk  Goal: Patient will remain free from falls  Outcome: Progressing     Problem: Pain - Standard  Goal: Alleviation of pain or a reduction in pain to the patient’s comfort goal  Outcome: Progressing

## 2025-04-28 NOTE — PROGRESS NOTES
Infectious Disease Progress Note    Author: Marisol Garcia M.D. Date & Time of service: 2025  7:51 AM    Chief Complaint:  Follow-up for MSSA bacteremia, diabetic foot infection    Interval History:   patient afebrile, WBC 7.5.  Patient underwent debridement and secondary closure of surgical wound yesterday.  Per the procedure note, the overall appearance of the wound was healthy there was some marginal necrosis that was excised.  Repeat blood cultures are negative to date.  Patient anxious to be discharged.  Discussed with patient that we are awaiting pathology to determine final antibiotic course   patient afebrile, WBC 9.9.  Repeat blood cultures from yesterday are negative thus far.  Path consistent with acute osteomyelitis.  Patient claustrophobic.  Plan for MRI under general anesthesia hopefully today.  Patient anxious to be discharged home.  Plan of care discussed with patient   patient afebrile, WBC 8.3.  Patient with ongoing lower back pain however MRI of the spine negative for infection.  Repeat blood cultures on  are negative to date    Labs Reviewed, Medications Reviewed, and Wound Reviewed.    Review of Systems:  Review of Systems   Constitutional:  Negative for chills and fever.   Musculoskeletal:         Shoulder pain       Hemodynamics:  Temp (24hrs), Av.7 °C (98 °F), Min:36.4 °C (97.6 °F), Max:36.9 °C (98.4 °F)  Temperature: 36.6 °C (97.8 °F)  Pulse  Av.4  Min: 70  Max: 123   Blood Pressure: 125/82       Physical Exam:  Physical Exam  Vitals and nursing note reviewed.   Eyes:      Pupils: Pupils are equal, round, and reactive to light.   Cardiovascular:      Rate and Rhythm: Normal rate.   Pulmonary:      Effort: Pulmonary effort is normal.   Musculoskeletal:      Comments: Left foot in surgical dressing   Skin:     General: Skin is warm.   Neurological:      General: No focal deficit present.      Mental Status: He is alert and oriented to person, place, and time.    Psychiatric:      Comments: Irritable         Meds:    Current Facility-Administered Medications:     insulin GLARGINE **AND** insulin lispro **AND** POC blood glucose manual result **AND** NOTIFY MD and PharmD **AND** Administer 20 grams of glucose (approximately 8 ounces of fruit juice) every 15 minutes PRN FSBG less than 70 mg/dL **AND** dextrose bolus    polyethylene glycol/lytes    cyclobenzaprine    ampicillin-sulbactam (UNASYN) IV    enoxaparin (LOVENOX) injection    [DISCONTINUED] oxyCODONE immediate-release **OR** oxyCODONE immediate-release **OR** [DISCONTINUED] HYDROmorphone    NS    acetaminophen    Pharmacy Consult Request    atorvastatin    hydrOXYzine HCl    Labs:  Recent Labs     04/26/25  0653 04/27/25  0909 04/28/25  0310   WBC 9.3 9.3 8.3   RBC 4.64* 4.61* 4.57*   HEMOGLOBIN 13.0* 12.9* 13.3*   HEMATOCRIT 40.1* 39.2* 38.8*   MCV 86.4 85.0 84.9   MCH 28.0 28.0 29.1   RDW 39.7 38.2 38.1   PLATELETCT 261 291 320   MPV 9.7 9.6 9.7     Recent Labs     04/26/25  0653 04/27/25  0909   SODIUM 133* 133*   POTASSIUM 4.5 4.2   CHLORIDE 99 98   CO2 26 24   GLUCOSE 321* 268*   BUN 18 17     Recent Labs     04/26/25  0653 04/27/25  0909   ALBUMIN 3.1* 3.2   TBILIRUBIN 0.2 0.3   ALKPHOSPHAT 78 83   TOTPROTEIN 6.2 6.4   ALTSGPT 24 19   ASTSGOT 27 21   CREATININE 1.10 1.06       Imaging:  CT-ABDOMEN-PELVIS WITH  Result Date: 4/26/2025 4/26/2025 3:12 PM HISTORY/REASON FOR EXAM:  Concern for bowel obstruction. Abdominal distention. TECHNIQUE/EXAM DESCRIPTION:   CT scan of the abdomen and pelvis with contrast. Contrast-enhanced helical scanning was obtained from the diaphragmatic domes through the pubic symphysis following the bolus administration of nonionic contrast without complication. 96 mL of Omnipaque 350 nonionic contrast was administered without complication. Low dose optimization technique was utilized for this CT exam including automated exposure control and adjustment of the mA and/or kV according to  patient size. COMPARISON: CTA of the thoracoabdominal aorta, 4/23/2025. FINDINGS: Lower Chest: Bibasilar subsegmental atelectasis. The heart size is normal. No pleural or pericardial effusion. Liver: Unremarkable. Spleen: Unremarkable. Pancreas: Unremarkable. Gallbladder: No calcified stones. Biliary: Nondilated. Adrenal glands: Stable bilateral adrenal nodules. Kidneys: Unremarkable without hydronephrosis. Bowel: No obstruction or acute inflammation. Lymph nodes: No adenopathy. Vasculature: Unremarkable. Peritoneum: Unremarkable without ascites. Musculoskeletal: No acute or destructive process. Pelvis: No adenopathy or free fluid. Central zone prostatic calcifications. Seminal vesicles are within normal limits. The bladder is unremarkable.     1.  No bowel obstruction. 2.  Stable bilateral adrenal nodules. 3.  Bibasilar subsegmental atelectasis.     LA-[id number]-2 VIEW  Result Date: 4/26/2025 4/26/2025 8:57 AM HISTORY/REASON FOR EXAM:  Gastrointestinal Complaint. TECHNIQUE/EXAM DESCRIPTION AND NUMBER OF VIEWS:  1 view(s) of the abdomen. COMPARISON: None FINDINGS: No bowel dilatation, free air, abnormal calcification, or acute osseous abnormality. Large amount of stool within the colon.     Large amount of stool within the colon.    MR-THORACIC SPINE-WITH & W/O  Result Date: 4/25/2025 4/25/2025 11:17 AM HISTORY/REASON FOR EXAM:  Back pain in the setting of persistent MSSA bacteremia TECHNIQUE/EXAM DESCRIPTION: MRI of the thoracic spine without and with contrast. Multiplanar multisequence MR examination of the thoracic spine. 20 mL ProHance contrast was administered intravenously. COMPARISON:  None. FINDINGS: There is mild chronic vertebral height loss at T7, and T8 vertebral bodies. There is no acute fracture. There is no pathologic lesion. There is no perivertebral, disc or epidural fluid collection. At the level of T5-6. there is small right paracentral disc protrusion without significant spinal or neural  foraminal stenosis. There is no intradural extramedullary lesion. There is no abnormal intramedullary T2 signal intensity. Mild left pleural effusion is seen. Bilateral basilar atelectasis is seen.     1.  Mild chronic compression deformity at T7 and T8. 2.  No acute fracture. 3.  Mild degenerative disease. 4.  No evidence of infection.    MR-CERVICAL SPINE-WITH & W/O  Result Date: 4/25/2025 4/25/2025 11:05 AM HISTORY/REASON FOR EXAM:  Back pain. TECHNIQUE/EXAM DESCRIPTION: MRI of the cervical spine without and with contrast. Multiplanar multisequence MR examination of the cervical spine. 20 mL ProHance contrast was administered intravenously. COMPARISON: None. FINDINGS: There are postsurgical changes as evidenced by C4-5 and C5-6 disc prosthesis. Magnetic susceptibility artifact is noted at these levels. The cervical spine maintains normal alignment. There is no destructive lesion. There is no perivertebral, disc epidural fluid collection. The craniovertebral junction is well aligned. At the level of C2-3,there is no spinal or neural foraminal stenosis. At the level of C3-4,there is left-sided uncinate joint arthropathy causing mild left neural foraminal stenosis. At the level of C4-5,there is disc prosthesis. There is no spinal or neural foraminal stenosis. At the level of C5-6,there is disc prosthesis. Bilateral uncinate joint arthropathy seen. There is moderate bilateral neural foraminal stenosis. There is moderate central canal stenosis. At the level of C6-7,there is bilateral uncinate joint arthropathy. There is moderate bilateral neural foraminal stenosis. At the level of C7-T1,there is no spinal or neural foraminal stenosis. The visualized posterior fossa structures appear normal within limits.  The cervical spinal cord does not demonstrate any mass or abnormal T2 signal intensity. Brainstem gliosis is seen. The visualized pre-and paraspinal soft tissues appear normal within limits. There is no abnormal  contrast enhancement.     1.  Postsurgical and degenerative changes in the cervical spine as described above. 2.  Moderate central canal stenosis at C5-6. 3.  Multifocal neural foraminal stenosis. 4.  There is no evidence of infection.    MR-LUMBAR SPINE-WITH & W/O  Result Date: 4/25/2025 4/25/2025 11:17 AM HISTORY/REASON FOR EXAM:  R/O osteomyelitis. TECHNIQUE/EXAM DESCRIPTION: MRI of the lumbar spine without and with contrast. Multiplanar multisequence MR examination of the lumbar spine. 20 mL ProHance contrast was administered intravenously. COMPARISON:  None. FINDINGS: The lumbar spine maintains normal height and alignment. There is no fracture or dislocation. There is no pathologic marrow infiltration. There is no aggressive osseous lesion. There is no perivertebral, disc or epidural fluid collection. At the level of L5-S1,there is mild bilateral facet joint arthropathy. There is no spinal or neural foraminal stenosis. At the level of L4-5,there is disc degeneration. There is minimal disc bulge. Mild bilateral facet joint arthropathy seen. There is no spinal canal stenosis. There is mild bilateral neural foraminal stenosis. At the level of L3-4,there is no central canal stenosis. There is mild bilateral neural foraminal stenosis. At the level of L2-3,there is no spinal or neural foraminal stenosis. At the level of L1-2,there is no spinal or neural foraminal stenosis. The conus terminates at the level of L1. The visualized lower thoracic spinal cord is unremarkable. There is no lumbar intradural lesion. The visualized pre-and paraspinal soft tissues appear normal. There is no abnormal contrast enhancement.     1.  There is no evidence of infection in the lumbar spine. 2.  Mild degenerative changes    CT-CTA COMPLETE THORACOABDOMINAL AORTA  Result Date: 4/23/2025 4/23/2025 1:43 PM HISTORY/REASON FOR EXAM:  Chest pain. TECHNIQUE/EXAM DESCRIPTION: CT angiogram without and with contrast, with reconstructions.  Initial precontrast thick helical sections were obtained from the top of the aortic arch through the diaphragmatic domes. Following this, thin-section postcontrast helical images were obtained from the lung apices through the iliac crests following the bolus administration of mL of nonionic Omnipaque 350 contrast.  CT angiographic technique was utilized. Parasagittal reconstructed images of the aorta were generated utilizing multiplanar volume reformat technique. Low dose optimization technique was utilized for this CT exam including automated exposure control and adjustment of the mA and/or kV according to patient size. COMPARISON: None available. FINDINGS: Aorta: Pre-contrast images demonstrate no evidence of displaced calcified intima or intramural hematoma. Aortic arch: Branching pattern is conventional. Diameter: The ascending and descending aorta are of normal caliber. Dissection: Absent. Celiac artery origin: Widely patent. Superior mesenteric artery origin: Widely patent. Renal artery origins: Widely patent. Inferior mesenteric artery: Patent. Common iliac arteries: Nonaneurysmal and patent. Heart: There is atherosclerotic change of the coronary arteries especially involving the left anterior descending artery origin. 3D angiographic/MIP images of the vasculature confirm the vascular findings as described above. Lungs: There is bibasilar atelectasis. Liver: There is fatty change. There is an enhancing mass involving the right lobe posteriorly measuring 2.3 cm size likely representing hemangioma. Biliary system: No intrahepatic or extrahepatic ductal dilatation. The gallbladder is grossly unremarkable. Spleen: Unremarkable. Adrenal glands: There is 17 mm left adrenal nodule. As 12 mm right adrenal nodule. Pancreas: Unremarkable. Kidneys: There are small bilateral simple appearing renal cysts. No follow-up recommended. Bowel: There is moderate constipation. No bowel obstruction or focal inflammatory change.  Ascites: None. Lymph nodes: No adenopathy is identified. Bones: There is partial fusion across the left SI joint.     1.  No evidence of aortic aneurysm or dissection. 2.  Atherosclerotic calcification of the coronary arteries especially involving the left anterior ascending artery origin 3.  Fatty liver with a 2.3 cm right lobe hepatic hemangioma. 4.  Bilateral adrenal nodules measuring 17 mm on the left and 12 mm on the right. 5.  Moderate constipation.     EC-ECHOCARDIOGRAM COMPLETE W/O CONT  Result Date: 2025  Transthoracic Echo Report Echocardiography Laboratory CONCLUSIONS Normal left ventricular systolic function. The left ventricular ejection fraction is visually estimated to be 65% Mild concentric left ventricular hypertrophy. Normal right ventricular size and systolic function. Aortic valve sclerosis without significant stenosis. Right ventricular systolic pressure is estimated to be 26 mmHg (normal). Semimobile echodensity seen at the junction of the aorta and brachiocephalic artery - recommend further imaging of the aorta. No prior study is available for comparison. Primary team is notified of findings. REX ECHEVARRIA Exam Date:         2025                    08:52 Exam Location:     Inpatient Priority:          Routine Ordering Physician:        BRIGID MARCIAL Referring Physician:       [id number] Manning Sonographer:               Yaneth Jade Age:    54     Gender:    M MRN:    [id number] :    1970 BSA:    2.22   Ht (in):    73     Wt (lb):    216 Exam Type:     Complete Indications:     Bacteremia ICD Codes:       R78.81 CPT Codes:       23315 BP:   119    /   83     HR: Technical Quality:       Fair MEASUREMENTS  (Male / Female) Normal Values 2D ECHO LV Diastolic Diameter PLAX        3.9 cm                4.2 - 5.9 / 3.9 - 5.3 cm LV Systolic Diameter PLAX         2 cm                  2.1 - 4.0 cm IVS Diastolic  Thickness           1.2 cm                LVPW Diastolic Thickness          1.2 cm                LVOT Diameter                     2 cm                  LV Ejection Fraction MOD BP       65.3 %                >= 55  % LV Ejection Fraction MOD 4C       69.4 %                LV Ejection Fraction MOD 2C       59.2 %                LV Ejection Fraction 4C AL        73.2 %                LV Ejection Fraction 2C AL        63.1 %                LA Volume Index                   21.6 cm3/m2           16 - 28 cm3/m2 DOPPLER AV Peak Velocity                  1.3 m/s               AV Peak Gradient                  7.1 mmHg              AV Mean Gradient                  4 mmHg                LVOT Peak Velocity                1.1 m/s               AV Area Cont Eq vti               2.7 cm2               Mitral E Point Velocity           1.2 m/s               Mitral E to A Ratio               1.4                   MV Pressure Half Time             55 ms                 MV Area PHT                       4 cm2                 MV Deceleration Time              187 ms                TR Peak Velocity                  239 cm/s              PV Peak Velocity                  1 m/s                 PV Peak Gradient                  4.3 mmHg              RVOT Peak Velocity                0.75 m/s              LV E' Lateral Velocity            11.1 cm/s             Mitral E to LV E' Lateral Ratio   10.5                  LV E' Septal Velocity             7.3 cm/s              Mitral E to LV E' Septal Ratio    16                    * Indicates values subject to auto-interpretation LV EF:        % FINDINGS Left Ventricle Normal left ventricular chamber size. Mild concentric left ventricular hypertrophy. Normal left ventricular systolic function. The left ventricular ejection fraction is visually estimated to be 65%. No regional wall motion abnormalities. Indeterminate diastolic function. Right Ventricle Normal right ventricular size and  systolic function. Right Atrium Normal right atrial size. Normal inferior vena cava size and inspiratory collapse. Left Atrium Normal left atrial size. Left atrial volume index is 20 mL/sq m. Mitral Valve Structurally normal mitral valve without significant stenosis or regurgitation. Aortic Valve Tricuspid aortic valve. Aortic valve sclerosis without significant stenosis. No aortic insufficiency. Tricuspid Valve Structurally normal tricuspid valve without significant stenosis. Trace tricuspid regurgitation. Right atrial pressure is estimated to be 3 mmHg. Right ventricular systolic pressure is estimated to be 26 mmHg. Pulmonic Valve Structurally normal pulmonic valve without significant stenosis or regurgitation. Pericardium No significant pericardial effusion. Aorta Normal aortic root for body surface area. The ascending aorta diameter is 3.1 cm. Semimobile echodensity seen at the junction of the aorta and brachiocephalic artery. Celestino Jay MD (Electronically Signed) Final Date:     22 April 2025                 10:44    DX-FOOT-COMPLETE 3+ LEFT  Result Date: 4/20/2025 4/20/2025 9:28 PM HISTORY/REASON FOR EXAM: Atraumatic Pain/Swelling/Deformity TECHNIQUE/EXAM DESCRIPTION:  AP, lateral, and oblique views of the LEFT foot. COMPARISON:  February 26, 2025 FINDINGS: Postoperative changes of transmetatarsal amputation of the fifth toe is seen. Slight permeative appearance of the lateral cortex of the fifth toe metatarsal waist is seen. There is soft tissue gas in small bony fragments adjacent to the fracture site.     1.  Slight permeative appearance of the lateral cortex of the fifth toe metatarsal waist, appearance raising concern for component of osteomyelitis. 2.  Soft tissue gas at the amputation site, consider underlying infectious etiology.    DX-CHEST-PORTABLE (1 VIEW)  Result Date: 4/20/2025 4/20/2025 9:28 PM HISTORY/REASON FOR EXAM: Sepsis TECHNIQUE/EXAM DESCRIPTION:  Single AP view of the chest.  COMPARISON: None FINDINGS: Overlying cardiac leads are present. The cardiac silhouette appears within normal limits. The mediastinal contour appears within normal limits.  The central pulmonary vasculature appears normal. The lungs appear well expanded bilaterally.  Bilateral lungs are clear. No significant pleural effusions are identified. The bony structures appear age-appropriate.     1.  No acute cardiopulmonary disease.      Micro:  Results       Procedure Component Value Units Date/Time    BLOOD CULTURE [[id number]] Collected: 04/22/25 0527    Order Status: Completed Specimen: Blood from Peripheral Updated: 04/27/25 0700     Significant Indicator NEG     Source BLD     Site PERIPHERAL     Culture Result No growth after 5 days of incubation.    BLOOD CULTURE [[id number]]  (Abnormal) Collected: 04/24/25 1259    Order Status: Completed Specimen: Blood from Peripheral Updated: 04/25/25 1723     Significant Indicator POS     Source BLD     Site PERIPHERAL     Culture Result A significant status has been triggered by the BACTEC  instrument due to an increase in CO2 production.  Gram  stain of blood culture bottle shows NO ORGANISMS SEEN.  Further investigation is in progress.  Note: Blood cultures are incubated for 5 days and  are monitored continuously. Positive blood cultures  are called to the RN and reported as soon as  they are identified.      BLOOD CULTURE [[id number]]  (Abnormal) Collected: 04/22/25 0527    Order Status: Completed Specimen: Blood from Peripheral Updated: 04/25/25 0946     Significant Indicator POS     Source BLD     Site PERIPHERAL     Culture Result Growth detected by automated blood culture system. 10:10      Staphylococcus aureus  Methicillin sensitive by screening method  See previous culture for sensitivity report.      BLOOD CULTURE [[id number]] Collected: 04/24/25 1259    Order Status: Completed Specimen: Blood from Peripheral Updated: 04/24/25 1808     Significant Indicator NEG      Source BLD     Site PERIPHERAL     Culture Result No Growth  Note: Blood cultures are incubated for 5 days and  are monitored continuously.Positive blood cultures  are called to the RN and reported as soon as  they are identified.      CULTURE TISSUE W/ GRM STAIN [[id number]]  (Abnormal) Collected: 04/21/25 1624    Order Status: Completed Specimen: Bone Updated: 04/24/25 1539     Significant Indicator POS     Source BONE     Site left foot     Culture Result -     Gram Stain Result Few WBCs.  Few Gram positive cocci.       Culture Result Staphylococcus aureus  Moderate growth  Methicillin sensitive by screening method  See previous culture for sensitivity report.      Anaerobic Culture [[id number]]  (Abnormal) Collected: 04/21/25 1624    Order Status: Completed Specimen: Bone Updated: 04/24/25 1539     Significant Indicator POS     Source BONE     Site left foot     Culture Result -      Finegoldia magna  Moderate growth      Urine Culture (New) [[id number]] Collected: 04/21/25 0740    Order Status: Completed Specimen: Urine Updated: 04/23/25 0943     Significant Indicator NEG     Source UR     Site -     Culture Result No growth at 48 hours.    Culture Wound With Gram Stain [[id number]]  (Abnormal)  (Susceptibility) Collected: 04/20/25 2145    Order Status: Completed Specimen: Wound from Left Foot Updated: 04/23/25 0931     Significant Indicator POS     Source WND     Site LEFT FOOT     Culture Result Light growth usual skin heather.     Gram Stain Result Few WBCs.  Many Gram positive cocci.       Culture Result Staphylococcus aureus  Heavy growth  Methicillin sensitive by screening method      Susceptibility       Staphylococcus aureus (1)       Antibiotic Interpretation Microscan   Method Status    Ampicillin/sulbactam Sensitive <=8/4 mcg/mL YESENIA Final    Clindamycin Sensitive <=0.25 mcg/mL YESENIA Final    Cefazolin Sensitive <=8 mcg/mL YESENIA Final    Cefepime Sensitive <=4 mcg/mL YESENIA Final    Daptomycin Sensitive 1 mcg/mL  YESENIA Final    Erythromycin Resistant >4 mcg/mL YESENIA Final    Oxacillin Sensitive 0.5 mcg/mL YESENIA Final    Trimeth/Sulfa Sensitive <=0.5/9.5 mcg/mL YESENIA Final    Tetracycline Sensitive <=4 mcg/mL YESENIA Final    Vancomycin Sensitive 1 mcg/mL YESENIA Final                       Blood Culture - Draw one from central line and one from peripheral site [[id number]]  (Abnormal) Collected: 04/20/25 2145    Order Status: Completed Specimen: Blood from Peripheral Updated: 04/23/25 0916     Significant Indicator POS     Source BLD     Site PERIPHERAL     Culture Result Growth detected by automated blood culture system.      Staphylococcus aureus  Methicillin sensitive by screening method  See previous culture for sensitivity report.      Blood Culture - Draw one from central line and one from peripheral site [[id number]]  (Abnormal)  (Susceptibility) Collected: 04/20/25 2113    Order Status: Completed Specimen: Blood from Line Updated: 04/23/25 0915     Significant Indicator POS     Source BLD     Site Peripheral     Culture Result Growth detected by automated blood culture system.  Staphylococcus aureus (methicillin sensitive)  detected by PCR.        Staphylococcus aureus    Susceptibility       Staphylococcus aureus (1)       Antibiotic Interpretation Microscan   Method Status    Ampicillin/sulbactam Sensitive <=8/4 mcg/mL YESENIA Final    Clindamycin Sensitive 0.5 mcg/mL YESENIA Final    Cefazolin Sensitive <=8 mcg/mL YESENIA Final    Cefepime Sensitive <=4 mcg/mL YESENIA Final    Daptomycin Sensitive 1 mcg/mL YESENIA Final    Erythromycin Resistant >4 mcg/mL YESENIA Final    Oxacillin Sensitive 0.5 mcg/mL YESENIA Final    Trimeth/Sulfa Sensitive <=0.5/9.5 mcg/mL YESENIA Final    Tetracycline Sensitive <=4 mcg/mL YESENIA Final    Vancomycin Sensitive 1 mcg/mL YESENIA Final                       GRAM STAIN [[id number]] Collected: 04/21/25 1624    Order Status: Completed Specimen: Bone Updated: 04/21/25 0930     Significant Indicator .     Source BONE     Site left foot      Gram Stain Result Few WBCs.  Few Gram positive cocci.      Urinalysis [[id number]]  (Abnormal) Collected: 04/21/25 0773    Order Status: Completed Specimen: Urine Updated: 04/21/25 0811     Color Yellow     Character Clear     Specific Gravity 1.028     Ph 6.0     Glucose >=1000 mg/dL      Ketones 40 mg/dL      Protein 100 mg/dL      Bilirubin Negative     Urobilinogen, Urine 1.0 EU/dL      Nitrite Negative     Leukocyte Esterase Negative     Occult Blood Moderate     Micro Urine Req Microscopic            Assessment:  54 y.o. male with history of uncontrolled type 2 diabetes mellitus, prior left fifth toe amputation in February admitted on 4/20/2025 secondary to worsening left foot swelling and drainage of prior amputation site.  X-ray revealed findings concerning for osteomyelitis.  He is now status post left fifth ray amputation with wound VAC placement on 4/21/2025.  Hospital course complicated by MSSA bacteremia.     Pertinent diagnoses:  MSSA bacteremia, source secondary to below, persistent  Left foot osteomyelitis status post left fifth ray amputation on 4/21, repeat I&D and closure on 4/23  History of left fifth toe amputation in February  Uncontrolled type 2 diabetes mellitus, hemoglobin A1c 12.9  Lower back pain, MRI of the spine negative for infection    Plan:  -Continue IV cefazolin 2 g every 8 hours  -Blood cultures x 2 on 4/20+ MSSA  -Repeat blood cultures 1 out of 2 sets on 4/22-MSSA  -Repeat blood cultures on 4/24-negative to date.  1 blood cultures that triggered a CO2 production but cultures have been negative  -OR cultures-MSSA.    -Pathology-acute osteomyelitis  -TTE negative for any valvular abnormalities however there was a semimobile echodensity at the junction of the aorta and brachiocephalic artery  -CTA aorta was negative for aortic aneurysm or dissection.  Showed atherosclerotic calcification in the coronary arteries  - Status post debridement and secondary closure of surgical wound left  foot amputation on 4/23/2025  -Diabetes education and blood sugar control  -Recommend referral to endocrinologist  - Will plan a 6-week course of IV antibiotics from 4/24 with stop date of 06/05/2025  -PICC line ordered today  -MRI of the entire spine with contrast-negative for infection  -[facility] orders for IV ertapenem 1 g daily done today    This infection poses a threat to life and further limb function     Disposition: Patient prefers [facility]     Need for PICC line: Yes     Follow-up in ID clinic with NP     Plan of care discussed with SAHRA Douglass M.D..  ID signing off.

## 2025-04-28 NOTE — PROGRESS NOTES
"      Orthopaedic Progress Note    Interval changes:  Patient doing well    LLE dressing CDI  Cleared for DC home by ortho pending medicine clearance    ROS - Patient denies any new issues.  Pain well controlled.    /82   Pulse 76   Temp 36.6 °C (97.8 °F) (Temporal)   Resp 18   Ht 1.854 m (6' 0.99\")   Wt 98.4 kg (216 lb 14.9 oz)   SpO2 96%     Patient seen and examined  No acute distress  Breathing non labored  RRR  LLE dressing CDI, DNVI, moves all remaining toes, cap refill <2 sec.     Recent Labs     04/26/25  0653 04/27/25  0909 04/28/25  0310   WBC 9.3 9.3 8.3   RBC 4.64* 4.61* 4.57*   HEMOGLOBIN 13.0* 12.9* 13.3*   HEMATOCRIT 40.1* 39.2* 38.8*   MCV 86.4 85.0 84.9   MCH 28.0 28.0 29.1   MCHC 32.4 32.9 34.3   RDW 39.7 38.2 38.1   PLATELETCT 261 291 320   MPV 9.7 9.6 9.7       Active Hospital Problems    Diagnosis     Sepsis ([facility]) [A41.9]     MSSA bacteremia [R78.81, B95.61]     Osteomyelitis of ankle or foot, acute, left ([facility]) [M86.172]     Hypertension [I10]     Acute renal failure ([facility]) [N17.9]     Anxiety [F41.9]     Type 2 diabetes mellitus with hyperglycemia, with long-term current use of insulin ([facility]) [E11.65, Z79.4]        Assessment/Plan:  Patient doing well    LLE dressing CDI  Cleared for DC home by ortho pending medicine clearance  POD#5 S/P Debridement and secondary closure of surgical wound left foot partial amputation   POD#7 S/P:  Left foot fifth ray amputation  Wound vac application <50cm2  Wt bearing status - Heel WB   Wound care/Drains - Dressings to be changed every other day by nursing. Or PRN for saturation    Future Procedures - none planned    Sutures/Staples out- 14-21 days post operatively. Removal by ortho mid levels only.  PT/OT-initiated  Antibiotics: unasyn 3g q6  DVT Prophylaxis- TEDS/SCDs/Foot pumps/heparin  Sainz-not needed per ortho  Case Coordination for Discharge Planning - Disposition per therapy recs.    "

## 2025-04-28 NOTE — PROGRESS NOTES
Donald Fuller has been provided discharge instructions, to include follow up care, home medications, and activity/diet reviewed. Copy of discharge instructions in patient chart, signed and reviewed. Patient verbalizes the understanding of the discharge instructions. PIV removed, tip intact, pressure dressing applied. Arm band removed. Patient did not have home meds during admit. Meds to Beds verified and given to pt. Red tamper seal intact on controlled medication. Questions and concerns addressed prior to leaving the discharge lounge. Transported via car, accompanied by family. Patient discharge to home.

## 2025-04-29 ENCOUNTER — OUTPATIENT INFUSION SERVICES (OUTPATIENT)
Dept: ONCOLOGY | Facility: MEDICAL CENTER | Age: 55
End: 2025-04-29
Attending: INTERNAL MEDICINE

## 2025-04-29 VITALS
HEART RATE: 124 BPM | TEMPERATURE: 97 F | OXYGEN SATURATION: 96 % | DIASTOLIC BLOOD PRESSURE: 93 MMHG | RESPIRATION RATE: 18 BRPM | SYSTOLIC BLOOD PRESSURE: 148 MMHG

## 2025-04-29 DIAGNOSIS — R78.81 MSSA BACTEREMIA: ICD-10-CM

## 2025-04-29 DIAGNOSIS — M86.172 OSTEOMYELITIS OF ANKLE OR FOOT, ACUTE, LEFT (HCC): ICD-10-CM

## 2025-04-29 DIAGNOSIS — B95.61 MSSA BACTEREMIA: ICD-10-CM

## 2025-04-29 LAB
BACTERIA BLD CULT: NORMAL
SIGNIFICANT IND 70042: NORMAL
SITE SITE: NORMAL
SOURCE SOURCE: NORMAL

## 2025-04-29 PROCEDURE — 96365 THER/PROPH/DIAG IV INF INIT: CPT

## 2025-04-29 PROCEDURE — 700105 HCHG RX REV CODE 258: Performed by: INTERNAL MEDICINE

## 2025-04-29 PROCEDURE — 700111 HCHG RX REV CODE 636 W/ 250 OVERRIDE (IP): Performed by: INTERNAL MEDICINE

## 2025-04-29 RX ORDER — 0.9 % SODIUM CHLORIDE 0.9 %
10 VIAL (ML) INJECTION PRN
Status: CANCELLED | OUTPATIENT
Start: 2025-04-30

## 2025-04-29 RX ORDER — 0.9 % SODIUM CHLORIDE 0.9 %
VIAL (ML) INJECTION PRN
Status: CANCELLED | OUTPATIENT
Start: 2025-04-30

## 2025-04-29 RX ORDER — 0.9 % SODIUM CHLORIDE 0.9 %
3 VIAL (ML) INJECTION PRN
Status: CANCELLED | OUTPATIENT
Start: 2025-04-30

## 2025-04-29 RX ADMIN — ERTAPENEM SODIUM 1000 MG: 1 INJECTION INTRAMUSCULAR; INTRAVENOUS at 14:40

## 2025-04-30 ENCOUNTER — OUTPATIENT INFUSION SERVICES (OUTPATIENT)
Dept: ONCOLOGY | Facility: MEDICAL CENTER | Age: 55
End: 2025-04-30
Attending: INTERNAL MEDICINE

## 2025-04-30 VITALS
WEIGHT: 216.93 LBS | BODY MASS INDEX: 28.63 KG/M2 | DIASTOLIC BLOOD PRESSURE: 90 MMHG | TEMPERATURE: 97.1 F | OXYGEN SATURATION: 98 % | HEART RATE: 107 BPM | SYSTOLIC BLOOD PRESSURE: 148 MMHG | RESPIRATION RATE: 18 BRPM

## 2025-04-30 DIAGNOSIS — B95.61 MSSA BACTEREMIA: ICD-10-CM

## 2025-04-30 DIAGNOSIS — M86.172 OSTEOMYELITIS OF ANKLE OR FOOT, ACUTE, LEFT (HCC): ICD-10-CM

## 2025-04-30 DIAGNOSIS — S22.060S COMPRESSION FRACTURE OF T8 VERTEBRA, SEQUELA: ICD-10-CM

## 2025-04-30 DIAGNOSIS — S22.060S COMPRESSION FRACTURE OF T7 VERTEBRA, SEQUELA: ICD-10-CM

## 2025-04-30 DIAGNOSIS — R78.81 MSSA BACTEREMIA: ICD-10-CM

## 2025-04-30 PROCEDURE — 700111 HCHG RX REV CODE 636 W/ 250 OVERRIDE (IP): Mod: JZ | Performed by: INTERNAL MEDICINE

## 2025-04-30 PROCEDURE — 700105 HCHG RX REV CODE 258: Performed by: INTERNAL MEDICINE

## 2025-04-30 PROCEDURE — 96365 THER/PROPH/DIAG IV INF INIT: CPT

## 2025-04-30 RX ORDER — 0.9 % SODIUM CHLORIDE 0.9 %
3 VIAL (ML) INJECTION PRN
OUTPATIENT
Start: 2025-05-01

## 2025-04-30 RX ORDER — 0.9 % SODIUM CHLORIDE 0.9 %
10 VIAL (ML) INJECTION PRN
OUTPATIENT
Start: 2025-05-01

## 2025-04-30 RX ORDER — 0.9 % SODIUM CHLORIDE 0.9 %
VIAL (ML) INJECTION PRN
OUTPATIENT
Start: 2025-05-01

## 2025-04-30 RX ADMIN — ERTAPENEM SODIUM 1000 MG: 1 INJECTION, POWDER, LYOPHILIZED, FOR SOLUTION INTRAMUSCULAR; INTRAVENOUS at 17:16

## 2025-04-30 ASSESSMENT — PAIN DESCRIPTION - PAIN TYPE: TYPE: ACUTE PAIN

## 2025-04-30 ASSESSMENT — FIBROSIS 4 INDEX: FIB4 SCORE: 0.67

## 2025-04-30 NOTE — PROGRESS NOTES
"Ja arrives via wheelchair for day 1 Ertapenem infusion for osteomyelitis of left foot.  Ja recently discharged from in-patient yesterday reports fatigue and significant pain to right back shoulder blade area \"muscle spasms\" and left foot pain.  Plan of care discussed, educational handouts provided, questions answered, Ja verbalizes understanding and agreement.  LUE PICC intact, flushes easily, + blood return observed.  Ertapenem infused as ordered over 30 minutes, Ja tolerated well.  PICC flushed with NS, + blood return again observed.  PICC dressing changed, clave changed, Ja tolerated well.  Ja discharged in no apparent distress, copy of schedule provided, next appointment confirmed.    "

## 2025-04-30 NOTE — Clinical Note
REFERRAL APPROVAL NOTICE         Sent on April 30, 2025                   Ja Fuller  [address]  [place] [address]                   Dear Mr. Fuller,    After a careful review of the medical information and benefit coverage, [facility] has processed your referral. See below for additional details.    If applicable, you must be actively enrolled with your insurance for coverage of the authorized service. If you have any questions regarding your coverage, please contact your insurance directly.    REFERRAL INFORMATION   Referral #:  [id number]  Referred-To Department    Referred-By Provider:  Infectious Diseases    Marisol Garcia M.D.   Id [facility]      [address]  [place] [address]  [phone] [address]  [place] [address]  [phone]    Referral Start Date:  04/22/2025  Referral End Date:   04/22/2026             SCHEDULING  If you do not already have an appointment, please call [phone] to make an appointment.     MORE INFORMATION  If you do not already have a [employer] account, sign up at: [email or web address]  You can access your medical information, make appointments, see lab results, billing information, and more.  If you have questions regarding this referral, please contact  the [facility] Referrals department at:             [phone]. Monday - Friday 8:00AM - 5:00PM.     Sincerely,    [facility]

## 2025-05-01 ENCOUNTER — OUTPATIENT INFUSION SERVICES (OUTPATIENT)
Dept: ONCOLOGY | Facility: MEDICAL CENTER | Age: 55
End: 2025-05-01
Attending: INTERNAL MEDICINE

## 2025-05-01 VITALS
TEMPERATURE: 98.9 F | RESPIRATION RATE: 18 BRPM | OXYGEN SATURATION: 94 % | HEART RATE: 116 BPM | DIASTOLIC BLOOD PRESSURE: 70 MMHG | SYSTOLIC BLOOD PRESSURE: 107 MMHG

## 2025-05-01 DIAGNOSIS — M86.172 OSTEOMYELITIS OF ANKLE OR FOOT, ACUTE, LEFT (HCC): ICD-10-CM

## 2025-05-01 DIAGNOSIS — B95.61 MSSA BACTEREMIA: ICD-10-CM

## 2025-05-01 DIAGNOSIS — R78.81 MSSA BACTEREMIA: ICD-10-CM

## 2025-05-01 PROCEDURE — 700111 HCHG RX REV CODE 636 W/ 250 OVERRIDE (IP): Mod: JZ | Performed by: INTERNAL MEDICINE

## 2025-05-01 PROCEDURE — 700105 HCHG RX REV CODE 258: Performed by: INTERNAL MEDICINE

## 2025-05-01 PROCEDURE — 96365 THER/PROPH/DIAG IV INF INIT: CPT

## 2025-05-01 RX ORDER — 0.9 % SODIUM CHLORIDE 0.9 %
VIAL (ML) INJECTION PRN
Status: CANCELLED | OUTPATIENT
Start: 2025-05-02

## 2025-05-01 RX ORDER — 0.9 % SODIUM CHLORIDE 0.9 %
10 VIAL (ML) INJECTION PRN
Status: CANCELLED | OUTPATIENT
Start: 2025-05-02

## 2025-05-01 RX ORDER — 0.9 % SODIUM CHLORIDE 0.9 %
3 VIAL (ML) INJECTION PRN
Status: CANCELLED | OUTPATIENT
Start: 2025-05-02

## 2025-05-01 RX ADMIN — ERTAPENEM 1000 MG: 1 INJECTION, POWDER, LYOPHILIZED, FOR SOLUTION INTRAMUSCULAR; INTRAVENOUS at 17:06

## 2025-05-01 ASSESSMENT — PAIN DESCRIPTION - PAIN TYPE: TYPE: ACUTE PAIN

## 2025-05-01 NOTE — PROGRESS NOTES
Ja arrived ambulatory to the outpatient infusion center for daily IV antibiotics. Plan of care reviewed, assessment completed. Patient reported some stomach upset after yesterdays dose, education provided on managing GI side effects.   LUE PICC line intact, dressing C/D/I. PICC line flushed briskly with brisk blood return. Invanz administered per the MAR, patient tolerated well.  Patient reported chest discomfort after every antibiotic infusion, patient encouraged to go to the ER for evaluation, patient declined. PICC line flushed, blood return noted. PICC line saline locked and secured in protective sleeve, appointment for tomorrow was confirmed and the patient was discharged home in [facility].

## 2025-05-02 LAB
BACTERIA BLD CULT: NORMAL
SIGNIFICANT IND 70042: NORMAL
SITE SITE: NORMAL
SOURCE SOURCE: NORMAL

## 2025-05-02 NOTE — PROGRESS NOTES
Pt presented to [facility] for daily IV ertapenem. POC discussed and pt verbalized agreement. Pt denies fever, reports continued pain- pt taking his home pain meds. LUE PICC in place, flushes with ease and brisk positive blood return noted, dressing CDI. Ertapenem administered per MAR. No signs of adverse reaction or complications noted. PICC line flushed per policy, claves wrapped in sterile gauze and line secured in protective sleeve. Pt confirmed tomorrow's appt and discharged ambulatory to self care. Pt in NAD at time of discharge.

## 2025-05-03 ENCOUNTER — OUTPATIENT INFUSION SERVICES (OUTPATIENT)
Dept: ONCOLOGY | Facility: MEDICAL CENTER | Age: 55
End: 2025-05-03
Attending: INTERNAL MEDICINE

## 2025-05-03 VITALS
HEART RATE: 101 BPM | TEMPERATURE: 97.3 F | DIASTOLIC BLOOD PRESSURE: 63 MMHG | OXYGEN SATURATION: 98 % | SYSTOLIC BLOOD PRESSURE: 105 MMHG | RESPIRATION RATE: 18 BRPM

## 2025-05-03 DIAGNOSIS — M86.172 OSTEOMYELITIS OF ANKLE OR FOOT, ACUTE, LEFT (HCC): ICD-10-CM

## 2025-05-03 DIAGNOSIS — B95.61 MSSA BACTEREMIA: ICD-10-CM

## 2025-05-03 DIAGNOSIS — R78.81 MSSA BACTEREMIA: ICD-10-CM

## 2025-05-03 PROCEDURE — 700105 HCHG RX REV CODE 258: Performed by: INTERNAL MEDICINE

## 2025-05-03 PROCEDURE — 96365 THER/PROPH/DIAG IV INF INIT: CPT

## 2025-05-03 PROCEDURE — 700111 HCHG RX REV CODE 636 W/ 250 OVERRIDE (IP): Mod: JZ | Performed by: INTERNAL MEDICINE

## 2025-05-03 RX ORDER — 0.9 % SODIUM CHLORIDE 0.9 %
VIAL (ML) INJECTION PRN
Status: CANCELLED | OUTPATIENT
Start: 2025-05-04

## 2025-05-03 RX ORDER — 0.9 % SODIUM CHLORIDE 0.9 %
3 VIAL (ML) INJECTION PRN
Status: CANCELLED | OUTPATIENT
Start: 2025-05-04

## 2025-05-03 RX ORDER — 0.9 % SODIUM CHLORIDE 0.9 %
10 VIAL (ML) INJECTION PRN
Status: CANCELLED | OUTPATIENT
Start: 2025-05-04

## 2025-05-03 RX ADMIN — ERTAPENEM SODIUM 1000 MG: 1 INJECTION, POWDER, LYOPHILIZED, FOR SOLUTION INTRAMUSCULAR; INTRAVENOUS at 16:23

## 2025-05-03 ASSESSMENT — PAIN DESCRIPTION - PAIN TYPE: TYPE: ACUTE PAIN

## 2025-05-03 NOTE — PROGRESS NOTES
"Pt arrived ambulatory to [facility] for daily Invanz infusion for osteomyelitis/MSSA bacteremia. POC discussed with pt and he agrees with plan.     Pt c/o continued left foot and chronic back pain. Pt states \"I'm out of my pain meds.\"  Pt requested pain meds today. This RN educated pt on process to contact his provider for pain medication as we do not provide pain medication here. Pt also advised to seek medical attention in the ER if pain is severe or if he has other concerns. Pt stated \"I passed out 2 times.\"  This RN again advised pt to be evaluated in the ER. Pt refused and stated \"I've been there too much already.\"    AWA PICC with brisk blood return, flushed with NS. Pt medicated per MAR. Invanz infused over 30 minutes. Pt tolerated treatment without s/s adverse reaction.     Pt discharged to self care, [facility]. Pt's next appointment confirmed 5/4/2025.  "

## 2025-05-04 ENCOUNTER — OUTPATIENT INFUSION SERVICES (OUTPATIENT)
Dept: ONCOLOGY | Facility: MEDICAL CENTER | Age: 55
End: 2025-05-04
Attending: INTERNAL MEDICINE

## 2025-05-04 VITALS
OXYGEN SATURATION: 96 % | DIASTOLIC BLOOD PRESSURE: 74 MMHG | SYSTOLIC BLOOD PRESSURE: 106 MMHG | TEMPERATURE: 96.9 F | HEART RATE: 107 BPM | RESPIRATION RATE: 18 BRPM

## 2025-05-04 DIAGNOSIS — R78.81 MSSA BACTEREMIA: ICD-10-CM

## 2025-05-04 DIAGNOSIS — B95.61 MSSA BACTEREMIA: ICD-10-CM

## 2025-05-04 DIAGNOSIS — M86.172 OSTEOMYELITIS OF ANKLE OR FOOT, ACUTE, LEFT (HCC): ICD-10-CM

## 2025-05-04 PROCEDURE — 700105 HCHG RX REV CODE 258: Performed by: INTERNAL MEDICINE

## 2025-05-04 PROCEDURE — 700111 HCHG RX REV CODE 636 W/ 250 OVERRIDE (IP): Mod: JZ | Performed by: INTERNAL MEDICINE

## 2025-05-04 PROCEDURE — 96365 THER/PROPH/DIAG IV INF INIT: CPT

## 2025-05-04 RX ORDER — 0.9 % SODIUM CHLORIDE 0.9 %
10 VIAL (ML) INJECTION PRN
Status: CANCELLED | OUTPATIENT
Start: 2025-05-05

## 2025-05-04 RX ORDER — 0.9 % SODIUM CHLORIDE 0.9 %
VIAL (ML) INJECTION PRN
Status: CANCELLED | OUTPATIENT
Start: 2025-05-05

## 2025-05-04 RX ORDER — 0.9 % SODIUM CHLORIDE 0.9 %
3 VIAL (ML) INJECTION PRN
Status: CANCELLED | OUTPATIENT
Start: 2025-05-05

## 2025-05-04 RX ADMIN — ERTAPENEM SODIUM 1000 MG: 1 INJECTION, POWDER, LYOPHILIZED, FOR SOLUTION INTRAMUSCULAR; INTRAVENOUS at 17:27

## 2025-05-04 ASSESSMENT — PAIN DESCRIPTION - PAIN TYPE: TYPE: CHRONIC PAIN

## 2025-05-05 ENCOUNTER — OUTPATIENT INFUSION SERVICES (OUTPATIENT)
Dept: ONCOLOGY | Facility: MEDICAL CENTER | Age: 55
End: 2025-05-05
Attending: INTERNAL MEDICINE

## 2025-05-05 VITALS
HEART RATE: 104 BPM | SYSTOLIC BLOOD PRESSURE: 123 MMHG | TEMPERATURE: 97.6 F | OXYGEN SATURATION: 95 % | BODY MASS INDEX: 28.63 KG/M2 | DIASTOLIC BLOOD PRESSURE: 84 MMHG | WEIGHT: 216.93 LBS | RESPIRATION RATE: 18 BRPM

## 2025-05-05 DIAGNOSIS — B95.61 MSSA BACTEREMIA: ICD-10-CM

## 2025-05-05 DIAGNOSIS — M86.172 OSTEOMYELITIS OF ANKLE OR FOOT, ACUTE, LEFT (HCC): ICD-10-CM

## 2025-05-05 DIAGNOSIS — R78.81 MSSA BACTEREMIA: ICD-10-CM

## 2025-05-05 LAB
ALBUMIN SERPL BCP-MCNC: 3.4 G/DL (ref 3.2–4.9)
ALBUMIN/GLOB SERPL: 0.9 G/DL
ALP SERPL-CCNC: 104 U/L (ref 30–99)
ALT SERPL-CCNC: <5 U/L (ref 2–50)
ANION GAP SERPL CALC-SCNC: 13 MMOL/L (ref 7–16)
AST SERPL-CCNC: 15 U/L (ref 12–45)
BASOPHILS # BLD AUTO: 0.5 % (ref 0–1.8)
BASOPHILS # BLD: 0.04 K/UL (ref 0–0.12)
BILIRUB SERPL-MCNC: 0.4 MG/DL (ref 0.1–1.5)
BUN SERPL-MCNC: 32 MG/DL (ref 8–22)
CALCIUM ALBUM COR SERPL-MCNC: 9.9 MG/DL (ref 8.5–10.5)
CALCIUM SERPL-MCNC: 9.4 MG/DL (ref 8.5–10.5)
CHLORIDE SERPL-SCNC: 96 MMOL/L (ref 96–112)
CO2 SERPL-SCNC: 24 MMOL/L (ref 20–33)
CREAT SERPL-MCNC: 1.49 MG/DL (ref 0.5–1.4)
CRP SERPL HS-MCNC: 5.19 MG/DL (ref 0–0.75)
EOSINOPHIL # BLD AUTO: 0.09 K/UL (ref 0–0.51)
EOSINOPHIL NFR BLD: 1.2 % (ref 0–6.9)
ERYTHROCYTE [DISTWIDTH] IN BLOOD BY AUTOMATED COUNT: 38.2 FL (ref 35.9–50)
ERYTHROCYTE [SEDIMENTATION RATE] IN BLOOD BY WESTERGREN METHOD: 20 MM/HOUR (ref 0–20)
GFR SERPLBLD CREATININE-BSD FMLA CKD-EPI: 55 ML/MIN/1.73 M 2
GLOBULIN SER CALC-MCNC: 3.8 G/DL (ref 1.9–3.5)
GLUCOSE SERPL-MCNC: 505 MG/DL (ref 65–99)
HCT VFR BLD AUTO: 44.1 % (ref 42–52)
HGB BLD-MCNC: 14.2 G/DL (ref 14–18)
IMM GRANULOCYTES # BLD AUTO: 0.04 K/UL (ref 0–0.11)
IMM GRANULOCYTES NFR BLD AUTO: 0.5 % (ref 0–0.9)
LYMPHOCYTES # BLD AUTO: 1.47 K/UL (ref 1–4.8)
LYMPHOCYTES NFR BLD: 19.8 % (ref 22–41)
MCH RBC QN AUTO: 27.6 PG (ref 27–33)
MCHC RBC AUTO-ENTMCNC: 32.2 G/DL (ref 32.3–36.5)
MCV RBC AUTO: 85.8 FL (ref 81.4–97.8)
MONOCYTES # BLD AUTO: 0.65 K/UL (ref 0–0.85)
MONOCYTES NFR BLD AUTO: 8.7 % (ref 0–13.4)
NEUTROPHILS # BLD AUTO: 5.15 K/UL (ref 1.82–7.42)
NEUTROPHILS NFR BLD: 69.3 % (ref 44–72)
NRBC # BLD AUTO: 0 K/UL
NRBC BLD-RTO: 0 /100 WBC (ref 0–0.2)
OUTPT INFUS CBC COMMENT OICOM: ABNORMAL
PLATELET # BLD AUTO: 429 K/UL (ref 164–446)
PMV BLD AUTO: 9.5 FL (ref 9–12.9)
POTASSIUM SERPL-SCNC: 5.3 MMOL/L (ref 3.6–5.5)
PROT SERPL-MCNC: 7.2 G/DL (ref 6–8.2)
RBC # BLD AUTO: 5.14 M/UL (ref 4.7–6.1)
SODIUM SERPL-SCNC: 133 MMOL/L (ref 135–145)
WBC # BLD AUTO: 7.4 K/UL (ref 4.8–10.8)

## 2025-05-05 PROCEDURE — 85025 COMPLETE CBC W/AUTO DIFF WBC: CPT

## 2025-05-05 PROCEDURE — 85652 RBC SED RATE AUTOMATED: CPT

## 2025-05-05 PROCEDURE — 700111 HCHG RX REV CODE 636 W/ 250 OVERRIDE (IP): Mod: JZ | Performed by: INTERNAL MEDICINE

## 2025-05-05 PROCEDURE — 96365 THER/PROPH/DIAG IV INF INIT: CPT

## 2025-05-05 PROCEDURE — 80053 COMPREHEN METABOLIC PANEL: CPT

## 2025-05-05 PROCEDURE — 700105 HCHG RX REV CODE 258: Performed by: INTERNAL MEDICINE

## 2025-05-05 PROCEDURE — 86140 C-REACTIVE PROTEIN: CPT

## 2025-05-05 RX ORDER — 0.9 % SODIUM CHLORIDE 0.9 %
3 VIAL (ML) INJECTION PRN
Status: CANCELLED | OUTPATIENT
Start: 2025-05-06

## 2025-05-05 RX ORDER — 0.9 % SODIUM CHLORIDE 0.9 %
VIAL (ML) INJECTION PRN
Status: CANCELLED | OUTPATIENT
Start: 2025-05-06

## 2025-05-05 RX ORDER — 0.9 % SODIUM CHLORIDE 0.9 %
10 VIAL (ML) INJECTION PRN
Status: CANCELLED | OUTPATIENT
Start: 2025-05-06

## 2025-05-05 RX ADMIN — ERTAPENEM 1000 MG: 1 INJECTION, POWDER, LYOPHILIZED, FOR SOLUTION INTRAMUSCULAR; INTRAVENOUS at 17:27

## 2025-05-05 ASSESSMENT — PAIN DESCRIPTION - PAIN TYPE: TYPE: CHRONIC PAIN

## 2025-05-05 ASSESSMENT — FIBROSIS 4 INDEX: FIB4 SCORE: 0.67

## 2025-05-05 NOTE — PROGRESS NOTES
Patient presents to infusion for daily INVanz infusion for osteomyelitis and MSSA bacteremia. Patient reports no new symptoms today and has tolerated IV antibiotics well thus far with no adverse effects.     PICC line flushed with NS with positive blood return.    INVanz infused over 30 minutes, patient tolerated well with no adverse effects.     PICC flushed post infusion with positive blood return. Dressing peeling up around edges, dressing change performed with sterile technique.  Patient discharged in stable condition, knows when to return for appointment tomorrow.

## 2025-05-06 ENCOUNTER — OUTPATIENT INFUSION SERVICES (OUTPATIENT)
Dept: ONCOLOGY | Facility: MEDICAL CENTER | Age: 55
End: 2025-05-06
Attending: INTERNAL MEDICINE

## 2025-05-06 ENCOUNTER — DOCUMENTATION (OUTPATIENT)
Dept: INFECTIOUS DISEASES | Facility: MEDICAL CENTER | Age: 55
End: 2025-05-06
Payer: COMMERCIAL

## 2025-05-06 VITALS
RESPIRATION RATE: 18 BRPM | DIASTOLIC BLOOD PRESSURE: 70 MMHG | SYSTOLIC BLOOD PRESSURE: 108 MMHG | HEART RATE: 105 BPM | OXYGEN SATURATION: 96 % | TEMPERATURE: 97.1 F

## 2025-05-06 DIAGNOSIS — N17.9 ACUTE RENAL FAILURE, UNSPECIFIED ACUTE RENAL FAILURE TYPE (HCC): ICD-10-CM

## 2025-05-06 DIAGNOSIS — R78.81 MSSA BACTEREMIA: ICD-10-CM

## 2025-05-06 DIAGNOSIS — E11.65 TYPE 2 DIABETES MELLITUS WITH HYPERGLYCEMIA, WITH LONG-TERM CURRENT USE OF INSULIN (HCC): ICD-10-CM

## 2025-05-06 DIAGNOSIS — Z79.4 TYPE 2 DIABETES MELLITUS WITH HYPERGLYCEMIA, WITH LONG-TERM CURRENT USE OF INSULIN (HCC): ICD-10-CM

## 2025-05-06 DIAGNOSIS — B95.61 MSSA BACTEREMIA: ICD-10-CM

## 2025-05-06 DIAGNOSIS — M86.172 OSTEOMYELITIS OF ANKLE OR FOOT, ACUTE, LEFT (HCC): ICD-10-CM

## 2025-05-06 LAB
ANION GAP SERPL CALC-SCNC: 11 MMOL/L (ref 7–16)
BUN SERPL-MCNC: 30 MG/DL (ref 8–22)
CALCIUM SERPL-MCNC: 9.4 MG/DL (ref 8.5–10.5)
CHLORIDE SERPL-SCNC: 98 MMOL/L (ref 96–112)
CO2 SERPL-SCNC: 23 MMOL/L (ref 20–33)
CREAT SERPL-MCNC: 1.28 MG/DL (ref 0.5–1.4)
GFR SERPLBLD CREATININE-BSD FMLA CKD-EPI: 66 ML/MIN/1.73 M 2
GLUCOSE SERPL-MCNC: 425 MG/DL (ref 65–99)
POTASSIUM SERPL-SCNC: 5 MMOL/L (ref 3.6–5.5)
SODIUM SERPL-SCNC: 132 MMOL/L (ref 135–145)

## 2025-05-06 PROCEDURE — 700105 HCHG RX REV CODE 258: Performed by: INTERNAL MEDICINE

## 2025-05-06 PROCEDURE — 80048 BASIC METABOLIC PNL TOTAL CA: CPT

## 2025-05-06 PROCEDURE — 96365 THER/PROPH/DIAG IV INF INIT: CPT

## 2025-05-06 PROCEDURE — 700111 HCHG RX REV CODE 636 W/ 250 OVERRIDE (IP): Mod: JZ | Performed by: INTERNAL MEDICINE

## 2025-05-06 RX ORDER — 0.9 % SODIUM CHLORIDE 0.9 %
VIAL (ML) INJECTION PRN
Status: CANCELLED | OUTPATIENT
Start: 2025-05-07

## 2025-05-06 RX ORDER — 0.9 % SODIUM CHLORIDE 0.9 %
10 VIAL (ML) INJECTION PRN
Status: CANCELLED | OUTPATIENT
Start: 2025-05-07

## 2025-05-06 RX ORDER — 0.9 % SODIUM CHLORIDE 0.9 %
3 VIAL (ML) INJECTION PRN
Status: CANCELLED | OUTPATIENT
Start: 2025-05-07

## 2025-05-06 RX ADMIN — ERTAPENEM 1000 MG: 1 INJECTION, POWDER, LYOPHILIZED, FOR SOLUTION INTRAMUSCULAR; INTRAVENOUS at 17:06

## 2025-05-06 ASSESSMENT — PAIN DESCRIPTION - PAIN TYPE: TYPE: CHRONIC PAIN

## 2025-05-06 NOTE — PROGRESS NOTES
Patient presents to infusion for daily INVanz infusion for osteomyelitis and MSSA bacteremia. Patient reports no new symptoms today and has tolerated IV antibiotics well thus far with no adverse effects. PICC line dressing c/d/I. Picc site with no edema, no drainage, no errythema.      PICC line flushed with 10ml NS with positive blood return. Labs drawn and sent. Flushed PICC with 10ml NS.      INVanz infused over 30 minutes, patient tolerated well with no adverse effects.      PICC flushed post infusion with positive blood return with 20ml NS. Confirmed apt for tomorrow at 1700.

## 2025-05-06 NOTE — PROGRESS NOTES
Reviewed labs and noted the very elevated glucose of 505 and  increase in creatinine.  It is unlikely this is due to his ertapenem as he has been on that for some time and doing well.  Although this is a rare side effect listed with his antibiotic.  The patient has diabetes and is on insulin.  Reached out to the infusion team to tell them notify the patient, make sure he is monitoring his sugar intake, and glucose levels, taking his insulin as prescribed and recommend follow-up with his PCP regarding diabetes management.      Recommend repeating BMP, ordered.     Rosette Muñoz MD

## 2025-05-07 ENCOUNTER — OUTPATIENT INFUSION SERVICES (OUTPATIENT)
Dept: ONCOLOGY | Facility: MEDICAL CENTER | Age: 55
End: 2025-05-07
Attending: INTERNAL MEDICINE

## 2025-05-07 VITALS
SYSTOLIC BLOOD PRESSURE: 110 MMHG | DIASTOLIC BLOOD PRESSURE: 72 MMHG | OXYGEN SATURATION: 98 % | TEMPERATURE: 98 F | RESPIRATION RATE: 18 BRPM | HEART RATE: 99 BPM

## 2025-05-07 DIAGNOSIS — B95.61 MSSA BACTEREMIA: ICD-10-CM

## 2025-05-07 DIAGNOSIS — R78.81 MSSA BACTEREMIA: ICD-10-CM

## 2025-05-07 DIAGNOSIS — M86.172 OSTEOMYELITIS OF ANKLE OR FOOT, ACUTE, LEFT (HCC): ICD-10-CM

## 2025-05-07 PROCEDURE — 700111 HCHG RX REV CODE 636 W/ 250 OVERRIDE (IP): Mod: JZ | Performed by: INTERNAL MEDICINE

## 2025-05-07 PROCEDURE — 700105 HCHG RX REV CODE 258: Performed by: INTERNAL MEDICINE

## 2025-05-07 PROCEDURE — 96365 THER/PROPH/DIAG IV INF INIT: CPT

## 2025-05-07 RX ORDER — 0.9 % SODIUM CHLORIDE 0.9 %
10 VIAL (ML) INJECTION PRN
Status: CANCELLED | OUTPATIENT
Start: 2025-05-08

## 2025-05-07 RX ORDER — 0.9 % SODIUM CHLORIDE 0.9 %
3 VIAL (ML) INJECTION PRN
Status: CANCELLED | OUTPATIENT
Start: 2025-05-08

## 2025-05-07 RX ORDER — 0.9 % SODIUM CHLORIDE 0.9 %
VIAL (ML) INJECTION PRN
Status: CANCELLED | OUTPATIENT
Start: 2025-05-08

## 2025-05-07 RX ADMIN — ERTAPENEM SODIUM 1000 MG: 1 INJECTION, POWDER, LYOPHILIZED, FOR SOLUTION INTRAMUSCULAR; INTRAVENOUS at 17:09

## 2025-05-07 ASSESSMENT — PAIN DESCRIPTION - PAIN TYPE: TYPE: CHRONIC PAIN

## 2025-05-07 NOTE — PROGRESS NOTES
"[facility] In[facility]z. Ja reported not checking his sugars today nor taking his insulin. He stated that \"he feels worse when his sugars are in the 100s.\" He stated that he \"just hasn't had time to check his sugars because he \"just woke up.\"  Reviewed with patient his critically high blood sugar from yesterday and his elevated creatinine levels. Reviewed the importance of monitoring his blood glucose and his sugar intake. Reviewed with Ja that Dr. Muñoz requested a repeat BMP today to recheck his blood sugar and follow up on his creatinine. BMP drawn and sent to the lab. Left arm SL PICC line in place, flushed and brisk blood return observed. Invanz infused with no s/sx of adverse reaction. PICC line flushed, brisk blood return again observed, wrapped in gauze and protective sleeve. Encouraged patient to use his insulin as prescribed as well as follow up with his primary doctor per Dr. Muñoz. Pt encouraged to go to the ER if worsens. Has next appt, left on foot to self care.   "

## 2025-05-08 ENCOUNTER — OUTPATIENT INFUSION SERVICES (OUTPATIENT)
Dept: ONCOLOGY | Facility: MEDICAL CENTER | Age: 55
End: 2025-05-08
Attending: INTERNAL MEDICINE

## 2025-05-08 VITALS
TEMPERATURE: 98.2 F | WEIGHT: 216.93 LBS | SYSTOLIC BLOOD PRESSURE: 108 MMHG | DIASTOLIC BLOOD PRESSURE: 70 MMHG | RESPIRATION RATE: 18 BRPM | BODY MASS INDEX: 28.63 KG/M2 | OXYGEN SATURATION: 97 % | HEART RATE: 105 BPM

## 2025-05-08 DIAGNOSIS — M86.172 OSTEOMYELITIS OF ANKLE OR FOOT, ACUTE, LEFT (HCC): ICD-10-CM

## 2025-05-08 DIAGNOSIS — B95.61 MSSA BACTEREMIA: ICD-10-CM

## 2025-05-08 DIAGNOSIS — R78.81 MSSA BACTEREMIA: ICD-10-CM

## 2025-05-08 PROCEDURE — 96365 THER/PROPH/DIAG IV INF INIT: CPT

## 2025-05-08 PROCEDURE — 700111 HCHG RX REV CODE 636 W/ 250 OVERRIDE (IP): Mod: JZ | Performed by: INTERNAL MEDICINE

## 2025-05-08 PROCEDURE — 700105 HCHG RX REV CODE 258: Performed by: INTERNAL MEDICINE

## 2025-05-08 RX ORDER — 0.9 % SODIUM CHLORIDE 0.9 %
3 VIAL (ML) INJECTION PRN
Status: CANCELLED | OUTPATIENT
Start: 2025-05-09

## 2025-05-08 RX ORDER — 0.9 % SODIUM CHLORIDE 0.9 %
10 VIAL (ML) INJECTION PRN
Status: CANCELLED | OUTPATIENT
Start: 2025-05-09

## 2025-05-08 RX ORDER — 0.9 % SODIUM CHLORIDE 0.9 %
VIAL (ML) INJECTION PRN
Status: CANCELLED | OUTPATIENT
Start: 2025-05-09

## 2025-05-08 RX ADMIN — ERTAPENEM 1000 MG: 1 INJECTION, POWDER, LYOPHILIZED, FOR SOLUTION INTRAMUSCULAR; INTRAVENOUS at 17:07

## 2025-05-08 ASSESSMENT — FIBROSIS 4 INDEX: FIB4 SCORE: 0.91

## 2025-05-08 ASSESSMENT — PAIN DESCRIPTION - PAIN TYPE: TYPE: CHRONIC PAIN

## 2025-05-08 NOTE — Clinical Note
REFERRAL APPROVAL NOTICE         Sent on May 8, 2025                   Ja Fuller  [address]  [place] [address]                   Dear Mr. Fuller,    After a careful review of the medical information and benefit coverage, [facility] has processed your referral. See below for additional details.    If applicable, you must be actively enrolled with your insurance for coverage of the authorized service. If you have any questions regarding your coverage, please contact your insurance directly.    REFERRAL INFORMATION   Referral #:  [id number]  Referred-To Department    Referred-By Provider:  Orthopedics    Kojo Covington M.D.   [facility]      [address] [place] [id number] [phone] [address] [place] [address] [place] [address]  [phone]    Referral Start Date:  04/30/2025  Referral End Date:   04/30/2026             SCHEDULING  If you do not already have an appointment, please call [phone] to make an appointment.     MORE INFORMATION  If you do not already have a [employer] account, sign up at: [email or web address]  You can access your medical information, make appointments, see lab results, billing information, and more.  If you have questions regarding this referral, please contact  the [facility] Referrals department at:             [phone]. Monday - Friday 8:00AM - 5:00PM.     Sincerely,    [facility]

## 2025-05-08 NOTE — PROGRESS NOTES
Ja presented to [facility] for daily Invanz. Ja reported he did not check his blood sugars but he did give himself short acting insulin when he ate his meals today. Left arm SL PICC line in place, flushed and brisk blood return observed. Invanz infused with no s/sx of adverse reaction. PICC line flushed, brisk blood return again observed, wrapped in gauze and protective sleeve. Print out of future appointments given to patient. Pt has future appointments. Pt discharged to home in stable condition with family.

## 2025-05-08 NOTE — PROGRESS NOTES
Pt presented to [facility] for daily IV ertapenem. POC discussed and pt verbalized agreement, voices no new concerns. PICC in place, flushes with ease and brisk positive blood return noted, dressing CDI. Ertapenem administered per MAR. No signs of adverse reaction or complications noted. PICC line flushed per policy, claves wrapped in sterile gauze and line secured in protective sleeve. Pt confirmed tomorrow's appt and discharged ambulatory to self care. Pt in NAD at time of discharge.

## 2025-05-09 ENCOUNTER — OUTPATIENT INFUSION SERVICES (OUTPATIENT)
Dept: ONCOLOGY | Facility: MEDICAL CENTER | Age: 55
End: 2025-05-09
Attending: INTERNAL MEDICINE

## 2025-05-09 VITALS
SYSTOLIC BLOOD PRESSURE: 122 MMHG | DIASTOLIC BLOOD PRESSURE: 81 MMHG | OXYGEN SATURATION: 95 % | HEART RATE: 102 BPM | TEMPERATURE: 98.3 F | RESPIRATION RATE: 18 BRPM

## 2025-05-09 DIAGNOSIS — R78.81 MSSA BACTEREMIA: ICD-10-CM

## 2025-05-09 DIAGNOSIS — B95.61 MSSA BACTEREMIA: ICD-10-CM

## 2025-05-09 DIAGNOSIS — M86.172 OSTEOMYELITIS OF ANKLE OR FOOT, ACUTE, LEFT (HCC): ICD-10-CM

## 2025-05-09 PROCEDURE — 96365 THER/PROPH/DIAG IV INF INIT: CPT

## 2025-05-09 PROCEDURE — 700105 HCHG RX REV CODE 258: Performed by: INTERNAL MEDICINE

## 2025-05-09 PROCEDURE — 700111 HCHG RX REV CODE 636 W/ 250 OVERRIDE (IP): Mod: JZ | Performed by: INTERNAL MEDICINE

## 2025-05-09 RX ORDER — 0.9 % SODIUM CHLORIDE 0.9 %
VIAL (ML) INJECTION PRN
Status: CANCELLED | OUTPATIENT
Start: 2025-05-10

## 2025-05-09 RX ORDER — 0.9 % SODIUM CHLORIDE 0.9 %
3 VIAL (ML) INJECTION PRN
Status: CANCELLED | OUTPATIENT
Start: 2025-05-10

## 2025-05-09 RX ORDER — 0.9 % SODIUM CHLORIDE 0.9 %
10 VIAL (ML) INJECTION PRN
Status: CANCELLED | OUTPATIENT
Start: 2025-05-10

## 2025-05-09 RX ADMIN — ERTAPENEM SODIUM 1000 MG: 1 INJECTION, POWDER, LYOPHILIZED, FOR SOLUTION INTRAMUSCULAR; INTRAVENOUS at 17:36

## 2025-05-09 ASSESSMENT — PAIN DESCRIPTION - PAIN TYPE: TYPE: ACUTE PAIN

## 2025-05-10 ENCOUNTER — OUTPATIENT INFUSION SERVICES (OUTPATIENT)
Dept: ONCOLOGY | Facility: MEDICAL CENTER | Age: 55
End: 2025-05-10
Attending: INTERNAL MEDICINE

## 2025-05-10 VITALS
HEART RATE: 120 BPM | DIASTOLIC BLOOD PRESSURE: 79 MMHG | OXYGEN SATURATION: 97 % | TEMPERATURE: 98.7 F | RESPIRATION RATE: 18 BRPM | BODY MASS INDEX: 28.63 KG/M2 | SYSTOLIC BLOOD PRESSURE: 117 MMHG | WEIGHT: 216.93 LBS

## 2025-05-10 DIAGNOSIS — M86.172 OSTEOMYELITIS OF ANKLE OR FOOT, ACUTE, LEFT (HCC): ICD-10-CM

## 2025-05-10 DIAGNOSIS — B95.61 MSSA BACTEREMIA: ICD-10-CM

## 2025-05-10 DIAGNOSIS — R78.81 MSSA BACTEREMIA: ICD-10-CM

## 2025-05-10 PROCEDURE — 700105 HCHG RX REV CODE 258: Performed by: INTERNAL MEDICINE

## 2025-05-10 PROCEDURE — 700111 HCHG RX REV CODE 636 W/ 250 OVERRIDE (IP): Mod: JZ | Performed by: INTERNAL MEDICINE

## 2025-05-10 PROCEDURE — 96365 THER/PROPH/DIAG IV INF INIT: CPT

## 2025-05-10 RX ORDER — 0.9 % SODIUM CHLORIDE 0.9 %
VIAL (ML) INJECTION PRN
Status: CANCELLED | OUTPATIENT
Start: 2025-05-11

## 2025-05-10 RX ORDER — 0.9 % SODIUM CHLORIDE 0.9 %
10 VIAL (ML) INJECTION PRN
Status: CANCELLED | OUTPATIENT
Start: 2025-05-11

## 2025-05-10 RX ORDER — 0.9 % SODIUM CHLORIDE 0.9 %
3 VIAL (ML) INJECTION PRN
Status: CANCELLED | OUTPATIENT
Start: 2025-05-11

## 2025-05-10 RX ADMIN — ERTAPENEM SODIUM 1000 MG: 1 INJECTION, POWDER, LYOPHILIZED, FOR SOLUTION INTRAMUSCULAR; INTRAVENOUS at 17:02

## 2025-05-10 ASSESSMENT — FIBROSIS 4 INDEX: FIB4 SCORE: 0.91

## 2025-05-10 NOTE — PROGRESS NOTES
Ja arrived ambulatory to the outpatient infusion center for daily IV antibiotics. Plan of care reviewed, assessment completed, patient denies any new or acute concerns.   LUE PICC line intact, dressing peeling up at all edges. Dressing changed per protocol, clave changed.  PICC line flushed briskly with brisk blood return. Invanz administered per the MAR, patient tolerated well.  PICC line flushed, blood return noted. PICC line saline locked and secured in protective sleeve, appointment for tomorrow was confirmed and the patient was discharged home in stable condition.

## 2025-05-11 ENCOUNTER — OUTPATIENT INFUSION SERVICES (OUTPATIENT)
Dept: ONCOLOGY | Facility: MEDICAL CENTER | Age: 55
End: 2025-05-11
Attending: INTERNAL MEDICINE

## 2025-05-11 VITALS
HEART RATE: 107 BPM | SYSTOLIC BLOOD PRESSURE: 109 MMHG | DIASTOLIC BLOOD PRESSURE: 68 MMHG | OXYGEN SATURATION: 95 % | RESPIRATION RATE: 18 BRPM | TEMPERATURE: 98.5 F

## 2025-05-11 DIAGNOSIS — R78.81 MSSA BACTEREMIA: ICD-10-CM

## 2025-05-11 DIAGNOSIS — M86.172 OSTEOMYELITIS OF ANKLE OR FOOT, ACUTE, LEFT (HCC): ICD-10-CM

## 2025-05-11 DIAGNOSIS — B95.61 MSSA BACTEREMIA: ICD-10-CM

## 2025-05-11 PROCEDURE — 96365 THER/PROPH/DIAG IV INF INIT: CPT

## 2025-05-11 PROCEDURE — 700111 HCHG RX REV CODE 636 W/ 250 OVERRIDE (IP): Mod: JZ | Performed by: INTERNAL MEDICINE

## 2025-05-11 PROCEDURE — 700105 HCHG RX REV CODE 258: Performed by: INTERNAL MEDICINE

## 2025-05-11 RX ORDER — 0.9 % SODIUM CHLORIDE 0.9 %
10 VIAL (ML) INJECTION PRN
Status: CANCELLED | OUTPATIENT
Start: 2025-05-12

## 2025-05-11 RX ORDER — 0.9 % SODIUM CHLORIDE 0.9 %
VIAL (ML) INJECTION PRN
Status: CANCELLED | OUTPATIENT
Start: 2025-05-12

## 2025-05-11 RX ORDER — 0.9 % SODIUM CHLORIDE 0.9 %
3 VIAL (ML) INJECTION PRN
Status: CANCELLED | OUTPATIENT
Start: 2025-05-12

## 2025-05-11 RX ADMIN — ERTAPENEM 1000 MG: 1 INJECTION, POWDER, LYOPHILIZED, FOR SOLUTION INTRAMUSCULAR; INTRAVENOUS at 17:10

## 2025-05-11 NOTE — PROGRESS NOTES
Pt presented to infusion center for daily ertapenem. Pt reports no significant changes since yesterday other than pain has now resolved. Left arm PICC line in place, flushed and brisk blood return observed. Ertapenem infused with no s/s of adverse reaction. PICC line flushed, brisk blood return again observed, wrapped in gauze and protective sleeve. Has next appt, Ja discharged home to self care.

## 2025-05-12 ENCOUNTER — OUTPATIENT INFUSION SERVICES (OUTPATIENT)
Dept: ONCOLOGY | Facility: MEDICAL CENTER | Age: 55
End: 2025-05-12
Attending: INTERNAL MEDICINE

## 2025-05-12 VITALS
HEART RATE: 106 BPM | TEMPERATURE: 97.1 F | OXYGEN SATURATION: 98 % | RESPIRATION RATE: 18 BRPM | DIASTOLIC BLOOD PRESSURE: 78 MMHG | SYSTOLIC BLOOD PRESSURE: 108 MMHG

## 2025-05-12 DIAGNOSIS — B95.61 MSSA BACTEREMIA: ICD-10-CM

## 2025-05-12 DIAGNOSIS — M86.172 OSTEOMYELITIS OF ANKLE OR FOOT, ACUTE, LEFT (HCC): ICD-10-CM

## 2025-05-12 DIAGNOSIS — R78.81 MSSA BACTEREMIA: ICD-10-CM

## 2025-05-12 LAB
ALBUMIN SERPL BCP-MCNC: 3.4 G/DL (ref 3.2–4.9)
ALBUMIN/GLOB SERPL: 0.9 G/DL
ALP SERPL-CCNC: 97 U/L (ref 30–99)
ALT SERPL-CCNC: 8 U/L (ref 2–50)
ANION GAP SERPL CALC-SCNC: 12 MMOL/L (ref 7–16)
AST SERPL-CCNC: 16 U/L (ref 12–45)
BASOPHILS # BLD AUTO: 0.5 % (ref 0–1.8)
BASOPHILS # BLD: 0.04 K/UL (ref 0–0.12)
BILIRUB SERPL-MCNC: 0.3 MG/DL (ref 0.1–1.5)
BUN SERPL-MCNC: 27 MG/DL (ref 8–22)
CALCIUM ALBUM COR SERPL-MCNC: 9.9 MG/DL (ref 8.5–10.5)
CALCIUM SERPL-MCNC: 9.4 MG/DL (ref 8.5–10.5)
CHLORIDE SERPL-SCNC: 105 MMOL/L (ref 96–112)
CO2 SERPL-SCNC: 23 MMOL/L (ref 20–33)
CREAT SERPL-MCNC: 1.31 MG/DL (ref 0.5–1.4)
EOSINOPHIL # BLD AUTO: 0.17 K/UL (ref 0–0.51)
EOSINOPHIL NFR BLD: 2.2 % (ref 0–6.9)
ERYTHROCYTE [DISTWIDTH] IN BLOOD BY AUTOMATED COUNT: 38.2 FL (ref 35.9–50)
GFR SERPLBLD CREATININE-BSD FMLA CKD-EPI: 64 ML/MIN/1.73 M 2
GLOBULIN SER CALC-MCNC: 3.7 G/DL (ref 1.9–3.5)
GLUCOSE SERPL-MCNC: 134 MG/DL (ref 65–99)
HCT VFR BLD AUTO: 42.2 % (ref 42–52)
HGB BLD-MCNC: 13.6 G/DL (ref 14–18)
IMM GRANULOCYTES # BLD AUTO: 0.02 K/UL (ref 0–0.11)
IMM GRANULOCYTES NFR BLD AUTO: 0.3 % (ref 0–0.9)
LYMPHOCYTES # BLD AUTO: 2.06 K/UL (ref 1–4.8)
LYMPHOCYTES NFR BLD: 26.2 % (ref 22–41)
MCH RBC QN AUTO: 27.5 PG (ref 27–33)
MCHC RBC AUTO-ENTMCNC: 32.2 G/DL (ref 32.3–36.5)
MCV RBC AUTO: 85.3 FL (ref 81.4–97.8)
MONOCYTES # BLD AUTO: 0.58 K/UL (ref 0–0.85)
MONOCYTES NFR BLD AUTO: 7.4 % (ref 0–13.4)
NEUTROPHILS # BLD AUTO: 5 K/UL (ref 1.82–7.42)
NEUTROPHILS NFR BLD: 63.4 % (ref 44–72)
NRBC # BLD AUTO: 0 K/UL
NRBC BLD-RTO: 0 /100 WBC (ref 0–0.2)
OUTPT INFUS CBC COMMENT OICOM: ABNORMAL
PLATELET # BLD AUTO: 392 K/UL (ref 164–446)
PMV BLD AUTO: 9.6 FL (ref 9–12.9)
POTASSIUM SERPL-SCNC: 4.5 MMOL/L (ref 3.6–5.5)
PROT SERPL-MCNC: 7.1 G/DL (ref 6–8.2)
RBC # BLD AUTO: 4.95 M/UL (ref 4.7–6.1)
SODIUM SERPL-SCNC: 140 MMOL/L (ref 135–145)
WBC # BLD AUTO: 7.9 K/UL (ref 4.8–10.8)

## 2025-05-12 PROCEDURE — 80053 COMPREHEN METABOLIC PANEL: CPT

## 2025-05-12 PROCEDURE — 700105 HCHG RX REV CODE 258: Performed by: INTERNAL MEDICINE

## 2025-05-12 PROCEDURE — 85025 COMPLETE CBC W/AUTO DIFF WBC: CPT

## 2025-05-12 PROCEDURE — 96365 THER/PROPH/DIAG IV INF INIT: CPT

## 2025-05-12 PROCEDURE — 700111 HCHG RX REV CODE 636 W/ 250 OVERRIDE (IP): Mod: JZ | Performed by: INTERNAL MEDICINE

## 2025-05-12 RX ORDER — 0.9 % SODIUM CHLORIDE 0.9 %
VIAL (ML) INJECTION PRN
Status: CANCELLED | OUTPATIENT
Start: 2025-05-13

## 2025-05-12 RX ORDER — 0.9 % SODIUM CHLORIDE 0.9 %
10 VIAL (ML) INJECTION PRN
Status: CANCELLED | OUTPATIENT
Start: 2025-05-13

## 2025-05-12 RX ORDER — 0.9 % SODIUM CHLORIDE 0.9 %
3 VIAL (ML) INJECTION PRN
Status: CANCELLED | OUTPATIENT
Start: 2025-05-13

## 2025-05-12 RX ADMIN — ERTAPENEM SODIUM 1000 MG: 1 INJECTION, POWDER, LYOPHILIZED, FOR SOLUTION INTRAMUSCULAR; INTRAVENOUS at 17:09

## 2025-05-12 NOTE — PROGRESS NOTES
Ja arrived to the Infusion Center for daily Invanz for osteomyelitis L foot. Pt denies changes to health, medication or allergies. POC reviewed.    L PICC line flushed, brisk blood return noted. IV abx infusion per MD order, Pt tolerated well.     PICC line flushed, gauze and sleeve applied. Confirmed next appointment and Ja was discharged home in no acute distress.

## 2025-05-13 ENCOUNTER — OUTPATIENT INFUSION SERVICES (OUTPATIENT)
Dept: ONCOLOGY | Facility: MEDICAL CENTER | Age: 55
End: 2025-05-13
Attending: INTERNAL MEDICINE

## 2025-05-13 VITALS
DIASTOLIC BLOOD PRESSURE: 74 MMHG | OXYGEN SATURATION: 99 % | RESPIRATION RATE: 18 BRPM | TEMPERATURE: 97 F | SYSTOLIC BLOOD PRESSURE: 110 MMHG | HEART RATE: 110 BPM

## 2025-05-13 DIAGNOSIS — M86.172 OSTEOMYELITIS OF ANKLE OR FOOT, ACUTE, LEFT (HCC): ICD-10-CM

## 2025-05-13 DIAGNOSIS — B95.61 MSSA BACTEREMIA: ICD-10-CM

## 2025-05-13 DIAGNOSIS — R78.81 MSSA BACTEREMIA: ICD-10-CM

## 2025-05-13 PROCEDURE — 700105 HCHG RX REV CODE 258: Performed by: INTERNAL MEDICINE

## 2025-05-13 PROCEDURE — 700111 HCHG RX REV CODE 636 W/ 250 OVERRIDE (IP): Mod: JZ | Performed by: INTERNAL MEDICINE

## 2025-05-13 PROCEDURE — 96365 THER/PROPH/DIAG IV INF INIT: CPT

## 2025-05-13 RX ORDER — 0.9 % SODIUM CHLORIDE 0.9 %
VIAL (ML) INJECTION PRN
Status: CANCELLED | OUTPATIENT
Start: 2025-05-14

## 2025-05-13 RX ORDER — 0.9 % SODIUM CHLORIDE 0.9 %
3 VIAL (ML) INJECTION PRN
Status: CANCELLED | OUTPATIENT
Start: 2025-05-14

## 2025-05-13 RX ORDER — 0.9 % SODIUM CHLORIDE 0.9 %
10 VIAL (ML) INJECTION PRN
Status: CANCELLED | OUTPATIENT
Start: 2025-05-14

## 2025-05-13 RX ADMIN — ERTAPENEM SODIUM 1000 MG: 1 INJECTION, POWDER, LYOPHILIZED, FOR SOLUTION INTRAMUSCULAR; INTRAVENOUS at 17:06

## 2025-05-13 NOTE — PROGRESS NOTES
Pt arrived ambulatory for daily Ertapenem, c/o worsening pain in spine, encouraged pt to f/u with providers to further evaluate pain.  Left PICC flushed with good blood return, labs drawn. PICC line dressing peeling, biopatch exposed.  Dressing changed by Jennifer MAGAÑA, pt tolerated well.  Ertapenem infused over 30 minutes, tolerated well, no reaction noted.  Dc'd home without incident and will f/u tomorrow as scheduled.

## 2025-05-14 ENCOUNTER — OUTPATIENT INFUSION SERVICES (OUTPATIENT)
Dept: ONCOLOGY | Facility: MEDICAL CENTER | Age: 55
End: 2025-05-14
Attending: INTERNAL MEDICINE

## 2025-05-14 VITALS
HEART RATE: 98 BPM | DIASTOLIC BLOOD PRESSURE: 83 MMHG | SYSTOLIC BLOOD PRESSURE: 130 MMHG | RESPIRATION RATE: 18 BRPM | TEMPERATURE: 98 F | OXYGEN SATURATION: 98 %

## 2025-05-14 DIAGNOSIS — R78.81 MSSA BACTEREMIA: Primary | ICD-10-CM

## 2025-05-14 DIAGNOSIS — M86.172 OSTEOMYELITIS OF ANKLE OR FOOT, ACUTE, LEFT (HCC): ICD-10-CM

## 2025-05-14 DIAGNOSIS — B95.61 MSSA BACTEREMIA: Primary | ICD-10-CM

## 2025-05-14 PROCEDURE — 700111 HCHG RX REV CODE 636 W/ 250 OVERRIDE (IP): Mod: JZ | Performed by: INTERNAL MEDICINE

## 2025-05-14 PROCEDURE — 700105 HCHG RX REV CODE 258: Performed by: INTERNAL MEDICINE

## 2025-05-14 PROCEDURE — 96365 THER/PROPH/DIAG IV INF INIT: CPT

## 2025-05-14 RX ORDER — 0.9 % SODIUM CHLORIDE 0.9 %
10 VIAL (ML) INJECTION PRN
Status: CANCELLED | OUTPATIENT
Start: 2025-05-15

## 2025-05-14 RX ORDER — 0.9 % SODIUM CHLORIDE 0.9 %
VIAL (ML) INJECTION PRN
Status: CANCELLED | OUTPATIENT
Start: 2025-05-15

## 2025-05-14 RX ORDER — 0.9 % SODIUM CHLORIDE 0.9 %
3 VIAL (ML) INJECTION PRN
Status: CANCELLED | OUTPATIENT
Start: 2025-05-15

## 2025-05-14 RX ADMIN — ERTAPENEM SODIUM 1000 MG: 1 INJECTION, POWDER, LYOPHILIZED, FOR SOLUTION INTRAMUSCULAR; INTRAVENOUS at 16:55

## 2025-05-14 NOTE — PROGRESS NOTES
Pt presented to [facility] for daily IV ertapenem. POC discussed and pt verbalized agreement, voices no new concerns. LUE PICC in place, flushes with ease and brisk positive blood return noted, dressing CDI, clave changed. Ertapenem administered per MAR. No signs of adverse reaction or complications noted. PICC line flushed per policy, claves wrapped in sterile gauze and line secured in protective sleeve. Pt confirmed tomorrow's appt and discharged ambulatory to self care. Pt in NAD at time of discharge.

## 2025-05-15 ENCOUNTER — OUTPATIENT INFUSION SERVICES (OUTPATIENT)
Dept: ONCOLOGY | Facility: MEDICAL CENTER | Age: 55
End: 2025-05-15
Attending: INTERNAL MEDICINE

## 2025-05-15 VITALS
HEART RATE: 116 BPM | TEMPERATURE: 98.4 F | RESPIRATION RATE: 18 BRPM | DIASTOLIC BLOOD PRESSURE: 97 MMHG | SYSTOLIC BLOOD PRESSURE: 177 MMHG | OXYGEN SATURATION: 98 %

## 2025-05-15 DIAGNOSIS — M86.172 OSTEOMYELITIS OF ANKLE OR FOOT, ACUTE, LEFT (HCC): ICD-10-CM

## 2025-05-15 DIAGNOSIS — R78.81 MSSA BACTEREMIA: Primary | ICD-10-CM

## 2025-05-15 DIAGNOSIS — B95.61 MSSA BACTEREMIA: Primary | ICD-10-CM

## 2025-05-15 PROCEDURE — 700111 HCHG RX REV CODE 636 W/ 250 OVERRIDE (IP): Mod: JZ | Performed by: INTERNAL MEDICINE

## 2025-05-15 PROCEDURE — 700105 HCHG RX REV CODE 258: Performed by: INTERNAL MEDICINE

## 2025-05-15 PROCEDURE — 96365 THER/PROPH/DIAG IV INF INIT: CPT

## 2025-05-15 RX ORDER — 0.9 % SODIUM CHLORIDE 0.9 %
3 VIAL (ML) INJECTION PRN
Status: CANCELLED | OUTPATIENT
Start: 2025-05-16

## 2025-05-15 RX ORDER — 0.9 % SODIUM CHLORIDE 0.9 %
VIAL (ML) INJECTION PRN
Status: CANCELLED | OUTPATIENT
Start: 2025-05-16

## 2025-05-15 RX ORDER — 0.9 % SODIUM CHLORIDE 0.9 %
10 VIAL (ML) INJECTION PRN
Status: CANCELLED | OUTPATIENT
Start: 2025-05-16

## 2025-05-15 RX ADMIN — ERTAPENEM SODIUM 1000 MG: 1 INJECTION, POWDER, LYOPHILIZED, FOR SOLUTION INTRAMUSCULAR; INTRAVENOUS at 16:59

## 2025-05-15 ASSESSMENT — PAIN DESCRIPTION - PAIN TYPE: TYPE: ACUTE PAIN

## 2025-05-15 NOTE — PROGRESS NOTES
Mr Fuller is here today for daily Invanz for osteomyelitis/amputation of 4th & 5th toes of left foot. He is wearing a walking boot, unseen surgical wounds to the site.     He is in good spirits and has no new concerns to report.     PICC line to his left upper arm was flushed and had good blood return.     Invanz given as ordered and was tolerated well.     PICC line flushed and pt was discharged in stable condition.

## 2025-05-16 ENCOUNTER — OUTPATIENT INFUSION SERVICES (OUTPATIENT)
Dept: ONCOLOGY | Facility: MEDICAL CENTER | Age: 55
End: 2025-05-16
Attending: INTERNAL MEDICINE

## 2025-05-16 VITALS
OXYGEN SATURATION: 98 % | DIASTOLIC BLOOD PRESSURE: 78 MMHG | SYSTOLIC BLOOD PRESSURE: 111 MMHG | WEIGHT: 216.93 LBS | TEMPERATURE: 98.3 F | BODY MASS INDEX: 28.63 KG/M2 | RESPIRATION RATE: 18 BRPM | HEART RATE: 108 BPM

## 2025-05-16 DIAGNOSIS — R78.81 MSSA BACTEREMIA: Primary | ICD-10-CM

## 2025-05-16 DIAGNOSIS — M86.172 OSTEOMYELITIS OF ANKLE OR FOOT, ACUTE, LEFT (HCC): ICD-10-CM

## 2025-05-16 DIAGNOSIS — B95.61 MSSA BACTEREMIA: Primary | ICD-10-CM

## 2025-05-16 PROCEDURE — 96365 THER/PROPH/DIAG IV INF INIT: CPT

## 2025-05-16 PROCEDURE — 700105 HCHG RX REV CODE 258: Performed by: INTERNAL MEDICINE

## 2025-05-16 PROCEDURE — 700111 HCHG RX REV CODE 636 W/ 250 OVERRIDE (IP): Mod: JZ | Performed by: INTERNAL MEDICINE

## 2025-05-16 RX ORDER — 0.9 % SODIUM CHLORIDE 0.9 %
10 VIAL (ML) INJECTION PRN
Status: CANCELLED | OUTPATIENT
Start: 2025-05-17

## 2025-05-16 RX ORDER — 0.9 % SODIUM CHLORIDE 0.9 %
VIAL (ML) INJECTION PRN
Status: CANCELLED | OUTPATIENT
Start: 2025-05-17

## 2025-05-16 RX ORDER — 0.9 % SODIUM CHLORIDE 0.9 %
3 VIAL (ML) INJECTION PRN
Status: CANCELLED | OUTPATIENT
Start: 2025-05-17

## 2025-05-16 RX ADMIN — ERTAPENEM SODIUM 1000 MG: 1 INJECTION, POWDER, LYOPHILIZED, FOR SOLUTION INTRAMUSCULAR; INTRAVENOUS at 16:51

## 2025-05-16 ASSESSMENT — FIBROSIS 4 INDEX: FIB4 SCORE: 0.79

## 2025-05-16 NOTE — PROGRESS NOTES
Patient arrived to [facility] for Invanz infusion, no concerns at this time. PICC flushed with brisk blood return noted, Invanz infused per MAR well tolerated. PICC flushed and secured, next appointment confirmed, patient left home in stable condition.

## 2025-05-17 ENCOUNTER — OUTPATIENT INFUSION SERVICES (OUTPATIENT)
Dept: ONCOLOGY | Facility: MEDICAL CENTER | Age: 55
End: 2025-05-17
Attending: INTERNAL MEDICINE

## 2025-05-17 VITALS
HEART RATE: 107 BPM | SYSTOLIC BLOOD PRESSURE: 133 MMHG | TEMPERATURE: 98.2 F | DIASTOLIC BLOOD PRESSURE: 98 MMHG | RESPIRATION RATE: 18 BRPM | OXYGEN SATURATION: 97 %

## 2025-05-17 DIAGNOSIS — R78.81 MSSA BACTEREMIA: Primary | ICD-10-CM

## 2025-05-17 DIAGNOSIS — B95.61 MSSA BACTEREMIA: Primary | ICD-10-CM

## 2025-05-17 DIAGNOSIS — M86.172 OSTEOMYELITIS OF ANKLE OR FOOT, ACUTE, LEFT (HCC): ICD-10-CM

## 2025-05-17 PROCEDURE — 700105 HCHG RX REV CODE 258: Performed by: INTERNAL MEDICINE

## 2025-05-17 PROCEDURE — 700111 HCHG RX REV CODE 636 W/ 250 OVERRIDE (IP): Mod: JZ | Performed by: INTERNAL MEDICINE

## 2025-05-17 PROCEDURE — 96365 THER/PROPH/DIAG IV INF INIT: CPT

## 2025-05-17 RX ORDER — 0.9 % SODIUM CHLORIDE 0.9 %
VIAL (ML) INJECTION PRN
Status: CANCELLED | OUTPATIENT
Start: 2025-05-18

## 2025-05-17 RX ORDER — 0.9 % SODIUM CHLORIDE 0.9 %
10 VIAL (ML) INJECTION PRN
Status: CANCELLED | OUTPATIENT
Start: 2025-05-18

## 2025-05-17 RX ORDER — 0.9 % SODIUM CHLORIDE 0.9 %
3 VIAL (ML) INJECTION PRN
Status: CANCELLED | OUTPATIENT
Start: 2025-05-18

## 2025-05-17 RX ADMIN — ERTAPENEM SODIUM 1000 MG: 1 INJECTION, POWDER, LYOPHILIZED, FOR SOLUTION INTRAMUSCULAR; INTRAVENOUS at 16:55

## 2025-05-18 ENCOUNTER — OUTPATIENT INFUSION SERVICES (OUTPATIENT)
Dept: ONCOLOGY | Facility: MEDICAL CENTER | Age: 55
End: 2025-05-18
Attending: INTERNAL MEDICINE

## 2025-05-18 VITALS
DIASTOLIC BLOOD PRESSURE: 76 MMHG | RESPIRATION RATE: 18 BRPM | SYSTOLIC BLOOD PRESSURE: 114 MMHG | TEMPERATURE: 98.2 F | OXYGEN SATURATION: 99 % | HEART RATE: 109 BPM | BODY MASS INDEX: 28.75 KG/M2 | HEIGHT: 73 IN | WEIGHT: 216.93 LBS

## 2025-05-18 DIAGNOSIS — R78.81 MSSA BACTEREMIA: Primary | ICD-10-CM

## 2025-05-18 DIAGNOSIS — M86.172 OSTEOMYELITIS OF ANKLE OR FOOT, ACUTE, LEFT (HCC): ICD-10-CM

## 2025-05-18 DIAGNOSIS — B95.61 MSSA BACTEREMIA: Primary | ICD-10-CM

## 2025-05-18 PROCEDURE — 700105 HCHG RX REV CODE 258: Performed by: INTERNAL MEDICINE

## 2025-05-18 PROCEDURE — 700111 HCHG RX REV CODE 636 W/ 250 OVERRIDE (IP): Mod: JZ | Performed by: INTERNAL MEDICINE

## 2025-05-18 PROCEDURE — 96374 THER/PROPH/DIAG INJ IV PUSH: CPT

## 2025-05-18 RX ORDER — 0.9 % SODIUM CHLORIDE 0.9 %
10 VIAL (ML) INJECTION PRN
Status: CANCELLED | OUTPATIENT
Start: 2025-05-19

## 2025-05-18 RX ORDER — 0.9 % SODIUM CHLORIDE 0.9 %
VIAL (ML) INJECTION PRN
Status: CANCELLED | OUTPATIENT
Start: 2025-05-19

## 2025-05-18 RX ORDER — 0.9 % SODIUM CHLORIDE 0.9 %
3 VIAL (ML) INJECTION PRN
Status: CANCELLED | OUTPATIENT
Start: 2025-05-19

## 2025-05-18 RX ADMIN — ERTAPENEM SODIUM 1000 MG: 1 INJECTION, POWDER, LYOPHILIZED, FOR SOLUTION INTRAMUSCULAR; INTRAVENOUS at 17:20

## 2025-05-18 ASSESSMENT — FIBROSIS 4 INDEX: FIB4 SCORE: 0.79

## 2025-05-18 NOTE — PROGRESS NOTES
Ja arrived to the Infusion Center for daily Invanz for osteomyelitis L foot. Pt denies changes to health, medication or allergies. POC reviewed.    L PICC line flushed, brisk blood return noted. Dressing lifted, will change dressing. IV abx infusion per MD order, Pt tolerated well.     PICC line dressing/clave changed per policy Ja tolerated well. PICC line flushed, guaze and sleeve applied.     Confirmed next appointment and Ja was discharged home in no acute distress.

## 2025-05-19 ENCOUNTER — OUTPATIENT INFUSION SERVICES (OUTPATIENT)
Dept: ONCOLOGY | Facility: MEDICAL CENTER | Age: 55
End: 2025-05-19
Attending: INTERNAL MEDICINE

## 2025-05-19 VITALS
DIASTOLIC BLOOD PRESSURE: 80 MMHG | SYSTOLIC BLOOD PRESSURE: 132 MMHG | HEIGHT: 73 IN | WEIGHT: 202.82 LBS | RESPIRATION RATE: 18 BRPM | HEART RATE: 110 BPM | BODY MASS INDEX: 26.88 KG/M2 | TEMPERATURE: 98.5 F | OXYGEN SATURATION: 98 %

## 2025-05-19 DIAGNOSIS — B95.61 MSSA BACTEREMIA: Primary | ICD-10-CM

## 2025-05-19 DIAGNOSIS — R78.81 MSSA BACTEREMIA: Primary | ICD-10-CM

## 2025-05-19 DIAGNOSIS — M86.172 OSTEOMYELITIS OF ANKLE OR FOOT, ACUTE, LEFT (HCC): ICD-10-CM

## 2025-05-19 LAB
ALBUMIN SERPL BCP-MCNC: 3.4 G/DL (ref 3.2–4.9)
ALBUMIN/GLOB SERPL: 1.1 G/DL
ALP SERPL-CCNC: 107 U/L (ref 30–99)
ALT SERPL-CCNC: 10 U/L (ref 2–50)
ANION GAP SERPL CALC-SCNC: 10 MMOL/L (ref 7–16)
AST SERPL-CCNC: 16 U/L (ref 12–45)
BASOPHILS # BLD AUTO: 0.5 % (ref 0–1.8)
BASOPHILS # BLD: 0.03 K/UL (ref 0–0.12)
BILIRUB SERPL-MCNC: <0.2 MG/DL (ref 0.1–1.5)
BUN SERPL-MCNC: 24 MG/DL (ref 8–22)
CALCIUM ALBUM COR SERPL-MCNC: 9.7 MG/DL (ref 8.5–10.5)
CALCIUM SERPL-MCNC: 9.2 MG/DL (ref 8.5–10.5)
CHLORIDE SERPL-SCNC: 103 MMOL/L (ref 96–112)
CO2 SERPL-SCNC: 23 MMOL/L (ref 20–33)
CREAT SERPL-MCNC: 1.13 MG/DL (ref 0.5–1.4)
CRP SERPL HS-MCNC: 0.71 MG/DL (ref 0–0.75)
EOSINOPHIL # BLD AUTO: 0.16 K/UL (ref 0–0.51)
EOSINOPHIL NFR BLD: 2.8 % (ref 0–6.9)
ERYTHROCYTE [DISTWIDTH] IN BLOOD BY AUTOMATED COUNT: 39.1 FL (ref 35.9–50)
ERYTHROCYTE [SEDIMENTATION RATE] IN BLOOD BY WESTERGREN METHOD: 31 MM/HOUR (ref 0–20)
GFR SERPLBLD CREATININE-BSD FMLA CKD-EPI: 77 ML/MIN/1.73 M 2
GLOBULIN SER CALC-MCNC: 3.1 G/DL (ref 1.9–3.5)
GLUCOSE SERPL-MCNC: 299 MG/DL (ref 65–99)
HCT VFR BLD AUTO: 40.3 % (ref 42–52)
HGB BLD-MCNC: 13.3 G/DL (ref 14–18)
IMM GRANULOCYTES # BLD AUTO: 0.02 K/UL (ref 0–0.11)
IMM GRANULOCYTES NFR BLD AUTO: 0.3 % (ref 0–0.9)
LYMPHOCYTES # BLD AUTO: 1.57 K/UL (ref 1–4.8)
LYMPHOCYTES NFR BLD: 27 % (ref 22–41)
MCH RBC QN AUTO: 28.1 PG (ref 27–33)
MCHC RBC AUTO-ENTMCNC: 33 G/DL (ref 32.3–36.5)
MCV RBC AUTO: 85 FL (ref 81.4–97.8)
MONOCYTES # BLD AUTO: 0.43 K/UL (ref 0–0.85)
MONOCYTES NFR BLD AUTO: 7.4 % (ref 0–13.4)
NEUTROPHILS # BLD AUTO: 3.6 K/UL (ref 1.82–7.42)
NEUTROPHILS NFR BLD: 62 % (ref 44–72)
NRBC # BLD AUTO: 0 K/UL
NRBC BLD-RTO: 0 /100 WBC (ref 0–0.2)
OUTPT INFUS CBC COMMENT OICOM: ABNORMAL
PLATELET # BLD AUTO: 278 K/UL (ref 164–446)
PMV BLD AUTO: 10.3 FL (ref 9–12.9)
POTASSIUM SERPL-SCNC: 4.4 MMOL/L (ref 3.6–5.5)
PROT SERPL-MCNC: 6.5 G/DL (ref 6–8.2)
RBC # BLD AUTO: 4.74 M/UL (ref 4.7–6.1)
SODIUM SERPL-SCNC: 136 MMOL/L (ref 135–145)
WBC # BLD AUTO: 5.8 K/UL (ref 4.8–10.8)

## 2025-05-19 PROCEDURE — 700111 HCHG RX REV CODE 636 W/ 250 OVERRIDE (IP): Mod: JZ | Performed by: INTERNAL MEDICINE

## 2025-05-19 PROCEDURE — 85025 COMPLETE CBC W/AUTO DIFF WBC: CPT

## 2025-05-19 PROCEDURE — 80053 COMPREHEN METABOLIC PANEL: CPT

## 2025-05-19 PROCEDURE — 96365 THER/PROPH/DIAG IV INF INIT: CPT

## 2025-05-19 PROCEDURE — 700105 HCHG RX REV CODE 258: Performed by: INTERNAL MEDICINE

## 2025-05-19 PROCEDURE — 85652 RBC SED RATE AUTOMATED: CPT

## 2025-05-19 PROCEDURE — 86140 C-REACTIVE PROTEIN: CPT

## 2025-05-19 RX ORDER — 0.9 % SODIUM CHLORIDE 0.9 %
VIAL (ML) INJECTION PRN
Status: CANCELLED | OUTPATIENT
Start: 2025-05-20

## 2025-05-19 RX ORDER — 0.9 % SODIUM CHLORIDE 0.9 %
3 VIAL (ML) INJECTION PRN
Status: CANCELLED | OUTPATIENT
Start: 2025-05-20

## 2025-05-19 RX ORDER — 0.9 % SODIUM CHLORIDE 0.9 %
10 VIAL (ML) INJECTION PRN
Status: CANCELLED | OUTPATIENT
Start: 2025-05-20

## 2025-05-19 RX ADMIN — ERTAPENEM SODIUM 1000 MG: 1 INJECTION, POWDER, LYOPHILIZED, FOR SOLUTION INTRAMUSCULAR; INTRAVENOUS at 17:11

## 2025-05-19 ASSESSMENT — FIBROSIS 4 INDEX: FIB4 SCORE: 0.79

## 2025-05-19 NOTE — PROGRESS NOTES
Patient arrived to infusion center for daily antibiotic infusion of ertapenem. Patient had no new complaints today. PICC line flushed with positive blood return. Ertapenem infused without difficulty. PICC line flushed with saline, and covered with gauze and netting. Patient confirms appointment for tomorrow at 1700. Discharged from infusion center with no apparent distress.

## 2025-05-20 ENCOUNTER — OUTPATIENT INFUSION SERVICES (OUTPATIENT)
Dept: ONCOLOGY | Facility: MEDICAL CENTER | Age: 55
End: 2025-05-20
Attending: INTERNAL MEDICINE

## 2025-05-20 VITALS
TEMPERATURE: 98.6 F | SYSTOLIC BLOOD PRESSURE: 130 MMHG | HEART RATE: 97 BPM | DIASTOLIC BLOOD PRESSURE: 82 MMHG | OXYGEN SATURATION: 99 % | RESPIRATION RATE: 16 BRPM

## 2025-05-20 DIAGNOSIS — B95.61 MSSA BACTEREMIA: Primary | ICD-10-CM

## 2025-05-20 DIAGNOSIS — M86.172 OSTEOMYELITIS OF ANKLE OR FOOT, ACUTE, LEFT (HCC): ICD-10-CM

## 2025-05-20 DIAGNOSIS — R78.81 MSSA BACTEREMIA: Primary | ICD-10-CM

## 2025-05-20 PROCEDURE — 96365 THER/PROPH/DIAG IV INF INIT: CPT

## 2025-05-20 PROCEDURE — 99211 OFF/OP EST MAY X REQ PHY/QHP: CPT

## 2025-05-20 PROCEDURE — 700111 HCHG RX REV CODE 636 W/ 250 OVERRIDE (IP): Mod: JZ | Performed by: INTERNAL MEDICINE

## 2025-05-20 PROCEDURE — 700105 HCHG RX REV CODE 258: Performed by: INTERNAL MEDICINE

## 2025-05-20 RX ORDER — 0.9 % SODIUM CHLORIDE 0.9 %
3 VIAL (ML) INJECTION PRN
Status: CANCELLED | OUTPATIENT
Start: 2025-05-21

## 2025-05-20 RX ORDER — 0.9 % SODIUM CHLORIDE 0.9 %
10 VIAL (ML) INJECTION PRN
Status: CANCELLED | OUTPATIENT
Start: 2025-05-21

## 2025-05-20 RX ORDER — 0.9 % SODIUM CHLORIDE 0.9 %
VIAL (ML) INJECTION PRN
Status: CANCELLED | OUTPATIENT
Start: 2025-05-21

## 2025-05-20 RX ADMIN — ERTAPENEM 1000 MG: 1 INJECTION, POWDER, LYOPHILIZED, FOR SOLUTION INTRAMUSCULAR; INTRAVENOUS at 16:59

## 2025-05-20 NOTE — CARE PLAN
KOKOM to confirm appointment scheduled for 5/27 at 1:00 with Dr. De La Torre and remind pt to complete labs prior to visit (orders faxed to [place])     The patient is Stable - Low risk of patient condition declining or worsening    Shift Goals  Clinical Goals: Pain, safety  Patient Goals: Comfort, Pain  Family Goals: not present    Progress made toward(s) clinical / shift goals:    Problem: Fall Risk  Goal: Patient will remain free from falls  Outcome: Progressing  Note: Patient low fall risk     Problem: Pain - Standard  Goal: Alleviation of pain or a reduction in pain to the patient’s comfort goal  Outcome: Progressing       Patient is not progressing towards the following goals:

## 2025-05-20 NOTE — PROGRESS NOTES
Pt arrived ambulatory to [facility] for daily Invanz infusion for osteomyelitis. POC discussed with pt and he agrees with plan.     AWA PICC with brisk blood return, Monday labs drawn as ordered, PICC flushed with NS. Pt medicated per MAR. Pt tolerated treatment without s/s adverse reaction. PICC flushed with NS then wrapped with sleeve.    Pt discharged to [facility] care, [facility]. Pt's next appointment confirmed 5/20/2025.

## 2025-05-21 ENCOUNTER — OUTPATIENT INFUSION SERVICES (OUTPATIENT)
Dept: ONCOLOGY | Facility: MEDICAL CENTER | Age: 55
End: 2025-05-21
Attending: INTERNAL MEDICINE

## 2025-05-21 VITALS
HEART RATE: 101 BPM | RESPIRATION RATE: 18 BRPM | SYSTOLIC BLOOD PRESSURE: 112 MMHG | DIASTOLIC BLOOD PRESSURE: 75 MMHG | TEMPERATURE: 98 F | OXYGEN SATURATION: 95 %

## 2025-05-21 DIAGNOSIS — B95.61 MSSA BACTEREMIA: Primary | ICD-10-CM

## 2025-05-21 DIAGNOSIS — R78.81 MSSA BACTEREMIA: Primary | ICD-10-CM

## 2025-05-21 DIAGNOSIS — M86.172 OSTEOMYELITIS OF ANKLE OR FOOT, ACUTE, LEFT (HCC): ICD-10-CM

## 2025-05-21 PROCEDURE — 96365 THER/PROPH/DIAG IV INF INIT: CPT

## 2025-05-21 PROCEDURE — 700111 HCHG RX REV CODE 636 W/ 250 OVERRIDE (IP): Mod: JZ | Performed by: INTERNAL MEDICINE

## 2025-05-21 PROCEDURE — 700105 HCHG RX REV CODE 258: Performed by: INTERNAL MEDICINE

## 2025-05-21 RX ORDER — 0.9 % SODIUM CHLORIDE 0.9 %
10 VIAL (ML) INJECTION PRN
Status: CANCELLED | OUTPATIENT
Start: 2025-05-22

## 2025-05-21 RX ORDER — 0.9 % SODIUM CHLORIDE 0.9 %
VIAL (ML) INJECTION PRN
Status: CANCELLED | OUTPATIENT
Start: 2025-05-22

## 2025-05-21 RX ORDER — 0.9 % SODIUM CHLORIDE 0.9 %
3 VIAL (ML) INJECTION PRN
Status: CANCELLED | OUTPATIENT
Start: 2025-05-22

## 2025-05-21 RX ADMIN — ERTAPENEM SODIUM 1000 MG: 1 INJECTION, POWDER, LYOPHILIZED, FOR SOLUTION INTRAMUSCULAR; INTRAVENOUS at 16:53

## 2025-05-21 NOTE — PROGRESS NOTES
Mr Fuller is here today for daily Invanz for osteomyelitis/amputation of 4th & 5th toes of left foot. He is wearing a walking boot, unseen surgical wounds to the site.      He is in good spirits and has no new concerns to report.          PICC line to his left upper arm was flushed and had good blood return. Dressing was lifting up, changed the dressing today. Changed connector.     Invanz given as ordered and was tolerated well.   Discussed lab results from yesterday      PICC line flushed and pt was discharged in stable condition.

## 2025-05-21 NOTE — PROGRESS NOTES
Pt presented to [facility] for daily IV ertapenem. POC discussed and pt verbalized agreement, voices no new concerns. PICC in place, flushes with ease and brisk positive blood return noted, dressing CDI. Ertapenem administered per MAR. No signs of adverse reaction or complications noted. PICC line flushed per policy, claves wrapped in sterile gauze and line secured in protective sleeve. Pt confirmed tomorrow's appt and discharged ambulatory to self care. Pt in NAD at time of discharge.

## 2025-05-22 ENCOUNTER — OUTPATIENT INFUSION SERVICES (OUTPATIENT)
Dept: ONCOLOGY | Facility: MEDICAL CENTER | Age: 55
End: 2025-05-22
Attending: INTERNAL MEDICINE

## 2025-05-22 VITALS
RESPIRATION RATE: 18 BRPM | HEART RATE: 104 BPM | BODY MASS INDEX: 26.77 KG/M2 | DIASTOLIC BLOOD PRESSURE: 99 MMHG | TEMPERATURE: 98.6 F | WEIGHT: 202.82 LBS | OXYGEN SATURATION: 99 % | SYSTOLIC BLOOD PRESSURE: 161 MMHG

## 2025-05-22 DIAGNOSIS — B95.61 MSSA BACTEREMIA: Primary | ICD-10-CM

## 2025-05-22 DIAGNOSIS — M86.172 OSTEOMYELITIS OF ANKLE OR FOOT, ACUTE, LEFT (HCC): ICD-10-CM

## 2025-05-22 DIAGNOSIS — R78.81 MSSA BACTEREMIA: Primary | ICD-10-CM

## 2025-05-22 PROCEDURE — 96365 THER/PROPH/DIAG IV INF INIT: CPT

## 2025-05-22 PROCEDURE — 700111 HCHG RX REV CODE 636 W/ 250 OVERRIDE (IP): Mod: JZ | Performed by: INTERNAL MEDICINE

## 2025-05-22 PROCEDURE — 700105 HCHG RX REV CODE 258: Performed by: INTERNAL MEDICINE

## 2025-05-22 RX ORDER — 0.9 % SODIUM CHLORIDE 0.9 %
10 VIAL (ML) INJECTION PRN
Status: CANCELLED | OUTPATIENT
Start: 2025-05-23

## 2025-05-22 RX ORDER — 0.9 % SODIUM CHLORIDE 0.9 %
VIAL (ML) INJECTION PRN
Status: CANCELLED | OUTPATIENT
Start: 2025-05-23

## 2025-05-22 RX ORDER — 0.9 % SODIUM CHLORIDE 0.9 %
3 VIAL (ML) INJECTION PRN
Status: CANCELLED | OUTPATIENT
Start: 2025-05-23

## 2025-05-22 RX ADMIN — ERTAPENEM 1000 MG: 1 INJECTION, POWDER, LYOPHILIZED, FOR SOLUTION INTRAMUSCULAR; INTRAVENOUS at 16:22

## 2025-05-22 ASSESSMENT — FIBROSIS 4 INDEX: FIB4 SCORE: 1

## 2025-05-23 ENCOUNTER — OUTPATIENT INFUSION SERVICES (OUTPATIENT)
Dept: ONCOLOGY | Facility: MEDICAL CENTER | Age: 55
End: 2025-05-23
Attending: INTERNAL MEDICINE

## 2025-05-23 VITALS
RESPIRATION RATE: 18 BRPM | DIASTOLIC BLOOD PRESSURE: 87 MMHG | HEART RATE: 106 BPM | TEMPERATURE: 98.7 F | OXYGEN SATURATION: 96 % | SYSTOLIC BLOOD PRESSURE: 150 MMHG

## 2025-05-23 DIAGNOSIS — B95.61 MSSA BACTEREMIA: Primary | ICD-10-CM

## 2025-05-23 DIAGNOSIS — M86.172 OSTEOMYELITIS OF ANKLE OR FOOT, ACUTE, LEFT (HCC): ICD-10-CM

## 2025-05-23 DIAGNOSIS — R78.81 MSSA BACTEREMIA: Primary | ICD-10-CM

## 2025-05-23 PROCEDURE — 700105 HCHG RX REV CODE 258: Performed by: INTERNAL MEDICINE

## 2025-05-23 PROCEDURE — 96365 THER/PROPH/DIAG IV INF INIT: CPT

## 2025-05-23 PROCEDURE — 700111 HCHG RX REV CODE 636 W/ 250 OVERRIDE (IP): Mod: JZ | Performed by: INTERNAL MEDICINE

## 2025-05-23 RX ORDER — 0.9 % SODIUM CHLORIDE 0.9 %
10 VIAL (ML) INJECTION PRN
Status: CANCELLED | OUTPATIENT
Start: 2025-05-24

## 2025-05-23 RX ORDER — 0.9 % SODIUM CHLORIDE 0.9 %
3 VIAL (ML) INJECTION PRN
Status: CANCELLED | OUTPATIENT
Start: 2025-05-24

## 2025-05-23 RX ORDER — 0.9 % SODIUM CHLORIDE 0.9 %
VIAL (ML) INJECTION PRN
Status: CANCELLED | OUTPATIENT
Start: 2025-05-24

## 2025-05-23 RX ADMIN — ERTAPENEM 1000 MG: 1 INJECTION, POWDER, LYOPHILIZED, FOR SOLUTION INTRAMUSCULAR; INTRAVENOUS at 16:59

## 2025-05-23 NOTE — PROGRESS NOTES
Pt came into Infusion independently . Orders and vitals reviewed, assessment done. PICC accessed with blood return noted. Invanz infused as ordered with no adverse events. PICC flushed and saline locked post infusion. Pt left in stable condition with next appointment confirmed.

## 2025-05-24 ENCOUNTER — OUTPATIENT INFUSION SERVICES (OUTPATIENT)
Dept: ONCOLOGY | Facility: MEDICAL CENTER | Age: 55
End: 2025-05-24
Attending: INTERNAL MEDICINE

## 2025-05-24 VITALS
OXYGEN SATURATION: 94 % | RESPIRATION RATE: 18 BRPM | TEMPERATURE: 98.3 F | DIASTOLIC BLOOD PRESSURE: 84 MMHG | WEIGHT: 202.82 LBS | SYSTOLIC BLOOD PRESSURE: 144 MMHG | BODY MASS INDEX: 26.77 KG/M2 | HEART RATE: 107 BPM

## 2025-05-24 DIAGNOSIS — B95.61 MSSA BACTEREMIA: Primary | ICD-10-CM

## 2025-05-24 DIAGNOSIS — M86.172 OSTEOMYELITIS OF ANKLE OR FOOT, ACUTE, LEFT (HCC): ICD-10-CM

## 2025-05-24 DIAGNOSIS — R78.81 MSSA BACTEREMIA: Primary | ICD-10-CM

## 2025-05-24 PROCEDURE — 96365 THER/PROPH/DIAG IV INF INIT: CPT

## 2025-05-24 PROCEDURE — 700111 HCHG RX REV CODE 636 W/ 250 OVERRIDE (IP): Mod: JZ | Performed by: INTERNAL MEDICINE

## 2025-05-24 PROCEDURE — 700105 HCHG RX REV CODE 258: Performed by: INTERNAL MEDICINE

## 2025-05-24 RX ORDER — 0.9 % SODIUM CHLORIDE 0.9 %
10 VIAL (ML) INJECTION PRN
Status: CANCELLED | OUTPATIENT
Start: 2025-05-25

## 2025-05-24 RX ORDER — 0.9 % SODIUM CHLORIDE 0.9 %
VIAL (ML) INJECTION PRN
Status: CANCELLED | OUTPATIENT
Start: 2025-05-25

## 2025-05-24 RX ORDER — 0.9 % SODIUM CHLORIDE 0.9 %
3 VIAL (ML) INJECTION PRN
Status: CANCELLED | OUTPATIENT
Start: 2025-05-25

## 2025-05-24 RX ADMIN — ERTAPENEM 1000 MG: 1 INJECTION, POWDER, LYOPHILIZED, FOR SOLUTION INTRAMUSCULAR; INTRAVENOUS at 16:50

## 2025-05-24 ASSESSMENT — FIBROSIS 4 INDEX: FIB4 SCORE: 1

## 2025-05-24 NOTE — PROGRESS NOTES
Ja presents to infusion for daily ertapenem. Patient reports feeling well today, has tolerated IV antibiotics well thus far with no adverse effects.     PICC line flushed with NS with positive blood return. Clave changed    ertapenem infused over 30 minutes, patient tolerated well with no adverse effects.     PICC flushed post infusion with positive blood return.   Patient left in stable condition, knows when to return for appt tomorrow.

## 2025-05-25 ENCOUNTER — OUTPATIENT INFUSION SERVICES (OUTPATIENT)
Dept: ONCOLOGY | Facility: MEDICAL CENTER | Age: 55
End: 2025-05-25
Attending: INTERNAL MEDICINE

## 2025-05-25 VITALS
SYSTOLIC BLOOD PRESSURE: 132 MMHG | OXYGEN SATURATION: 96 % | HEART RATE: 115 BPM | RESPIRATION RATE: 18 BRPM | TEMPERATURE: 98.5 F | DIASTOLIC BLOOD PRESSURE: 87 MMHG

## 2025-05-25 DIAGNOSIS — M86.172 OSTEOMYELITIS OF ANKLE OR FOOT, ACUTE, LEFT (HCC): ICD-10-CM

## 2025-05-25 DIAGNOSIS — R78.81 MSSA BACTEREMIA: Primary | ICD-10-CM

## 2025-05-25 DIAGNOSIS — B95.61 MSSA BACTEREMIA: Primary | ICD-10-CM

## 2025-05-25 PROCEDURE — 700111 HCHG RX REV CODE 636 W/ 250 OVERRIDE (IP): Mod: JZ | Performed by: INTERNAL MEDICINE

## 2025-05-25 PROCEDURE — 96365 THER/PROPH/DIAG IV INF INIT: CPT

## 2025-05-25 PROCEDURE — 700105 HCHG RX REV CODE 258: Performed by: INTERNAL MEDICINE

## 2025-05-25 RX ORDER — 0.9 % SODIUM CHLORIDE 0.9 %
3 VIAL (ML) INJECTION PRN
Status: CANCELLED | OUTPATIENT
Start: 2025-05-26

## 2025-05-25 RX ORDER — 0.9 % SODIUM CHLORIDE 0.9 %
VIAL (ML) INJECTION PRN
Status: CANCELLED | OUTPATIENT
Start: 2025-05-26

## 2025-05-25 RX ORDER — 0.9 % SODIUM CHLORIDE 0.9 %
10 VIAL (ML) INJECTION PRN
Status: CANCELLED | OUTPATIENT
Start: 2025-05-26

## 2025-05-25 RX ADMIN — ERTAPENEM 1000 MG: 1 INJECTION, POWDER, LYOPHILIZED, FOR SOLUTION INTRAMUSCULAR; INTRAVENOUS at 17:20

## 2025-05-25 NOTE — PROGRESS NOTES
Ja presents to infusion for daily ertapenem. Patient reports feeling well today, has tolerated IV antibiotics well thus far with no adverse effects.     PICC line flushed with NS with positive blood return.     ertapenem infused over 30 minutes, patient tolerated well with no adverse effects.     PICC flushed post infusion with positive blood return.   Patient left in stable condition, knows when to return for appt tomorrow.

## 2025-05-26 ENCOUNTER — OUTPATIENT INFUSION SERVICES (OUTPATIENT)
Dept: ONCOLOGY | Facility: MEDICAL CENTER | Age: 55
End: 2025-05-26
Attending: INTERNAL MEDICINE

## 2025-05-26 ENCOUNTER — PHARMACY VISIT (OUTPATIENT)
Dept: PHARMACY | Facility: MEDICAL CENTER | Age: 55
End: 2025-05-26
Payer: COMMERCIAL

## 2025-05-26 VITALS
BODY MASS INDEX: 26.88 KG/M2 | OXYGEN SATURATION: 96 % | SYSTOLIC BLOOD PRESSURE: 116 MMHG | HEART RATE: 107 BPM | WEIGHT: 202.82 LBS | DIASTOLIC BLOOD PRESSURE: 80 MMHG | HEIGHT: 73 IN | TEMPERATURE: 98 F | RESPIRATION RATE: 18 BRPM

## 2025-05-26 DIAGNOSIS — M86.172 OSTEOMYELITIS OF ANKLE OR FOOT, ACUTE, LEFT (HCC): ICD-10-CM

## 2025-05-26 DIAGNOSIS — R78.81 MSSA BACTEREMIA: Primary | ICD-10-CM

## 2025-05-26 DIAGNOSIS — B95.61 MSSA BACTEREMIA: Primary | ICD-10-CM

## 2025-05-26 LAB
ALBUMIN SERPL BCP-MCNC: 3.6 G/DL (ref 3.2–4.9)
ALBUMIN/GLOB SERPL: 1 G/DL
ALP SERPL-CCNC: 114 U/L (ref 30–99)
ALT SERPL-CCNC: 12 U/L (ref 2–50)
ANION GAP SERPL CALC-SCNC: 11 MMOL/L (ref 7–16)
AST SERPL-CCNC: 16 U/L (ref 12–45)
BASOPHILS # BLD AUTO: 0.6 % (ref 0–1.8)
BASOPHILS # BLD: 0.04 K/UL (ref 0–0.12)
BILIRUB SERPL-MCNC: 0.4 MG/DL (ref 0.1–1.5)
BUN SERPL-MCNC: 26 MG/DL (ref 8–22)
CALCIUM ALBUM COR SERPL-MCNC: 9.8 MG/DL (ref 8.5–10.5)
CALCIUM SERPL-MCNC: 9.5 MG/DL (ref 8.5–10.5)
CHLORIDE SERPL-SCNC: 101 MMOL/L (ref 96–112)
CO2 SERPL-SCNC: 22 MMOL/L (ref 20–33)
CREAT SERPL-MCNC: 1.76 MG/DL (ref 0.5–1.4)
EOSINOPHIL # BLD AUTO: 0.09 K/UL (ref 0–0.51)
EOSINOPHIL NFR BLD: 1.4 % (ref 0–6.9)
ERYTHROCYTE [DISTWIDTH] IN BLOOD BY AUTOMATED COUNT: 39.4 FL (ref 35.9–50)
GFR SERPLBLD CREATININE-BSD FMLA CKD-EPI: 45 ML/MIN/1.73 M 2
GLOBULIN SER CALC-MCNC: 3.6 G/DL (ref 1.9–3.5)
GLUCOSE SERPL-MCNC: 373 MG/DL (ref 65–99)
HCT VFR BLD AUTO: 40.7 % (ref 42–52)
HGB BLD-MCNC: 13.7 G/DL (ref 14–18)
IMM GRANULOCYTES # BLD AUTO: 0.02 K/UL (ref 0–0.11)
IMM GRANULOCYTES NFR BLD AUTO: 0.3 % (ref 0–0.9)
LYMPHOCYTES # BLD AUTO: 1.71 K/UL (ref 1–4.8)
LYMPHOCYTES NFR BLD: 26.2 % (ref 22–41)
MCH RBC QN AUTO: 28.2 PG (ref 27–33)
MCHC RBC AUTO-ENTMCNC: 33.7 G/DL (ref 32.3–36.5)
MCV RBC AUTO: 83.9 FL (ref 81.4–97.8)
MONOCYTES # BLD AUTO: 0.53 K/UL (ref 0–0.85)
MONOCYTES NFR BLD AUTO: 8.1 % (ref 0–13.4)
NEUTROPHILS # BLD AUTO: 4.14 K/UL (ref 1.82–7.42)
NEUTROPHILS NFR BLD: 63.4 % (ref 44–72)
NRBC # BLD AUTO: 0 K/UL
NRBC BLD-RTO: 0 /100 WBC (ref 0–0.2)
OUTPT INFUS CBC COMMENT OICOM: ABNORMAL
PLATELET # BLD AUTO: 234 K/UL (ref 164–446)
PMV BLD AUTO: 10.2 FL (ref 9–12.9)
POTASSIUM SERPL-SCNC: 5.1 MMOL/L (ref 3.6–5.5)
PROT SERPL-MCNC: 7.2 G/DL (ref 6–8.2)
RBC # BLD AUTO: 4.85 M/UL (ref 4.7–6.1)
SODIUM SERPL-SCNC: 134 MMOL/L (ref 135–145)
WBC # BLD AUTO: 6.5 K/UL (ref 4.8–10.8)

## 2025-05-26 PROCEDURE — 700111 HCHG RX REV CODE 636 W/ 250 OVERRIDE (IP): Mod: JZ | Performed by: INTERNAL MEDICINE

## 2025-05-26 PROCEDURE — RXMED WILLOW AMBULATORY MEDICATION CHARGE: Performed by: STUDENT IN AN ORGANIZED HEALTH CARE EDUCATION/TRAINING PROGRAM

## 2025-05-26 PROCEDURE — 96365 THER/PROPH/DIAG IV INF INIT: CPT

## 2025-05-26 PROCEDURE — 700105 HCHG RX REV CODE 258: Performed by: INTERNAL MEDICINE

## 2025-05-26 PROCEDURE — 85025 COMPLETE CBC W/AUTO DIFF WBC: CPT

## 2025-05-26 PROCEDURE — 80053 COMPREHEN METABOLIC PANEL: CPT

## 2025-05-26 RX ORDER — 0.9 % SODIUM CHLORIDE 0.9 %
10 VIAL (ML) INJECTION PRN
Status: CANCELLED | OUTPATIENT
Start: 2025-05-27

## 2025-05-26 RX ORDER — 0.9 % SODIUM CHLORIDE 0.9 %
3 VIAL (ML) INJECTION PRN
Status: CANCELLED | OUTPATIENT
Start: 2025-05-27

## 2025-05-26 RX ORDER — 0.9 % SODIUM CHLORIDE 0.9 %
VIAL (ML) INJECTION PRN
Status: CANCELLED | OUTPATIENT
Start: 2025-05-27

## 2025-05-26 RX ADMIN — ERTAPENEM SODIUM 1000 MG: 1 INJECTION, POWDER, LYOPHILIZED, FOR SOLUTION INTRAMUSCULAR; INTRAVENOUS at 16:51

## 2025-05-26 ASSESSMENT — FIBROSIS 4 INDEX
FIB4 SCORE: 1
FIB4 SCORE: 1

## 2025-05-26 NOTE — PROGRESS NOTES
Ja arrived ambulatory to the outpatient infusion center for daily IV antibiotics. Plan of care reviewed, assessment completed, patient denies any new or acute concerns.   LUE PICC line intact, dressing C/D/I.  PICC line flushed briskly with brisk blood return. Invanz administered per the MAR, patient tolerated well.  PICC line flushed, blood return noted. PICC line saline locked and secured in protective sleeve, appointment for tomorrow was confirmed and the patient was discharged home in stable condition.

## 2025-05-27 ENCOUNTER — OUTPATIENT INFUSION SERVICES (OUTPATIENT)
Dept: ONCOLOGY | Facility: MEDICAL CENTER | Age: 55
End: 2025-05-27
Attending: INTERNAL MEDICINE

## 2025-05-27 ENCOUNTER — TELEPHONE (OUTPATIENT)
Dept: INFECTIOUS DISEASES | Facility: MEDICAL CENTER | Age: 55
End: 2025-05-27
Payer: COMMERCIAL

## 2025-05-27 VITALS
OXYGEN SATURATION: 97 % | SYSTOLIC BLOOD PRESSURE: 127 MMHG | RESPIRATION RATE: 18 BRPM | DIASTOLIC BLOOD PRESSURE: 85 MMHG | TEMPERATURE: 98.2 F | HEART RATE: 104 BPM

## 2025-05-27 DIAGNOSIS — R78.81 MSSA BACTEREMIA: Primary | ICD-10-CM

## 2025-05-27 DIAGNOSIS — M86.172 OSTEOMYELITIS OF ANKLE OR FOOT, ACUTE, LEFT (HCC): ICD-10-CM

## 2025-05-27 DIAGNOSIS — B95.61 MSSA BACTEREMIA: Primary | ICD-10-CM

## 2025-05-27 PROCEDURE — 96365 THER/PROPH/DIAG IV INF INIT: CPT

## 2025-05-27 PROCEDURE — 700111 HCHG RX REV CODE 636 W/ 250 OVERRIDE (IP): Mod: JZ | Performed by: INTERNAL MEDICINE

## 2025-05-27 PROCEDURE — 700105 HCHG RX REV CODE 258: Performed by: INTERNAL MEDICINE

## 2025-05-27 RX ORDER — 0.9 % SODIUM CHLORIDE 0.9 %
10 VIAL (ML) INJECTION PRN
Status: CANCELLED | OUTPATIENT
Start: 2025-05-28

## 2025-05-27 RX ORDER — 0.9 % SODIUM CHLORIDE 0.9 %
3 VIAL (ML) INJECTION PRN
Status: CANCELLED | OUTPATIENT
Start: 2025-05-28

## 2025-05-27 RX ORDER — 0.9 % SODIUM CHLORIDE 0.9 %
VIAL (ML) INJECTION PRN
Status: CANCELLED | OUTPATIENT
Start: 2025-05-28

## 2025-05-27 RX ADMIN — ERTAPENEM 1000 MG: 1 INJECTION, POWDER, LYOPHILIZED, FOR SOLUTION INTRAMUSCULAR; INTRAVENOUS at 17:01

## 2025-05-27 NOTE — TELEPHONE ENCOUNTER
Reviewed labs and noted continued elevated (highest on 5/6 glucose of 505) and  increase in creatinine.  It is unlikely this is due to his ertapenem as he has been on that since April.  The patient has diabetes and is on insulin.  Reached out to the infusion team to tell them notify the patient, make sure he is monitoring his sugar intake, and glucose levels, taking his insulin as prescribed and recommend he should follow-up with his PCP regarding diabetes management as soon as possible  [facility] Infusion that he has been advised of above and has not FU  Repeat BMP and if creat not improved-go to ER

## 2025-05-27 NOTE — PROGRESS NOTES
Pt arrived ambulatory for daily Ertapenem, states he is running out of insulin, encouraged pt to call asap for make a primary care appt, gave pt the number and he said he would call tomorrow.  Left PICC flushed with good blood return, labs drawn. Ertapenem infused over 30 minutes, tolerated well, no reaction noted.  Dc'd home without incident and will f/u tomorrow as scheduled.   1800 - Labs reviewed, sent message to Dr. Muñoz to review worsening kidney function and continued hyperglycemia.   5/27 at 1200 - Lab results reviewed with Dr. Holt, asked to repeat BMP this Thursday, order placed.

## 2025-05-28 ENCOUNTER — OUTPATIENT INFUSION SERVICES (OUTPATIENT)
Dept: ONCOLOGY | Facility: MEDICAL CENTER | Age: 55
End: 2025-05-28
Attending: INTERNAL MEDICINE

## 2025-05-28 VITALS
SYSTOLIC BLOOD PRESSURE: 157 MMHG | RESPIRATION RATE: 8 BRPM | HEART RATE: 109 BPM | OXYGEN SATURATION: 96 % | TEMPERATURE: 98.7 F | DIASTOLIC BLOOD PRESSURE: 103 MMHG

## 2025-05-28 DIAGNOSIS — M86.172 OSTEOMYELITIS OF ANKLE OR FOOT, ACUTE, LEFT (HCC): ICD-10-CM

## 2025-05-28 DIAGNOSIS — R78.81 MSSA BACTEREMIA: Primary | ICD-10-CM

## 2025-05-28 DIAGNOSIS — B95.61 MSSA BACTEREMIA: Primary | ICD-10-CM

## 2025-05-28 PROCEDURE — 700111 HCHG RX REV CODE 636 W/ 250 OVERRIDE (IP): Mod: JZ | Performed by: INTERNAL MEDICINE

## 2025-05-28 PROCEDURE — 96365 THER/PROPH/DIAG IV INF INIT: CPT

## 2025-05-28 PROCEDURE — 700105 HCHG RX REV CODE 258: Performed by: INTERNAL MEDICINE

## 2025-05-28 RX ORDER — 0.9 % SODIUM CHLORIDE 0.9 %
10 VIAL (ML) INJECTION PRN
Status: CANCELLED | OUTPATIENT
Start: 2025-05-29

## 2025-05-28 RX ORDER — 0.9 % SODIUM CHLORIDE 0.9 %
VIAL (ML) INJECTION PRN
Status: CANCELLED | OUTPATIENT
Start: 2025-05-29

## 2025-05-28 RX ORDER — 0.9 % SODIUM CHLORIDE 0.9 %
3 VIAL (ML) INJECTION PRN
Status: CANCELLED | OUTPATIENT
Start: 2025-05-29

## 2025-05-28 RX ADMIN — ERTAPENEM 1000 MG: 1 INJECTION, POWDER, LYOPHILIZED, FOR SOLUTION INTRAMUSCULAR; INTRAVENOUS at 16:58

## 2025-05-28 NOTE — PROGRESS NOTES
Patient presents to infusion for daily INVanz infusion for osteomyelitis. Patient reports feeling at baseline today, has tolerated IV antibiotics well thus far with no adverse effects.     PICC line flushed with NS with positive blood return.    INVanz infused over 30 minutes, patient tolerated well with no adverse effects.     PICC flushed post infusion with positive blood return. PICC dressing and clave changed with sterile technique.  Patient discharged in stable condition, knows when to return for appointment tomorrow.

## 2025-05-29 ENCOUNTER — OUTPATIENT INFUSION SERVICES (OUTPATIENT)
Dept: ONCOLOGY | Facility: MEDICAL CENTER | Age: 55
End: 2025-05-29
Attending: INTERNAL MEDICINE

## 2025-05-29 VITALS
TEMPERATURE: 98 F | RESPIRATION RATE: 18 BRPM | DIASTOLIC BLOOD PRESSURE: 72 MMHG | OXYGEN SATURATION: 95 % | HEART RATE: 103 BPM | SYSTOLIC BLOOD PRESSURE: 112 MMHG

## 2025-05-29 DIAGNOSIS — R78.81 MSSA BACTEREMIA: Primary | ICD-10-CM

## 2025-05-29 DIAGNOSIS — B95.61 MSSA BACTEREMIA: Primary | ICD-10-CM

## 2025-05-29 DIAGNOSIS — M86.172 OSTEOMYELITIS OF ANKLE OR FOOT, ACUTE, LEFT (HCC): ICD-10-CM

## 2025-05-29 LAB
ALBUMIN SERPL BCP-MCNC: 3.7 G/DL (ref 3.2–4.9)
ALBUMIN/GLOB SERPL: 1.3 G/DL
ALP SERPL-CCNC: 109 U/L (ref 30–99)
ALT SERPL-CCNC: 15 U/L (ref 2–50)
ANION GAP SERPL CALC-SCNC: 10 MMOL/L (ref 7–16)
AST SERPL-CCNC: 19 U/L (ref 12–45)
BILIRUB SERPL-MCNC: 0.3 MG/DL (ref 0.1–1.5)
BUN SERPL-MCNC: 31 MG/DL (ref 8–22)
CALCIUM ALBUM COR SERPL-MCNC: 9.9 MG/DL (ref 8.5–10.5)
CALCIUM SERPL-MCNC: 9.7 MG/DL (ref 8.5–10.5)
CHLORIDE SERPL-SCNC: 109 MMOL/L (ref 96–112)
CO2 SERPL-SCNC: 24 MMOL/L (ref 20–33)
CREAT SERPL-MCNC: 1.41 MG/DL (ref 0.5–1.4)
GFR SERPLBLD CREATININE-BSD FMLA CKD-EPI: 59 ML/MIN/1.73 M 2
GLOBULIN SER CALC-MCNC: 2.9 G/DL (ref 1.9–3.5)
GLUCOSE SERPL-MCNC: 110 MG/DL (ref 65–99)
POTASSIUM SERPL-SCNC: 5 MMOL/L (ref 3.6–5.5)
PROT SERPL-MCNC: 6.6 G/DL (ref 6–8.2)
SODIUM SERPL-SCNC: 143 MMOL/L (ref 135–145)

## 2025-05-29 PROCEDURE — 700111 HCHG RX REV CODE 636 W/ 250 OVERRIDE (IP): Mod: JZ | Performed by: INTERNAL MEDICINE

## 2025-05-29 PROCEDURE — 80053 COMPREHEN METABOLIC PANEL: CPT

## 2025-05-29 PROCEDURE — 96365 THER/PROPH/DIAG IV INF INIT: CPT

## 2025-05-29 PROCEDURE — 700105 HCHG RX REV CODE 258: Performed by: INTERNAL MEDICINE

## 2025-05-29 RX ORDER — 0.9 % SODIUM CHLORIDE 0.9 %
3 VIAL (ML) INJECTION PRN
Status: CANCELLED | OUTPATIENT
Start: 2025-05-30

## 2025-05-29 RX ORDER — 0.9 % SODIUM CHLORIDE 0.9 %
10 VIAL (ML) INJECTION PRN
Status: CANCELLED | OUTPATIENT
Start: 2025-05-30

## 2025-05-29 RX ORDER — 0.9 % SODIUM CHLORIDE 0.9 %
VIAL (ML) INJECTION PRN
Status: CANCELLED | OUTPATIENT
Start: 2025-05-30

## 2025-05-29 RX ADMIN — ERTAPENEM 1000 MG: 1 INJECTION, POWDER, LYOPHILIZED, FOR SOLUTION INTRAMUSCULAR; INTRAVENOUS at 17:03

## 2025-05-29 NOTE — PROGRESS NOTES
Ja presents to infusion for daily Invanz for osteomyelitis. Patient reports feeling well today, has tolerated IV antibiotics well thus far with no adverse effects.      PICC line flushed with NS with positive blood return.     Invanz infused over 30 minutes, patient tolerated well with no adverse effects.      PICC flushed post infusion with positive blood return.   Patient left in stable condition, knows when to return for appointment tomorrow.

## 2025-05-30 ENCOUNTER — OUTPATIENT INFUSION SERVICES (OUTPATIENT)
Dept: ONCOLOGY | Facility: MEDICAL CENTER | Age: 55
End: 2025-05-30
Attending: INTERNAL MEDICINE

## 2025-05-30 VITALS
WEIGHT: 202.82 LBS | OXYGEN SATURATION: 96 % | SYSTOLIC BLOOD PRESSURE: 112 MMHG | BODY MASS INDEX: 27.47 KG/M2 | HEART RATE: 106 BPM | HEIGHT: 72 IN | DIASTOLIC BLOOD PRESSURE: 79 MMHG | RESPIRATION RATE: 17 BRPM | TEMPERATURE: 98.4 F

## 2025-05-30 DIAGNOSIS — B95.61 MSSA BACTEREMIA: Primary | ICD-10-CM

## 2025-05-30 DIAGNOSIS — M86.172 OSTEOMYELITIS OF ANKLE OR FOOT, ACUTE, LEFT (HCC): ICD-10-CM

## 2025-05-30 DIAGNOSIS — R78.81 MSSA BACTEREMIA: Primary | ICD-10-CM

## 2025-05-30 PROCEDURE — 96365 THER/PROPH/DIAG IV INF INIT: CPT

## 2025-05-30 PROCEDURE — 700105 HCHG RX REV CODE 258: Performed by: INTERNAL MEDICINE

## 2025-05-30 PROCEDURE — 700111 HCHG RX REV CODE 636 W/ 250 OVERRIDE (IP): Mod: JZ | Performed by: INTERNAL MEDICINE

## 2025-05-30 RX ORDER — 0.9 % SODIUM CHLORIDE 0.9 %
10 VIAL (ML) INJECTION PRN
Status: CANCELLED | OUTPATIENT
Start: 2025-05-31

## 2025-05-30 RX ORDER — 0.9 % SODIUM CHLORIDE 0.9 %
3 VIAL (ML) INJECTION PRN
Status: CANCELLED | OUTPATIENT
Start: 2025-05-31

## 2025-05-30 RX ORDER — 0.9 % SODIUM CHLORIDE 0.9 %
VIAL (ML) INJECTION PRN
Status: CANCELLED | OUTPATIENT
Start: 2025-05-31

## 2025-05-30 RX ADMIN — ERTAPENEM 1000 MG: 1 INJECTION, POWDER, LYOPHILIZED, FOR SOLUTION INTRAMUSCULAR; INTRAVENOUS at 16:54

## 2025-05-30 ASSESSMENT — FIBROSIS 4 INDEX
FIB4 SCORE: 1.15
FIB4 SCORE: 1.15

## 2025-05-30 NOTE — PROGRESS NOTES
Pt returns to [facility] for Invanz for left foot infection.  Pt reports left foot pain is rated 3/10 today.  Order to draw CMP today.  LUE PICC flushed with NS and brisk blood return observed.  Labs drawn via PICC.  Invanz infused without adverse reaction.  PICC flushed with NS and line secured with sleeve.  Confirmed tomorrow's appt time with pt.  Pt dc home to self care.

## 2025-05-31 ENCOUNTER — OUTPATIENT INFUSION SERVICES (OUTPATIENT)
Dept: ONCOLOGY | Facility: MEDICAL CENTER | Age: 55
End: 2025-05-31
Attending: INTERNAL MEDICINE

## 2025-05-31 VITALS
WEIGHT: 205.69 LBS | HEIGHT: 72 IN | HEART RATE: 110 BPM | DIASTOLIC BLOOD PRESSURE: 98 MMHG | OXYGEN SATURATION: 97 % | TEMPERATURE: 98.5 F | SYSTOLIC BLOOD PRESSURE: 149 MMHG | BODY MASS INDEX: 27.86 KG/M2 | RESPIRATION RATE: 16 BRPM

## 2025-05-31 DIAGNOSIS — B95.61 MSSA BACTEREMIA: Primary | ICD-10-CM

## 2025-05-31 DIAGNOSIS — M86.172 OSTEOMYELITIS OF ANKLE OR FOOT, ACUTE, LEFT (HCC): ICD-10-CM

## 2025-05-31 DIAGNOSIS — R78.81 MSSA BACTEREMIA: Primary | ICD-10-CM

## 2025-05-31 PROCEDURE — 700111 HCHG RX REV CODE 636 W/ 250 OVERRIDE (IP): Mod: JZ | Performed by: INTERNAL MEDICINE

## 2025-05-31 PROCEDURE — 700105 HCHG RX REV CODE 258: Performed by: INTERNAL MEDICINE

## 2025-05-31 PROCEDURE — 96365 THER/PROPH/DIAG IV INF INIT: CPT

## 2025-05-31 RX ORDER — 0.9 % SODIUM CHLORIDE 0.9 %
10 VIAL (ML) INJECTION PRN
OUTPATIENT
Start: 2025-06-01

## 2025-05-31 RX ORDER — 0.9 % SODIUM CHLORIDE 0.9 %
VIAL (ML) INJECTION PRN
OUTPATIENT
Start: 2025-06-01

## 2025-05-31 RX ORDER — 0.9 % SODIUM CHLORIDE 0.9 %
3 VIAL (ML) INJECTION PRN
OUTPATIENT
Start: 2025-06-01

## 2025-05-31 RX ADMIN — ERTAPENEM 1000 MG: 1 INJECTION, POWDER, LYOPHILIZED, FOR SOLUTION INTRAMUSCULAR; INTRAVENOUS at 16:43

## 2025-05-31 ASSESSMENT — FIBROSIS 4 INDEX: FIB4 SCORE: 1.15

## 2025-06-01 ENCOUNTER — OUTPATIENT INFUSION SERVICES (OUTPATIENT)
Dept: ONCOLOGY | Facility: MEDICAL CENTER | Age: 55
End: 2025-06-01
Attending: INTERNAL MEDICINE

## 2025-06-01 VITALS
SYSTOLIC BLOOD PRESSURE: 122 MMHG | DIASTOLIC BLOOD PRESSURE: 74 MMHG | HEART RATE: 105 BPM | TEMPERATURE: 98.7 F | OXYGEN SATURATION: 97 % | RESPIRATION RATE: 18 BRPM

## 2025-06-01 DIAGNOSIS — M86.172 OSTEOMYELITIS OF ANKLE OR FOOT, ACUTE, LEFT (HCC): ICD-10-CM

## 2025-06-01 DIAGNOSIS — B95.61 MSSA BACTEREMIA: Primary | ICD-10-CM

## 2025-06-01 DIAGNOSIS — R78.81 MSSA BACTEREMIA: Primary | ICD-10-CM

## 2025-06-01 PROCEDURE — 700105 HCHG RX REV CODE 258: Performed by: INTERNAL MEDICINE

## 2025-06-01 PROCEDURE — 96365 THER/PROPH/DIAG IV INF INIT: CPT

## 2025-06-01 PROCEDURE — 700111 HCHG RX REV CODE 636 W/ 250 OVERRIDE (IP): Mod: JZ | Performed by: INTERNAL MEDICINE

## 2025-06-01 RX ORDER — 0.9 % SODIUM CHLORIDE 0.9 %
10 VIAL (ML) INJECTION PRN
Status: CANCELLED | OUTPATIENT
Start: 2025-06-02

## 2025-06-01 RX ORDER — 0.9 % SODIUM CHLORIDE 0.9 %
VIAL (ML) INJECTION PRN
Status: CANCELLED | OUTPATIENT
Start: 2025-06-02

## 2025-06-01 RX ORDER — 0.9 % SODIUM CHLORIDE 0.9 %
3 VIAL (ML) INJECTION PRN
Status: CANCELLED | OUTPATIENT
Start: 2025-06-02

## 2025-06-01 RX ADMIN — ERTAPENEM 1000 MG: 1 INJECTION, POWDER, LYOPHILIZED, FOR SOLUTION INTRAMUSCULAR; INTRAVENOUS at 16:50

## 2025-06-01 NOTE — PROGRESS NOTES
Ja presents to infusion for daily ertapenem for osteomyelitis of left ankle. Patient reports feeling well today, has tolerated IV antibiotics well thus far.    PICC line flushed with NS with positive blood return.    Ertapenem infused over 30 minutes, patient tolerated well with no adverse effects.     PICC flushed post infusion with positive blood return.   Patient left in stable condition, knows when to return for appt tomorrow.

## 2025-06-02 ENCOUNTER — OUTPATIENT INFUSION SERVICES (OUTPATIENT)
Dept: ONCOLOGY | Facility: MEDICAL CENTER | Age: 55
End: 2025-06-02
Attending: INTERNAL MEDICINE

## 2025-06-02 VITALS
WEIGHT: 205.69 LBS | TEMPERATURE: 98.2 F | HEART RATE: 115 BPM | OXYGEN SATURATION: 96 % | BODY MASS INDEX: 27.56 KG/M2 | RESPIRATION RATE: 17 BRPM | DIASTOLIC BLOOD PRESSURE: 87 MMHG | SYSTOLIC BLOOD PRESSURE: 125 MMHG

## 2025-06-02 DIAGNOSIS — B95.61 MSSA BACTEREMIA: Primary | ICD-10-CM

## 2025-06-02 DIAGNOSIS — M86.172 OSTEOMYELITIS OF ANKLE OR FOOT, ACUTE, LEFT (HCC): ICD-10-CM

## 2025-06-02 DIAGNOSIS — R78.81 MSSA BACTEREMIA: Primary | ICD-10-CM

## 2025-06-02 LAB
ALBUMIN SERPL BCP-MCNC: 3.8 G/DL (ref 3.2–4.9)
ALBUMIN/GLOB SERPL: 1.3 G/DL
ALP SERPL-CCNC: 104 U/L (ref 30–99)
ALT SERPL-CCNC: 12 U/L (ref 2–50)
ANION GAP SERPL CALC-SCNC: 13 MMOL/L (ref 7–16)
AST SERPL-CCNC: 17 U/L (ref 12–45)
BASOPHILS # BLD AUTO: 0.3 % (ref 0–1.8)
BASOPHILS # BLD: 0.02 K/UL (ref 0–0.12)
BILIRUB SERPL-MCNC: 0.3 MG/DL (ref 0.1–1.5)
BUN SERPL-MCNC: 26 MG/DL (ref 8–22)
CALCIUM ALBUM COR SERPL-MCNC: 9.5 MG/DL (ref 8.5–10.5)
CALCIUM SERPL-MCNC: 9.3 MG/DL (ref 8.5–10.5)
CHLORIDE SERPL-SCNC: 106 MMOL/L (ref 96–112)
CO2 SERPL-SCNC: 21 MMOL/L (ref 20–33)
CREAT SERPL-MCNC: 1.32 MG/DL (ref 0.5–1.4)
CRP SERPL HS-MCNC: 0.33 MG/DL (ref 0–0.75)
EOSINOPHIL # BLD AUTO: 0.13 K/UL (ref 0–0.51)
EOSINOPHIL NFR BLD: 2 % (ref 0–6.9)
ERYTHROCYTE [DISTWIDTH] IN BLOOD BY AUTOMATED COUNT: 40.1 FL (ref 35.9–50)
ERYTHROCYTE [SEDIMENTATION RATE] IN BLOOD BY WESTERGREN METHOD: 27 MM/HOUR (ref 0–20)
GFR SERPLBLD CREATININE-BSD FMLA CKD-EPI: 64 ML/MIN/1.73 M 2
GLOBULIN SER CALC-MCNC: 2.9 G/DL (ref 1.9–3.5)
GLUCOSE SERPL-MCNC: 152 MG/DL (ref 65–99)
HCT VFR BLD AUTO: 42.2 % (ref 42–52)
HGB BLD-MCNC: 13.5 G/DL (ref 14–18)
IMM GRANULOCYTES # BLD AUTO: 0.03 K/UL (ref 0–0.11)
IMM GRANULOCYTES NFR BLD AUTO: 0.5 % (ref 0–0.9)
LYMPHOCYTES # BLD AUTO: 1.72 K/UL (ref 1–4.8)
LYMPHOCYTES NFR BLD: 26.4 % (ref 22–41)
MCH RBC QN AUTO: 27.2 PG (ref 27–33)
MCHC RBC AUTO-ENTMCNC: 32 G/DL (ref 32.3–36.5)
MCV RBC AUTO: 85.1 FL (ref 81.4–97.8)
MONOCYTES # BLD AUTO: 0.52 K/UL (ref 0–0.85)
MONOCYTES NFR BLD AUTO: 8 % (ref 0–13.4)
NEUTROPHILS # BLD AUTO: 4.1 K/UL (ref 1.82–7.42)
NEUTROPHILS NFR BLD: 62.8 % (ref 44–72)
NRBC # BLD AUTO: 0 K/UL
NRBC BLD-RTO: 0 /100 WBC (ref 0–0.2)
OUTPT INFUS CBC COMMENT OICOM: ABNORMAL
PLATELET # BLD AUTO: 236 K/UL (ref 164–446)
PMV BLD AUTO: 10 FL (ref 9–12.9)
POTASSIUM SERPL-SCNC: 4.1 MMOL/L (ref 3.6–5.5)
PROT SERPL-MCNC: 6.7 G/DL (ref 6–8.2)
RBC # BLD AUTO: 4.96 M/UL (ref 4.7–6.1)
SODIUM SERPL-SCNC: 140 MMOL/L (ref 135–145)
WBC # BLD AUTO: 6.5 K/UL (ref 4.8–10.8)

## 2025-06-02 PROCEDURE — 85652 RBC SED RATE AUTOMATED: CPT

## 2025-06-02 PROCEDURE — 96365 THER/PROPH/DIAG IV INF INIT: CPT

## 2025-06-02 PROCEDURE — 700105 HCHG RX REV CODE 258: Performed by: INTERNAL MEDICINE

## 2025-06-02 PROCEDURE — 80053 COMPREHEN METABOLIC PANEL: CPT

## 2025-06-02 PROCEDURE — 85025 COMPLETE CBC W/AUTO DIFF WBC: CPT

## 2025-06-02 PROCEDURE — 86140 C-REACTIVE PROTEIN: CPT

## 2025-06-02 PROCEDURE — 700111 HCHG RX REV CODE 636 W/ 250 OVERRIDE (IP): Mod: JZ | Performed by: INTERNAL MEDICINE

## 2025-06-02 RX ORDER — 0.9 % SODIUM CHLORIDE 0.9 %
3 VIAL (ML) INJECTION PRN
Status: CANCELLED | OUTPATIENT
Start: 2025-06-03

## 2025-06-02 RX ORDER — 0.9 % SODIUM CHLORIDE 0.9 %
10 VIAL (ML) INJECTION PRN
Status: CANCELLED | OUTPATIENT
Start: 2025-06-03

## 2025-06-02 RX ORDER — 0.9 % SODIUM CHLORIDE 0.9 %
VIAL (ML) INJECTION PRN
Status: CANCELLED | OUTPATIENT
Start: 2025-06-03

## 2025-06-02 RX ADMIN — ERTAPENEM 1000 MG: 1 INJECTION, POWDER, LYOPHILIZED, FOR SOLUTION INTRAMUSCULAR; INTRAVENOUS at 16:51

## 2025-06-02 ASSESSMENT — FIBROSIS 4 INDEX: FIB4 SCORE: 1.15

## 2025-06-02 NOTE — PROGRESS NOTES
Ja into IS for daily ertapenem infusion for osteomyelitis of left ankle. Ja voices no new complaints.  LUE single lumen PICC with brisk blood return and flushing without difficulty.  Ertapenem infused as ordered.  PICC with + blood return post infusion, flushed per policy, clave wrapped in gauze and protective sleeve applied. Verified next appointment and Ja off unit in [facility].

## 2025-06-03 ENCOUNTER — OUTPATIENT INFUSION SERVICES (OUTPATIENT)
Dept: ONCOLOGY | Facility: MEDICAL CENTER | Age: 55
End: 2025-06-03
Attending: INTERNAL MEDICINE

## 2025-06-03 ENCOUNTER — PHARMACY VISIT (OUTPATIENT)
Dept: PHARMACY | Facility: MEDICAL CENTER | Age: 55
End: 2025-06-03
Payer: COMMERCIAL

## 2025-06-03 ENCOUNTER — OFFICE VISIT (OUTPATIENT)
Dept: ENDOCRINOLOGY | Facility: MEDICAL CENTER | Age: 55
End: 2025-06-03

## 2025-06-03 VITALS
HEART RATE: 108 BPM | BODY MASS INDEX: 27.04 KG/M2 | OXYGEN SATURATION: 98 % | HEIGHT: 73 IN | SYSTOLIC BLOOD PRESSURE: 122 MMHG | WEIGHT: 204 LBS | DIASTOLIC BLOOD PRESSURE: 80 MMHG

## 2025-06-03 VITALS
TEMPERATURE: 98 F | DIASTOLIC BLOOD PRESSURE: 76 MMHG | RESPIRATION RATE: 18 BRPM | HEART RATE: 101 BPM | OXYGEN SATURATION: 96 % | SYSTOLIC BLOOD PRESSURE: 116 MMHG

## 2025-06-03 DIAGNOSIS — Z79.84 LONG TERM (CURRENT) USE OF ORAL HYPOGLYCEMIC DRUGS: ICD-10-CM

## 2025-06-03 DIAGNOSIS — B95.61 MSSA BACTEREMIA: Primary | ICD-10-CM

## 2025-06-03 DIAGNOSIS — R78.81 MSSA BACTEREMIA: Primary | ICD-10-CM

## 2025-06-03 DIAGNOSIS — E11.65 TYPE 2 DIABETES MELLITUS WITH HYPERGLYCEMIA, WITH LONG-TERM CURRENT USE OF INSULIN (HCC): Primary | ICD-10-CM

## 2025-06-03 DIAGNOSIS — M86.172 OSTEOMYELITIS OF ANKLE OR FOOT, ACUTE, LEFT (HCC): ICD-10-CM

## 2025-06-03 DIAGNOSIS — I10 PRIMARY HYPERTENSION: ICD-10-CM

## 2025-06-03 DIAGNOSIS — Z79.4 TYPE 2 DIABETES MELLITUS WITH HYPERGLYCEMIA, WITH LONG-TERM CURRENT USE OF INSULIN (HCC): Primary | ICD-10-CM

## 2025-06-03 PROCEDURE — 99211 OFF/OP EST MAY X REQ PHY/QHP: CPT

## 2025-06-03 PROCEDURE — 3074F SYST BP LT 130 MM HG: CPT

## 2025-06-03 PROCEDURE — 99204 OFFICE O/P NEW MOD 45 MIN: CPT

## 2025-06-03 PROCEDURE — 99213 OFFICE O/P EST LOW 20 MIN: CPT

## 2025-06-03 PROCEDURE — 700111 HCHG RX REV CODE 636 W/ 250 OVERRIDE (IP): Mod: JZ | Performed by: INTERNAL MEDICINE

## 2025-06-03 PROCEDURE — 96365 THER/PROPH/DIAG IV INF INIT: CPT

## 2025-06-03 PROCEDURE — RXMED WILLOW AMBULATORY MEDICATION CHARGE

## 2025-06-03 PROCEDURE — 96374 THER/PROPH/DIAG INJ IV PUSH: CPT

## 2025-06-03 PROCEDURE — 3079F DIAST BP 80-89 MM HG: CPT

## 2025-06-03 PROCEDURE — 700105 HCHG RX REV CODE 258: Performed by: INTERNAL MEDICINE

## 2025-06-03 RX ORDER — 0.9 % SODIUM CHLORIDE 0.9 %
10 VIAL (ML) INJECTION PRN
Status: CANCELLED | OUTPATIENT
Start: 2025-06-04

## 2025-06-03 RX ORDER — 0.9 % SODIUM CHLORIDE 0.9 %
VIAL (ML) INJECTION PRN
Status: CANCELLED | OUTPATIENT
Start: 2025-06-04

## 2025-06-03 RX ORDER — 0.9 % SODIUM CHLORIDE 0.9 %
3 VIAL (ML) INJECTION PRN
Status: CANCELLED | OUTPATIENT
Start: 2025-06-04

## 2025-06-03 RX ORDER — SEMAGLUTIDE 0.68 MG/ML
0.5 INJECTION, SOLUTION SUBCUTANEOUS
Qty: 3 ML | Refills: 0 | COMMUNITY
Start: 2025-06-03

## 2025-06-03 RX ADMIN — ERTAPENEM SODIUM 1000 MG: 1 INJECTION, POWDER, LYOPHILIZED, FOR SOLUTION INTRAMUSCULAR; INTRAVENOUS at 17:07

## 2025-06-03 ASSESSMENT — FIBROSIS 4 INDEX: FIB4 SCORE: 1.14

## 2025-06-03 NOTE — PROGRESS NOTES
Chief Complaint:  Consult requested by Kojo Covington M.D. for initial evaluation of Type 2 Diabetes Mellitus    HPI:   Donald Fuller is a 54 y.o. male with Type 2 Diabetes Mellitus diagnosed in 40 years ago.   He denies hospitalizations for DKA in the past.  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     He monitors blood glucose  3 times per day. 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     He denies episodes of severe hypoglycemia requiring third party assistance.      He  is not wearing a medical alert bracelet or necklace.      He does not a glucagon emergency kit.    He denies attending diabetes education classes.  Diet: meats, eggs, salads, salmon.    Diabetes Complications   He  denies history of retinopathy.    He reports laser eye surgery.   Last eye exam: in clinic today    He reports history of peripheral sensory neuropathy.    He reports numbness, tingling in both feet and hands  He denies history of foot sores.     He reports history of kidney damage.    He is on ACE inhibitor or ARB.   Currently on losartan 50 mg daily  BP: 122/80   Latest Reference Range & Units 25 16:50   Bun 8 - 22 mg/dL 26 (H)   Creatinine 0.50 - 1.40 mg/dL 1.32   GFR (CKD-EPI) >60 mL/min/1.73 m 2 64     He denies history of coronary artery disease.    He  denies history of stroke and denies TIA.    He denies history of PAD.  He reports history of  hyperlipidemia.  Currently taking atorvastatin 20 mg daily      ROS:     CONS:     No fever, no chills, no weight loss, no fatigue   EYES:      No diplopia, no blurry vision, no redness of eyes, no swelling of eyelids   ENT:    No hearing loss, No ear pain, No sore throat, no dysphagia, no neck swelling   CV:     No chest pain, no palpitations, no claudication, no orthopnea, no PND   PULM:    No SOB, no cough, no hemoptysis, no wheezing    GI:   No nausea, no vomiting, no diarrhea, no constipation, no bloody stools   :  Passing urine well, no dysuria, no hematuria   ENDO:   No polyuria, no polydipsia, no heat intolerance, no cold intolerance   NEURO: No headaches, no dizziness, no convulsions, no tremors   MUSC:  No joint swellings, no arthralgias, no myalgias, no weakness   SKIN:   No rash, no ulcers, no dry skin   PSYCH:   No depression, no anxiety, no difficulty sleeping       Past Medical History:  Patient Active Problem List    Diagnosis Date Noted    Sepsis ([facility]) 04/21/2025    MSSA bacteremia 04/21/2025    Osteomyelitis of ankle or foot, acute, left ([facility]) 04/20/2025    Hypertension 02/28/2025    Diabetic ulcer of left midfoot associated with type 2 diabetes mellitus, with bone involvement without evidence of necrosis ([facility]) 02/27/2025    Acute renal failure ([facility]) 02/27/2025    Lactic acidosis 02/27/2025    Diabetic ulcer of left midfoot associated with type 2 diabetes mellitus, with muscle involvement without evidence of necrosis ([facility]) 01/08/2025    Anxiety 01/24/2023    Syncope 01/24/2023    History of neck surgery 12/14/2022    Skin rash 12/14/2022    Cherry angioma 12/14/2022    Benign mole 12/14/2022    Hyponatremia 10/18/2022    Type 2 diabetes mellitus with hyperglycemia, with long-term current use of insulin ([facility]) 09/06/2022    Erectile dysfunction 08/26/2022    Family history of breast cancer in sister 08/26/2022       Past Surgical History:  Past Surgical History[1]     Allergies:  Patient has no  known allergies.     Current Medications:  Current Medications[2]    Social History:  Social History     Socioeconomic History    Marital status: Single     Spouse name: Not on file    Number of children: Not on file    Years of education: Not on file    Highest education level: Bachelor's degree (e.g., BA, AB, BS)   Occupational History    Not on file   Tobacco Use    Smoking status: Never    Smokeless tobacco: Never   Vaping Use    Vaping status: Some Days    Substances: Nicotine   Substance and Sexual Activity    Alcohol use: Never    Drug use: Never    Sexual activity: Never   Other Topics Concern    Not on file   Social History Narrative    Not on file     Social Drivers of Health     Financial Resource Strain: Low Risk  (8/25/2022)    Overall Financial Resource Strain (CARDIA)     Difficulty of Paying Living Expenses: Not hard at all   Food Insecurity: No Food Insecurity (4/21/2025)    Hunger Vital Sign     Worried About Running Out of Food in the Last Year: Never true     Ran Out of Food in the Last Year: Never true   Transportation Needs: No Transportation Needs (4/21/2025)    PRAPARE - Transportation     Lack of Transportation (Medical): No     Lack of Transportation (Non-Medical): No   Physical Activity: Inactive (8/25/2022)    Exercise Vital Sign     Days of Exercise per Week: 0 days     Minutes of Exercise per Session: 30 min   Stress: No Stress Concern Present (8/25/2022)    Citizen of [place] [employer] [employer] - Occupational Stress Questionnaire     Feeling of Stress : Not at all   Social Connections: Socially Isolated (8/25/2022)    Social Connection and Isolation Panel [NHANES]     Frequency of Communication with Friends and Family: Once a week     Frequency of Social Gatherings with Friends and Family: Never     Attends Yazidi Services: Never     Active Member of Clubs or Organizations: No     Attends Club or Organization Meetings: Never     Marital Status: Living with partner   Intimate  "Partner Violence: Not At Risk (4/21/2025)    Humiliation, Afraid, Rape, and Kick questionnaire     Fear of Current or Ex-Partner: No     Emotionally Abused: No     Physically Abused: No     Sexually Abused: No   Housing Stability: High Risk (4/21/2025)    Housing Stability Vital Sign     Unable to Pay for Housing in the Last Year: No     Number of Times Moved in the Last Year: 2     Homeless in the Last Year: No        Family History:   History reviewed. No pertinent family history.    PHYSICAL EXAM:   Vital signs: /80 (BP Location: Right arm, Patient Position: Sitting, BP Cuff Size: Adult)   Pulse (!) 108   Ht 1.854 m (6' 1\")   Wt 92.5 kg (204 lb)   SpO2 98%   BMI 26.91 kg/m²   GENERAL: Well-developed, well-nourished  in no apparent distress.   EYE: No ocular and eyelid asymmetry, Anicteric sclerae,  PERRL, No exophthalmos or lidlag  HENT: Hearing grossly intact, Normocephalic, atraumatic. Pink, moist mucous membranes, No exudate  NECK: Supple. Trachea midline.   CARDIOVASCULAR: Regular rate and rhythm. No murmurs, rubs, or gallops.   LUNGS: Clear to auscultation bilaterally   ABDOMEN: Soft, nontender with positive bowel sounds.   EXTREMITIES: No clubbing, cyanosis, or edema.   NEUROLOGICAL: Cranial nerves II-XII are grossly intact   Symmetric reflexes at the patella no proximal muscle weakness, No visible tremor with both outstretched hands  LYMPH: No cervical, supraclavicular,  adenopathy palpated.   SKIN: No rashes, lesions. Turgor is normal.  FOOT: Normal sensation to monofilament testing, normal pulses, no ulcers.  Normal Vibration quantitative sensation test.    Labs:  Lab Results   Component Value Date/Time    HBA1C 12.9 (H) 04/21/2025 0205    AVGLUC 324 04/21/2025 0205       Lab Results   Component Value Date/Time    WBC 6.5 06/02/2025 04:50 PM    RBC 4.96 06/02/2025 04:50 PM    HEMOGLOBIN 13.5 (L) 06/02/2025 04:50 PM    MCV 85.1 06/02/2025 04:50 PM    MCH 27.2 06/02/2025 04:50 PM    MCHC 32.0 " "(L) 06/02/2025 04:50 PM    RDW 40.1 06/02/2025 04:50 PM    MPV 10.0 06/02/2025 04:50 PM       Lab Results   Component Value Date/Time    SODIUM 140 06/02/2025 04:50 PM    POTASSIUM 4.1 06/02/2025 04:50 PM    CHLORIDE 106 06/02/2025 04:50 PM    CO2 21 06/02/2025 04:50 PM    ANION 13.0 06/02/2025 04:50 PM    GLUCOSE 152 (H) 06/02/2025 04:50 PM    BUN 26 (H) 06/02/2025 04:50 PM    CREATININE 1.32 06/02/2025 04:50 PM    CALCIUM 9.3 06/02/2025 04:50 PM    ASTSGOT 17 06/02/2025 04:50 PM    ALTSGPT 12 06/02/2025 04:50 PM    TBILIRUBIN 0.3 06/02/2025 04:50 PM    ALBUMIN 3.8 06/02/2025 04:50 PM    TOTPROTEIN 6.7 06/02/2025 04:50 PM    GLOBULIN 2.9 06/02/2025 04:50 PM    AGRATIO 1.3 06/02/2025 04:50 PM       No results found for: \"CHOLSTRLTOT\", \"TRIGLYCERIDE\", \"HDL\", \"LDL\", \"CHOLHDLRAT\", \"NONHDL\"    No results found for: \"MICROALBCALC\", \"MALBCRT\", \"MALBEXCR\", \"RYEGJK70\", \"MICROALBUR\", \"MICRALB\", \"UMICROALBUM\", \"MICROALBTIM\"     No results found for: \"TSHULTRASEN\"  No results found for: \"FREEDIR\"  No results found for: \"FREET3\"  No results found for: \"THYSTIMIG\"      ASSESSMENT/PLAN:     1. Type 2 diabetes mellitus with hyperglycemia, with long-term current use of insulin (HCC) (Primary)  Unstable  A1c 12.9  Medication:  Lantus 10 units at bedtime - continue  Lispro 6-12 with each meal - continue  Ozempic 0.25 mg x 8 weeks than increase to 0.5 mg weekly - start  Side effects of medicaiton discussed with patient  Patient understands proper administration of the pen  PAP started for ozempic.   Patient started on stelo in clinic today  Patient aware that stelo is over the counter without prescription. He will  stelo to get started.   Recommend diet low in fats and carbs  Recommend 150mins/activity weekly  Stay well hydrated  - Comp Metabolic Panel; Future  - C-PEPTIDE; Future  - HANS-65; Future  - Lipid Profile; Future  - MICROALBUMIN CREAT RATIO URINE; Future  - VITAMIN D,25 HYDROXY (DEFICIENCY); Future  - TSH; Future  - " FREE THYROXINE; Future  - Semaglutide,0.25 or 0.5MG/DOS, (OZEMPIC, 0.25 OR 0.5 MG/DOSE,) 2 MG/3ML Solution Pen-injector; Inject 0.5 mg under the skin every 7 days.  Dispense: 3 mL; Refill: 0    2. Primary hypertension  Stable  Continue current      3. Long term (current) use of oral hypoglycemic drugs  Patient is on oral hypoglycemics for diabetes      Return in about 3 months (around 9/3/2025). Patient will have blood work done prior to follow up in 3 months.        This patient during there office visit was started on new medication.  Side effects of new medications were discussed with the patient today in the office. The patient was supplied paperwork on this new medication.    I spent approximately 60 minutes with the patient face-to-face more than 50% of which time was spent on counseling on the diagnosis and treatment and review of her risks and prognosis.  I discussed current status, physical exam findings, and plan of care.  Answered all of her questions.  The patient understands and agrees with the treatment plan.     Thank you kindly for allowing me to participate in the diabetes care plan for this patient.    Arian Luna, APRN   06/03/25    CC:   Kojo Covington M.D.         [1]   Past Surgical History:  Procedure Laterality Date    WOUND IRRIGATION & DEBRIDEMENT Left 4/23/2025    Procedure: DEBRIDEMENT AND WOUND CLOSURE LEFT FOOT;  Surgeon: Johnathan Weldon M.D.;  Location: [facility];  Service: Orthopedics    FOOT AMPUTATION Left 4/21/2025    Procedure: AMPUTATION REVISION, RAY AMPUTATION;  Surgeon: Nik Andersen M.D.;  Location: [facility];  Service: Orthopedics    FOOT AMPUTATION Left 2/28/2025    Procedure: AMPUTATION, FOOT-4TH AND 5TH RAY RESECTION;  Surgeon: Jason Davis M.D.;  Location: SUR[facility]ERY [facility];  Service: Orthopedics   [2]   Current Outpatient Medications:     Semaglutide,0.25 or 0.5MG/DOS, (OZEMPIC, 0.25 OR 0.5 MG/DOSE,) 2 MG/3ML Solution  "Pen-injector, Inject 0.5 mg under the skin every 7 days., Disp: 3 mL, Rfl: 0    acetaminophen (TYLENOL) 500 MG Tab, Take 2 Tablets by mouth 3 times a day., Disp: 30 Tablet, Rfl: 0    cyclobenzaprine (FLEXERIL) 5 MG tablet, Take 1 Tablet by mouth 3 times a day as needed for Muscle Spasms., Disp: 30 Tablet, Rfl: 0    insulin glargine 100 UNIT/ML SC SOPN injection, Inject 30 Units under the skin every evening., Disp: 9 mL, Rfl: 0    venlafaxine (EFFEXOR) 37.5 MG Tab, Take 37.5 mg by mouth every day., Disp: , Rfl:     insulin lispro 100 UNIT/ML SC SOPN injection PEN, Inject 2-10 Units under the skin 3 times a day before meals. Use 2 units if blood sugar 150-200. 4 units if 200-250, 6 units if 250-300, 8 units if 300-350, 10 units if 350-400, Disp: 3 mL, Rfl: 3    atorvastatin (LIPITOR) 20 MG Tab, Take 1 Tablet by mouth every evening., Disp: 100 Tablet, Rfl: 3    losartan (COZAAR) 50 MG Tab, Take 1 Tablet by mouth every day., Disp: 100 Tablet, Rfl: 3    pioglitazone (ACTOS) 15 MG Tab, Take 1 Tablet by mouth every day., Disp: 100 Tablet, Rfl: 3    Insulin Pen Needle 32 G x 4 mm, Use one pen needle in pen device to inject insulin four times daily., Disp: 100 Each, Rfl: 0    Insulin Syringe-Needle U-100 (INSULIN SYRINGE 1CC/31GX5/16\") 31G X 5/16\" 1 ML Misc, Use as directed for SQ injection of insulin., Disp: 400 Each, Rfl: 3    metformin (GLUCOPHAGE) 1000 MG tablet, Take 1 Tablet by mouth 2 times a day with meals., Disp: 180 Tablet, Rfl: 3    hydrOXYzine HCl (ATARAX) 25 MG Tab, TAKE 1 TABLET BY MOUTH THREE TIMES DAILY AS NEEDED FOR ITCHING OR ANXIETY., Disp: 90 Tablet, Rfl: 3  No current facility-administered medications for this visit.    Facility-Administered Medications Ordered in Other Visits:     ertapenem (INVanz) 1,000 mg in  mL IVPB, 1 g, Intravenous, Once, Marisol Garcia M.D., Last Rate: 200 mL/hr at 06/03/25 1707, 1,000 mg at 06/03/25 1707    "

## 2025-06-03 NOTE — PROGRESS NOTES
Pt presented to [facility] for daily IV ertapenem. POC discussed and pt verbalized agreement, voices no new concerns. PICC in place, flushes with ease and brisk positive blood return noted, dressing CDI. Dressing changed per protocol. Ertapenem administered per MAR. No signs of adverse reaction or complications noted. PICC line flushed per policy, claves wrapped in sterile gauze and line secured in protective sleeve. Pt confirmed tomorrow's appt and discharged ambulatory to self care. Pt in NAD at time of discharge.

## 2025-06-03 NOTE — PROGRESS NOTES
Patient arrived to unit for Invanz.    Labs drawn from PICC. Flushed with 10 mL NS. No erythema, edema, or pain noted.     Invanz administered.    Blood return noted from PICC. Flushed with 20 mL NS. No erythema, edema, or pain noted.     Patient tolerated treatment without any adverse effects. Future appointments confirmed. Patient discharged home in stable condition.

## 2025-06-04 ENCOUNTER — OUTPATIENT INFUSION SERVICES (OUTPATIENT)
Dept: ONCOLOGY | Facility: MEDICAL CENTER | Age: 55
End: 2025-06-04
Attending: INTERNAL MEDICINE

## 2025-06-04 VITALS
TEMPERATURE: 97.6 F | HEART RATE: 104 BPM | OXYGEN SATURATION: 98 % | SYSTOLIC BLOOD PRESSURE: 101 MMHG | RESPIRATION RATE: 18 BRPM | DIASTOLIC BLOOD PRESSURE: 75 MMHG

## 2025-06-04 DIAGNOSIS — B95.61 MSSA BACTEREMIA: Primary | ICD-10-CM

## 2025-06-04 DIAGNOSIS — M86.172 OSTEOMYELITIS OF ANKLE OR FOOT, ACUTE, LEFT (HCC): ICD-10-CM

## 2025-06-04 DIAGNOSIS — R78.81 MSSA BACTEREMIA: Primary | ICD-10-CM

## 2025-06-04 PROCEDURE — 96365 THER/PROPH/DIAG IV INF INIT: CPT

## 2025-06-04 PROCEDURE — 700105 HCHG RX REV CODE 258: Performed by: INTERNAL MEDICINE

## 2025-06-04 PROCEDURE — 700111 HCHG RX REV CODE 636 W/ 250 OVERRIDE (IP): Mod: JZ | Performed by: INTERNAL MEDICINE

## 2025-06-04 RX ORDER — 0.9 % SODIUM CHLORIDE 0.9 %
3 VIAL (ML) INJECTION PRN
Status: CANCELLED | OUTPATIENT
Start: 2025-06-05

## 2025-06-04 RX ORDER — 0.9 % SODIUM CHLORIDE 0.9 %
10 VIAL (ML) INJECTION PRN
Status: CANCELLED | OUTPATIENT
Start: 2025-06-05

## 2025-06-04 RX ORDER — 0.9 % SODIUM CHLORIDE 0.9 %
VIAL (ML) INJECTION PRN
Status: CANCELLED | OUTPATIENT
Start: 2025-06-05

## 2025-06-04 RX ADMIN — ERTAPENEM 1000 MG: 1 INJECTION, POWDER, LYOPHILIZED, FOR SOLUTION INTRAMUSCULAR; INTRAVENOUS at 16:55

## 2025-06-05 ENCOUNTER — OUTPATIENT INFUSION SERVICES (OUTPATIENT)
Dept: ONCOLOGY | Facility: MEDICAL CENTER | Age: 55
End: 2025-06-05
Attending: INTERNAL MEDICINE

## 2025-06-05 VITALS
OXYGEN SATURATION: 95 % | SYSTOLIC BLOOD PRESSURE: 104 MMHG | DIASTOLIC BLOOD PRESSURE: 65 MMHG | HEART RATE: 95 BPM | WEIGHT: 203.93 LBS | RESPIRATION RATE: 18 BRPM | TEMPERATURE: 98.3 F | BODY MASS INDEX: 26.9 KG/M2

## 2025-06-05 DIAGNOSIS — R78.81 MSSA BACTEREMIA: Primary | ICD-10-CM

## 2025-06-05 DIAGNOSIS — M86.172 OSTEOMYELITIS OF ANKLE OR FOOT, ACUTE, LEFT (HCC): ICD-10-CM

## 2025-06-05 DIAGNOSIS — B95.61 MSSA BACTEREMIA: Primary | ICD-10-CM

## 2025-06-05 PROCEDURE — 700111 HCHG RX REV CODE 636 W/ 250 OVERRIDE (IP): Mod: JZ | Performed by: INTERNAL MEDICINE

## 2025-06-05 PROCEDURE — 96365 THER/PROPH/DIAG IV INF INIT: CPT

## 2025-06-05 PROCEDURE — 700105 HCHG RX REV CODE 258: Performed by: INTERNAL MEDICINE

## 2025-06-05 RX ORDER — 0.9 % SODIUM CHLORIDE 0.9 %
10 VIAL (ML) INJECTION PRN
Status: CANCELLED | OUTPATIENT
Start: 2025-06-06

## 2025-06-05 RX ORDER — 0.9 % SODIUM CHLORIDE 0.9 %
VIAL (ML) INJECTION PRN
Status: CANCELLED | OUTPATIENT
Start: 2025-06-06

## 2025-06-05 RX ORDER — 0.9 % SODIUM CHLORIDE 0.9 %
3 VIAL (ML) INJECTION PRN
Status: CANCELLED | OUTPATIENT
Start: 2025-06-06

## 2025-06-05 RX ADMIN — ERTAPENEM 1000 MG: 1 INJECTION, POWDER, LYOPHILIZED, FOR SOLUTION INTRAMUSCULAR; INTRAVENOUS at 17:04

## 2025-06-05 ASSESSMENT — FIBROSIS 4 INDEX: FIB4 SCORE: 1.14

## 2025-06-05 NOTE — PROGRESS NOTES
Mr Fuller is here today for daily ertapenem.     He is in good spirits. Sutures were removed from surgical wound on left foot. He reports wound is healing well.     PICC line was flushed and has good blood return.     Ertapenem given as ordered and was tolerated well.     PICC line flushed and pt discharged in stable condition.

## 2025-06-06 NOTE — PROGRESS NOTES
Pt arrived ambulatory to [facility] for daily Invanz infusion for osteomyelitis. POC discussed with pt and he agrees with plan.      AWA PICC with brisk blood return, Monday labs drawn as ordered, PICC flushed with NS. Pt medicated per MAR. Pt tolerated treatment without s/s adverse reaction. PICC flushed with NS then dc'd as today is pt's final treatment. PICC site wrapped with gauze abd coban. Pt educated on post PICC removal care. Pt verbalized understanding     Pt discharged to self care, NAD. No future appointments at this time. Pt completed treatment.

## 2025-06-16 ENCOUNTER — TELEPHONE (OUTPATIENT)
Dept: INFECTIOUS DISEASES | Facility: MEDICAL CENTER | Age: 55
End: 2025-06-16
Payer: COMMERCIAL

## 2025-06-16 NOTE — TELEPHONE ENCOUNTER
Caller Name: [place]on  Call Back Number: [phone]    How would the patient prefer to be contacted with a response: Phone call OK to leave a detailed message    Pt called and stated that he has been struggling with hiccups for the past 48hr that have been continuous and his wife states he has been hiccuping in his sleep. Per pt's wife she googled his S/S and stated she is fearful as it stated his hiccups could be due to his infection affecting his vagus nerve/diaphragm.     Pt denies nausea, diarrhea, or pain. Pt stated they were unaware of his appt on Friday that is documented as a no show however I reassured the pt APRN was not in clinic that day and we were able to get him rescheduled.     Pt was last seen by Dr. Holt and has not been seen by APRN post discharge.

## 2025-06-16 NOTE — TELEPHONE ENCOUNTER
Phone Number Called: [phone]    Call outcome: Spoke to patient regarding message below.    Message: I called pt to relay MD nunes, pt understood and stated he will contact his PCP.    Marisol Garcia M.D.  Talia Sorto, Med Ass't  Caller: Unspecified (Today,  2:19 PM)  Patient should contact his PCP regarding the hiccups. It could certainly be due to an irritation of the vagus nerve and his PCP may be able to prescribe a medication for this.

## 2025-06-20 ASSESSMENT — ENCOUNTER SYMPTOMS
DOUBLE VISION: 0
COUGH: 0
SHORTNESS OF BREATH: 0
HEADACHES: 0
FOCAL WEAKNESS: 0
NAUSEA: 0
PALPITATIONS: 0
BLURRED VISION: 0
WEIGHT LOSS: 0
BRUISES/BLEEDS EASILY: 0
ABDOMINAL PAIN: 0
FEVER: 0
DIZZINESS: 0
MYALGIAS: 0
VOMITING: 0
CHILLS: 0
SPUTUM PRODUCTION: 0
WHEEZING: 0
NERVOUS/ANXIOUS: 0

## 2025-06-21 NOTE — PROGRESS NOTES
INFECTIOUS  DISEASE  OUTPATIENT CLINIC  NOTE   Subjective   Primary care provider: Kojo Covington M.D..     Reason for Follow Up:   Follow-up for   1. Type 2 diabetes mellitus with hyperglycemia, with long-term current use of insulin ([facility])  Referral to Podiatry      2. Acute renal failure, unspecified acute renal failure type (HCC)          HPI: Patient previously seen and treated by ID team as inpatient during hospital admission.   Donald Fuller is a 54 y.o. male with history of uncontrolled type 2 diabetes mellitus, prior left fifth toe amputation in February admitted on 4/20/2025 secondary to worsening left foot swelling and drainage of prior amputation site. X-ray revealed findings concerning for osteomyelitis. He is now status post left fifth ray amputation with wound VAC placement on 4/21/2025. Hospital course complicated by MSSA bacteremia.  Blood cultures x 2 on 4/20+ MSSA. Repeat blood cultures 1 out of 2 sets on 4/22-MSSA. Repeat blood cultures on 4/24-negative to date.  1 blood cultures that triggered a CO2 production but cultures have been negative.  Return to the OR on 4/23/2025 for repeat I&D and surgical wound closure. OR cultures-MSSA. Pathology-acute osteomyelitis. TTE negative for any valvular abnormalities however there was a semimobile echodensity at the junction of the aorta and brachiocephalic artery. CTA aorta was negative for aortic aneurysm or dissection.  Showed atherosclerotic calcification in the coronary arteries. S/P I&D secondary closure of surgical wound left foot amputation on 4/23/2025.  6-week course of IV ertapenem 1 g daily with end date of 6/5/2025  - MRI C/T and L-spine negative for osteomyelitis    6/24/25-patient following up after completion of his 6-week course of IV ertapenem 1 g daily which ended on 6/5/2025.  Left fifth ray amputation site CDI with no surrounding erythema, swelling.  Previous sutures have been removed.  Small amount of serous drainage on  dressing.  No signs of active infection.  Today patient denies any nausea, vomiting, fever, chills, constipation or diarrhea.  Most recent labs reviewed from 6/2/2025 WBC 6.5, previous anemia resolved, platelets 236, neutrophils WNL, mild elevation in sed rate 27 and trending down, glucose 152, previous DOMINIC resolved, AST/ALT WNL, mild elevation alk phos 104.  CRP 0.33.  Education provided on signs and symptoms of recurrence of infection and when report to ER/call 911.  Referral to podiatry for ongoing footcare.  Follow-up with ID clinic as needed    Current Antimicrobials: None  Previous Antimicrobials: Ertapenem    Other Current Medications:  Home Medications   Medication Sig Taking? Last Dose Authorizing Provider   Semaglutide,0.25 or 0.5MG/DOS, (OZEMPIC, 0.25 OR 0.5 MG/DOSE,) 2 MG/3ML Solution Pen-injector Inject 0.5 mg under the skin every 7 days. Yes  CAROL LalP.RSridharNSridhar   acetaminophen (TYLENOL) 500 MG Tab Take 2 Tablets by mouth 3 times a day. Yes  Eric Douglass M.D.   cyclobenzaprine (FLEXERIL) 5 MG tablet Take 1 Tablet by mouth 3 times a day as needed for Muscle Spasms. Yes  Eric Douglass M.D.   insulin glargine 100 UNIT/ML SC SOPN injection Inject 30 Units under the skin every evening. Yes  Eric Douglass M.D.   venlafaxine (EFFEXOR) 37.5 MG Tab Take 37.5 mg by mouth every day. Yes  Physician Outpatient   insulin lispro 100 UNIT/ML SC SOPN injection PEN Inject 2-10 Units under the skin 3 times a day before meals. Use 2 units if blood sugar 150-200. 4 units if 200-250, 6 units if 250-300, 8 units if 300-350, 10 units if 350-400 Yes  Mary Ram M.D.   atorvastatin (LIPITOR) 20 MG Tab Take 1 Tablet by mouth every evening. Yes  Mary Ram M.D.   losartan (COZAAR) 50 MG Tab Take 1 Tablet by mouth every day. Yes  Mary Ram M.D.   pioglitazone (ACTOS) 15 MG Tab Take 1 Tablet by mouth every day. Yes  Mary Ram M.D.   metformin (GLUCOPHAGE)  "1000 MG tablet Take 1 Tablet by mouth 2 times a day with meals. Yes  Kojo Covington M.D.   hydrOXYzine HCl (ATARAX) 25 MG Tab TAKE 1 TABLET BY MOUTH THREE TIMES DAILY AS NEEDED FOR ITCHING OR ANXIETY. Yes  Kojo Covington M.D.   Insulin Pen Needle 32 G x 4 mm Use one pen needle in pen device to inject insulin four times daily.   Mary Ram M.D.   Insulin Syringe-Needle U-100 (INSULIN SYRINGE 1CC/31GX5/16\") 31G X 5/16\" 1 ML Misc Use as directed for SQ injection of insulin.   Kojo Covington M.D.        PMH:  Past Medical History[1]  Past Surgical History[2]  No family history on file.  Social History     Socioeconomic History    Marital status: Single     Spouse name: Not on file    Number of children: Not on file    Years of education: Not on file    Highest education level: Bachelor's degree (e.g., BA, AB, BS)   Occupational History    Not on file   Tobacco Use    Smoking status: Never    Smokeless tobacco: Never   Vaping Use    Vaping status: Some Days    Substances: Nicotine   Substance and Sexual Activity    Alcohol use: Never    Drug use: Never    Sexual activity: Never   Other Topics Concern    Not on file   Social History Narrative    Not on file     Social Drivers of Health     Financial Resource Strain: Low Risk  (8/25/2022)    Overall Financial Resource Strain (CARDIA)     Difficulty of Paying Living Expenses: Not hard at all   Food Insecurity: No Food Insecurity (4/21/2025)    Hunger Vital Sign     Worried About Running Out of Food in the Last Year: Never true     Ran Out of Food in the Last Year: Never true   Transportation Needs: No Transportation Needs (4/21/2025)    PRAPARE - Transportation     Lack of Transportation (Medical): No     Lack of Transportation (Non-Medical): No   Physical Activity: Inactive (8/25/2022)    Exercise Vital Sign     Days of Exercise per Week: 0 days     Minutes of Exercise per Session: 30 min   Stress: No Stress Concern Present (8/25/2022)    [facility] " Occupational Health - Occupational Stress Questionnaire     Feeling of Stress : Not at all   Social Connections: Socially Isolated (8/25/2022)    Social Connection and Isolation Panel [NHANES]     Frequency of Communication with Friends and Family: Once a week     Frequency of Social Gatherings with Friends and Family: Never     Attends [employer] Services: Never     Active Member of Clubs or Organizations: No     Attends Club or Organization Meetings: Never     Marital Status: Living with partner   Intimate Partner Violence: Not At Risk (4/21/2025)    Humiliation, Afraid, Rape, and Kick questionnaire     Fear of Current or Ex-Partner: No     Emotionally Abused: No     Physically Abused: No     Sexually Abused: No   Housing Stability: High Risk (4/21/2025)    Housing Stability Vital Sign     Unable to Pay for Housing in the Last Year: No     Number of Times Moved in the Last Year: 2     Homeless in the Last Year: No           Allergies/Intolerances:  Allergies[3]    ROS:   Review of Systems   Constitutional:  Negative for chills, fever, malaise/fatigue and weight loss.   HENT:  Negative for congestion and hearing loss.    Eyes:  Negative for blurred vision and double vision.   Respiratory:  Negative for cough, sputum production, shortness of breath and wheezing.    Cardiovascular:  Negative for chest pain and palpitations.   Gastrointestinal:  Negative for abdominal pain, nausea and vomiting.   Genitourinary:  Negative for dysuria.   Musculoskeletal:  Negative for myalgias.   Skin:  Negative for itching and rash.   Neurological:  Positive for tingling and sensory change. Negative for dizziness, focal weakness and headaches.   Endo/Heme/Allergies:  Does not bruise/bleed easily.   Psychiatric/Behavioral:  The patient is not nervous/anxious.       ROS was reviewed and were negative except as above.    Objective    Most Recent Vital Signs:  /68   Pulse 89   Temp 37.1 °C (98.7 °F) (Temporal)   Resp 18   Ht 1.867  "m (6' 1.5\")   Wt 93.9 kg (207 lb)   SpO2 97%   BMI 26.94 kg/m²     Physical Exam:  Physical Exam  Vitals reviewed.   Constitutional:       Appearance: Normal appearance.   HENT:      Head: Normocephalic and atraumatic.      Nose: Nose normal.      Mouth/Throat:      Mouth: Mucous membranes are moist.   Eyes:      Pupils: Pupils are equal, round, and reactive to light.   Cardiovascular:      Rate and Rhythm: Normal rate.   Pulmonary:      Effort: Pulmonary effort is normal.      Breath sounds: Normal breath sounds.   Abdominal:      Palpations: Abdomen is soft.   Musculoskeletal:         General: No tenderness.      Cervical back: Normal range of motion and neck supple.      Comments: 5th toe of left foot amputation site CDI.  Previous sutures were removed.  No surrounding erythema or swelling  No signs of active infection.  Small amount of serous drainage on dressing.   Skin:     General: Skin is warm and dry.      Coloration: Skin is not jaundiced.      Findings: No erythema or rash.   Neurological:      Mental Status: He is alert and oriented to person, place, and time.      Motor: No weakness.   Psychiatric:         Mood and Affect: Mood normal.         Behavior: Behavior normal.          Pertinent Lab/Imaging Results:  [unfilled]  @[facility]@  WBC   Date/Time Value Ref Range Status   06/02/2025 04:50 PM 6.5 4.8 - 10.8 K/uL Final     RBC   Date/Time Value Ref Range Status   06/02/2025 04:50 PM 4.96 4.70 - 6.10 M/uL Final     Hemoglobin   Date/Time Value Ref Range Status   06/02/2025 04:50 PM 13.5 (L) 14.0 - 18.0 g/dL Final     Hematocrit   Date/Time Value Ref Range Status   06/02/2025 04:50 PM 42.2 42.0 - 52.0 % Final     MCV   Date/Time Value Ref Range Status   06/02/2025 04:50 PM 85.1 81.4 - 97.8 fL Final     MCH   Date/Time Value Ref Range Status   06/02/2025 04:50 PM 27.2 27.0 - 33.0 pg Final     MCHC   Date/Time Value Ref Range Status   06/02/2025 04:50 PM 32.0 (L) 32.3 - 36.5 g/dL Final     MPV   Date/Time " "Value Ref Range Status   06/02/2025 04:50 PM 10.0 9.0 - 12.9 fL Final      Sodium   Date/Time Value Ref Range Status   06/02/2025 04:50  135 - 145 mmol/L Final     Potassium   Date/Time Value Ref Range Status   06/02/2025 04:50 PM 4.1 3.6 - 5.5 mmol/L Final     Chloride   Date/Time Value Ref Range Status   06/02/2025 04:50  96 - 112 mmol/L Final     Co2   Date/Time Value Ref Range Status   06/02/2025 04:50 PM 21 20 - 33 mmol/L Final     Glucose   Date/Time Value Ref Range Status   06/02/2025 04:50  (H) 65 - 99 mg/dL Final     Bun   Date/Time Value Ref Range Status   06/02/2025 04:50 PM 26 (H) 8 - 22 mg/dL Final     Creatinine   Date/Time Value Ref Range Status   06/02/2025 04:50 PM 1.32 0.50 - 1.40 mg/dL Final     Bun-Creatinine Ratio   Date/Time Value Ref Range Status   01/06/2025 02:10 PM 13.1 10.0 - 20.0 Final     Alkaline Phosphatase   Date/Time Value Ref Range Status   06/02/2025 04:50  (H) 30 - 99 U/L Final     AST(SGOT)   Date/Time Value Ref Range Status   06/02/2025 04:50 PM 17 12 - 45 U/L Final     ALT(SGPT)   Date/Time Value Ref Range Status   06/02/2025 04:50 PM 12 2 - 50 U/L Final     Total Bilirubin   Date/Time Value Ref Range Status   06/02/2025 04:50 PM 0.3 0.1 - 1.5 mg/dL Final      No results found for: \"CPKTOTAL\"         No results found for: \"BLOODCULTU\", \"BLDCULT\", \"BCHOLD\"    No results found for: \"BLOODCULTU\", \"BLDCULT\", \"BCHOLD\"       CULTURE TISSUE W/ GRM STAIN  Order: [id number]   Status: Final result       Next appt: Today at 01:30 PM in Infectious Diseases (CAROL HarrisonP.RSridharNSridhar)    Test Result Released: Yes (not seen)    Specimen Information: Bone   0 Result Notes      Component  Ref Range & Units (hover) 1 mo ago   Significant Indicator POS Positive (POS)   Source BONE   Site left foot   Culture Result - Abnormal    Gram Stain Result Few WBCs.  Few Gram positive cocci.   Culture Result  Abnormal   Staphylococcus aureus  Moderate growth  Methicillin sensitive by " screening method  See previous culture for sensitivity report.    Resulting Agency M             Specimen Collected: 04/21/25  4:24 PM Last Resulted: 04/23/25 10:06 AM        Lab Flowsheet        Order Details        View Encounter        Lab and Collection Details        Routing        Result History     View All Conversations on this Encounter     Result Care Coordination      Patient Communication     Add Comments   Not seen Back to Top      Contains abnormal data Anaerobic Culture  Order: [id number] - Reflex for Order [id number]   Status: Final result       Next appt: Today at 01:30 PM in Infectious Diseases (Bob Shell, A.P.R.N.)    Test Result Released: Yes (not seen)    Specimen Information: Bone   0 Result Notes      Component  Ref Range & Units (hover) 1 mo ago   Significant Indicator POS Positive (POS)   Source BONE   Site left foot   Culture Result - Abnormal    Culture Result  Abnormal   Finegoldia magna  Moderate growth    Resulting Agency M             Specimen Collected: 04/21/25  4:24 PM Last Resulted: 04/24/25  3:39 PM   ntains abnormal data Anaerobic Culture  Order: [id number] - Reflex for Order [id number]   Status: Final result       Next appt: Today at 01:30 PM in Infectious Diseases (Bob Shell, A.P.R.N.)    Test Result Released: Yes (not seen)    Specimen Information: Bone   0 Result Notes      Component  Ref Range & Units (hover) 1 mo ago   Significant Indicator POS Positive (POS)   Source BONE   Site left foot   Culture Result - Abnormal    Culture Result  Abnormal   Finegoldia magna  Moderate growth    Resulting Agency M             Specimen Collected: 04/21/25  4:24 PM Last Resulted: 04/24/25  3:39 PM   BLOOD CULTURE  Order: [id number]   Status: Final result       Next appt: Today at 01:30 PM in Infectious Diseases (Bob Shell, A.P.R.N.)    Test Result Released: Yes (not seen)    Specimen Information: Peripheral; Blood   0 Result Notes      Component  Ref Range & Units (hover) 4 wk ago    Significant Indicator POS Positive (POS)   Source BLD   Site PERIPHERAL   Culture Result Growth detected by automated blood culture system. 10:10 Abnormal    Culture Result  Abnormal   Staphylococcus aureus  Methicillin sensitive by screening method  See previous culture for sensitivity report.    Resulting Agency [facility]             Specimen Collected: 04/22/25  5:27 AM Last Resulted: 04/25/25  9:46 AM       Culture Wound With Gram Stain  Order: [id number]   Status: Final result       Next appt: Today at 01:30 PM in Infectious Diseases (Bob Shell, A.P.R.N.)    Test Result Released: Yes (not seen)    Specimen Information: Left Foot; Wound   0 Result Notes      Component  Ref Range & Units (hover) 1 mo ago   Significant Indicator POS Positive (POS)   Source WND   Site LEFT FOOT   Culture Result Light growth usual skin heather. Abnormal    Gram Stain Result Few WBCs.  Many Gram positive cocci.   Culture Result  Abnormal   Staphylococcus aureus  Heavy growth  Methicillin sensitive by screening method    Resulting Agency [facility]        Susceptibility     Staphylococcus aureus     YESENIA     Ampicillin/sulbactam <=8/4 mcg/mL Sensitive     Cefazolin <=8 mcg/mL Sensitive     Cefepime <=4 mcg/mL Sensitive     Clindamycin <=0.25 mcg/mL Sensitive     Daptomycin 1 mcg/mL Sensitive     Erythromycin >4 mcg/mL Resistant     Oxacillin 0.5 mcg/mL Sensitive     Tetracycline <=4 mcg/mL Sensitive     Trimeth/Sulfa <=0.5/9.5 m... Sensitive     Vancomycin 1 mcg/mL Sensitive                 Specimen Collected: 04/20/25  9:45 PM Last Resulted: 04/23/25  9:31 AM       ntains abnormal data Blood Culture - Draw one from central line and one from peripheral site  Order: [id number]   Status: Final result       Next appt: Today at 01:30 PM in Infectious Diseases (Bob Shell, A.P.R.N.)    Test Result Released: Yes (not seen)    Specimen Information: Line; Blood   0 Result Notes      Component  Ref Range & Units (hover) 1 mo ago   Significant Indicator POS  Positive (POS)   Source BLD   Site Peripheral   Culture Result  Abnormal   Growth detected by automated blood culture system.  Staphylococcus aureus (methicillin sensitive)  detected by PCR.    Culture Result Staphylococcus aureus Abnormal    Resulting Agency [facility]        Susceptibility     Staphylococcus aureus     YESENIA     Ampicillin/sulbactam <=8/4 mcg/mL Sensitive     Cefazolin <=8 mcg/mL Sensitive     Cefepime <=4 mcg/mL Sensitive     Clindamycin 0.5 mcg/mL Sensitive     Daptomycin 1 mcg/mL Sensitive     Erythromycin >4 mcg/mL Resistant     Oxacillin 0.5 mcg/mL Sensitive     Tetracycline <=4 mcg/mL Sensitive     Trimeth/Sulfa <=0.5/9.5 m... Sensitive     Vancomycin 1 mcg/mL Sensitive                 Specimen Collected: 04/20/25  9:13 PM Last Resulted: 04/23/25  9:15 AM      Latest Reference Range & Units 04/20/25 21:13 04/20/25 21:45 04/21/25 07:40 04/21/25 16:24 04/22/25 05:27 04/24/25 12:59   MRSA by PCR Negative   Negative       Significant Indicator  POS ! POS !  POS !  . NEG POS !  POS !  . NEG  POS ! NEG  NEG   Site  Peripheral LEFT FOOT  PERIPHERAL  LEFT FOOT - left foot  left foot  left foot PERIPHERAL  PERIPHERAL PERIPHERAL  PERIPHERAL   Source  BLD WND  BLD  WND UR BONE  BONE  BONE BLD  BLD BLD  BLD   !: Data is abnormal    DX-FOOT-COMPLETE 3+ LEFT  Order: [id number]   Status: Final result       Next appt: Today at 01:30 PM in Infectious Diseases (SALVADOR Harrison.P.RSOILA)    Test Result Released: Yes (not seen)    0 Result Notes  Details    Reading Physician Reading Date Result Priority   Katie Collins M.D.  [phone] 4/20/2025      Narrative & Impression     4/20/2025 9:28 PM     HISTORY/REASON FOR EXAM: Atraumatic Pain/Swelling/Deformity     TECHNIQUE/EXAM DESCRIPTION:  AP, lateral, and oblique views of the LEFT foot.     COMPARISON:  February 26, 2025     FINDINGS:     Postoperative changes of transmetatarsal amputation of the fifth toe is seen. Slight permeative appearance of the lateral cortex of  the fifth toe metatarsal waist is seen. There is soft tissue gas in small bony fragments adjacent to the fracture site.     IMPRESSION:        1.  Slight permeative appearance of the lateral cortex of the fifth toe metatarsal waist, appearance raising concern for component of osteomyelitis.  2.  Soft tissue gas at the amputation site, consider underlying infectious etiology.        Exam Ended: 25 10:00 PM Last Resulted: 25 10:22 PM   EC-ECHOCARDIOGRAM COMPLETE W/O CONT  Order: [id number]   Status: Final result       Next appt: Today at 01:30 PM in Infectious Diseases (Bob Wall A.P.R.N.)    Test Result Released: Yes (not seen)    0 Result Notes  Details    Reading Physician Reading Date Result Priority   No Reading Provider Prelim 2025    Celestino Jay M.D.  [phone] 2025      Result Text  Transthoracic  Echo Report        Echocardiography Laboratory     CONCLUSIONS  Normal left ventricular systolic function. The left ventricular   ejection fraction is visually estimated to be 65%  Mild concentric left ventricular hypertrophy.  Normal right ventricular size and systolic function.  Aortic valve sclerosis without significant stenosis.  Right ventricular systolic pressure is estimated to be 26 mmHg   (normal).   Semimobile echodensity seen at the junction of the aorta and   brachiocephalic artery - recommend further imaging of the aorta.   No prior study is available for comparison.  Primary team is notified of findings.     WALE REX  Exam Date:         2025                      08:52  Exam Location:     Inpatient  Priority:          Routine     Ordering Physician:        BRIGID MARCIAL  Referring Physician:       [id number] Manning  Sonographer:               Yaneth Jade     Age:    54     Gender:    M  MRN:    [id number]  :    1970  BSA:    2.22   Ht (in):    73     Wt (lb):    216  Exam Type:      Complete     Indications:     Bacteremia  ICD Codes:       R78.81     CPT Codes:       15265     BP:   119    /   83     HR:  Technical Quality:       Fair     MEASUREMENTS  (Male / Female) Normal Values  2D ECHO  LV Diastolic Diameter PLAX        3.9 cm                4.2 - 5.9 / 3.9 - 5.3   cm  LV Systolic Diameter PLAX         2 cm                  2.1 - 4.0 cm  IVS Diastolic Thickness           1.2 cm                  LVPW Diastolic Thickness          1.2 cm                  LVOT Diameter                     2 cm                    LV Ejection Fraction MOD BP       65.3 %                >= 55  %  LV Ejection Fraction MOD 4C       69.4 %                  LV Ejection Fraction MOD 2C       59.2 %                  LV Ejection Fraction 4C AL        73.2 %                  LV Ejection Fraction 2C AL        63.1 %                  LA Volume Index                   21.6 cm3/m2           16 - 28 cm3/m2     DOPPLER  AV Peak Velocity                  1.3 m/s                 AV Peak Gradient                  7.1 mmHg                AV Mean Gradient                  4 mmHg                  LVOT Peak Velocity                1.1 m/s                 AV Area Cont Eq vti               2.7 cm2                 Mitral E Point Velocity           1.2 m/s                 Mitral E to A Ratio               1.4                     MV Pressure Half Time             55 ms                   MV Area PHT                       4 cm2                   MV Deceleration Time              187 ms                  TR Peak Velocity                  239 cm/s                PV Peak Velocity                  1 m/s                   PV Peak Gradient                  4.3 mmHg                RVOT Peak Velocity                0.75 m/s                LV E' Lateral Velocity            11.1 cm/s               Mitral E to LV E' Lateral Ratio   10.5                    LV E' Septal Velocity             7.3 cm/s                Mitral E to LV E' Septal Ratio    16                          * Indicates values subject to auto-interpretation  LV EF:        %     FINDINGS  Left Ventricle  Normal left ventricular chamber size. Mild concentric left ventricular   hypertrophy. Normal left ventricular systolic function. The left   ventricular ejection fraction is visually estimated to be 65%. No   regional wall motion abnormalities. Indeterminate diastolic function.     Right Ventricle  Normal right ventricular size and systolic function.     Right Atrium  Normal right atrial size. Normal inferior vena cava size and   inspiratory collapse.     Left Atrium  Normal left atrial size. Left atrial volume index is 20 mL/sq m.     Mitral Valve  Structurally normal mitral valve without significant stenosis or   regurgitation.     Aortic Valve  Tricuspid aortic valve. Aortic valve sclerosis without significant   stenosis. No aortic insufficiency.     Tricuspid Valve  Structurally normal tricuspid valve without significant stenosis. Trace   tricuspid regurgitation. Right atrial pressure is estimated to be 3   mmHg. Right ventricular systolic pressure is estimated to be 26 mmHg.     Pulmonic Valve  Structurally normal pulmonic valve without significant stenosis or   regurgitation.     Pericardium  No significant pericardial effusion.     Aorta  Normal aortic root for body surface area. The ascending aorta diameter   is 3.1 cm. Semimobile echodensity seen at the junction of the aorta and   brachiocephalic artery.                                Celestino Jay MD  (Electronically Signed)  Final Date:     22 April 2025                   10:44     All Measurements        Exam Ended: 04/22/25 10:21 AM Last Resulted: 04/22/25 10:44 AM   CT-CTA COMPLETE THORACOABDOMINAL AORTA  Order: [id number]   Status: Final result       Next appt: Today at 01:30 PM in Infectious Diseases (Bob Wall A.P.R.N.)    Test Result Released: Yes (not seen)    0 Result Notes  Details    Reading Physician Reading Date Result  Priority   Aristides Thomas M.D.  [phone] 4/23/2025      Narrative & Impression     4/23/2025 1:43 PM     HISTORY/REASON FOR EXAM:  Chest pain.        TECHNIQUE/EXAM DESCRIPTION:  CT angiogram without and with contrast, with reconstructions.     Initial precontrast thick helical sections were obtained from the top of the aortic arch through the diaphragmatic domes. Following this, thin-section postcontrast helical images were obtained from the lung apices through the iliac crests following the   bolus administration of mL of nonionic Omnipaque 350 contrast.  CT angiographic technique was utilized.     Parasagittal reconstructed images of the aorta were generated utilizing multiplanar volume reformat technique.     Low dose optimization technique was utilized for this CT exam including automated exposure control and adjustment of the mA and/or kV according to patient size.     COMPARISON: None available.     FINDINGS:     Aorta: Pre-contrast images demonstrate no evidence of displaced calcified intima or intramural hematoma.  Aortic arch: Branching pattern is conventional.  Diameter: The ascending and descending aorta are of normal caliber.  Dissection: Absent.  Celiac artery origin: Widely patent.  Superior mesenteric artery origin: Widely patent.  Renal artery origins: Widely patent.  Inferior mesenteric artery: Patent.  Common iliac arteries: Nonaneurysmal and patent.     Heart: There is atherosclerotic change of the coronary arteries especially involving the left anterior descending artery origin.     3D angiographic/MIP images of the vasculature confirm the vascular findings as described above.     Lungs: There is bibasilar atelectasis.  Liver: There is fatty change. There is an enhancing mass involving the right lobe posteriorly measuring 2.3 cm size likely representing hemangioma.  Biliary system: No intrahepatic or extrahepatic ductal dilatation. The gallbladder is grossly unremarkable.  Spleen:  Unremarkable.  Adrenal glands: There is 17 mm left adrenal nodule. As 12 mm right adrenal nodule.  Pancreas: Unremarkable.  Kidneys: There are small bilateral simple appearing renal cysts. No follow-up recommended.  Bowel: There is moderate constipation. No bowel obstruction or focal inflammatory change.  Ascites: None.  Lymph nodes: No adenopathy is identified.  Bones: There is partial fusion across the left SI joint.     IMPRESSION:     1.  No evidence of aortic aneurysm or dissection.     2.  Atherosclerotic calcification of the coronary arteries especially involving the left anterior ascending artery origin     3.  Fatty liver with a 2.3 cm right lobe hepatic hemangioma.     4.  Bilateral adrenal nodules measuring 17 mm on the left and 12 mm on the right.     5.  Moderate constipation.                          Exam Ended: 04/23/25  1:56 PM Last Resulted: 04/23/25  2:33 PM   MR-CERVICAL SPINE-WITH & W/O  Order: [id number]   Status: Final result       Next appt: Today at 01:30 PM in Infectious Diseases (Bob Wall A.P.R.N.)    Test Result Released: Yes (not seen)    0 Result Notes  Details    Reading Physician Reading Date Result Priority   Nishant Glez M.D.  [phone] 4/25/2025      Narrative & Impression     4/25/2025 11:05 AM     HISTORY/REASON FOR EXAM:  Back pain.     TECHNIQUE/EXAM DESCRIPTION:  MRI of the cervical spine without and with contrast.     Multiplanar multisequence MR examination of the cervical spine. 20 mL ProHance contrast was administered intravenously.     COMPARISON: None.     FINDINGS:  There are postsurgical changes as evidenced by C4-5 and C5-6 disc prosthesis. Magnetic susceptibility artifact is noted at these levels. The cervical spine maintains normal alignment. There is no destructive lesion. There is no perivertebral, disc   epidural fluid collection.     The craniovertebral junction is well aligned.     At the level of C2-3,there is no spinal or neural foraminal  stenosis.     At the level of C3-4,there is left-sided uncinate joint arthropathy causing mild left neural foraminal stenosis.     At the level of C4-5,there is disc prosthesis. There is no spinal or neural foraminal stenosis.     At the level of C5-6,there is disc prosthesis. Bilateral uncinate joint arthropathy seen. There is moderate bilateral neural foraminal stenosis. There is moderate central canal stenosis.     At the level of C6-7,there is bilateral uncinate joint arthropathy. There is moderate bilateral neural foraminal stenosis.     At the level of C7-T1,there is no spinal or neural foraminal stenosis.     The visualized posterior fossa structures appear normal within limits.  The cervical spinal cord does not demonstrate any mass or abnormal T2 signal intensity. Brainstem gliosis is seen.     The visualized pre-and paraspinal soft tissues appear normal within limits.     There is no abnormal contrast enhancement.     IMPRESSION:     1.  Postsurgical and degenerative changes in the cervical spine as described above.  2.  Moderate central canal stenosis at C5-6.  3.  Multifocal neural foraminal stenosis.  4.  There is no evidence of infection.        Exam Ended: 04/25/25  1:41 PM Last Resulted: 04/25/25  2:23 PM       MR-THORACIC SPINE-WITH & W/O  Order: [id number]   Status: Final result       Next appt: Today at 01:30 PM in Infectious Diseases (Bob Wall A.P.R.N.)    Test Result Released: Yes (not seen)    0 Result Notes  Details    Reading Physician Reading Date Result Priority   Nishant Glez M.D.  [phone] 4/25/2025      Narrative & Impression     4/25/2025 11:17 AM     HISTORY/REASON FOR EXAM:  Back pain in the setting of persistent MSSA bacteremia     TECHNIQUE/EXAM DESCRIPTION:  MRI of the thoracic spine without and with contrast.     Multiplanar multisequence MR examination of the thoracic spine.     20 mL ProHance contrast was administered intravenously.     COMPARISON:  None.      FINDINGS: There is mild chronic vertebral height loss at T7, and T8 vertebral bodies. There is no acute fracture. There is no pathologic lesion. There is no perivertebral, disc or epidural fluid collection.     At the level of T5-6. there is small right paracentral disc protrusion without significant spinal or neural foraminal stenosis. There is no intradural extramedullary lesion. There is no abnormal intramedullary T2 signal intensity.     Mild left pleural effusion is seen. Bilateral basilar atelectasis is seen.        IMPRESSION:     1.  Mild chronic compression deformity at T7 and T8.  2.  No acute fracture.  3.  Mild degenerative disease.  4.  No evidence of infection.        Exam Ended: 04/25/25  1:42 PM Last Resulted: 04/25/25  2:28 PM   MR-LUMBAR SPINE-WITH & W/O  Order: [id number]   Status: Final result       Next appt: Today at 01:30 PM in Infectious Diseases (CAROL HarrisonP.R.NSridhar)    Test Result Released: Yes (not seen)    0 Result Notes  Details    Reading Physician Reading Date Result Priority   Nishant Glez M.D.  [phone] 4/25/2025      Narrative & Impression     4/25/2025 11:17 AM     HISTORY/REASON FOR EXAM:  R/O osteomyelitis.        TECHNIQUE/EXAM DESCRIPTION:  MRI of the lumbar spine without and with contrast.     Multiplanar multisequence MR examination of the lumbar spine. 20 mL ProHance contrast was administered intravenously.     COMPARISON:  None.     FINDINGS:  The lumbar spine maintains normal height and alignment. There is no fracture or dislocation. There is no pathologic marrow infiltration. There is no aggressive osseous lesion. There is no perivertebral, disc or epidural fluid collection.     At the level of L5-S1,there is mild bilateral facet joint arthropathy. There is no spinal or neural foraminal stenosis.     At the level of L4-5,there is disc degeneration. There is minimal disc bulge. Mild bilateral facet joint arthropathy seen. There is no spinal canal stenosis.  There is mild bilateral neural foraminal stenosis.     At the level of L3-4,there is no central canal stenosis. There is mild bilateral neural foraminal stenosis.     At the level of L2-3,there is no spinal or neural foraminal stenosis.     At the level of L1-2,there is no spinal or neural foraminal stenosis.     The conus terminates at the level of L1. The visualized lower thoracic spinal cord is unremarkable. There is no lumbar intradural lesion.     The visualized pre-and paraspinal soft tissues appear normal.     There is no abnormal contrast enhancement.     IMPRESSION:     1.  There is no evidence of infection in the lumbar spine.  2.  Mild degenerative changes        Exam Ended: 04/25/25  1:44 PM Last Resulted: 04/25/25  2:19 PM       CT-ABDOMEN-PELVIS WITH  Order: [id number]   Status: Final result       Next appt: Today at 01:30 PM in Infectious Diseases (Bob Wall A.P.R.N.)    Test Result Released: Yes (not seen)    0 Result Notes  Details    Reading Physician Reading Date Result Priority   Luis Bonilla M.D.  [phone] 4/26/2025      Narrative & Impression     4/26/2025 3:12 PM     HISTORY/REASON FOR EXAM:  Concern for bowel obstruction. Abdominal distention.     TECHNIQUE/EXAM DESCRIPTION:   CT scan of the abdomen and pelvis with contrast.     Contrast-enhanced helical scanning was obtained from the diaphragmatic domes through the pubic symphysis following the bolus administration of nonionic contrast without complication.     96 mL of Omnipaque 350 nonionic contrast was administered without complication.     Low dose optimization technique was utilized for this CT exam including automated exposure control and adjustment of the mA and/or kV according to patient size.     COMPARISON: CTA of the thoracoabdominal aorta, 4/23/2025.     FINDINGS:  Lower Chest: Bibasilar subsegmental atelectasis. The heart size is normal. No pleural or pericardial effusion.     Liver: Unremarkable.     Spleen:  Unremarkable.     Pancreas: Unremarkable.     Gallbladder: No calcified stones.     Biliary: Nondilated.     Adrenal glands: Stable bilateral adrenal nodules.     Kidneys: Unremarkable without hydronephrosis.     Bowel: No obstruction or acute inflammation.     Lymph nodes: No adenopathy.     Vasculature: Unremarkable.     Peritoneum: Unremarkable without ascites.     Musculoskeletal: No acute or destructive process.     Pelvis: No adenopathy or free fluid. Central zone prostatic calcifications. Seminal vesicles are within normal limits. The bladder is unremarkable.     IMPRESSION:     1.  No bowel obstruction.  2.  Stable bilateral adrenal nodules.  3.  Bibasilar subsegmental atelectasis.           Exam Ended: 04/26/25  3:36 PM Last Resulted: 04/26/25  3:44 PM       Pathology Specimen  Order: [id number]   Status: Final result       Next appt: Today at 01:30 PM in Infectious Diseases (SALVADOR Harrison.P.R.N.)    Test Result Released: Yes (seen)    0 Result Notes        Narrative  Performed by: [facility]                          DEPARTMENT OF PATHOLOGY                   [address] [place] [address]  [address]              Phone [phone]          Fax [phone]  Natalee Nieves M.D.     Loli Wilkes M.D.     ZOILA Contreras D.O.  Edith Pandey M.D.     SHON Mccarty                                    D.GONZALEZ.    Patient:    REX ECHEVARRIA     Specimen    [id number]                                           #:      Copies to:                        SURGICAL PATHOLOGY CONSULTATION      FINAL DIAGNOSIS:    A. Left foot 5th metatarsal:         Acute osteomyelitis.                                          Diagnosis performed by:                                      TIBURCIO  "EKTA  ------------------------------------------------------------------------  ---------------------------------------------------------------  CODES: 2005x1  2205x1  [id number]  [id number]    SPECIMEN(S)  A. Left foot 5th metatarsal:    PREOPERATIVE DIAGNOSIS:  Wound dehiscence    POSTOPERATIVE DIAGNOSIS:  Wound dehiscence       GROSS DESCRIPTION:  A. Received in formalin labeled with the patient's name, medical record  number, and \"left foot fifth metatarsal\" is a 4.7 cm in length by up to  2.5 cm in maximum diameter portion of bone having a single smooth  transected surface, and articular cartilage on the opposing aspect.  Transected surface inked blue.  The cut surfaces are tan-pink to  yellow, trabeculated, and without obvious bone softening.  Decalcified  in HCl prior to processing. RS2 /[id number] [facility]    SLIDE KEY:  A1:  Representative perpendicular transected surface (inked blue)  A2:  Representative articular aspect    MICROSCOPIC DESCRIPTION:  Microscopic examination was performed.  Please see diagnosis.    Report electronically signed by:  TIBURCIO DASH ,  Diagnosis performed at: [facility]  Pathology  Department, [address] [place] [address]  [id number]      Printed 00/00/0000 [id number] DONALD ECHEVARRIA   Page 1 of 1 MRN#:[id number]   Specimen Collected: 04/21/25  4:30 PM Last Resulted: 04/24/25 12:55 PM         Impression/Assessment      1. Type 2 diabetes mellitus with hyperglycemia, with long-term current use of insulin ([facility])  Referral to Podiatry      2. Acute renal failure, unspecified acute renal failure type ([facility])            Donald Echevarria is a 54 y.o. male with history of uncontrolled type 2 diabetes mellitus, prior left fifth toe amputation in February admitted on 4/20/2025 secondary to worsening left foot swelling and drainage of prior amputation site. X-ray revealed findings concerning for osteomyelitis. He is now status post left fifth ray amputation with " wound VAC placement on 4/21/2025. Hospital course complicated by MSSA bacteremia.  Blood cultures x 2 on 4/20+ MSSA. Repeat blood cultures 1 out of 2 sets on 4/22-MSSA. Repeat blood cultures on 4/24-negative to date.  1 blood cultures that triggered a CO2 production but cultures have been negative.  Return to the OR on 4/23/2025 for repeat I&D and surgical wound closure. OR cultures-MSSA. Pathology-acute osteomyelitis. TTE negative for any valvular abnormalities however there was a semimobile echodensity at the junction of the aorta and brachiocephalic artery. CTA aorta was negative for aortic aneurysm or dissection.  Showed atherosclerotic calcification in the coronary arteries. S/P I&D secondary closure of surgical wound left foot amputation on 4/23/2025.  6-week course of IV ertapenem 1 g daily with end date of 6/5/2025  - MRI C/T and L-spine negative for osteomyelitis    6/24/25-patient following up after completion of his 6-week course of IV ertapenem 1 g daily which ended on 6/5/2025.  Left fifth ray amputation site CDI with no surrounding erythema, swelling.  Previous sutures have been removed.  Small amount of serous drainage on dressing.  No signs of active infection.  Today patient denies any nausea, vomiting, fever, chills, constipation or diarrhea.  Most recent labs reviewed from 6/2/2025 WBC 6.5, previous anemia resolved, platelets 236, neutrophils WNL, mild elevation in sed rate 27 and trending down, glucose 152, previous DOMINIC resolved, AST/ALT WNL, mild elevation alk phos 104.  CRP 0.33.  Education provided on signs and symptoms of recurrence of infection and when report to ER/call 911.  Referral to podiatry for ongoing footcare.  Follow-up with ID clinic as needed      - This infection/ chronic illness posses a threat to life, bodily function, and limb preservation   PLAN:   - Completed 6-week course of IV ertapenem yesterday on 6/5/2025.  No need for antibiotic therapy.  No signs or symptoms of  recurrence of infection today  - Continue wound care to the left foot  - Referral to podiatry for ongoing footcare as the patient has a history of diabetes with neuropathy.  At risk for recurrence of foot wounds  - Recommended to check blood sugars 2-3 times daily and keep below 150 to allow for adequate wound and prevent secondary infection.  - Education provided on signs and symptoms of recurrence of infection and when report to ER/call 911      Return visit: PRN. Follow up with primary care physician for chronic medical problems    I have performed a physical exam,  updated ROS and plan today. I have reviewed previous images, labs, and provider notes.      RANDOLPH Harrison.    All Patients should seek medical re-evaluation or report to the ER for new, increasing or worsening symptoms. In some circumstances medical conditions can change from the initial evaluation and may require emergent medical re-evaluation. This includes but is not limited to chest pain, shortness of breath, atypical abdominal pain, atypical headache, ALOC, fever >101, low blood pressure, high respiratory rate (above 30), low oxygen saturation (below 90%), acute delirium, abnormal bleeding, inability to tolerate any intake, weakness on one side of the body, any worsened or concerning conditions.    Please note that this dictation was created using voice recognition software. I have worked with technical experts from [employer] to optimize the interface.  I have made every reasonable attempt to correct obvious errors, but there may be errors of grammar and possibly content that I did not discover before finalizing the note.         [1]   Past Medical History:  Diagnosis Date    Diabetes (HCC)    [2]   Past Surgical History:  Procedure Laterality Date    WOUND IRRIGATION & DEBRIDEMENT Left 4/23/2025    Procedure: DEBRIDEMENT AND WOUND CLOSURE LEFT FOOT;  Surgeon: Johnathan Weldon M.D.;  Location: SURGERY [facility];  Service:  Orthopedics    FOOT AMPUTATION Left 4/21/2025    Procedure: AMPUTATION REVISION, RAY AMPUTATION;  Surgeon: Nik Andersen M.D.;  Location: SURGERY [facility];  Service: Orthopedics    FOOT AMPUTATION Left 2/28/2025    Procedure: AMPUTATION, FOOT-4TH AND 5TH RAY RESECTION;  Surgeon: Jason Davis M.D.;  Location: [facility];  Service: Orthopedics   [3] No Known Allergies

## 2025-06-24 ENCOUNTER — OFFICE VISIT (OUTPATIENT)
Dept: INFECTIOUS DISEASES | Facility: MEDICAL CENTER | Age: 55
End: 2025-06-24
Attending: NURSE PRACTITIONER

## 2025-06-24 VITALS
TEMPERATURE: 98.7 F | SYSTOLIC BLOOD PRESSURE: 118 MMHG | HEIGHT: 74 IN | DIASTOLIC BLOOD PRESSURE: 68 MMHG | WEIGHT: 207 LBS | BODY MASS INDEX: 26.56 KG/M2 | RESPIRATION RATE: 18 BRPM | HEART RATE: 89 BPM | OXYGEN SATURATION: 97 %

## 2025-06-24 DIAGNOSIS — Z79.4 TYPE 2 DIABETES MELLITUS WITH HYPERGLYCEMIA, WITH LONG-TERM CURRENT USE OF INSULIN (HCC): Primary | ICD-10-CM

## 2025-06-24 DIAGNOSIS — E11.65 TYPE 2 DIABETES MELLITUS WITH HYPERGLYCEMIA, WITH LONG-TERM CURRENT USE OF INSULIN (HCC): Primary | ICD-10-CM

## 2025-06-24 DIAGNOSIS — N17.9 ACUTE RENAL FAILURE, UNSPECIFIED ACUTE RENAL FAILURE TYPE (HCC): ICD-10-CM

## 2025-06-24 PROCEDURE — 99213 OFFICE O/P EST LOW 20 MIN: CPT | Performed by: NURSE PRACTITIONER

## 2025-06-24 PROCEDURE — 3074F SYST BP LT 130 MM HG: CPT | Performed by: NURSE PRACTITIONER

## 2025-06-24 PROCEDURE — 3078F DIAST BP <80 MM HG: CPT | Performed by: NURSE PRACTITIONER

## 2025-06-24 ASSESSMENT — ENCOUNTER SYMPTOMS
SENSORY CHANGE: 1
TINGLING: 1

## 2025-06-24 ASSESSMENT — FIBROSIS 4 INDEX: FIB4 SCORE: 1.14

## 2025-06-26 NOTE — Clinical Note
REFERRAL APPROVAL NOTICE         Sent on June 25, 2025                   Ja Fuller  [address]  [place] [address]                   Dear Mr. Fuller,    After a careful review of the medical information and benefit coverage, [facility] has processed your referral. See below for additional details.    If applicable, you must be actively enrolled with your insurance for coverage of the authorized service. If you have any questions regarding your coverage, please contact your insurance directly.    REFERRAL INFORMATION   Referral #:  [id number]  Referred-To Provider    Referred-By Provider:  Podiatry    MABLE Harrison   [facility]      [address]  [address]  [place] [address]  [phone] [id number]  [place] [address]  [phone]    Referral Start Date:  06/24/2025  Referral End Date:   06/24/2026             SCHEDULING  If you do not already have an appointment, please call [phone] to make an appointment.     MORE INFORMATION  If you do not already have a [employer] account, sign up at: [email or web address]  You can access your medical information, make appointments, see lab results, billing information, and more.  If you have questions regarding this referral, please contact  the [facility] Referrals department at:             [phone]. Monday - Friday 8:00AM - 5:00PM.     Sincerely,    [facility]

## 2025-07-01 DIAGNOSIS — E11.65 TYPE 2 DIABETES MELLITUS WITH HYPERGLYCEMIA, WITH LONG-TERM CURRENT USE OF INSULIN (HCC): ICD-10-CM

## 2025-07-01 DIAGNOSIS — Z79.4 TYPE 2 DIABETES MELLITUS WITH HYPERGLYCEMIA, WITH LONG-TERM CURRENT USE OF INSULIN (HCC): ICD-10-CM

## 2025-07-01 DIAGNOSIS — Z79.4 TYPE 2 DIABETES MELLITUS WITH HYPERGLYCEMIA, WITH LONG-TERM CURRENT USE OF INSULIN (HCC): Primary | ICD-10-CM

## 2025-07-01 DIAGNOSIS — E11.65 TYPE 2 DIABETES MELLITUS WITH HYPERGLYCEMIA, WITH LONG-TERM CURRENT USE OF INSULIN (HCC): Primary | ICD-10-CM

## 2025-07-01 RX ORDER — ACYCLOVIR 400 MG/1
1 TABLET ORAL
Qty: 9 EACH | Refills: 11 | Status: SHIPPED | OUTPATIENT
Start: 2025-07-01

## 2025-07-02 ENCOUNTER — TELEPHONE (OUTPATIENT)
Dept: ENDOCRINOLOGY | Facility: MEDICAL CENTER | Age: 55
End: 2025-07-02
Payer: COMMERCIAL

## 2025-07-02 RX ORDER — INSULIN GLARGINE 100 [IU]/ML
30 INJECTION, SOLUTION SUBCUTANEOUS EVERY EVENING
Qty: 9 ML | Refills: 0 | Status: SHIPPED | OUTPATIENT
Start: 2025-07-02

## 2025-07-02 RX ORDER — INSULIN LISPRO 100 [IU]/ML
2-10 INJECTION, SOLUTION INTRAVENOUS; SUBCUTANEOUS
Qty: 3 ML | Refills: 3 | Status: SHIPPED | OUTPATIENT
Start: 2025-07-02

## 2025-07-02 RX ORDER — SEMAGLUTIDE 0.68 MG/ML
0.5 INJECTION, SOLUTION SUBCUTANEOUS
Qty: 3 ML | Refills: 0 | Status: SHIPPED | OUTPATIENT
Start: 2025-07-02

## 2025-07-02 NOTE — TELEPHONE ENCOUNTER
Successfully submitted a PAP application for Lantus to [employer] fax: [phone] today.     Thank you,  Chloe Doss, [facility]  Endo RX Coordinator  [phone]

## 2025-07-02 NOTE — TELEPHONE ENCOUNTER
Successfully submitted PAP Application for Ozempic to [employer] fax:[phone] today.     Thank you,  Chloe Doss, [facility]  Endo RX Coordinator  [phone]

## 2025-07-02 NOTE — TELEPHONE ENCOUNTER
Patient Phone Number: [phone]  Medication: Humalog KwikPen 100 unit/ml injection pen  (Quantity 9ml, Day Supply 30)  Cost: $113.95  Household size:1  Annual Household Adjusted Gross Income: $14,600  Additional information/consent to FA: Called and spoke with patient at [phone]. Notified patient of their copay. Patient gave verbal consent to obtain FA through [employer]. Patient does not have Government sponsored insurance. Patient currently does not have an active insurance.    Successfully submitted a PAP application form today to [employer] for Humalog at Fax: [phone].    Type of Insurance: No insurance*  Type of Financial assistance requested MAP/Free Drug  Source: [employer]  Source Phone #: [phone]  Outcome: Aprroved  Effective dates: 07/03/2025 until 12/31/2025    I called and advised patient on approval    Thank you,  Chloe Doss [facility]  Endo RX Coordinator  [phone]

## 2025-07-05 PROCEDURE — 99283 EMERGENCY DEPT VISIT LOW MDM: CPT

## 2025-07-05 ASSESSMENT — FIBROSIS 4 INDEX: FIB4 SCORE: 1.14

## 2025-07-06 ENCOUNTER — APPOINTMENT (OUTPATIENT)
Dept: RADIOLOGY | Facility: MEDICAL CENTER | Age: 55
End: 2025-07-06
Attending: EMERGENCY MEDICINE

## 2025-07-06 ENCOUNTER — PHARMACY VISIT (OUTPATIENT)
Dept: PHARMACY | Facility: MEDICAL CENTER | Age: 55
End: 2025-07-06
Payer: COMMERCIAL

## 2025-07-06 ENCOUNTER — HOSPITAL ENCOUNTER (EMERGENCY)
Facility: MEDICAL CENTER | Age: 55
End: 2025-07-06
Attending: EMERGENCY MEDICINE

## 2025-07-06 VITALS
DIASTOLIC BLOOD PRESSURE: 82 MMHG | HEIGHT: 74 IN | HEART RATE: 89 BPM | BODY MASS INDEX: 27.44 KG/M2 | TEMPERATURE: 98.8 F | SYSTOLIC BLOOD PRESSURE: 130 MMHG | OXYGEN SATURATION: 95 % | WEIGHT: 213.85 LBS | RESPIRATION RATE: 16 BRPM

## 2025-07-06 DIAGNOSIS — L08.9 LEFT FOOT INFECTION: Primary | ICD-10-CM

## 2025-07-06 LAB
ALBUMIN SERPL BCP-MCNC: 4 G/DL (ref 3.2–4.9)
ALBUMIN/GLOB SERPL: 1.5 G/DL
ALP SERPL-CCNC: 96 U/L (ref 30–99)
ALT SERPL-CCNC: 11 U/L (ref 2–50)
ANION GAP SERPL CALC-SCNC: 11 MMOL/L (ref 7–16)
AST SERPL-CCNC: 13 U/L (ref 12–45)
BASOPHILS # BLD AUTO: 0.4 % (ref 0–1.8)
BASOPHILS # BLD: 0.03 K/UL (ref 0–0.12)
BILIRUB SERPL-MCNC: 0.2 MG/DL (ref 0.1–1.5)
BUN SERPL-MCNC: 27 MG/DL (ref 8–22)
CALCIUM ALBUM COR SERPL-MCNC: 9.4 MG/DL (ref 8.5–10.5)
CALCIUM SERPL-MCNC: 9.4 MG/DL (ref 8.5–10.5)
CHLORIDE SERPL-SCNC: 101 MMOL/L (ref 96–112)
CO2 SERPL-SCNC: 24 MMOL/L (ref 20–33)
CREAT SERPL-MCNC: 1.29 MG/DL (ref 0.5–1.4)
CRP SERPL HS-MCNC: 1.93 MG/DL (ref 0–0.75)
EOSINOPHIL # BLD AUTO: 0.15 K/UL (ref 0–0.51)
EOSINOPHIL NFR BLD: 1.8 % (ref 0–6.9)
ERYTHROCYTE [DISTWIDTH] IN BLOOD BY AUTOMATED COUNT: 41.6 FL (ref 35.9–50)
ERYTHROCYTE [SEDIMENTATION RATE] IN BLOOD BY WESTERGREN METHOD: 25 MM/HOUR (ref 0–20)
GFR SERPLBLD CREATININE-BSD FMLA CKD-EPI: 65 ML/MIN/1.73 M 2
GLOBULIN SER CALC-MCNC: 2.7 G/DL (ref 1.9–3.5)
GLUCOSE SERPL-MCNC: 306 MG/DL (ref 65–99)
HCT VFR BLD AUTO: 41.7 % (ref 42–52)
HGB BLD-MCNC: 13.6 G/DL (ref 14–18)
IMM GRANULOCYTES # BLD AUTO: 0.03 K/UL (ref 0–0.11)
IMM GRANULOCYTES NFR BLD AUTO: 0.4 % (ref 0–0.9)
LYMPHOCYTES # BLD AUTO: 2.14 K/UL (ref 1–4.8)
LYMPHOCYTES NFR BLD: 25 % (ref 22–41)
MCH RBC QN AUTO: 27.9 PG (ref 27–33)
MCHC RBC AUTO-ENTMCNC: 32.6 G/DL (ref 32.3–36.5)
MCV RBC AUTO: 85.5 FL (ref 81.4–97.8)
MONOCYTES # BLD AUTO: 0.75 K/UL (ref 0–0.85)
MONOCYTES NFR BLD AUTO: 8.8 % (ref 0–13.4)
NEUTROPHILS # BLD AUTO: 5.45 K/UL (ref 1.82–7.42)
NEUTROPHILS NFR BLD: 63.6 % (ref 44–72)
NRBC # BLD AUTO: 0 K/UL
NRBC BLD-RTO: 0 /100 WBC (ref 0–0.2)
PLATELET # BLD AUTO: 235 K/UL (ref 164–446)
PMV BLD AUTO: 9.5 FL (ref 9–12.9)
POTASSIUM SERPL-SCNC: 4.4 MMOL/L (ref 3.6–5.5)
PROCALCITONIN SERPL-MCNC: <0.05 NG/ML
PROT SERPL-MCNC: 6.7 G/DL (ref 6–8.2)
RBC # BLD AUTO: 4.88 M/UL (ref 4.7–6.1)
SODIUM SERPL-SCNC: 136 MMOL/L (ref 135–145)
WBC # BLD AUTO: 8.6 K/UL (ref 4.8–10.8)

## 2025-07-06 PROCEDURE — 87040 BLOOD CULTURE FOR BACTERIA: CPT

## 2025-07-06 PROCEDURE — 84145 PROCALCITONIN (PCT): CPT

## 2025-07-06 PROCEDURE — 85652 RBC SED RATE AUTOMATED: CPT

## 2025-07-06 PROCEDURE — 80053 COMPREHEN METABOLIC PANEL: CPT

## 2025-07-06 PROCEDURE — 85025 COMPLETE CBC W/AUTO DIFF WBC: CPT

## 2025-07-06 PROCEDURE — 86140 C-REACTIVE PROTEIN: CPT

## 2025-07-06 PROCEDURE — RXMED WILLOW AMBULATORY MEDICATION CHARGE

## 2025-07-06 PROCEDURE — RXMED WILLOW AMBULATORY MEDICATION CHARGE: Performed by: EMERGENCY MEDICINE

## 2025-07-06 PROCEDURE — 73630 X-RAY EXAM OF FOOT: CPT | Mod: LT

## 2025-07-06 PROCEDURE — 36415 COLL VENOUS BLD VENIPUNCTURE: CPT

## 2025-07-06 RX ORDER — CEPHALEXIN 500 MG/1
1000 CAPSULE ORAL 2 TIMES DAILY
Qty: 56 CAPSULE | Refills: 0 | Status: ACTIVE | OUTPATIENT
Start: 2025-07-06 | End: 2025-07-08 | Stop reason: SDUPTHER

## 2025-07-06 NOTE — ED PROVIDER NOTES
ED Provider Note    CHIEF COMPLAINT  Chief Complaint   Patient presents with    Wound Check     Patient had 5th toe amputation approximately one month ago on the left hand side. Patient concerned for infection. States it has been hurting more than usual. HX: DM2, DAWOOD -thinners       EXTERNAL RECORDS REVIEWED  Outpatient infectious disease visit 6/24/2025 seen for follow-up of previous left fifth toe osteomyelitis and amputation complicated by MSSA bacteremia,, most recent I&D and surgical wound closure on 4/23/2025.  He completed 6-week course of IV Ertapenem 6/5/2025.    HPI/ROS  LIMITATION TO HISTORY   Select: : None  OUTSIDE HISTORIAN(S):  None    Donald Fuller is a 54 y.o. male who presents to the ER for swelling of the left foot and some redness along the lateral aspect.  He is concerned that he might have recurrence of an infection in this foot.  No recent injuries.  No fevers or chills.  No drainage.    PAST MEDICAL HISTORY   has a past medical history of Diabetes (HCC).    SURGICAL HISTORY   has a past surgical history that includes foot amputation (Left, 2/28/2025); foot amputation (Left, 4/21/2025); and wound irrigation & debridement (Left, 4/23/2025).    FAMILY HISTORY  History reviewed. No pertinent family history.    SOCIAL HISTORY  Social History     Tobacco Use    Smoking status: Never    Smokeless tobacco: Never   Vaping Use    Vaping status: Some Days    Substances: Nicotine   Substance and Sexual Activity    Alcohol use: Never    Drug use: Yes     Types: Oral     Comment: marijuanna gummies    Sexual activity: Never       CURRENT MEDICATIONS  Home Medications       Reviewed by Linwood Johnson R.N. (Registered Nurse) on 07/05/25 at 2349  Med List Status: Partial     Medication Last Dose Status   acetaminophen (TYLENOL) 500 MG Tab  Active   atorvastatin (LIPITOR) 20 MG Tab  Active   Continuous Glucose Sensor (DEXCOM G7 SENSOR) Misc  Active   cyclobenzaprine (FLEXERIL) 5 MG tablet  Active  "  hydrOXYzine HCl (ATARAX) 25 MG Tab  Active   insulin glargine (LANTUS SOLOSTAR) 100 UNIT/ML Solution Pen-injector injection PEN  Active   insulin lispro 100 UNIT/ML SC SOPN injection PEN  Active   Insulin Pen Needle 32 G x 4 mm  Active   Insulin Syringe-Needle U-100 (INSULIN SYRINGE 1CC/31GX5/16\") 31G X 5/16\" 1 ML Misc  Active   losartan (COZAAR) 50 MG Tab  Active   metformin (GLUCOPHAGE) 1000 MG tablet  Active   pioglitazone (ACTOS) 15 MG Tab  Active   Semaglutide,0.25 or 0.5MG/DOS, (OZEMPIC, 0.25 OR 0.5 MG/DOSE,) 2 MG/3ML Solution Pen-injector  Active   venlafaxine (EFFEXOR) 37.5 MG Tab  Active                  Audit from Redirected Encounters    **Home medications have not yet been reviewed for this encounter**         ALLERGIES  Allergies[1]    PHYSICAL EXAM  VITAL SIGNS: /82   Pulse 89   Temp 37.1 °C (98.8 °F) (Temporal)   Resp 16   Ht 1.88 m (6' 2\")   Wt 97 kg (213 lb 13.5 oz)   SpO2 95%   BMI 27.46 kg/m²    General: Laying calmly in stretcher, no distress  HEENT: NCAT, moist mucous membranes, normal conjunctiva  CV: Regular rate rhythm, 2+ DP pulses  Pulmonary: Breathing comfortably on room air  MSK: Left foot status post fifth toe amputation, well-healed surgical site, mild diffuse swelling to the dorsum and lateral aspect of the foot, small area of erythema along the lateral aspect just proximal to the surgical incision, no areas of fluctuance no drainage    EKG/LABS  CBC no leukocytosis, normal differential.  Procalcitonin undetectable.  CRP mildly elevated 1.93, ESR mildly elevated 25.  CMP with hyperglycemia 306, slight uremia BUN 27, otherwise normal.    RADIOLOGY/PROCEDURES   I have independently interpreted the diagnostic imaging associated with this visit and am waiting the final reading from the radiologist.   My preliminary interpretation is as follows: X-ray of the foot shows periosteal reaction around the proximal diaphysis of the fourth digit, possible osteomyelitis.  No signs of " osteomyelitis along the lateral aspect of the foot.    Radiologist interpretation:  DX-FOOT-COMPLETE 3+ LEFT   Final Result      1.  No acute fracture or dislocation.      2.  Periosteal reaction is noted around the proximal diaphysis of the fourth digit. Osteomyelitis is a possibility.      3.  Soft tissue swelling is noted.          COURSE & MEDICAL DECISION MAKING    ASSESSMENT, COURSE AND PLAN  Care Narrative: Differential includes cellulitis, osteomyelitis, abscess, foreign body, bacteremia    On arrival the patient is overall well-appearing.  He is afebrile has no signs of systemic illness.  Has some swelling of the left foot and small area of erythema over the lateral aspect, mild tenderness palpation.  No areas of fluctuance to suggest abscess.  Differential above considered.  No acute interventions necessary.  X-ray was obtained as well as labs and blood culture x 2.  Labs show some mild elevations in inflammatory markers CRP 1.93 ESR 25.  ESR slightly down from a month ago CRP slightly up.  No leukocytosis, differential normal.  Procalcitonin normal, less concern for major systemic infection at this time.  Blood cultures pending.  Hyperglycemic sugar 306.  X-ray shows possible periosteal reaction around the fourth toe proximal phalanx, may be a new osteomyelitis.  I do feel patient needs antibiotics at this time.  He states he has obligations outside of the hospital that he needs to attend this morning and will not stay in the hospital for any reason.  Given that he does not appear systemically ill at this time and is established with infectious disease, I feel it is appropriate to start him on oral cephalexin and have him call his infectious disease clinic on Monday morning.  We did discuss return precautions for signs of worsening infection should this happen before follow-up.  He was then discharged home with prescription for cephalexin 1 g twice daily for 2 weeks and recommendation to follow-up with  his infectious disease clinic.    DISPOSITION AND DISCUSSIONS    Escalation of care considered, and ultimately not performed:after discussion with the patient / family, they have elected to decline an escalation in care and acute inpatient care management, however at this time, the patient is most appropriate for outpatient management    Barriers to care at this time, including but not limited to: None.     Decision tools and prescription drugs considered including, but not limited to: Cephalexin.    FINAL DIAGNOSIS  1. Left foot infection         Electronically signed by: Sergo Torres M.D., 7/6/2025 1:03 AM           [1] No Known Allergies

## 2025-07-06 NOTE — DISCHARGE INSTRUCTIONS
Start taking the antibiotic tonight.  Call your infectious disease clinic Monday morning to set up a follow-up appointment.  Come back to the ER if you think redness is rapidly spreading up your foot/leg

## 2025-07-06 NOTE — ED TRIAGE NOTES
"Chief Complaint   Patient presents with    Wound Check     Patient had 5th toe amputation approximately one month ago on the left hand side. Patient concerned for infection. States it has been hurting more than usual. HX: DM2, DAWOOD -thinners     Patient ambulatory to triage for above complaint. Patient A&Ox4, GCS 15, patient speaking in full sentences. Equal and unlabored respirations. Patient educated on triage process and encouraged to notify staff if condition worsens. Appropriate protocols ordered. Patient to er lobby in stable condition.     BP (!) 155/94   Pulse 95   Temp 37.1 °C (98.8 °F) (Temporal)   Resp 16   Ht 1.88 m (6' 2\")   Wt 97 kg (213 lb 13.5 oz)   SpO2 96%   BMI 27.46 kg/m²       "

## 2025-07-07 ENCOUNTER — TELEPHONE (OUTPATIENT)
Dept: INFECTIOUS DISEASES | Facility: MEDICAL CENTER | Age: 55
End: 2025-07-07
Payer: COMMERCIAL

## 2025-07-07 ASSESSMENT — ENCOUNTER SYMPTOMS
VOMITING: 0
PALPITATIONS: 0
DIZZINESS: 0
ABDOMINAL PAIN: 0
FEVER: 0
CHILLS: 0
COUGH: 0
NAUSEA: 0
DOUBLE VISION: 0
WEIGHT LOSS: 0
TINGLING: 1
SENSORY CHANGE: 1
WHEEZING: 0
SHORTNESS OF BREATH: 0
SPUTUM PRODUCTION: 0
FOCAL WEAKNESS: 0
BRUISES/BLEEDS EASILY: 0
MYALGIAS: 0
BLURRED VISION: 0
HEADACHES: 0
NERVOUS/ANXIOUS: 0

## 2025-07-07 NOTE — TELEPHONE ENCOUNTER
Caller Name: H[place]on  Call Back Number: [phone]    How would the patient prefer to be contacted with a response: Phone call OK to leave a detailed message

## 2025-07-07 NOTE — TELEPHONE ENCOUNTER
Phone Number Called: [phone]    Call outcome: Spoke to patient regarding message below.    Message: I called pt back to get more information about his infection he has stated is back. Pt stated that he was just discharged from the ER where it was discussed for him to get more toes amputated. Pt stated he is concerned as he is experiencing the same infection like S/S he has in the past with his other toes. He stated that these symptoms arose after he got stitches removed and he stated that every time he has had stitches removed it has led to an infection.     Pt stated after everything he left the ER without abx and he stated he only left the ER because he had commitments to attend to and that otherwise this infection is taking over his life. He has since been scheduled to be seen by SACHA FRASER for further evaluation and discussion about abx.

## 2025-07-07 NOTE — TELEPHONE ENCOUNTER
Received an HCP Action Required request from [employer]. The providers signature was missing from page 8 of the PAP Application. I had provider sign the application and I successfully faxed the missing information/page to [employer] @ [phone].     Thank you,  Swati Ware, [facility]  Endocrinology Pharmacy Coordinator

## 2025-07-07 NOTE — TELEPHONE ENCOUNTER
Medication: Lantus SoloStar 100iu/mL Sopn   Type of Insurance: None*  Type of Financial assistance requested MAP/Free Drug  Source: [employer]  Source Phone #: [phone]  Outcome: APPROVED  Effective dates: 7/6/25 until 7/5/26  I called patient @ [phone] and advised him of the approval (I advised him of the Humalog PAP approval as well). I advised him that we should be receiving the shipment within 7-10 business days and we would give him an additional call to advise him of . The patient was very thankful for the assistance and follow up.     Thank you,  Swati Ware, [facility]  Endocrinology Pharmacy Coordinator

## 2025-07-08 ENCOUNTER — OFFICE VISIT (OUTPATIENT)
Dept: INFECTIOUS DISEASES | Facility: MEDICAL CENTER | Age: 55
End: 2025-07-08
Attending: NURSE PRACTITIONER

## 2025-07-08 ENCOUNTER — HOSPITAL ENCOUNTER (OUTPATIENT)
Facility: MEDICAL CENTER | Age: 55
End: 2025-07-08
Attending: NURSE PRACTITIONER

## 2025-07-08 VITALS
OXYGEN SATURATION: 99 % | HEART RATE: 85 BPM | RESPIRATION RATE: 18 BRPM | TEMPERATURE: 98.2 F | BODY MASS INDEX: 27.89 KG/M2 | WEIGHT: 210.4 LBS | SYSTOLIC BLOOD PRESSURE: 96 MMHG | HEIGHT: 73 IN | DIASTOLIC BLOOD PRESSURE: 82 MMHG

## 2025-07-08 DIAGNOSIS — Z79.4 TYPE 2 DIABETES MELLITUS WITH HYPERGLYCEMIA, WITH LONG-TERM CURRENT USE OF INSULIN (HCC): Primary | ICD-10-CM

## 2025-07-08 DIAGNOSIS — L08.9 LEFT FOOT INFECTION: ICD-10-CM

## 2025-07-08 DIAGNOSIS — E11.65 TYPE 2 DIABETES MELLITUS WITH HYPERGLYCEMIA, WITH LONG-TERM CURRENT USE OF INSULIN (HCC): ICD-10-CM

## 2025-07-08 DIAGNOSIS — E11.65 TYPE 2 DIABETES MELLITUS WITH HYPERGLYCEMIA, WITH LONG-TERM CURRENT USE OF INSULIN (HCC): Primary | ICD-10-CM

## 2025-07-08 DIAGNOSIS — S91.105D OPEN WOUND OF FOURTH TOE OF LEFT FOOT, SUBSEQUENT ENCOUNTER: ICD-10-CM

## 2025-07-08 DIAGNOSIS — Z79.4 TYPE 2 DIABETES MELLITUS WITH HYPERGLYCEMIA, WITH LONG-TERM CURRENT USE OF INSULIN (HCC): ICD-10-CM

## 2025-07-08 LAB
GRAM STN SPEC: NORMAL
SIGNIFICANT IND 70042: NORMAL
SITE SITE: NORMAL
SOURCE SOURCE: NORMAL

## 2025-07-08 PROCEDURE — 99215 OFFICE O/P EST HI 40 MIN: CPT | Performed by: NURSE PRACTITIONER

## 2025-07-08 PROCEDURE — 87070 CULTURE OTHR SPECIMN AEROBIC: CPT

## 2025-07-08 PROCEDURE — 99214 OFFICE O/P EST MOD 30 MIN: CPT | Performed by: NURSE PRACTITIONER

## 2025-07-08 PROCEDURE — 87077 CULTURE AEROBIC IDENTIFY: CPT

## 2025-07-08 PROCEDURE — 3079F DIAST BP 80-89 MM HG: CPT | Performed by: NURSE PRACTITIONER

## 2025-07-08 PROCEDURE — 3074F SYST BP LT 130 MM HG: CPT | Performed by: NURSE PRACTITIONER

## 2025-07-08 PROCEDURE — 87186 SC STD MICRODIL/AGAR DIL: CPT

## 2025-07-08 PROCEDURE — RXMED WILLOW AMBULATORY MEDICATION CHARGE: Performed by: NURSE PRACTITIONER

## 2025-07-08 PROCEDURE — 87205 SMEAR GRAM STAIN: CPT

## 2025-07-08 RX ORDER — CEPHALEXIN 500 MG/1
1000 CAPSULE ORAL 2 TIMES DAILY
Qty: 56 CAPSULE | Refills: 0 | Status: SHIPPED | OUTPATIENT
Start: 2025-07-08 | End: 2025-07-23

## 2025-07-08 ASSESSMENT — FIBROSIS 4 INDEX: FIB4 SCORE: 0.92

## 2025-07-08 ASSESSMENT — ENCOUNTER SYMPTOMS: DIARRHEA: 0

## 2025-07-09 ENCOUNTER — PHARMACY VISIT (OUTPATIENT)
Dept: PHARMACY | Facility: MEDICAL CENTER | Age: 55
End: 2025-07-09
Payer: COMMERCIAL

## 2025-07-10 LAB
BACTERIA WND AEROBE CULT: ABNORMAL
BACTERIA WND AEROBE CULT: ABNORMAL
GRAM STN SPEC: ABNORMAL
SIGNIFICANT IND 70042: ABNORMAL
SITE SITE: ABNORMAL
SOURCE SOURCE: ABNORMAL

## 2025-07-10 NOTE — TELEPHONE ENCOUNTER
Medication: Ozempic 0.25mg/0.5mg/Dose (2mg/3mL) Sopn   Type of Insurance: None*  Type of Financial assistance requested MAP/Free Drug  Source: [employer]  Source Phone #: [phone]  Outcome: APPROVED  Effective dates: 7/9/25 until 7/3/26  I called patient @ [phone] and advised him of the approval. I also advised him that we should be receiving the first order within 10-14 business days and an additional call will be made out to him to advise him of .     Thank you,  Swati Ware, [facility]  Endocrinology Pharmacy Coordinator

## 2025-07-11 LAB
BACTERIA BLD CULT: NORMAL
BACTERIA BLD CULT: NORMAL
SIGNIFICANT IND 70042: NORMAL
SIGNIFICANT IND 70042: NORMAL
SITE SITE: NORMAL
SITE SITE: NORMAL
SOURCE SOURCE: NORMAL
SOURCE SOURCE: NORMAL

## 2025-07-28 ASSESSMENT — ENCOUNTER SYMPTOMS
DOUBLE VISION: 0
BRUISES/BLEEDS EASILY: 0
WHEEZING: 0
NERVOUS/ANXIOUS: 0
HEADACHES: 0
MYALGIAS: 0
WEIGHT LOSS: 0
NAUSEA: 0
FEVER: 0
SENSORY CHANGE: 1
TINGLING: 1
ABDOMINAL PAIN: 0
BLURRED VISION: 0
SHORTNESS OF BREATH: 0
SPUTUM PRODUCTION: 0
VOMITING: 0
DIZZINESS: 0
DIARRHEA: 0
CHILLS: 0
FOCAL WEAKNESS: 0
PALPITATIONS: 0
COUGH: 0

## 2025-07-28 NOTE — PROGRESS NOTES
INFECTIOUS  DISEASE  OUTPATIENT CLINIC  NOTE   Subjective   Primary care provider: Kojo Covington M.D..     Reason for Follow Up:   Follow-up for   1. Type 2 diabetes mellitus with hyperglycemia, with long-term current use of insulin (HCC)        2. Left foot infection        3. Open wound of fourth toe of left foot, subsequent encounter        HPI: Patient previously seen and treated by ID team as inpatient during hospital admission.   Donald Fuller is a 54 y.o. male with history of uncontrolled type 2 diabetes mellitus, prior left fifth toe amputation in February admitted on 4/20/2025 secondary to worsening left foot swelling and drainage of prior amputation site. X-ray revealed findings concerning for osteomyelitis. He is now status post left fifth ray amputation with wound VAC placement on 4/21/2025. Hospital course complicated by MSSA bacteremia.  Blood cultures x 2 on 4/20+ MSSA. Repeat blood cultures 1 out of 2 sets on 4/22-MSSA. Repeat blood cultures on 4/24-negative to date.  1 blood cultures that triggered a CO2 production but cultures have been negative.  Return to the OR on 4/23/2025 for repeat I&D and surgical wound closure. OR cultures-MSSA. Pathology-acute osteomyelitis. TTE negative for any valvular abnormalities however there was a semimobile echodensity at the junction of the aorta and brachiocephalic artery. CTA aorta was negative for aortic aneurysm or dissection.  Showed atherosclerotic calcification in the coronary arteries. S/P I&D secondary closure of surgical wound left foot amputation on 4/23/2025.  6-week course of IV ertapenem 1 g daily with end date of 6/5/2025  - MRI C/T and L-spine negative for osteomyelitis    6/24/25-patient following up after completion of his 6-week course of IV ertapenem 1 g daily which ended on 6/5/2025.  Left fifth ray amputation site CDI with no surrounding erythema, swelling.  Previous sutures have been removed.  Small amount of serous drainage on  dressing.  No signs of active infection.  Today patient denies any nausea, vomiting, fever, chills, constipation or diarrhea.  Most recent labs reviewed from 6/2/2025 WBC 6.5, previous anemia resolved, platelets 236, neutrophils WNL, mild elevation in sed rate 27 and trending down, glucose 152, previous DOMINIC resolved, AST/ALT WNL, mild elevation alk phos 104.  CRP 0.33.  Education provided on signs and symptoms of recurrence of infection and when report to ER/call 911.  Referral to podiatry for ongoing footcare.  Follow-up with ID clinic as needed    7/8/25-patient returns to ID clinic with complaints of drainage from his surgical site that started a few days prior.  Patient noted increasing redness, swelling and purulent drainage from previous suture site along the left lateral foot.  He recently went to the emergency room prescribed p.o. Keflex which he has noticed some improvement in his wound.  X-ray of left foot completed in the emergency room with periosteal reaction is noted around the proximal diaphysis of the fourth digit. Osteomyelitis is a possibility.  Today he denies any nausea, vomiting, fever, chills, constipation or diarrhea.  Macerated wound edges along previous surgical wound.  Clinical photo uploaded.  I was able to express purulent fluid from lateral foot.  Wound culture sent to lab.  Given that patient is on Keflex this will most likely decrease culture yield but will hopefully rule out secondary infection.  Most recent labs reviewed from 7/6/2025.  Repeat labs in 1 week for continuous mind.  Recommend patient follow-up with [facility] ASAP for wound site eval.  May possibly need additional I&D or amputation for source control.     7/17/25 -wound culture positive for MSSA which was being treated with Keflex.  Patient did follow-up with orthopedic surgery who ordered an MRI of his left foot to rule out osteomyelitis but patient no-show to appointment on 7/16.  He did follow-up with  Dr. Weldon on 7/15 noted to have no significant erythema or swelling.  Repeat labs CBC/CMP yet to be completed    7/30/25-patient following up while on p.o. Keflex 500 mg 2 tabs twice daily.  Originally was supposed to end on 7/23/2025 but patient reports he had leftover Keflex from a previous prescription from his past hospital stay and he continued to take the antibiotic as previously prescribed.  Today his left foot wound is nearly completely healed with a very small area of maceration along wound edge.  No warmth or erythema.  No signs of active infection.  He completed 6 weeks of IV ertapenem on 6/5/2025 then was started on p.o. Keflex on 7/6/2025 and has been taking the medication since.  Recommended to stop antibiotic therapy today to monitor for signs and symptoms of recurrence of infection.  Repeat lab work yet to be completed.  Recommend repeating labs CBC/CMP in the next 4-5 days for monitoring while off antibiotic therapy.  Follow-up with ID clinic in 1 week while off antibiotics.  Today he denies any nausea, vomiting, fever, chills, constipation or diarrhea.  Education provided on signs and symptoms of infection and wound report to ER/call 911    Current Antimicrobials: None  Previous Antimicrobials: Ertapenem, Keflex    Other Current Medications:  Home Medications   Medication Sig Taking? Last Dose Authorizing Provider   cephALEXin (KEFLEX) 500 MG Cap Take 500 mg by mouth 2 times a day. Yes  Physician Outpatient   insulin glargine (LANTUS SOLOSTAR) 100 UNIT/ML Solution Pen-injector injection PEN Inject 30 Units under the skin every evening. Yes  CAROL LalP.R.N.   insulin lispro 100 UNIT/ML SC SOPN injection PEN Inject 2-10 Units under the skin 3 times a day before meals. Use 2 units if blood sugar 150-200. 4 units if 200-250, 6 units if 250-300, 8 units if 300-350, 10 units if 350-400 Yes  SALVADOR Lal.P.R.N.   Semaglutide,0.25 or 0.5MG/DOS, (OZEMPIC, 0.25 OR 0.5 MG/DOSE,) 2  "MG/3ML Solution Pen-injector Inject 0.5 mg under the skin every 7 days. Yes  CAROL LalP.RSridharNSridhar   acetaminophen (TYLENOL) 500 MG Tab Take 2 Tablets by mouth 3 times a day. Yes  Eric Douglass M.D.   cyclobenzaprine (FLEXERIL) 5 MG tablet Take 1 Tablet by mouth 3 times a day as needed for Muscle Spasms. Yes  Eric Douglass M.D.   venlafaxine (EFFEXOR) 37.5 MG Tab Take 37.5 mg by mouth every day. Yes  Physician Outpatient   atorvastatin (LIPITOR) 20 MG Tab Take 1 Tablet by mouth every evening. Yes  Mary Ram M.D.   losartan (COZAAR) 50 MG Tab Take 1 Tablet by mouth every day. Yes  Mary Ram M.D.   pioglitazone (ACTOS) 15 MG Tab Take 1 Tablet by mouth every day. Yes  Mary Ram M.D.   hydrOXYzine HCl (ATARAX) 25 MG Tab TAKE 1 TABLET BY MOUTH THREE TIMES DAILY AS NEEDED FOR ITCHING OR ANXIETY. Yes  Kojo Covington M.D.   Continuous Glucose Sensor (DEXCOM G7 SENSOR) Misc 1 Each every 10 days.   CAROL LalP.RSridharNSridhar   Insulin Pen Needle 32 G x 4 mm Use one pen needle in pen device to inject insulin four times daily.   Mary Ram M.D.   Insulin Syringe-Needle U-100 (INSULIN SYRINGE 1CC/31GX5/16\") 31G X 5/16\" 1 ML Misc Use as directed for SQ injection of insulin.   Kojo Covington M.D.   metformin (GLUCOPHAGE) 1000 MG tablet Take 1 Tablet by mouth 2 times a day with meals.  Patient not taking: Reported on 7/30/2025   Kojo Covington M.D.        PMH:  Past Medical History[1]  Past Surgical History[2]  History reviewed. No pertinent family history.  Social History     Socioeconomic History    Marital status: Single     Spouse name: Not on file    Number of children: Not on file    Years of education: Not on file    Highest education level: Bachelor's degree (e.g., BA, AB, BS)   Occupational History    Not on file   Tobacco Use    Smoking status: Never    Smokeless tobacco: Never   Vaping Use    Vaping status: Some Days    Substances: Nicotine "   Substance and Sexual Activity    Alcohol use: Never    Drug use: Yes     Types: Oral     Comment: marijuanna gummies    Sexual activity: Never   Other Topics Concern    Not on file   Social History Narrative    Not on file     Social Drivers of Health     Financial Resource Strain: Low Risk  (8/25/2022)    Overall Financial Resource Strain (CARDIA)     Difficulty of Paying Living Expenses: Not hard at all   Food Insecurity: No Food Insecurity (4/21/2025)    Hunger Vital Sign     Worried About Running Out of Food in the Last Year: Never true     Ran Out of Food in the Last Year: Never true   Transportation Needs: No Transportation Needs (4/21/2025)    PRAPARE - Transportation     Lack of Transportation (Medical): No     Lack of Transportation (Non-Medical): No   Physical Activity: Inactive (8/25/2022)    Exercise Vital Sign     Days of Exercise per Week: 0 days     Minutes of Exercise per Session: 30 min   Stress: No Stress Concern Present (8/25/2022)    [employer] [place] [employer] - Occupational Stress Questionnaire     Feeling of Stress : Not at all   Social Connections: Socially Isolated (8/25/2022)    Social Connection and Isolation Panel [NHANES]     Frequency of Communication with Friends and Family: Once a week     Frequency of Social Gatherings with Friends and Family: Never     Attends [employer] Services: Never     Active Member of Clubs or Organizations: No     Attends Club or Organization Meetings: Never     Marital Status: Living with partner   Intimate Partner Violence: Not At Risk (4/21/2025)    Humiliation, Afraid, Rape, and Kick questionnaire     Fear of Current or Ex-Partner: No     Emotionally Abused: No     Physically Abused: No     Sexually Abused: No   Housing Stability: High Risk (4/21/2025)    Housing Stability Vital Sign     Unable to Pay for Housing in the Last Year: No     Number of Times Moved in the Last Year: 2     Homeless in the Last Year: No        "    Allergies/Intolerances:  Allergies[3]    ROS:   Review of Systems   Constitutional:  Negative for chills, fever, malaise/fatigue and weight loss.   HENT:  Negative for congestion and hearing loss.    Eyes:  Negative for blurred vision and double vision.   Respiratory:  Negative for cough, sputum production, shortness of breath and wheezing.    Cardiovascular:  Negative for chest pain and palpitations.   Gastrointestinal:  Negative for abdominal pain, diarrhea, nausea and vomiting.   Genitourinary:  Negative for dysuria.   Musculoskeletal:  Positive for joint pain (left foot). Negative for myalgias.   Skin:  Negative for itching and rash.   Neurological:  Positive for tingling and sensory change. Negative for dizziness, focal weakness and headaches.   Endo/Heme/Allergies:  Does not bruise/bleed easily.   Psychiatric/Behavioral:  The patient is not nervous/anxious.       ROS was reviewed and were negative except as above.    Objective    Most Recent Vital Signs:  Pulse 94   Temp 37.1 °C (98.7 °F) (Temporal)   Resp 18   Ht 1.854 m (6' 1\")   Wt 99 kg (218 lb 4 oz)   SpO2 97%   BMI 28.79 kg/m²     Physical Exam:  Physical Exam  Vitals reviewed.   Constitutional:       Appearance: Normal appearance.   HENT:      Head: Normocephalic and atraumatic.      Nose: Nose normal.      Mouth/Throat:      Mouth: Mucous membranes are moist.   Eyes:      Pupils: Pupils are equal, round, and reactive to light.   Cardiovascular:      Rate and Rhythm: Normal rate.   Pulmonary:      Effort: Pulmonary effort is normal.      Breath sounds: Normal breath sounds.   Abdominal:      Palpations: Abdomen is soft.   Musculoskeletal:         General: No tenderness.      Cervical back: Normal range of motion and neck supple.      Comments: 5th toe of left foot amputation site nearly completely healed.  Mildly macerated wound.  Small amount of serous drainage on dressing.  Stop periwound.  No erythema.  No warmth     Skin:     General: Skin " is warm and dry.      Coloration: Skin is not jaundiced.      Findings: No erythema or rash.   Neurological:      Mental Status: He is alert and oriented to person, place, and time.      Motor: No weakness.   Psychiatric:         Mood and Affect: Mood normal.         Behavior: Behavior normal.          Pertinent Lab/Imaging Results:  [unfilled]  @CMP@  WBC   Date/Time Value Ref Range Status   07/06/2025 01:26 AM 8.6 4.8 - 10.8 K/uL Final     RBC   Date/Time Value Ref Range Status   07/06/2025 01:26 AM 4.88 4.70 - 6.10 M/uL Final     Hemoglobin   Date/Time Value Ref Range Status   07/06/2025 01:26 AM 13.6 (L) 14.0 - 18.0 g/dL Final     Hematocrit   Date/Time Value Ref Range Status   07/06/2025 01:26 AM 41.7 (L) 42.0 - 52.0 % Final     MCV   Date/Time Value Ref Range Status   07/06/2025 01:26 AM 85.5 81.4 - 97.8 fL Final     MCH   Date/Time Value Ref Range Status   07/06/2025 01:26 AM 27.9 27.0 - 33.0 pg Final     MCHC   Date/Time Value Ref Range Status   07/06/2025 01:26 AM 32.6 32.3 - 36.5 g/dL Final     MPV   Date/Time Value Ref Range Status   07/06/2025 01:26 AM 9.5 9.0 - 12.9 fL Final      Sodium   Date/Time Value Ref Range Status   07/06/2025 01:26  135 - 145 mmol/L Final     Potassium   Date/Time Value Ref Range Status   07/06/2025 01:26 AM 4.4 3.6 - 5.5 mmol/L Final     Chloride   Date/Time Value Ref Range Status   07/06/2025 01:26  96 - 112 mmol/L Final     Co2   Date/Time Value Ref Range Status   07/06/2025 01:26 AM 24 20 - 33 mmol/L Final     Glucose   Date/Time Value Ref Range Status   07/06/2025 01:26  (H) 65 - 99 mg/dL Final     Bun   Date/Time Value Ref Range Status   07/06/2025 01:26 AM 27 (H) 8 - 22 mg/dL Final     Creatinine   Date/Time Value Ref Range Status   07/06/2025 01:26 AM 1.29 0.50 - 1.40 mg/dL Final     Bun-Creatinine Ratio   Date/Time Value Ref Range Status   01/06/2025 02:10 PM 13.1 10.0 - 20.0 Final     Alkaline Phosphatase   Date/Time Value Ref Range Status   07/06/2025  "01:26 AM 96 30 - 99 U/L Final     AST(SGOT)   Date/Time Value Ref Range Status   07/06/2025 01:26 AM 13 12 - 45 U/L Final     ALT(SGPT)   Date/Time Value Ref Range Status   07/06/2025 01:26 AM 11 2 - 50 U/L Final     Total Bilirubin   Date/Time Value Ref Range Status   07/06/2025 01:26 AM 0.2 0.1 - 1.5 mg/dL Final      No results found for: \"CPKTOTAL\"           No results found for: \"BLOODCULTU\", \"BLDCULT\", \"BCHOLD\"    No results found for: \"BLOODCULTU\", \"BLDCULT\", \"BCHOLD\"       CULTURE TISSUE W/ GRM STAIN  Order: [id number]   Status: Final result       Next appt: Today at 01:30 PM in Infectious Diseases (Bob Shell, A.P.R.N.)    Test Result Released: Yes (not seen)    Specimen Information: Bone   0 Result Notes      Component  Ref Range & Units (hover) 1 mo ago   Significant Indicator POS Positive (POS)   Source BONE   Site left foot   Culture Result - Abnormal    Gram Stain Result Few WBCs.  Few Gram positive cocci.   Culture Result  Abnormal   Staphylococcus aureus  Moderate growth  Methicillin sensitive by screening method  See previous culture for sensitivity report.    Resulting Agency [facility]             Specimen Collected: 04/21/25  4:24 PM Last Resulted: 04/23/25 10:06 AM        Lab Flowsheet        Order Details        View Encounter        Lab and Collection Details        Routing        Result History     View All Conversations on this Encounter     Result Care Coordination      Patient Communication     Add Comments   Not seen Back to Top      Contains abnormal data Anaerobic Culture  Order: [id number] - Reflex for Order [id number]   Status: Final result       Next appt: Today at 01:30 PM in Infectious Diseases (Bob Shell, A.P.R.N.)    Test Result Released: Yes (not seen)    Specimen Information: Bone   0 Result Notes      Component  Ref Range & Units (hover) 1 mo ago   Significant Indicator POS Positive (POS)   Source BONE   Site left foot   Culture Result - Abnormal    Culture Result  Abnormal "   Finegoldia magna  Moderate growth    Resulting Agency [facility]             Specimen Collected: 04/21/25  4:24 PM Last Resulted: 04/24/25  3:39 PM   ntains abnormal data Anaerobic Culture  Order: [id number] - Reflex for Order [id number]   Status: Final result       Next appt: Today at 01:30 PM in Infectious Diseases (Bob Shell, A.P.R.N.)    Test Result Released: Yes (not seen)    Specimen Information: Bone   0 Result Notes      Component  Ref Range & Units (hover) 1 mo ago   Significant Indicator POS Positive (POS)   Source BONE   Site left foot   Culture Result - Abnormal    Culture Result  Abnormal   Finegoldia magna  Moderate growth    Resulting Agency [facility]             Specimen Collected: 04/21/25  4:24 PM Last Resulted: 04/24/25  3:39 PM   BLOOD CULTURE  Order: [id number]   Status: Final result       Next appt: Today at 01:30 PM in Infectious Diseases (Bob Shell, A.P.R.N.)    Test Result Released: Yes (not seen)    Specimen Information: Peripheral; Blood   0 Result Notes      Component  Ref Range & Units (hover) 4 wk ago   Significant Indicator POS Positive (POS)   Source BLD   Site PERIPHERAL   Culture Result Growth detected by automated blood culture system. 10:10 Abnormal    Culture Result  Abnormal   Staphylococcus aureus  Methicillin sensitive by screening method  See previous culture for sensitivity report.    Resulting Agency [facility]             Specimen Collected: 04/22/25  5:27 AM Last Resulted: 04/25/25  9:46 AM       Culture Wound With Gram Stain  Order: [id number]   Status: Final result       Next appt: Today at 01:30 PM in Infectious Diseases (Bob Shell, A.P.R.N.)    Test Result Released: Yes (not seen)    Specimen Information: Left Foot; Wound   0 Result Notes      Component  Ref Range & Units (hover) 1 mo ago   Significant Indicator POS Positive (POS)   Source WND   Site LEFT FOOT   Culture Result Light growth usual skin heather. Abnormal    Gram Stain Result Few WBCs.  Many Gram positive cocci.   Culture  Result  Abnormal   Staphylococcus aureus  Heavy growth  Methicillin sensitive by screening method    Resulting Agency [facility]        Susceptibility     Staphylococcus aureus     YESENIA     Ampicillin/sulbactam <=8/4 mcg/mL Sensitive     Cefazolin <=8 mcg/mL Sensitive     Cefepime <=4 mcg/mL Sensitive     Clindamycin <=0.25 mcg/mL Sensitive     Daptomycin 1 mcg/mL Sensitive     Erythromycin >4 mcg/mL Resistant     Oxacillin 0.5 mcg/mL Sensitive     Tetracycline <=4 mcg/mL Sensitive     Trimeth/Sulfa <=0.5/9.5 m... Sensitive     Vancomycin 1 mcg/mL Sensitive                 Specimen Collected: 04/20/25  9:45 PM Last Resulted: 04/23/25  9:31 AM       ntains abnormal data Blood Culture - Draw one from central line and one from peripheral site  Order: [id number]   Status: Final result       Next appt: Today at 01:30 PM in Infectious Diseases (Bob Wall A.P.R.N.)    Test Result Released: Yes (not seen)    Specimen Information: Line; Blood   0 Result Notes      Component  Ref Range & Units (hover) 1 mo ago   Significant Indicator POS Positive (POS)   Source BLD   Site Peripheral   Culture Result  Abnormal   Growth detected by automated blood culture system.  Staphylococcus aureus (methicillin sensitive)  detected by PCR.    Culture Result Staphylococcus aureus Abnormal    Resulting Agency [facility]        Susceptibility     Staphylococcus aureus     YESENIA     Ampicillin/sulbactam <=8/4 mcg/mL Sensitive     Cefazolin <=8 mcg/mL Sensitive     Cefepime <=4 mcg/mL Sensitive     Clindamycin 0.5 mcg/mL Sensitive     Daptomycin 1 mcg/mL Sensitive     Erythromycin >4 mcg/mL Resistant     Oxacillin 0.5 mcg/mL Sensitive     Tetracycline <=4 mcg/mL Sensitive     Trimeth/Sulfa <=0.5/9.5 m... Sensitive     Vancomycin 1 mcg/mL Sensitive                 Specimen Collected: 04/20/25  9:13 PM Last Resulted: 04/23/25  9:15 AM      Latest Reference Range & Units 04/20/25 21:13 04/20/25 21:45 04/21/25 07:40 04/21/25 16:24 04/22/25 05:27 04/24/25 12:59    MRSA by PCR Negative   Negative       Significant Indicator  POS ! POS !  POS !  . NEG POS !  POS !  . NEG  POS ! NEG  NEG   Site  Peripheral LEFT FOOT  PERIPHERAL  LEFT FOOT - left foot  left foot  left foot PERIPHERAL  PERIPHERAL PERIPHERAL  PERIPHERAL   Source  BLD WND  BLD  WND UR BONE  BONE  BONE BLD  BLD BLD  BLD   !: Data is abnormal    DX-FOOT-COMPLETE 3+ LEFT  Order: [id number]   Status: Final result       Next appt: Today at 01:30 PM in Infectious Diseases (Bob Wall, A.P.R.N.)    Test Result Released: Yes (not seen)    0 Result Notes  Details    Reading Physician Reading Date Result Priority   Katie Collins M.D.  [phone] 4/20/2025      Narrative & Impression     4/20/2025 9:28 PM     HISTORY/REASON FOR EXAM: Atraumatic Pain/Swelling/Deformity     TECHNIQUE/EXAM DESCRIPTION:  AP, lateral, and oblique views of the LEFT foot.     COMPARISON:  February 26, 2025     FINDINGS:     Postoperative changes of transmetatarsal amputation of the fifth toe is seen. Slight permeative appearance of the lateral cortex of the fifth toe metatarsal waist is seen. There is soft tissue gas in small bony fragments adjacent to the fracture site.     IMPRESSION:        1.  Slight permeative appearance of the lateral cortex of the fifth toe metatarsal waist, appearance raising concern for component of osteomyelitis.  2.  Soft tissue gas at the amputation site, consider underlying infectious etiology.        Exam Ended: 04/20/25 10:00 PM Last Resulted: 04/20/25 10:22 PM   EC-ECHOCARDIOGRAM COMPLETE W/O CONT  Order: [id number]   Status: Final result       Next appt: Today at 01:30 PM in Infectious Diseases (Bob Wall, A.P.R.N.)    Test Result Released: Yes (not seen)    0 Result Notes  Details    Reading Physician Reading Date Result Priority   No Reading Provider Prelim 4/22/2025    Celestino Jay M.D.  [phone] 4/22/2025      Result Text  Transthoracic  Echo Report        Echocardiography Laboratory      CONCLUSIONS  Normal left ventricular systolic function. The left ventricular   ejection fraction is visually estimated to be 65%  Mild concentric left ventricular hypertrophy.  Normal right ventricular size and systolic function.  Aortic valve sclerosis without significant stenosis.  Right ventricular systolic pressure is estimated to be 26 mmHg   (normal).   Semimobile echodensity seen at the junction of the aorta and   brachiocephalic artery - recommend further imaging of the aorta.   No prior study is available for comparison.  Primary team is notified of findings.     REX ECHEVARRIA  Exam Date:         2025                      08:52  Exam Location:     Inpatient  Priority:          Routine     Ordering Physician:        BRIGID MARCIAL  Referring Physician:       [id number] Manning  Sonographer:               Yaneth Jade     Age:    54     Gender:    M  MRN:    [id number]  :    1970  BSA:    2.22   Ht (in):    73     Wt (lb):    216  Exam Type:     Complete     Indications:     Bacteremia  ICD Codes:       R78.81     CPT Codes:       12119     BP:   119    /   83     HR:  Technical Quality:       Fair     MEASUREMENTS  (Male / Female) Normal Values  2D ECHO  LV Diastolic Diameter PLAX        3.9 cm                4.2 - 5.9 / 3.9 - 5.3   cm  LV Systolic Diameter PLAX         2 cm                  2.1 - 4.0 cm  IVS Diastolic Thickness           1.2 cm                  LVPW Diastolic Thickness          1.2 cm                  LVOT Diameter                     2 cm                    LV Ejection Fraction MOD BP       65.3 %                >= 55  %  LV Ejection Fraction MOD 4C       69.4 %                  LV Ejection Fraction MOD 2C       59.2 %                  LV Ejection Fraction 4C AL        73.2 %                  LV Ejection Fraction 2C AL        63.1 %                  LA Volume Index                   21.6 cm3/m2            16 - 28 cm3/m2     DOPPLER  AV Peak Velocity                  1.3 m/s                 AV Peak Gradient                  7.1 mmHg                AV Mean Gradient                  4 mmHg                  LVOT Peak Velocity                1.1 m/s                 AV Area Cont Eq vti               2.7 cm2                 Mitral E Point Velocity           1.2 m/s                 Mitral E to A Ratio               1.4                     MV Pressure Half Time             55 ms                   MV Area PHT                       4 cm2                   MV Deceleration Time              187 ms                  TR Peak Velocity                  239 cm/s                PV Peak Velocity                  1 m/s                   PV Peak Gradient                  4.3 mmHg                RVOT Peak Velocity                0.75 m/s                LV E' Lateral Velocity            11.1 cm/s               Mitral E to LV E' Lateral Ratio   10.5                    LV E' Septal Velocity             7.3 cm/s                Mitral E to LV E' Septal Ratio    16                         * Indicates values subject to auto-interpretation  LV EF:        %     FINDINGS  Left Ventricle  Normal left ventricular chamber size. Mild concentric left ventricular   hypertrophy. Normal left ventricular systolic function. The left   ventricular ejection fraction is visually estimated to be 65%. No   regional wall motion abnormalities. Indeterminate diastolic function.     Right Ventricle  Normal right ventricular size and systolic function.     Right Atrium  Normal right atrial size. Normal inferior vena cava size and   inspiratory collapse.     Left Atrium  Normal left atrial size. Left atrial volume index is 20 mL/sq m.     Mitral Valve  Structurally normal mitral valve without significant stenosis or   regurgitation.     Aortic Valve  Tricuspid aortic valve. Aortic valve sclerosis without significant   stenosis. No aortic insufficiency.     Tricuspid  Valve  Structurally normal tricuspid valve without significant stenosis. Trace   tricuspid regurgitation. Right atrial pressure is estimated to be 3   mmHg. Right ventricular systolic pressure is estimated to be 26 mmHg.     Pulmonic Valve  Structurally normal pulmonic valve without significant stenosis or   regurgitation.     Pericardium  No significant pericardial effusion.     Aorta  Normal aortic root for body surface area. The ascending aorta diameter   is 3.1 cm. Semimobile echodensity seen at the junction of the aorta and   brachiocephalic artery.                                Celestino Jay MD  (Electronically Signed)  Final Date:     22 April 2025                   10:44     All Measurements        Exam Ended: 04/22/25 10:21 AM Last Resulted: 04/22/25 10:44 AM   CT-CTA COMPLETE THORACOABDOMINAL AORTA  Order: [id number]   Status: Final result       Next appt: Today at 01:30 PM in Infectious Diseases (Bob Wall, A.P.R.N.)    Test Result Released: Yes (not seen)    0 Result Notes  Details    Reading Physician Reading Date Result Priority   Aristides Thomas M.D.  [phone] 4/23/2025      Narrative & Impression     4/23/2025 1:43 PM     HISTORY/REASON FOR EXAM:  Chest pain.        TECHNIQUE/EXAM DESCRIPTION:  CT angiogram without and with contrast, with reconstructions.     Initial precontrast thick helical sections were obtained from the top of the aortic arch through the diaphragmatic domes. Following this, thin-section postcontrast helical images were obtained from the lung apices through the iliac crests following the   bolus administration of mL of nonionic Omnipaque 350 contrast.  CT angiographic technique was utilized.     Parasagittal reconstructed images of the aorta were generated utilizing multiplanar volume reformat technique.     Low dose optimization technique was utilized for this CT exam including automated exposure control and adjustment of the mA and/or kV according to patient size.      COMPARISON: None available.     FINDINGS:     Aorta: Pre-contrast images demonstrate no evidence of displaced calcified intima or intramural hematoma.  Aortic arch: Branching pattern is conventional.  Diameter: The ascending and descending aorta are of normal caliber.  Dissection: Absent.  Celiac artery origin: Widely patent.  Superior mesenteric artery origin: Widely patent.  Renal artery origins: Widely patent.  Inferior mesenteric artery: Patent.  Common iliac arteries: Nonaneurysmal and patent.     Heart: There is atherosclerotic change of the coronary arteries especially involving the left anterior descending artery origin.     3D angiographic/MIP images of the vasculature confirm the vascular findings as described above.     Lungs: There is bibasilar atelectasis.  Liver: There is fatty change. There is an enhancing mass involving the right lobe posteriorly measuring 2.3 cm size likely representing hemangioma.  Biliary system: No intrahepatic or extrahepatic ductal dilatation. The gallbladder is grossly unremarkable.  Spleen: Unremarkable.  Adrenal glands: There is 17 mm left adrenal nodule. As 12 mm right adrenal nodule.  Pancreas: Unremarkable.  Kidneys: There are small bilateral simple appearing renal cysts. No follow-up recommended.  Bowel: There is moderate constipation. No bowel obstruction or focal inflammatory change.  Ascites: None.  Lymph nodes: No adenopathy is identified.  Bones: There is partial fusion across the left SI joint.     IMPRESSION:     1.  No evidence of aortic aneurysm or dissection.     2.  Atherosclerotic calcification of the coronary arteries especially involving the left anterior ascending artery origin     3.  Fatty liver with a 2.3 cm right lobe hepatic hemangioma.     4.  Bilateral adrenal nodules measuring 17 mm on the left and 12 mm on the right.     5.  Moderate constipation.                          Exam Ended: 04/23/25  1:56 PM Last Resulted: 04/23/25  2:33 PM    MR-CERVICAL SPINE-WITH & W/O  Order: [id number]   Status: Final result       Next appt: Today at 01:30 PM in Infectious Diseases (ARIE HarrisonRSOILA)    Test Result Released: Yes (not seen)    0 Result Notes  Details    Reading Physician Reading Date Result Priority   Nishant Glez M.D.  [phone] 4/25/2025      Narrative & Impression     4/25/2025 11:05 AM     HISTORY/REASON FOR EXAM:  Back pain.     TECHNIQUE/EXAM DESCRIPTION:  MRI of the cervical spine without and with contrast.     Multiplanar multisequence MR examination of the cervical spine. 20 mL ProHance contrast was administered intravenously.     COMPARISON: None.     FINDINGS:  There are postsurgical changes as evidenced by C4-5 and C5-6 disc prosthesis. Magnetic susceptibility artifact is noted at these levels. The cervical spine maintains normal alignment. There is no destructive lesion. There is no perivertebral, disc   epidural fluid collection.     The craniovertebral junction is well aligned.     At the level of C2-3,there is no spinal or neural foraminal stenosis.     At the level of C3-4,there is left-sided uncinate joint arthropathy causing mild left neural foraminal stenosis.     At the level of C4-5,there is disc prosthesis. There is no spinal or neural foraminal stenosis.     At the level of C5-6,there is disc prosthesis. Bilateral uncinate joint arthropathy seen. There is moderate bilateral neural foraminal stenosis. There is moderate central canal stenosis.     At the level of C6-7,there is bilateral uncinate joint arthropathy. There is moderate bilateral neural foraminal stenosis.     At the level of C7-T1,there is no spinal or neural foraminal stenosis.     The visualized posterior fossa structures appear normal within limits.  The cervical spinal cord does not demonstrate any mass or abnormal T2 signal intensity. Brainstem gliosis is seen.     The visualized pre-and paraspinal soft tissues appear normal within limits.      There is no abnormal contrast enhancement.     IMPRESSION:     1.  Postsurgical and degenerative changes in the cervical spine as described above.  2.  Moderate central canal stenosis at C5-6.  3.  Multifocal neural foraminal stenosis.  4.  There is no evidence of infection.        Exam Ended: 04/25/25  1:41 PM Last Resulted: 04/25/25  2:23 PM       MR-THORACIC SPINE-WITH & W/O  Order: [id number]   Status: Final result       Next appt: Today at 01:30 PM in Infectious Diseases (CAROL HarrisonP.RSridharNSridhar)    Test Result Released: Yes (not seen)    0 Result Notes  Details    Reading Physician Reading Date Result Priority   Nishant Glez M.D.  [phone] 4/25/2025      Narrative & Impression     4/25/2025 11:17 AM     HISTORY/REASON FOR EXAM:  Back pain in the setting of persistent MSSA bacteremia     TECHNIQUE/EXAM DESCRIPTION:  MRI of the thoracic spine without and with contrast.     Multiplanar multisequence MR examination of the thoracic spine.     20 mL ProHance contrast was administered intravenously.     COMPARISON:  None.     FINDINGS: There is mild chronic vertebral height loss at T7, and T8 vertebral bodies. There is no acute fracture. There is no pathologic lesion. There is no perivertebral, disc or epidural fluid collection.     At the level of T5-6. there is small right paracentral disc protrusion without significant spinal or neural foraminal stenosis. There is no intradural extramedullary lesion. There is no abnormal intramedullary T2 signal intensity.     Mild left pleural effusion is seen. Bilateral basilar atelectasis is seen.        IMPRESSION:     1.  Mild chronic compression deformity at T7 and T8.  2.  No acute fracture.  3.  Mild degenerative disease.  4.  No evidence of infection.        Exam Ended: 04/25/25  1:42 PM Last Resulted: 04/25/25  2:28 PM   MR-LUMBAR SPINE-WITH & W/O  Order: [id number]   Status: Final result       Next appt: Today at 01:30 PM in Infectious Diseases (Bob Wall  A.P.R.N.)    Test Result Released: Yes (not seen)    0 Result Notes  Details    Reading Physician Reading Date Result Priority   Nishant Glez M.D.  [phone] 4/25/2025      Narrative & Impression     4/25/2025 11:17 AM     HISTORY/REASON FOR EXAM:  R/O osteomyelitis.        TECHNIQUE/EXAM DESCRIPTION:  MRI of the lumbar spine without and with contrast.     Multiplanar multisequence MR examination of the lumbar spine. 20 mL ProHance contrast was administered intravenously.     COMPARISON:  None.     FINDINGS:  The lumbar spine maintains normal height and alignment. There is no fracture or dislocation. There is no pathologic marrow infiltration. There is no aggressive osseous lesion. There is no perivertebral, disc or epidural fluid collection.     At the level of L5-S1,there is mild bilateral facet joint arthropathy. There is no spinal or neural foraminal stenosis.     At the level of L4-5,there is disc degeneration. There is minimal disc bulge. Mild bilateral facet joint arthropathy seen. There is no spinal canal stenosis. There is mild bilateral neural foraminal stenosis.     At the level of L3-4,there is no central canal stenosis. There is mild bilateral neural foraminal stenosis.     At the level of L2-3,there is no spinal or neural foraminal stenosis.     At the level of L1-2,there is no spinal or neural foraminal stenosis.     The conus terminates at the level of L1. The visualized lower thoracic spinal cord is unremarkable. There is no lumbar intradural lesion.     The visualized pre-and paraspinal soft tissues appear normal.     There is no abnormal contrast enhancement.     IMPRESSION:     1.  There is no evidence of infection in the lumbar spine.  2.  Mild degenerative changes        Exam Ended: 04/25/25  1:44 PM Last Resulted: 04/25/25  2:19 PM       CT-ABDOMEN-PELVIS WITH  Order: [id number]   Status: Final result       Next appt: Today at 01:30 PM in Infectious Diseases (Bob Wall A.P.R.N.)     Test Result Released: Yes (not seen)    0 Result Notes  Details    Reading Physician Reading Date Result Priority   Luis Bonilla M.D.  [phone] 4/26/2025      Narrative & Impression     4/26/2025 3:12 PM     HISTORY/REASON FOR EXAM:  Concern for bowel obstruction. Abdominal distention.     TECHNIQUE/EXAM DESCRIPTION:   CT scan of the abdomen and pelvis with contrast.     Contrast-enhanced helical scanning was obtained from the diaphragmatic domes through the pubic symphysis following the bolus administration of nonionic contrast without complication.     96 mL of Omnipaque 350 nonionic contrast was administered without complication.     Low dose optimization technique was utilized for this CT exam including automated exposure control and adjustment of the mA and/or kV according to patient size.     COMPARISON: CTA of the thoracoabdominal aorta, 4/23/2025.     FINDINGS:  Lower Chest: Bibasilar subsegmental atelectasis. The heart size is normal. No pleural or pericardial effusion.     Liver: Unremarkable.     Spleen: Unremarkable.     Pancreas: Unremarkable.     Gallbladder: No calcified stones.     Biliary: Nondilated.     Adrenal glands: Stable bilateral adrenal nodules.     Kidneys: Unremarkable without hydronephrosis.     Bowel: No obstruction or acute inflammation.     Lymph nodes: No adenopathy.     Vasculature: Unremarkable.     Peritoneum: Unremarkable without ascites.     Musculoskeletal: No acute or destructive process.     Pelvis: No adenopathy or free fluid. Central zone prostatic calcifications. Seminal vesicles are within normal limits. The bladder is unremarkable.     IMPRESSION:     1.  No bowel obstruction.  2.  Stable bilateral adrenal nodules.  3.  Bibasilar subsegmental atelectasis.           Exam Ended: 04/26/25  3:36 PM Last Resulted: 04/26/25  3:44 PM       Pathology Specimen  Order: [id number]   Status: Final result       Next appt: Today at 01:30 PM in Infectious Diseases  "(MABLE Harrison)    Test Result Released: Yes (seen)    0 Result Notes        Narrative  Performed by: [facility]                          DEPARTMENT OF PATHOLOGY                   [address] [place] [address]  [id number]              Phone [phone]          Fax [phone]  Natalee Nieves M.D.     Loli Wilkes M.D.     ZOILA Contreras D.O. Jennifer Jeung, M.D. Arash Mohtashamian, M.D. Aaron G. Novotny, D.O.    Patient:    REX ECHEVARRIA     Specimen    [id number]                                           #:      Copies to:                        SURGICAL PATHOLOGY CONSULTATION      FINAL DIAGNOSIS:    A. Left foot 5th metatarsal:         Acute osteomyelitis.                                          Diagnosis performed by:                                      TIBURCIO DASH  ------------------------------------------------------------------------  ---------------------------------------------------------------  CODES: 2005x1  2205x1  [id number]  SK413h3    SPECIMEN(S)  A. Left foot 5th metatarsal:    PREOPERATIVE DIAGNOSIS:  Wound dehiscence    POSTOPERATIVE DIAGNOSIS:  Wound dehiscence       GROSS DESCRIPTION:  A. Received in formalin labeled with the patient's name, medical record  number, and \"left foot fifth metatarsal\" is a 4.7 cm in length by up to  2.5 cm in maximum diameter portion of bone having a single smooth  transected surface, and articular cartilage on the opposing aspect.  Transected surface inked blue.  The cut surfaces are tan-pink to  yellow, trabeculated, and without obvious bone softening.  Decalcified  in HCl prior to processing. RS /[id number] [facility]    SLIDE KEY:  A1:  Representative perpendicular transected surface (inked blue)  A2:  Representative articular aspect    MICROSCOPIC DESCRIPTION:  Microscopic examination was performed.  Please see " diagnosis.    Report electronically signed by:  TIBURCIO DASH ,  Diagnosis performed at: [facility]  Pathology  Department, [address] [place]  [address]      Printed 00/00/0000 [id number] DONALD FULLER   Page 1 of 1 MRN#:[id number]   Specimen Collected: 04/21/25  4:30 PM Last Resulted: 04/24/25 12:55 PM     DX-FOOT-COMPLETE 3+ LEFT  Order: [id number]   Status: Final result       Next appt: 09/30/2025 at 11:00 AM in Endocrinology (CAROL LalPSridharRSridharNSridhar)    Test Result Released: Yes (not seen)    0 Result Notes  Details    Reading Physician Reading Date Result Priority   Jorge Duran M.D.  [phone] 7/6/2025      Narrative & Impression     7/6/2025 1:30 AM     HISTORY/REASON FOR EXAM:  Left foot pain        TECHNIQUE/EXAM DESCRIPTION AND NUMBER OF VIEWS:  3 nonweightbearing views of the LEFT foot.     COMPARISON:  04/20/2025     FINDINGS:  Bone mineralization is normal.  There is no evidence of fracture or dislocation.  There is now complete amputation of the fifth digit. Soft tissue swelling is noted in the lateral foot area.  Periosteal reaction is noted around the proximal   diaphysis of the fourth digit.     IMPRESSION:     1.  No acute fracture or dislocation.     2.  Periosteal reaction is noted around the proximal diaphysis of the fourth digit. Osteomyelitis is a possibility.     3.  Soft tissue swelling is noted.        Exam Ended: 07/06/25  1:30 AM Last Resulted: 07/06/25  1:36 AM         Impression/Assessment      1. Type 2 diabetes mellitus with hyperglycemia, with long-term current use of insulin ([facility])        2. Left foot infection        3. Open wound of fourth toe of left foot, subsequent encounter            Donald Fuller is a 54 y.o. male with history of uncontrolled type 2 diabetes mellitus, prior left fifth toe amputation in February admitted on 4/20/2025 secondary to worsening left foot swelling and drainage of prior amputation site. X-ray  revealed findings concerning for osteomyelitis. He is now status post left fifth ray amputation with wound VAC placement on 4/21/2025. Hospital course complicated by MSSA bacteremia.  Blood cultures x 2 on 4/20+ MSSA. Repeat blood cultures 1 out of 2 sets on 4/22-MSSA. Repeat blood cultures on 4/24-negative to date.  1 blood cultures that triggered a CO2 production but cultures have been negative.  Return to the OR on 4/23/2025 for repeat I&D and surgical wound closure. OR cultures-MSSA. Pathology-acute osteomyelitis. TTE negative for any valvular abnormalities however there was a semimobile echodensity at the junction of the aorta and brachiocephalic artery. CTA aorta was negative for aortic aneurysm or dissection.  Showed atherosclerotic calcification in the coronary arteries. S/P I&D secondary closure of surgical wound left foot amputation on 4/23/2025.  6-week course of IV ertapenem 1 g daily with end date of 6/5/2025. - MRI C/T and L-spine negative for osteomyelitis.  Patient returned to the ID clinic on 7/8/2025 due to concerns of ongoing infection from surgical site.  He went to the emergency room and was given p.o. Keflex.  X-ray of left foot completed in the emergency room with periosteal reaction is noted around the proximal diaphysis of the fourth digit. Osteomyelitis is a possibility.     7/30/25-patient following up while on p.o. Keflex 500 mg 2 tabs twice daily.  Originally was supposed to end on 7/23/2025 but patient reports he had leftover Keflex from a previous prescription from his past hospital stay and he continued to take the antibiotic as previously prescribed.  Today his left foot wound is nearly completely healed with a very small area of maceration along wound edge.  No warmth or erythema.  No signs of active infection.  He completed 6 weeks of IV ertapenem on 6/5/2025 then was started on p.o. Keflex on 7/6/2025 and has been taking the medication since.  Recommended to stop antibiotic  therapy today to monitor for signs and symptoms of recurrence of infection.  Repeat lab work yet to be completed.  Recommend repeating labs CBC/CMP in the next 4-5 days for monitoring while off antibiotic therapy.  Follow-up with ID clinic in 1 week while off antibiotics.  Education provided on signs and symptoms of infection and wound report to ER/call 911    - This infection/ chronic illness posses a threat to life, bodily function, and limb preservation   PLAN:   - Stop Keflex today. Completed additional 26 days of Keflex as patient had leftover antibiotics from previous hospital admission  - Continue wound care to the left foot  - Recommended to check blood sugars 2-3 times daily and keep below 150 to allow for adequate wound and prevent secondary infection.  - Education provided on signs and symptoms of recurrence of infection and when report to ER/call 911      Return visit: 1 week. Follow up with primary care physician for chronic medical problems    I have performed a physical exam,  updated ROS and plan today. I have reviewed previous images, labs, and provider notes.      REMEDIOS Harrison.R.N.    All Patients should seek medical re-evaluation or report to the ER for new, increasing or worsening symptoms. In some circumstances medical conditions can change from the initial evaluation and may require emergent medical re-evaluation. This includes but is not limited to chest pain, shortness of breath, atypical abdominal pain, atypical headache, ALOC, fever >101, low blood pressure, high respiratory rate (above 30), low oxygen saturation (below 90%), acute delirium, abnormal bleeding, inability to tolerate any intake, weakness on one side of the body, any worsened or concerning conditions.    Please note that this dictation was created using voice recognition software. I have worked with technical experts from [employer] to optimize the interface.  I have made every reasonable attempt to correct obvious  errors, but there may be errors of grammar and possibly content that I did not discover before finalizing the note.             [1]   Past Medical History:  Diagnosis Date    Diabetes (HCC)    [2]   Past Surgical History:  Procedure Laterality Date    WOUND IRRIGATION & DEBRIDEMENT Left 4/23/2025    Procedure: DEBRIDEMENT AND WOUND CLOSURE LEFT FOOT;  Surgeon: Johnathan Weldon M.D.;  Location: [facility];  Service: Orthopedics    FOOT AMPUTATION Left 4/21/2025    Procedure: AMPUTATION REVISION, RAY AMPUTATION;  Surgeon: Nik Andersen M.D.;  Location: [facility];  Service: Orthopedics    FOOT AMPUTATION Left 2/28/2025    Procedure: AMPUTATION, FOOT-4TH AND 5TH RAY RESECTION;  Surgeon: Jason Davis M.D.;  Location: [facility];  Service: Orthopedics   [3] No Known Allergies

## 2025-07-30 ENCOUNTER — OFFICE VISIT (OUTPATIENT)
Dept: INFECTIOUS DISEASES | Facility: MEDICAL CENTER | Age: 55
End: 2025-07-30
Attending: NURSE PRACTITIONER

## 2025-07-30 VITALS
RESPIRATION RATE: 18 BRPM | HEIGHT: 73 IN | WEIGHT: 218.25 LBS | DIASTOLIC BLOOD PRESSURE: 72 MMHG | SYSTOLIC BLOOD PRESSURE: 112 MMHG | OXYGEN SATURATION: 97 % | TEMPERATURE: 98.7 F | HEART RATE: 94 BPM | BODY MASS INDEX: 28.93 KG/M2

## 2025-07-30 DIAGNOSIS — L08.9 LEFT FOOT INFECTION: ICD-10-CM

## 2025-07-30 DIAGNOSIS — S91.105D OPEN WOUND OF FOURTH TOE OF LEFT FOOT, SUBSEQUENT ENCOUNTER: ICD-10-CM

## 2025-07-30 DIAGNOSIS — E11.65 TYPE 2 DIABETES MELLITUS WITH HYPERGLYCEMIA, WITH LONG-TERM CURRENT USE OF INSULIN (HCC): Primary | ICD-10-CM

## 2025-07-30 DIAGNOSIS — Z79.4 TYPE 2 DIABETES MELLITUS WITH HYPERGLYCEMIA, WITH LONG-TERM CURRENT USE OF INSULIN (HCC): Primary | ICD-10-CM

## 2025-07-30 RX ORDER — CEPHALEXIN 500 MG/1
500 CAPSULE ORAL 2 TIMES DAILY
COMMUNITY

## 2025-07-30 ASSESSMENT — FIBROSIS 4 INDEX: FIB4 SCORE: 0.92

## 2025-08-04 ASSESSMENT — ENCOUNTER SYMPTOMS
HEADACHES: 0
DIZZINESS: 0
WHEEZING: 0
CHILLS: 0
NERVOUS/ANXIOUS: 0
SHORTNESS OF BREATH: 0
ABDOMINAL PAIN: 0
DOUBLE VISION: 0
BRUISES/BLEEDS EASILY: 0
NAUSEA: 0
COUGH: 0
SPUTUM PRODUCTION: 0
FOCAL WEAKNESS: 0
DIARRHEA: 0
PALPITATIONS: 0
BLURRED VISION: 0
MYALGIAS: 0
VOMITING: 0
FEVER: 0
WEIGHT LOSS: 0

## 2025-08-06 ENCOUNTER — OFFICE VISIT (OUTPATIENT)
Dept: INFECTIOUS DISEASES | Facility: MEDICAL CENTER | Age: 55
End: 2025-08-06
Attending: NURSE PRACTITIONER

## 2025-08-06 VITALS
OXYGEN SATURATION: 97 % | TEMPERATURE: 99.1 F | HEIGHT: 73 IN | RESPIRATION RATE: 20 BRPM | WEIGHT: 211.06 LBS | HEART RATE: 111 BPM | BODY MASS INDEX: 27.97 KG/M2 | DIASTOLIC BLOOD PRESSURE: 80 MMHG | SYSTOLIC BLOOD PRESSURE: 120 MMHG

## 2025-08-06 DIAGNOSIS — S91.105D OPEN WOUND OF FOURTH TOE OF LEFT FOOT, SUBSEQUENT ENCOUNTER: ICD-10-CM

## 2025-08-06 DIAGNOSIS — E11.65 TYPE 2 DIABETES MELLITUS WITH HYPERGLYCEMIA, WITH LONG-TERM CURRENT USE OF INSULIN (HCC): Primary | ICD-10-CM

## 2025-08-06 DIAGNOSIS — Z79.4 TYPE 2 DIABETES MELLITUS WITH HYPERGLYCEMIA, WITH LONG-TERM CURRENT USE OF INSULIN (HCC): Primary | ICD-10-CM

## 2025-08-06 DIAGNOSIS — L08.9 LEFT FOOT INFECTION: ICD-10-CM

## 2025-08-06 PROCEDURE — 99213 OFFICE O/P EST LOW 20 MIN: CPT | Performed by: NURSE PRACTITIONER

## 2025-08-06 PROCEDURE — 99212 OFFICE O/P EST SF 10 MIN: CPT | Performed by: NURSE PRACTITIONER

## 2025-08-06 PROCEDURE — 3079F DIAST BP 80-89 MM HG: CPT | Performed by: NURSE PRACTITIONER

## 2025-08-06 PROCEDURE — 3074F SYST BP LT 130 MM HG: CPT | Performed by: NURSE PRACTITIONER

## 2025-08-06 ASSESSMENT — ENCOUNTER SYMPTOMS
TINGLING: 0
SENSORY CHANGE: 0

## 2025-08-06 ASSESSMENT — FIBROSIS 4 INDEX: FIB4 SCORE: 0.92

## (undated) DEVICE — GLOVE BIOGEL PI INDICATOR SZ 7.0 SURGICAL PF LF - (50/BX 4BX/CA)

## (undated) DEVICE — BANDAGE ROLL STERILE BULKEE 4.5 IN X 4 YD (100EA/CA)

## (undated) DEVICE — SET LEADWIRE 5 LEAD BEDSIDE DISPOSABLE ECG (1SET OF 5/EA)

## (undated) DEVICE — DRAIN JACKSON PRATT 15FR - (10EA/CA)

## (undated) DEVICE — TUBING CLEARLINK DUO-VENT - C-FLO (48EA/CA)

## (undated) DEVICE — DRESSING VAC SIMPLACE MED - (10EA/CA)

## (undated) DEVICE — DRAPE IOBAN II INCISE 23X17 - (10EA/BX 4BX/CA)

## (undated) DEVICE — CANISTER SUCTION 3000ML MECHANICAL FILTER AUTO SHUTOFF MEDI-VAC NONSTERILE LF DISP (40EA/CA)

## (undated) DEVICE — STAPLER SKIN DISP - (6/BX 10BX/CA) VISISTAT

## (undated) DEVICE — LACTATED RINGERS INJ 1000 ML - (14EA/CA 60CA/PF)

## (undated) DEVICE — SET EXTENSION WITH 2 PORTS (48EA/CA) ***PART #2C8610 IS A SUBSTITUTE*****

## (undated) DEVICE — KIT SKIN PREP SURG. SOLUTION - DURAPREP (20 KT/CA)

## (undated) DEVICE — DRESSING PETROLEUM GAUZE 5 X 9" (50EA/BX 4BX/CA)"

## (undated) DEVICE — PACK LOWER EXTREMITY - (2/CA)

## (undated) DEVICE — ELECTRODE DUAL RETURN W/ CORD - (50/PK)

## (undated) DEVICE — SENSOR OXIMETER ADULT SPO2 RD SET (20EA/BX)

## (undated) DEVICE — CLOSURE SKIN STRIP 1/4 X 3 IN - (STERI STRIP) (50/BX)

## (undated) DEVICE — GLOVE BIOGEL INDICATOR SZ 7.5 SURGICAL PF LTX - (50PR/BX 4BX/CA)

## (undated) DEVICE — BLADE SAW 90X25X1.37MM SAGITTAL DUAL CUT

## (undated) DEVICE — GLOVE SZ 7.5 BIOGEL PI MICRO - PF LF (50PR/BX)

## (undated) DEVICE — SUCTION INSTRUMENT YANKAUER BULBOUS TIP W/O VENT (50EA/CA)

## (undated) DEVICE — SODIUM CHL IRRIGATION 0.9% 1000ML (12EA/CA)

## (undated) DEVICE — GLOVE BIOGEL PI ORTHO SZ 6 1/2 SURGICAL PF LF (40PR/BX)

## (undated) DEVICE — DRAPE U ORTHOPEDIC - (10/BX)

## (undated) DEVICE — CHLORAPREP 26 ML APPLICATOR - ORANGE TINT(25/CA)

## (undated) DEVICE — TOWELS CLOTH SURGICAL - (4/PK 20PK/CA)

## (undated) DEVICE — PACK UPPER EXTREMITY (2EA/CA)

## (undated) DEVICE — GLOVE BIOGEL PI INDICATOR SZ 7.5 SURGICAL PF LF -(50/BX 4BX/CA)

## (undated) DEVICE — GLOVE BIOGEL PI ORTHO SZ 7.5 PF LF (40PR/BX)

## (undated) DEVICE — PADDING CAST 4 IN STERILE - 4 X 4 YDS (24/CA)

## (undated) DEVICE — DRAPE LARGE 3 QUARTER - (20/CA)

## (undated) DEVICE — TRAY SRGPRP PVP IOD WT PRP - (20/CA)

## (undated) DEVICE — BANDAGE ELASTIC 6 HONEYCOMB - 6X5YD LF (20/CA)"

## (undated) DEVICE — DRAIN JACKSON PRATT 19FR - (10/CS)

## (undated) DEVICE — GLOVE BIOGEL PI INDICATOR SZ 8.0 SURGICAL PF LF -(50/BX 4BX/CA)

## (undated) DEVICE — PADDING CAST 6 IN STERILE - 6 X 4 YDS (24/CA)

## (undated) DEVICE — SUTURE 0 SILK TIES (36PK/BX)

## (undated) DEVICE — SUTURE 1 VICRYL PLUS CTX - 36 INCH (36/BX)

## (undated) DEVICE — SLEEVE VASO DVT COMPRESSION CALF MED - (10PR/CA)

## (undated) DEVICE — RESERVOIR SUCTION 100 CC - SILICONE (20EA/CA)

## (undated) DEVICE — SUTURE 3-0 ETHILON 3-0 PSLX 30 BLACK (36PK/BX)

## (undated) DEVICE — GOWN WARMING STANDARD FLEX - (30/CA)

## (undated) DEVICE — SUTURE GENERAL

## (undated) DEVICE — PAD LAP STERILE 18 X 18 - (5/PK 40PK/CA)

## (undated) DEVICE — DRAPE SURGICAL U 77X120 - (10/CA)

## (undated) DEVICE — BLADE SURGICAL #10 - (50/BX)

## (undated) DEVICE — SUTURE ABSORBABLE VIOLET PDS 2-0 CT-1 L18 IN (12EA/BX)

## (undated) DEVICE — BANDAGE ELASTIC 4 HONEYCOMB - 4"X5YD LF (20/CA)"

## (undated) DEVICE — COVER LIGHT HANDLE ALC PLUS DISP (18EA/BX)

## (undated) DEVICE — IMMOBILIZER KNEE 20 INCH - (1/EA)

## (undated) DEVICE — WRAP CO-FLEX 4IN X 5YD STERIL - SELF-ADHERENT (18/CA)

## (undated) DEVICE — GOWN SURGEONS X-LARGE - DISP. (30/CA)

## (undated) DEVICE — SUTURE 3-0 ETHILON PS-1 (36PK/BX)

## (undated) DEVICE — GLOVE BIOGEL SZ 7 SURGICAL PF LTX - (50PR/BX 4BX/CA)

## (undated) DEVICE — SUTURE 2-0 VICRYL PLUS CT-1 - 8 X 18 INCH(12/BX)

## (undated) DEVICE — CLOSURE SKIN STRIP 1/2 X 4 IN - (STERI STRIP) (50/BX 4BX/CA)

## (undated) DEVICE — BAG ISOLATION 20X20 INVISI - SHEILD (10EA/BX)

## (undated) DEVICE — SLEEVE, VASO, THIGH, MED